# Patient Record
Sex: FEMALE | Race: WHITE | Employment: FULL TIME | URBAN - METROPOLITAN AREA
[De-identification: names, ages, dates, MRNs, and addresses within clinical notes are randomized per-mention and may not be internally consistent; named-entity substitution may affect disease eponyms.]

---

## 2017-01-03 ENCOUNTER — TELEPHONE (OUTPATIENT)
Dept: PEDIATRICS | Facility: CLINIC | Age: 47
End: 2017-01-03

## 2017-01-03 NOTE — TELEPHONE ENCOUNTER
[facility] Call Center    Phone Message    Name of Caller: ANDIE from  [facility]    Phone Number: Other phone number: [phone].  Andie states she sent a message last week and never heard back from Dr Limon.  Needs order for home care.  Please call her back.

## 2017-01-03 NOTE — TELEPHONE ENCOUNTER
Called Andie, Home care RN.  She has orders for wound care, she needs the orders to reflect  RN visits for wound care 1 time per week for 7 weeks with 4 PRN visits.    Routing to Carmen Paez RN.     Olive Lam RN, [facility]

## 2017-01-03 NOTE — TELEPHONE ENCOUNTER
Called Andie, ok'd 1 time per week RN visits to change dressing for 7 weeks with 4 prn visits.  As long as patient is comfortable with doing her wound care daily, homecare can come out 1 time per week.  If patient is not comfortable they will call to get additional orders.  Andie stated patient is comfortable with her dressing changes as of now.    Will fax orders to be signed later.  Carmen Paez RN

## 2017-01-10 ENCOUNTER — OFFICE VISIT (OUTPATIENT)
Dept: PODIATRY | Facility: CLINIC | Age: 47
End: 2017-01-10
Payer: MEDICARE

## 2017-01-10 VITALS — DIASTOLIC BLOOD PRESSURE: 78 MMHG | SYSTOLIC BLOOD PRESSURE: 124 MMHG

## 2017-01-10 DIAGNOSIS — Z53.9 ERRONEOUS ENCOUNTER--DISREGARD: Primary | ICD-10-CM

## 2017-01-10 DIAGNOSIS — L97.422 ULCER OF HEEL AND MIDFOOT, LEFT, WITH FAT LAYER EXPOSED (H): Primary | ICD-10-CM

## 2017-01-10 PROCEDURE — 97597 DBRDMT OPN WND 1ST 20 CM/<: CPT | Performed by: PODIATRIST

## 2017-01-10 NOTE — PATIENT INSTRUCTIONS
Thanks for coming today.  Ortho/Sports Medicine Clinic  [address] [place] [address]    To schedule future appointments in Ortho Clinic, you may call [phone].    To schedule ordered imaging by your provider:   Call [facility] [phone]    To schedule an injection ordered by your provider:  Call [facility] Injection scheduling line: [phone]  [employer] available online at:  [email or web address]    Please call if any further questions or concerns ([phone]).  Clinic hours 8 am to 5 pm.    Return to clinic (call) if symptoms worsen or fail to improve.

## 2017-01-10 NOTE — PROGRESS NOTES
Past Medical History   Diagnosis Date     Tobacco use disorder 1/30/2012     Paraplegic immobility syndrome      Diarrhea      Abdominal pain      Patient Active Problem List   Diagnosis     Paraplegia (H)     Suprapubic catheter (H)     Gastrointestinal problem     CARDIOVASCULAR SCREENING; LDL GOAL LESS THAN 160     Dysmenorrhea     Neuropathic pain of flank     Neurogenic bladder     Endometriosis     IBS (irritable bowel syndrome)     Generalized anxiety disorder     Past Surgical History   Procedure Laterality Date     C spine fusion,poster,7-12 sgmts  2004     From T10 to L5     Cholecystectomy  1990's     Colonoscopy       Colonoscopy Left 11/25/2014     Procedure: COMBINED COLONOSCOPY, SINGLE OR MULTIPLE BIOPSY/POLYPECTOMY BY BIOPSY;  Surgeon: Junior Galeano MD;  Location:  GI     Esophagoscopy, gastroscopy, duodenoscopy (egd), combined N/A 11/25/2014     Procedure: COMBINED ESOPHAGOSCOPY, GASTROSCOPY, DUODENOSCOPY (EGD), BIOPSY SINGLE OR MULTIPLE;  Surgeon: Junior Galeano MD;  Location:  GI     Social History     Social History     Marital Status: Single     Spouse Name: N/A     Number of Children: N/A     Years of Education: N/A     Occupational History     Not on file.     Social History Main Topics     Smoking status: Former Smoker     Quit date: 03/18/2012     Smokeless tobacco: Never Used      Comment: 7 cig a day off and on socially     Alcohol Use: No     Drug Use: No     Sexual Activity: No     Other Topics Concern     Not on file     Social History Narrative     Family History   Problem Relation Age of Onset     Hypertension Father      Heart Failure Maternal Grandmother        SUBJECTIVE:  A 46-year-old female returns to clinic for ulcers, left heel, paronychia, left hallux.  She relates she is doing okay.  She feels it is doing better and she is using the Silvadene cream, cleaning it with MicroKlenz and putting on a dressing.  She got her PRAFO boot and that seems to help.   There is not much drainage.      OBJECTIVE:  DP and PT are 2/4 left.  She has some peripheral edema.  She has a left medial heel ulcer with full-thickness eschar that is about 3.5 x 5 cm with floresita margins.  There is no erythema, minimal drainage, no odor, no calor.  She has a lateral heel ulcer that is about 4.5 x 5.5 cm, full-thickness eschar with loosening, floresita margins.  There is some hyperkeratotic tissue buildup in the margins on both ulcer sites.  The lateral heel ulcer has some serosanguineous drainage.  No erythema, no odor, no calor.  Left hallux nail bed is intact.  No erythema, no drainage, no odor, no calor there.      ASSESSMENT AND PLAN:  Ulcers, left heel.  Diagnosis and treatment options discussed with her.  I am going to continue the Silvadene, clean with MicroKlenz and a dressing.  Continue with PRAFO boot and offloading.  She can d/c the wound care on the paronychia; that appears to be resolved.  She will return to clinic and see me in about 2 weeks.  I sharp debrided the margins of the eschars with a tissue cutter through the dermis.  No anesthesia needed.  Local wound care done upon consent today.

## 2017-01-10 NOTE — NURSING NOTE
"Priyanka Martinez's goals for this visit include: Recheck left foot.  She requests these members of her care team be copied on today's visit information: yes    PCP: Ayala Limon    Referring Provider:  No referring provider defined for this encounter.    Chief Complaint   Patient presents with     RECHECK     Recheck left foot ulcer.        Initial /78 mmHg Estimated body mass index is 22.24 kg/(m^2) as calculated from the following:    Height as of 12/17/16: 1.778 m (5' 10\").    Weight as of 12/17/16: 70.308 kg (155 lb).  BP completed using cuff size: large    "

## 2017-01-25 ENCOUNTER — OFFICE VISIT (OUTPATIENT)
Dept: PODIATRY | Facility: CLINIC | Age: 47
End: 2017-01-25
Payer: MEDICARE

## 2017-01-25 VITALS — SYSTOLIC BLOOD PRESSURE: 127 MMHG | DIASTOLIC BLOOD PRESSURE: 81 MMHG | HEART RATE: 102 BPM | OXYGEN SATURATION: 99 %

## 2017-01-25 DIAGNOSIS — L97.422 ULCER OF HEEL AND MIDFOOT, LEFT, WITH FAT LAYER EXPOSED (H): Primary | ICD-10-CM

## 2017-01-25 DIAGNOSIS — K58.9 IRRITABLE BOWEL SYNDROME: ICD-10-CM

## 2017-01-25 DIAGNOSIS — N94.6 DYSMENORRHEA: Primary | ICD-10-CM

## 2017-01-25 DIAGNOSIS — L60.0 INGROWING NAIL: ICD-10-CM

## 2017-01-25 PROCEDURE — 97597 DBRDMT OPN WND 1ST 20 CM/<: CPT | Performed by: PODIATRIST

## 2017-01-25 RX ORDER — BENZETHONIUM CHLORIDE
CLEANSER (ML) TOPICAL DAILY
Qty: 240 ML | Refills: 5 | Status: SHIPPED | OUTPATIENT
Start: 2017-01-25 | End: 2018-11-09

## 2017-01-25 NOTE — PROGRESS NOTES
Past Medical History   Diagnosis Date     Tobacco use disorder 1/30/2012     Paraplegic immobility syndrome      Diarrhea      Abdominal pain      Patient Active Problem List   Diagnosis     Paraplegia (H)     Suprapubic catheter (H)     Gastrointestinal problem     CARDIOVASCULAR SCREENING; LDL GOAL LESS THAN 160     Dysmenorrhea     Neuropathic pain of flank     Neurogenic bladder     Endometriosis     IBS (irritable bowel syndrome)     Generalized anxiety disorder     Past Surgical History   Procedure Laterality Date     C spine fusion,poster,7-12 sgmts  2004     From T10 to L5     Cholecystectomy  1990's     Colonoscopy       Colonoscopy Left 11/25/2014     Procedure: COMBINED COLONOSCOPY, SINGLE OR MULTIPLE BIOPSY/POLYPECTOMY BY BIOPSY;  Surgeon: Junior Galeano MD;  Location:  GI     Esophagoscopy, gastroscopy, duodenoscopy (egd), combined N/A 11/25/2014     Procedure: COMBINED ESOPHAGOSCOPY, GASTROSCOPY, DUODENOSCOPY (EGD), BIOPSY SINGLE OR MULTIPLE;  Surgeon: Junior Galeano MD;  Location:  GI     Social History     Social History     Marital Status: Single     Spouse Name: N/A     Number of Children: N/A     Years of Education: N/A     Occupational History     Not on file.     Social History Main Topics     Smoking status: Former Smoker     Quit date: 03/18/2012     Smokeless tobacco: Never Used      Comment: 7 cig a day off and on socially     Alcohol Use: No     Drug Use: No     Sexual Activity: No     Other Topics Concern     Not on file     Social History Narrative     Family History   Problem Relation Age of Onset     Hypertension Father      Heart Failure Maternal Grandmother        SUBJECTIVE FINDINGS:  46-year-old female returns to clinic for ulcers left heel and distal hallux.  Relates she is doing well.  It is draining a little bit but not a lot.  They are using the Silvadene and cleaning with MicroKlenz and that seems to be working well.  She is using an off-loading boot and Ace  wraps as well.  Presents in a wheelchair.      OBJECTIVE FINDINGS:  DP and PT 2/4 left.  She has minimal peripheral edema.  She has a left hallux scab present.  No erythema, no drainage, no odor, no calor there.  She has a left heel medial and lateral full-thickness eschar with loosening margins.  On the lateral heel there is a more proximal one with underlying skin intact after that is removed.  The medial heel ulcer is about 3.5 x 5 cm with loosening margins that have new skin margins underlying it when they are removed.  There is minimal drainage.  No erythema, no odor, no calor there.  She has a left lateral heel ulcer eschar that is about 4.5 x 5.5 cm, again with loosening margins with new skin underlying these margins.  It is floresita.  Minimal drainage.  No erythema, no odor, no calor there.      ASSESSMENT/PLAN:  Ulcers left heel.  This is healing well.  Diagnosis and treatment options discussed with patient.  Continue the treatment plan.  Continue cleaning with MicroKlenz, applying Silvadene and offloading.  Sharp ulcer debridement of the eschars and margins with #15 blade and tissue cutter through the dermis done today upon consent.  She will return to clinic and see me in 2 weeks.

## 2017-01-25 NOTE — NURSING NOTE
"Priyanka Martinez's goals for this visit include: Recheck left foot ulcer  She requests these members of her care team be copied on today's visit information: yes    PCP: Ayala Limon    Referring Provider:  No referring provider defined for this encounter.    Chief Complaint   Patient presents with     RECHECK     Recheck left foot ulcers       Initial /81 mmHg  Pulse 102  SpO2 99% Estimated body mass index is 22.24 kg/(m^2) as calculated from the following:    Height as of 12/17/16: 1.778 m (5' 10\").    Weight as of 12/17/16: 70.308 kg (155 lb).  BP completed using cuff size: regular    "

## 2017-01-25 NOTE — PATIENT INSTRUCTIONS
Thanks for coming today.  Ortho/Sports Medicine Clinic  [address] [place] [address]    To schedule future appointments in Ortho Clinic, you may call [phone].    To schedule ordered imaging by your Provider: Call [facility] at [phone]    [employer] available online at:   [email or web address]    Please call if any further questions or concerns [phone] and ask for the Orthopedic Department. Clinic hours 8 am to 5 pm.    Return to clinic if symptoms worsen.

## 2017-01-28 ENCOUNTER — DOCUMENTATION ONLY (OUTPATIENT)
Dept: CARE COORDINATION | Facility: CLINIC | Age: 47
End: 2017-01-28

## 2017-01-28 NOTE — PROGRESS NOTES
[facility] now requests orders and shares plan of care/discharge summaries for some patients through [employer].  Please REPLY TO THIS MESSAGE in order to give authorization for orders when needed.  This is considered a verbal order, you will still receive a faxed copy of orders for signature.  Thank you for your assistance in improving collaboration for our patients.      DISCHARGE SUMMARY  Goals met   Patient/Caregiver will verbalize s/s to report to Home Care clinician or physician.   Patient will demonstrate ability to maintain safety in home environment without injury/falls.   Patient will demonstrate ability to maintain condition in the home without hospitalization, ER visit, or unplanned physicians visit.   Patient will   demonstrate knowledge of disease process, treatment goals and self-care management.   Patient will demonstrate stable physiological status within normal limits for patient.   Patient/caregiver will demonstrate effective disease management practices.   Patient will achieve adequate symptom control through use of medications or other therapies/treatments.   Patient will remain free of S/S of infection.   Patient will demonstrate compliance with treatment plan, diet, meds, exercise, other.   Patient/caregiver will verbalize appropriate measures for managing changes in body image/lifestyle.   Patient/caregiver will verbalize community resources available and how to contact them.    Pt was discharged from [facility] on 1/27/17. Patient status has improved with education from . Discharge instructions given to Patient. Pt is following up with Dr Rojas for wound care every 2 weeks at the [facility]. Pt is performing her own wound care daily. Pt has an unstageable pressure ulcer to her left medial ankle that measures 4.1 cm X 2.9 cm, wound bed is entirely covered with black eschar. Pt has an unstageable pressure ulcer to her left lateral ankle that measures  4.1 cm X 3.4 cm, wound bed is entirely covered with black eschar. Pt has a Stage 3 pressure ulcer to her left great toe that measures 0.9 cm X 0.2 cm, wound bed is covered with pink/red tissue. None of the areas appear infected, no redness, warmth, and no drainage. Surrounding tissue to each wound is pink. Pt is cleansing the wounds daily with ns and covering the eschar areas with Silvedene and gauze and wrapping with Kerlix. The stage 3 ulcer is just covered with gauze and kerlix, no medication to this area. At the [facility], pt also had an unstageable pressure ulcer to her left lateral ankle that was superior to the other ulcer listed that has healed.  Medication instruction\schedule reviewed with pt. Pt is ind with all of her own medications. Pt is knowledgable about her medications and has no questions or concerns about them.  Activity guidelines/restrictions discussed with pt. Pt is a paraplegic and lives alone. Pt has a ramp on the front of her home. Pt is ind with all w/c mobiltiy. Pt is able to transfer herself from w/c to bed or chair. Pt has had no falls or visits to the ER recently. Pt has a suprapubic catheter that she changes independently every month. Urine is clear and yellow. Pt has a bowel protocol that she follows every 3 days to try to be as continent at possible. Pt denies any constipation or diarrhea. Pt cooks all of her own meals. Pt reports that her appetite is good. Pt is ind with dressing/grooming and showering. Pt has a shower bench in her bathroom and a hand held shower sprayer.       Follow up with Dr Rojas in 2 weeks and PCP as needed.  Physician notified of discharge via [email or web address])

## 2017-01-30 RX ORDER — DICYCLOMINE HCL 20 MG
TABLET ORAL
Qty: 360 TABLET | Refills: 0 | Status: SHIPPED | OUTPATIENT
Start: 2017-01-30 | End: 2017-02-13

## 2017-01-30 RX ORDER — MEDROXYPROGESTERONE ACETATE 10 MG
TABLET ORAL
Qty: 135 TABLET | Refills: 0 | Status: SHIPPED | OUTPATIENT
Start: 2017-01-30 | End: 2017-02-13

## 2017-01-30 NOTE — TELEPHONE ENCOUNTER
Prescription approved per [facility] Refill Protocol.  Jaja refill provided to patient for Bentyl and Provera.  30 day supply provided to patient.  Assisted patient with scheduling annual follow up with Dr. Limon.

## 2017-01-31 ENCOUNTER — MYC MEDICAL ADVICE (OUTPATIENT)
Dept: HEALTH INFORMATION MANAGEMENT | Facility: CLINIC | Age: 47
End: 2017-01-31

## 2017-02-07 ENCOUNTER — OFFICE VISIT (OUTPATIENT)
Dept: PODIATRY | Facility: CLINIC | Age: 47
End: 2017-02-07
Payer: MEDICARE

## 2017-02-07 VITALS — OXYGEN SATURATION: 98 % | DIASTOLIC BLOOD PRESSURE: 80 MMHG | HEART RATE: 101 BPM | SYSTOLIC BLOOD PRESSURE: 136 MMHG

## 2017-02-07 DIAGNOSIS — L97.422 ULCER OF HEEL AND MIDFOOT, LEFT, WITH FAT LAYER EXPOSED (H): Primary | ICD-10-CM

## 2017-02-07 PROCEDURE — 97597 DBRDMT OPN WND 1ST 20 CM/<: CPT | Performed by: PODIATRIST

## 2017-02-07 NOTE — PROGRESS NOTES
Past Medical History   Diagnosis Date     Tobacco use disorder 1/30/2012     Paraplegic immobility syndrome      Diarrhea      Abdominal pain      Patient Active Problem List   Diagnosis     Paraplegia (H)     Suprapubic catheter (H)     Gastrointestinal problem     CARDIOVASCULAR SCREENING; LDL GOAL LESS THAN 160     Dysmenorrhea     Neuropathic pain of flank     Neurogenic bladder     Endometriosis     IBS (irritable bowel syndrome)     Generalized anxiety disorder     Past Surgical History   Procedure Laterality Date     C spine fusion,poster,7-12 sgmts  2004     From T10 to L5     Cholecystectomy  1990's     Colonoscopy       Colonoscopy Left 11/25/2014     Procedure: COMBINED COLONOSCOPY, SINGLE OR MULTIPLE BIOPSY/POLYPECTOMY BY BIOPSY;  Surgeon: Junior Galeano MD;  Location:  GI     Esophagoscopy, gastroscopy, duodenoscopy (egd), combined N/A 11/25/2014     Procedure: COMBINED ESOPHAGOSCOPY, GASTROSCOPY, DUODENOSCOPY (EGD), BIOPSY SINGLE OR MULTIPLE;  Surgeon: Junior Galeano MD;  Location:  GI     Social History     Social History     Marital Status: Single     Spouse Name: N/A     Number of Children: N/A     Years of Education: N/A     Occupational History     Not on file.     Social History Main Topics     Smoking status: Former Smoker     Quit date: 03/18/2012     Smokeless tobacco: Never Used      Comment: 7 cig a day off and on socially     Alcohol Use: No     Drug Use: No     Sexual Activity: No     Other Topics Concern     Not on file     Social History Narrative     Family History   Problem Relation Age of Onset     Hypertension Father      Heart Failure Maternal Grandmother        SUBJECTIVE FINDINGS:  46-year-old female returns to clinic for ulcers left heel and left hallux.  Relates it is doing well.  Seems to be floresita.  They are using the Silvadene and cleaning with MicroKlenz.  She is using the off-loading boot.      OBJECTIVE FINDINGS:  DP and PT 2/4 left.  She has some  peripheral edema.  She has a left medial heel ulcer with full-thickness eschar.  It is about 5 x 3 cm at its widest margin.  It is floresita.  There is some loosening of eschar, mostly on the margins.  No erythema.  Minimal drainage.  No odor, no calor.  No local edema.  She has a left lateral heel ulcer that is about 5 x 4.5 cm at its widest margins.  It is full-thickness eschar.  Margins are floresita.  There is some loosening of the eschar, mostly at the margins.  Minimal drainage.  No erythema, no odor, no calor.  No gross edema.       ASSESSMENT/PLAN:  Ulcers left heel.  Diagnosis and treatment options discussed with patient.  This is improving.  Sharp ulcer through the dermis with a tissue cutter and #15 blade.  No anesthesia needed.  Local wound care done upon consent today.  It was clean upon debridement.  Continue the Silvadene cream and dressing changes.  Wound cares.  She will return to clinic and see me in 2 weeks.

## 2017-02-07 NOTE — NURSING NOTE
"Priyanka Martinez's goals for this visit include: Left foot recheck  She requests these members of her care team be copied on today's visit information: yes    PCP: Ayala Limon    Referring Provider:  No referring provider defined for this encounter.    Chief Complaint   Patient presents with     RECHECK     Left foot recheck       Initial /80 mmHg  Pulse 101  SpO2 98% Estimated body mass index is 22.24 kg/(m^2) as calculated from the following:    Height as of 12/17/16: 1.778 m (5' 10\").    Weight as of 12/17/16: 70.308 kg (155 lb).  Medication Reconciliation: complete    "

## 2017-02-07 NOTE — PATIENT INSTRUCTIONS
Thanks for coming today.  Ortho/Sports Medicine Clinic  [address] [place] [address]    To schedule future appointments in Ortho Clinic, you may call [phone].    To schedule ordered imaging by your Provider: Call [facility] at [phone]    [employer] available online at:   [email or web address]    Please call if any further questions or concerns [phone] and ask for the Orthopedic Department. Clinic hours 8 am to 5 pm.    Return to clinic if symptoms worsen.

## 2017-02-13 ENCOUNTER — OFFICE VISIT (OUTPATIENT)
Dept: PEDIATRICS | Facility: CLINIC | Age: 47
End: 2017-02-13
Payer: MEDICARE

## 2017-02-13 VITALS
SYSTOLIC BLOOD PRESSURE: 116 MMHG | OXYGEN SATURATION: 97 % | HEART RATE: 88 BPM | DIASTOLIC BLOOD PRESSURE: 74 MMHG | TEMPERATURE: 97.9 F

## 2017-02-13 DIAGNOSIS — K58.0 IRRITABLE BOWEL SYNDROME WITH DIARRHEA: ICD-10-CM

## 2017-02-13 DIAGNOSIS — Z00.00 MEDICARE ANNUAL WELLNESS VISIT, SUBSEQUENT: Primary | ICD-10-CM

## 2017-02-13 DIAGNOSIS — N94.6 DYSMENORRHEA: ICD-10-CM

## 2017-02-13 DIAGNOSIS — F32.5 MAJOR DEPRESSION IN COMPLETE REMISSION (H): ICD-10-CM

## 2017-02-13 DIAGNOSIS — N31.9 NEUROGENIC BLADDER: ICD-10-CM

## 2017-02-13 DIAGNOSIS — F41.9 ANXIETY: ICD-10-CM

## 2017-02-13 DIAGNOSIS — R82.90 ABNORMAL URINE ODOR: ICD-10-CM

## 2017-02-13 PROCEDURE — G0439 PPPS, SUBSEQ VISIT: HCPCS | Performed by: INTERNAL MEDICINE

## 2017-02-13 RX ORDER — MEDROXYPROGESTERONE ACETATE 10 MG
TABLET ORAL
Qty: 135 TABLET | Refills: 3 | Status: SHIPPED
Start: 2017-02-13 | End: 2018-02-19

## 2017-02-13 RX ORDER — OXYBUTYNIN CHLORIDE 5 MG/1
5 TABLET ORAL 2 TIMES DAILY
Qty: 180 TABLET | Refills: 3 | Status: SHIPPED
Start: 2017-02-13 | End: 2018-02-19

## 2017-02-13 RX ORDER — DICYCLOMINE HCL 20 MG
20 TABLET ORAL 2 TIMES DAILY
Qty: 180 TABLET | Refills: 3 | Status: SHIPPED
Start: 2017-02-13 | End: 2018-02-19

## 2017-02-13 NOTE — NURSING NOTE
"Chief Complaint   Patient presents with     Wellness Visit       Initial /74 (Cuff Size: Adult Regular)  Pulse 88  Temp 97.9  F (36.6  C) (Temporal)  SpO2 97% Estimated body mass index is 22.24 kg/(m^2) as calculated from the following:    Height as of 12/17/16: 5' 10\" (1.778 m).    Weight as of 12/17/16: 155 lb (70.3 kg).  Medication Reconciliation: unable or not appropriate to perform height and weight    "

## 2017-02-13 NOTE — PATIENT INSTRUCTIONS
Drop off urine sample when you get it at home.       Preventive Health Recommendations    Female Ages 65 +    Yearly exam:     See your health care provider every year in order to  o Review health changes.   o Discuss preventive care.    o Review your medicines if your doctor has prescribed any.      You no longer need a yearly Pap test unless you've had an abnormal Pap test in the past 10 years. If you have vaginal symptoms, such as bleeding or discharge, be sure to talk with your provider about a Pap test.      Every 1 to 2 years, have a mammogram.  If you are over 69, talk with your health care provider about whether or not you want to continue having screening mammograms.      Every 10 years, have a colonoscopy. Or, have a yearly FIT test (stool test). These exams will check for colon cancer.       Have a cholesterol test every 5 years, or more often if your doctor advises it.       Have a diabetes test (fasting glucose) every three years. If you are at risk for diabetes, you should have this test more often.       At age 65, have a bone density scan (DEXA) to check for osteoporosis (brittle bone disease).    Shots:    Get a flu shot each year.    Get a tetanus shot every 10 years.    Talk to your doctor about your pneumonia vaccines. There are now two you should receive - Pneumovax (PPSV 23) and Prevnar (PCV 13).    Talk to your doctor about the shingles vaccine.    Talk to your doctor about the hepatitis B vaccine.    Nutrition:     Eat at least 5 servings of fruits and vegetables each day.      Eat whole-grain bread, whole-wheat pasta and brown rice instead of white grains and rice.      Talk to your provider about Calcium and Vitamin D.     Lifestyle    Exercise at least 150 minutes a week (30 minutes a day, 5 days a week). This will help you control your weight and prevent disease.      Limit alcohol to one drink per day.      No smoking.       Wear sunscreen to prevent skin cancer.       See your dentist  twice a year for an exam and cleaning.      See your eye doctor every 1 to 2 years to screen for conditions such as glaucoma, macular degeneration and cataracts.

## 2017-02-13 NOTE — PROGRESS NOTES
SUBJECTIVE:                                                            Priyanka Martinez is a 46 year old female who presents for Preventive Visit.    Has suprapubic catheter. Urine smells funny for a week.  Depression/anxiety well controlled with fluoxetine    Last year had left tibial fx and subsequent ulcer. It is finally healing. Seeing podiatrist every other week for wound care.   Are you in the first 12 months of your Medicare Part B coverage?  No    Healthy Habits:    Do you get at least three servings of calcium containing foods daily (dairy, green leafy vegetables, etc.)? yes    Amount of exercise or daily activities, outside of work: daily day(s) per week    Problems taking medications regularly No    Medication side effects: No    Have you had an eye exam in the past two years? no    Do you see a dentist twice per year? no    Do you have sleep apnea, excessive snoring or daytime drowsiness?no    COGNITIVE SCREEN  1) Repeat 3 items (Banana, Sunrise, Chair)    2) Clock draw: NORMAL  3) 3 item recall: Recalls 1 objects  Results: 1 items recalled: history of TBI. Normal clock draw.     Mini-CogTM Copyright S Oscar. Licensed by the author for use in [facility]; reprinted with permission ([email or web address]@[email or web address]). All rights reserved.          PROBLEMS TO ADD ON...  Urinary concerns, infections    All Histories reviewed and updated in EPIC as appropriate.  Social History   Substance Use Topics     Smoking status: Former Smoker     Quit date: 3/18/2012     Smokeless tobacco: Never Used      Comment: 7 cig a day off and on socially     Alcohol use No       The patient does not drink >3 drinks per day nor >7 drinks per week.    Today's PHQ-2 Score:   PHQ-2 ( 1999 Pf[employer]) 12/7/2015 5/11/2015   Q1: Little interest or pleasure in doing things - 0   Q2: Feeling down, depressed or hopeless - 0   PHQ-2 Score - 0   Little interest or pleasure in doing things Not at all -   Feeling down, depressed or hopeless Not at  "all -   PHQ-2 Score 0 -       Do you feel safe in your environment - Yes    Do you have a Health Care Directive?: Yes: Patient states has Advance Directive and will bring in a copy to clinic.    Current providers sharing in care for this patient include:   Patient Care Team:  Ayala Limon MD as PCP - General (Internal Medicine)      Hearing impairment: No    Ability to successfully perform activities of daily living: Yes, no assistance needed     Fall risk:       Home safety:  none identified      The following health maintenance items are reviewed in Epic and correct as of today:  Health Maintenance   Topic Date Due     DEPRESSION ACTION PLAN Q1 YR (NO INBASKET)  1970     PHQ-9 Q6 MONTHS (NO INBASKET)  08/03/2014     LIPID MONITORING Q5 YEARS (NO INBASKET)  12/08/2020     TETANUS IMMUNIZATION (SYSTEM ASSIGNED)  12/08/2025     INFLUENZA VACCINE (SYSTEM ASSIGNED)  Addressed         Mammogram Screening: Mammo discussed, not appropriate for or declined by this patient.     ROS:  Constitutional, HEENT, cardiovascular, pulmonary, gi and gu systems are negative, except as otherwise noted.    Problem list, Medication list, Allergies, and Medical/Social/Surgical histories reviewed in [facility] and updated as appropriate.  OBJECTIVE:                                                            /74 (Cuff Size: Adult Regular)  Pulse 88  Temp 97.9  F (36.6  C) (Temporal)  SpO2 97% Estimated body mass index is 22.24 kg/(m^2) as calculated from the following:    Height as of 12/17/16: 5' 10\" (1.778 m).    Weight as of 12/17/16: 155 lb (70.3 kg).  EXAM:   GENERAL: healthy, alert and no distress  EYES: Eyes grossly normal to inspection, PERRL and conjunctivae and sclerae normal  HENT: ear canals and TM's normal, nose and mouth without ulcers or lesions  NECK: no adenopathy, no asymmetry, masses, or scars and thyroid normal to palpation  RESP: lungs clear to auscultation - no rales, rhonchi or wheezes  BREAST: normal without " "masses, tenderness or nipple discharge and no palpable axillary masses or adenopathy  CV: regular rate and rhythm, normal S1 S2, no S3 or S4, no murmur, click or rub, no peripheral edema and peripheral pulses strong  ABDOMEN: soft, nontender, no hepatosplenomegaly, no masses and bowel sounds normal  MS: no gross musculoskeletal defects noted, no edema  SKIN: no suspicious lesions or rashes  NEURO: Normal strength and tone, mentation intact and speech normal  PSYCH: mentation appears normal, affect normal/bright    ASSESSMENT / PLAN:                                                                ICD-10-CM    1. Medicare annual wellness visit, subsequent Z00.00    2. Abnormal urine odor R82.90 UA with Microscopic reflex to Culture ([place])   3. Irritable bowel syndrome with diarrhea K58.0 dicyclomine (BENTYL) 20 MG tablet   4. Neurogenic bladder N31.9 oxybutynin (DITROPAN) 5 MG tablet   5. Dysmenorrhea N94.6 medroxyPROGESTERone (PROVERA) 10 MG tablet   6. Anxiety F41.9    7. Major depression in complete remission (H) F32.5      -- stable chronic health issues. Medications refilled.    -- urine odor: pt will get urine sample at home and send it in.     End of Life Planning:  Patient currently has an advanced directive: No.  I have verified the patient's ablity to prepare an advanced directive/make health care decisions.  Literature was provided to assist patient in preparing an advanced directive.    COUNSELING:  Reviewed preventive health counseling, as reflected in patient instructions        Estimated body mass index is 22.24 kg/(m^2) as calculated from the following:    Height as of 12/17/16: 5' 10\" (1.778 m).    Weight as of 12/17/16: 155 lb (70.3 kg).  Weight management plan: not checking weight, wheelchair dependent   reports that she quit smoking about 4 years ago. She has never used smokeless tobacco.      Appropriate preventive services were discussed with this patient, including applicable screening as " appropriate for cardiovascular disease, diabetes, osteopenia/osteoporosis, and glaucoma.  As appropriate for age/gender, discussed screening for colorectal cancer, prostate cancer, breast cancer, and cervical cancer. Checklist reviewing preventive services available has been given to the patient.    Reviewed patients plan of care and provided an AVS. The Basic Care Plan (routine screening as documented in Health Maintenance) for Priyanka meets the Care Plan requirement. This Care Plan has been established and reviewed with the Patient.    Counseling Resources:  ATP IV Guidelines  Pooled Cohorts Equation Calculator  Breast Cancer Risk Calculator  FRAX Risk Assessment  ICSI Preventive Guidelines  Dietary Guidelines for Americans, 2010  USDA's MyPlate  ASA Prophylaxis  Lung CA Screening    Ayala Liomn MD, MD  [facility]

## 2017-02-13 NOTE — LETTER
My Depression Action Plan  Name: Priyanka Martinez   Date of Birth 1970  Date: 2/13/2017    My doctor: Ayala Limon   My clinic: [address] [address] [phone]          GREEN    ZONE   Good Control    What it looks like:     Things are going generally well. You have normal up s and down s. You may even feel depressed from time to time, but bad moods usually last less than a day.   What you need to do:  1. Continue to care for yourself (see self care plan)  2. Check your depression survival kit and update it as needed  3. Follow your physician s recommendations including any medication.  4. Do not stop taking medication unless you consult with your physician first.           YELLOW         ZONE Getting Worse    What it looks like:     Depression is starting to interfere with your life.     It may be hard to get out of bed; you may be starting to isolate yourself from others.    Symptoms of depression are starting to last most all day and this has happened for several days.     You may have suicidal thoughts but they are not constant.   What you need to do:     1. Call your care team, your response to treatment will improve if you keep your care team informed of your progress. Yellow periods are signs an adjustment may need to be made.     2. Continue your self-care, even if you have to fake it!    3. Talk to someone in your support network    4. Open up your depression survival kit           RED    ZONE Medical Alert - Get Help    What it looks like:     Depression is seriously interfering with your life.     You may experience these or other symptoms: You can t get out of bed most days, can t work or engage in other necessary activities, you have trouble taking care of basic hygiene, or basic responsibilities, thoughts of suicide or death that will not go away, self-injurious behavior.     What you need to do:  1. Call your care team and  request a same-day appointment. If they are not available (weekends or after hours) call your local crisis line, emergency room or 911.      Electronically signed by: Ayala Limon MD, February 13, 2017    Depression Self Care Plan / Survival Kit    Self-Care for Depression  Here s the deal. Your body and mind are really not as separate as most people think.  What you do and think affects how you feel and how you feel influences what you do and think. This means if you do things that people who feel good do, it will help you feel better.  Sometimes this is all it takes.  There is also a place for medication and therapy depending on how severe your depression is, so be sure to consult with your medical provider and/ or Behavioral Health Consultant if your symptoms are worsening or not improving.     In order to better manage my stress, I will:    Exercise  Get some form of exercise, every day. This will help reduce pain and release endorphins, the  feel good  chemicals in your brain. This is almost as good as taking antidepressants!  This is not the same as joining a gym and then never going! (they count on that by the way ) It can be as simple as just going for a walk or doing some gardening, anything that will get you moving.      Hygiene   Maintain good hygiene (Get out of bed in the morning, Make your bed, Brush your teeth, Take a shower, and Get dressed like you were going to work, even if you are unemployed).  If your clothes don't fit try to get ones that do.    Diet  I will strive to eat foods that are good for me, drink plenty of water, and avoid excessive sugar, caffeine, alcohol, and other mood-altering substances.  Some foods that are helpful in depression are: complex carbohydrates, B vitamins, flaxseed, fish or fish oil, fresh fruits and vegetables.    Psychotherapy  I agree to participate in Individual Therapy (if recommended).    Medication  If prescribed medications, I agree to take them.  Missing doses  can result in serious side effects.  I understand that drinking alcohol, or other illicit drug use, may cause potential side effects.  I will not stop my medication abruptly without first discussing it with my provider.    Staying Connected With Others  I will stay in touch with my friends, family members, and my primary care provider/team.    Use your imagination  Be creative.  We all have a creative side; it doesn t matter if it s oil painting, sand castles, or mud pies! This will also kick up the endorphins.    Witness Beauty  (AKA stop and smell the roses) Take a look outside, even in mid-winter. Notice colors, textures. Watch the squirrels and birds.     Service to others  Be of service to others.  There is always someone else in need.  By helping others we can  get out of ourselves  and remember the really important things.  This also provides opportunities for practicing all the other parts of the program.    Humor  Laugh and be silly!  Adjust your TV habits for less news and crime-drama and more comedy.    Control your stress  Try breathing deep, massage therapy, biofeedback, and meditation. Find time to relax each day.     My support system    Clinic Contact:  Phone number:    Contact 1:  Phone number:    Contact 2:  Phone number:    [employer]/:  Phone number:    Therapist:  Phone number:    Local crisis center:    Phone number:    Other community support:  Phone number:

## 2017-02-13 NOTE — MR AVS SNAPSHOT
After Visit Summary   2/13/2017    Priyanka Martinez    MRN: [id number]           Patient Information     Date Of Birth          1970        Visit Information        Provider Department      2/13/2017 3:00 PM Ayala Limon MD [facility]        Today's Diagnoses     Medicare annual wellness visit, subsequent    -  1    Abnormal urine odor        Irritable bowel syndrome with diarrhea        Neurogenic bladder        Dysmenorrhea        Anxiety        Major depression in complete remission (H)          Care Instructions    Drop off urine sample when you get it at home.       Preventive Health Recommendations    Female Ages 65 +    Yearly exam:     See your health care provider every year in order to  o Review health changes.   o Discuss preventive care.    o Review your medicines if your doctor has prescribed any.      You no longer need a yearly Pap test unless you've had an abnormal Pap test in the past 10 years. If you have vaginal symptoms, such as bleeding or discharge, be sure to talk with your provider about a Pap test.      Every 1 to 2 years, have a mammogram.  If you are over 69, talk with your health care provider about whether or not you want to continue having screening mammograms.      Every 10 years, have a colonoscopy. Or, have a yearly FIT test (stool test). These exams will check for colon cancer.       Have a cholesterol test every 5 years, or more often if your doctor advises it.       Have a diabetes test (fasting glucose) every three years. If you are at risk for diabetes, you should have this test more often.       At age 65, have a bone density scan (DEXA) to check for osteoporosis (brittle bone disease).    Shots:    Get a flu shot each year.    Get a tetanus shot every 10 years.    Talk to your doctor about your pneumonia vaccines. There are now two you should receive - Pneumovax (PPSV 23) and Prevnar (PCV 13).    Talk to your doctor about the shingles  vaccine.    Talk to your doctor about the hepatitis B vaccine.    Nutrition:     Eat at least 5 servings of fruits and vegetables each day.      Eat whole-grain bread, whole-wheat pasta and brown rice instead of white grains and rice.      Talk to your provider about Calcium and Vitamin D.     Lifestyle    Exercise at least 150 minutes a week (30 minutes a day, 5 days a week). This will help you control your weight and prevent disease.      Limit alcohol to one drink per day.      No smoking.       Wear sunscreen to prevent skin cancer.       See your dentist twice a year for an exam and cleaning.      See your eye doctor every 1 to 2 years to screen for conditions such as glaucoma, macular degeneration and cataracts.        Follow-ups after your visit        Your next 10 appointments already scheduled     Feb 21, 2017  7:00 AM CST   Return Visit [facility] Y[facility] Rojas DPM   [facility]    [address] [place] [id number] [phone]              Future tests that were ordered for you today     Open Future Orders        Priority Expected Expires Ordered    UA with Microscopic reflex to Culture ([place]) Routine  2/13/2018 2/13/2017            Who to contact     If you have questions or need follow up information about today's clinic visit or your schedule please contact [facility] directly at [phone].  Normal or non-critical lab and imaging results will be communicated to you by MyChart, letter or phone within 4 business days after the clinic has received the results. If you do not hear from us within 7 days, please contact the clinic through MyChart or phone. If you have a critical or abnormal lab result, we will notify you by phone as soon as possible.  Submit refill requests through [employer] or call your pharmacy and they will forward the refill request to us. Please allow 3 business days for your refill to be completed.           Additional Information About Your Visit        [facility] Information     [employer] gives you secure access to your electronic health record. If you see a primary care provider, you can also send messages to your care team and make appointments. If you have questions, please call your primary care clinic.  If you do not have a primary care provider, please call [phone] and they will assist you.      [employer] is an electronic gateway that provides easy, online access to your medical records. With [employer], you can request a clinic appointment, read your test results, renew a prescription or communicate with your care team.     To access your existing account, please contact your [facility] or call [phone] for assistance.        Care EveryWhere ID     This is your Care EveryWhere ID. This could be used by other organizations to access your [facility] medical records  [id number]        Your Vitals Were     Pulse Temperature Pulse Oximetry             88 97.9  F (36.6  C) (Temporal) 97%          Blood Pressure from Last 3 Encounters:   02/13/17 116/74   02/07/17 136/80   01/25/17 127/81    Weight from Last 3 Encounters:   12/17/16 155 lb (70.3 kg)   12/15/16 150 lb (68 kg)   11/14/16 150 lb (68 kg)              We Performed the Following     DEPRESSION ACTION PLAN (DAP)          Today's Medication Changes          These changes are accurate as of: 2/13/17  3:30 PM.  If you have any questions, ask your nurse or doctor.               These medicines have changed or have updated prescriptions.        Dose/Directions    dicyclomine 20 MG tablet   Commonly known as:  BENTYL   This may have changed:  See the new instructions.   Used for:  Irritable bowel syndrome with diarrhea   Changed by:  Ayala Limon MD        Dose:  20 mg   Take 1 tablet (20 mg) by mouth 2 times daily   Quantity:  180 tablet   Refills:  3       medroxyPROGESTERone 10 MG tablet   Commonly known as:  PROVERA   This may  have changed:  See the new instructions.   Used for:  Dysmenorrhea   Changed by:  Ayala Limon MD        TAKE 1 AND 1/2 TABLETS EVERY DAY   Quantity:  135 tablet   Refills:  3       oxybutynin 5 MG tablet   Commonly known as:  DITROPAN   This may have changed:  when to take this   Used for:  Neurogenic bladder   Changed by:  Ayala Limon MD        Dose:  5 mg   Take 1 tablet (5 mg) by mouth 2 times daily   Quantity:  180 tablet   Refills:  3            Where to get your medicines      These medications were sent to [facility]  [address] [facility] [address]    Hours:  Mail Order Phone:  [phone]     dicyclomine 20 MG tablet    medroxyPROGESTERone 10 MG tablet    oxybutynin 5 MG tablet                Primary Care Provider Office Phone # Fax #    Ayala Limon [phone]       [place] [address]  [facility] [address]        Thank you!     Thank you for choosing [facility]  for your care. Our goal is always to provide you with excellent care. Hearing back from our patients is one way we can continue to improve our services. Please take a few minutes to complete the written survey that you may receive in the mail after your visit with us. Thank you!             Your Updated Medication List - Protect others around you: Learn how to safely use, store and throw away your medicines at www.disposemymeds.org.          This list is accurate as of: 2/13/17  3:30 PM.  Always use your most recent med list.                   Brand Name Dispense Instructions for use    bisacodyl 10 MG Suppository    DULCOLAX    25 suppository    Place 1 suppository (10 mg) rectally daily as needed for constipation       Catheters Misc     1 each    1 catheter every 28 days.       CRANBERRY CONCENTRATE 500 MG Caps   Generic drug:  Cranberry     90 capsule    Take 8,400 mg by mouth       dicyclomine 20 MG tablet    BENTYL    180 tablet    Take 1 tablet (20 mg) by mouth 2  times daily       FLUoxetine 20 MG capsule    PROzac    90 capsule    TAKE 1 CAPSULE EVERY DAY       gabapentin 400 MG capsule    NEURONTIN    270 capsule    TAKE 1 CAPSULE THREE TIMES DAILY       GLUTATHIONE PO      Take 175 mg by mouth 2 times daily       loperamide 2 MG capsule    IMODIUM     Take 2 mg by mouth 4 times daily as needed for diarrhea       medroxyPROGESTERone 10 MG tablet    PROVERA    135 tablet    TAKE 1 AND 1/2 TABLETS EVERY DAY       MICROKLENZ WOUND CLEANSER Liqd     240 mL    Externally apply topically daily For daily wound cares.       * order for DME     2 Units    Equipment being ordered: Anchoring Device Flexi-Track LG       * order for DME     1 Units    Equipment being ordered: Insertion Tray Wheeler w/BZK Swab Catheter 10CC, Sterile       * order for DME     2 Units    Equipment being ordered: Drain Bag, Kenguard 2000ml. Urinary Bag with Anti-Refulx       * order for DME     1 Units    Equipment being ordered: Catheter Wheeler 2Way 30cc 18FR, Silicone Coated Latex       order for DME     1 Units    Equipment being ordered: walking boot left       oxybutynin 5 MG tablet    DITROPAN    180 tablet    Take 1 tablet (5 mg) by mouth 2 times daily       * Notice:  This list has 4 medication(s) that are the same as other medications prescribed for you. Read the directions carefully, and ask your doctor or other care provider to review them with you.

## 2017-02-17 ASSESSMENT — ANXIETY QUESTIONNAIRES
6. BECOMING EASILY ANNOYED OR IRRITABLE: SEVERAL DAYS
7. FEELING AFRAID AS IF SOMETHING AWFUL MIGHT HAPPEN: NOT AT ALL
GAD7 TOTAL SCORE: 1
5. BEING SO RESTLESS THAT IT IS HARD TO SIT STILL: NOT AT ALL
1. FEELING NERVOUS, ANXIOUS, OR ON EDGE: NOT AT ALL
2. NOT BEING ABLE TO STOP OR CONTROL WORRYING: NOT AT ALL
3. WORRYING TOO MUCH ABOUT DIFFERENT THINGS: NOT AT ALL

## 2017-02-17 ASSESSMENT — PATIENT HEALTH QUESTIONNAIRE - PHQ9: 5. POOR APPETITE OR OVEREATING: NOT AT ALL

## 2017-02-18 ASSESSMENT — ANXIETY QUESTIONNAIRES: GAD7 TOTAL SCORE: 1

## 2017-02-18 ASSESSMENT — PATIENT HEALTH QUESTIONNAIRE - PHQ9: SUM OF ALL RESPONSES TO PHQ QUESTIONS 1-9: 1

## 2017-02-20 PROCEDURE — 81001 URINALYSIS AUTO W/SCOPE: CPT | Performed by: INTERNAL MEDICINE

## 2017-02-20 PROCEDURE — 87086 URINE CULTURE/COLONY COUNT: CPT | Performed by: INTERNAL MEDICINE

## 2017-02-21 ENCOUNTER — OFFICE VISIT (OUTPATIENT)
Dept: PODIATRY | Facility: CLINIC | Age: 47
End: 2017-02-21
Payer: MEDICARE

## 2017-02-21 VITALS — HEART RATE: 89 BPM | DIASTOLIC BLOOD PRESSURE: 81 MMHG | OXYGEN SATURATION: 98 % | SYSTOLIC BLOOD PRESSURE: 129 MMHG

## 2017-02-21 DIAGNOSIS — N39.0 URINARY TRACT INFECTION, SITE UNSPECIFIED: ICD-10-CM

## 2017-02-21 DIAGNOSIS — L97.422 ULCER OF HEEL AND MIDFOOT, LEFT, WITH FAT LAYER EXPOSED (H): Primary | ICD-10-CM

## 2017-02-21 DIAGNOSIS — R82.90 NONSPECIFIC FINDING ON EXAMINATION OF URINE: Primary | ICD-10-CM

## 2017-02-21 DIAGNOSIS — R82.90 ABNORMAL URINE ODOR: ICD-10-CM

## 2017-02-21 LAB
ALBUMIN UR-MCNC: 10 MG/DL
AMORPH CRY #/AREA URNS HPF: ABNORMAL /HPF
APPEARANCE UR: ABNORMAL
BACTERIA #/AREA URNS HPF: ABNORMAL /HPF
BILIRUB UR QL STRIP: NEGATIVE
CAOX CRY #/AREA URNS HPF: ABNORMAL /HPF
COLOR UR AUTO: ABNORMAL
GLUCOSE UR STRIP-MCNC: NEGATIVE MG/DL
HGB UR QL STRIP: NEGATIVE
KETONES UR STRIP-MCNC: NEGATIVE MG/DL
LEUKOCYTE ESTERASE UR QL STRIP: ABNORMAL
NITRATE UR QL: POSITIVE
PH UR STRIP: 6.5 PH (ref 5–7)
RBC #/AREA URNS AUTO: ABNORMAL /HPF (ref 0–2)
SP GR UR STRIP: 1.01 (ref 1–1.03)
URATE CRY #/AREA URNS HPF: ABNORMAL /HPF
URN SPEC COLLECT METH UR: ABNORMAL
UROBILINOGEN UR STRIP-MCNC: NORMAL MG/DL (ref 0–2)
WBC #/AREA URNS AUTO: ABNORMAL /HPF (ref 0–2)

## 2017-02-21 PROCEDURE — 97597 DBRDMT OPN WND 1ST 20 CM/<: CPT | Performed by: PODIATRIST

## 2017-02-21 RX ORDER — SULFAMETHOXAZOLE/TRIMETHOPRIM 800-160 MG
1 TABLET ORAL 2 TIMES DAILY
Qty: 14 TABLET | Refills: 0 | Status: SHIPPED | OUTPATIENT
Start: 2017-02-21 | End: 2017-02-27

## 2017-02-21 NOTE — MR AVS SNAPSHOT
After Visit Summary   2/21/2017    Priyanka Martinez    MRN: [id number]           Patient Information     Date Of Birth          1970        Visit Information        Provider Department      2/21/2017 7:00 AM Yobany Rojas DPM [facility]        Today's Diagnoses     Ulcer of heel and midfoot, left, with fat layer exposed (H)    -  1      Care Instructions    Thanks for coming today.  Ortho/Sports Medicine Clinic  [address] [place] [address]    To schedule future appointments in Ortho Clinic, you may call [phone].    To schedule ordered imaging by your Provider: Call [facility] at [phone]    [email or web address] available online at:   [email or web address]    Please call if any further questions or concerns [phone] and ask for the Orthopedic Department. Clinic hours 8 am to 5 pm.    Return to clinic if symptoms worsen.          Follow-ups after your visit        Your next 10 appointments already scheduled     Mar 07, 2017  7:30 AM CST   Return Visit with Yobany Rojas DPM   [facility] ([facility])    [address] [place] [id number] [phone]              Who to contact     If you have questions or need follow up information about today's clinic visit or your schedule please contact [facility] directly at [phone].  Normal or non-critical lab and imaging results will be communicated to you by MyChart, letter or phone within 4 business days after the clinic has received the results. If you do not hear from us within 7 days, please contact the clinic through MyChart or phone. If you have a critical or abnormal lab result, we will notify you by phone as soon as possible.  Submit refill requests through [email or web address] or call your pharmacy and they will forward the refill request to us. Please allow 3 business days for your refill to be completed.          Additional Information  About Your Visit        [employer] Information     RiverWired gives you secure access to your electronic health record. If you see a primary care provider, you can also send messages to your care team and make appointments. If you have questions, please call your primary care clinic.  If you do not have a primary care provider, please call [phone] and they will assist you.      RiverWired is an electronic gateway that provides easy, online access to your medical records. With River[employer], you can request a clinic appointment, read your test results, renew a prescription or communicate with your care team.     To access your existing account, please contact your [facility] or call [phone] for assistance.        Care EveryWhere ID     This is your Care EveryWhere ID. This could be used by other organizations to access your [facility] medical records  [id number]        Your Vitals Were     Pulse Pulse Oximetry                89 98%           Blood Pressure from Last 3 Encounters:   02/21/17 129/81   02/13/17 116/74   02/07/17 136/80    Weight from Last 3 Encounters:   12/17/16 70.3 kg (155 lb)   12/15/16 68 kg (150 lb)   11/14/16 68 kg (150 lb)              We Performed the Following     DEBRIDEMENT WOUND UP TO 20 SQ CM          Today's Medication Changes          These changes are accurate as of: 2/21/17  9:30 AM.  If you have any questions, ask your nurse or doctor.               Start taking these medicines.        Dose/Directions    sulfamethoxazole-trimethoprim 800-160 MG per tablet   Commonly known as:  BACTRIM DS/SEPTRA DS   Used for:  Urinary tract infection, site unspecified        Dose:  1 tablet   Take 1 tablet by mouth 2 times daily   Quantity:  14 tablet   Refills:  0            Where to get your medicines      These medications were sent to [facility] - [place], [place] - [address], [address]  [address], [address], [facility] [address]      Phone:  [phone]     sulfamethoxazole-trimethoprim 800-160 MG per tablet                Primary Care Provider Office Phone # Fax #    Ayala Limon [phone]       [facility] [address] [facility] [address]        Thank you!     Thank you for choosing [facility]  for your care. Our goal is always to provide you with excellent care. Hearing back from our patients is one way we can continue to improve our services. Please take a few minutes to complete the written survey that you may receive in the mail after your visit with us. Thank you!             Your Updated Medication List - Protect others around you: Learn how to safely use, store and throw away your medicines at www.disposemymeds.org.          This list is accurate as of: 2/21/17  9:30 AM.  Always use your most recent med list.                   Brand Name Dispense Instructions for use    bisacodyl 10 MG Suppository    DULCOLAX    25 suppository    Place 1 suppository (10 mg) rectally daily as needed for constipation       Catheters Misc     1 each    1 catheter every 28 days.       CRANBERRY CONCENTRATE 500 MG Caps   Generic drug:  Cranberry     90 capsule    Take 8,400 mg by mouth       dicyclomine 20 MG tablet    BENTYL    180 tablet    Take 1 tablet (20 mg) by mouth 2 times daily       FLUoxetine 20 MG capsule    PROzac    90 capsule    TAKE 1 CAPSULE EVERY DAY       gabapentin 400 MG capsule    NEURONTIN    270 capsule    TAKE 1 CAPSULE THREE TIMES DAILY       GLUTATHIONE PO      Take 175 mg by mouth 2 times daily       loperamide 2 MG capsule    IMODIUM     Take 2 mg by mouth 4 times daily as needed for diarrhea       medroxyPROGESTERone 10 MG tablet    PROVERA    135 tablet    TAKE 1 AND 1/2 TABLETS EVERY DAY       MICROKLENZ WOUND CLEANSER Liqd     240 mL    Externally apply topically daily For daily wound cares.       * order for DME     2 Units    Equipment being ordered: Anchoring Device  Flexi-Track LG       * order for DME     1 Units    Equipment being ordered: Insertion Tray Wheeler w/BZK Swab Catheter 10CC, Sterile       * order for DME     2 Units    Equipment being ordered: Drain Bag, Kenguard 2000ml. Urinary Bag with Anti-Refulx       * order for DME     1 Units    Equipment being ordered: Catheter Wheeler 2Way 30cc 18FR, Silicone Coated Latex       order for DME     1 Units    Equipment being ordered: walking boot left       oxybutynin 5 MG tablet    DITROPAN    180 tablet    Take 1 tablet (5 mg) by mouth 2 times daily       sulfamethoxazole-trimethoprim 800-160 MG per tablet    BACTRIM DS/SEPTRA DS    14 tablet    Take 1 tablet by mouth 2 times daily       * Notice:  This list has 4 medication(s) that are the same as other medications prescribed for you. Read the directions carefully, and ask your doctor or other care provider to review them with you.

## 2017-02-21 NOTE — NURSING NOTE
"Priyanka Martinez's goals for this visit include: Recheck left heel   She requests these members of her care team be copied on today's visit information: yes    PCP: Ayala Limon    Referring Provider:  No referring provider defined for this encounter.    Chief Complaint   Patient presents with     RECHECK     Left heel recheck       Initial There were no vitals taken for this visit. Estimated body mass index is 22.24 kg/(m^2) as calculated from the following:    Height as of 12/17/16: 1.778 m (5' 10\").    Weight as of 12/17/16: 70.3 kg (155 lb).  Medication Reconciliation: complete    "

## 2017-02-21 NOTE — PROGRESS NOTES
Past Medical History   Diagnosis Date     Abdominal pain      Diarrhea      Paraplegic immobility syndrome      Tobacco use disorder 1/30/2012     Patient Active Problem List   Diagnosis     Paraplegia (H)     Suprapubic catheter (H)     Gastrointestinal problem     CARDIOVASCULAR SCREENING; LDL GOAL LESS THAN 160     Dysmenorrhea     Neuropathic pain of flank     Neurogenic bladder     Endometriosis     IBS (irritable bowel syndrome)     Generalized anxiety disorder     Past Surgical History   Procedure Laterality Date     C spine fusion,poster,7-12 sgmts  2004     From T10 to L5     Cholecystectomy  1990's     Colonoscopy       Colonoscopy Left 11/25/2014     Procedure: COMBINED COLONOSCOPY, SINGLE OR MULTIPLE BIOPSY/POLYPECTOMY BY BIOPSY;  Surgeon: Junior Galeano MD;  Location:  GI     Esophagoscopy, gastroscopy, duodenoscopy (egd), combined N/A 11/25/2014     Procedure: COMBINED ESOPHAGOSCOPY, GASTROSCOPY, DUODENOSCOPY (EGD), BIOPSY SINGLE OR MULTIPLE;  Surgeon: Junior Galeano MD;  Location:  GI     Social History     Social History     Marital status: Single     Spouse name: N/A     Number of children: N/A     Years of education: N/A     Occupational History     Not on file.     Social History Main Topics     Smoking status: Former Smoker     Quit date: 3/18/2012     Smokeless tobacco: Never Used      Comment: 7 cig a day off and on socially     Alcohol use No     Drug use: No     Sexual activity: No     Other Topics Concern     Not on file     Social History Narrative     Family History   Problem Relation Age of Onset     Hypertension Father      Heart Failure Maternal Grandmother      SUBJECTIVE:  A 46-year-old female returns to clinic for ulcer, left medial and lateral heel.  She relates it is doing well.  She is using the Silvadene and cleaning these daily.  No new changes.      OBJECTIVE:  Vascular status is intact, left.  She has some peripheral edema.  She has a left medial heel  ulcer, has full thickness eschar.  Wound margins are loosening up and floresita.  It is about 4.5 x 3.2 cm at its widest margins.  It is floresita.  There is no erythema, minimal drainage, no odor, no calor.  She has a left lateral heel ulcer that is about 4.5 x 4 cm at its widest margin.  The margins are loosening up and floresita.  There is minimal drainage.  No erythema, no odor, no calor.      ASSESSMENT AND PLAN:  Ulcers, left heel.  These are healing.  Diagnosis and treatment options discussed with the patient.  Sharp ulcer debridement through the dermis with a tissue cutter, into the subcutaneous tissues with the tissue cutter.  No anesthesia needed.  Local wound care done upon consent today.  The ulcers bled well upon debridement.  The eschars are loosening.  The wound margins are floresita.  She will continue the Silvadene and the wound cares and the off-loading.  She will return to clinic and see me in 2 weeks.

## 2017-02-21 NOTE — PATIENT INSTRUCTIONS
Thanks for coming today.  Ortho/Sports Medicine Clinic  [address] [place] [address]    To schedule future appointments in Ortho Clinic, you may call [phone].    To schedule ordered imaging by your Provider: Call [facility] at [phone]    World[employer] available online at:   [email or web address]    Please call if any further questions or concerns [phone] and ask for the Orthopedic Department. Clinic hours 8 am to 5 pm.    Return to clinic if symptoms worsen.

## 2017-02-21 NOTE — PROGRESS NOTES
Hal Martinez,    Attached are your test results.  Urine looks like a UTI  Will send in a script for you to pharmacy here  while we are waiting on the culture    Please contact us if you have any questions.    Lourdes Recinos, CNP

## 2017-02-22 LAB
BACTERIA SPEC CULT: NORMAL
MICRO REPORT STATUS: NORMAL
SPECIMEN SOURCE: NORMAL

## 2017-02-27 ENCOUNTER — MYC MEDICAL ADVICE (OUTPATIENT)
Dept: PEDIATRICS | Facility: CLINIC | Age: 47
End: 2017-02-27

## 2017-02-27 DIAGNOSIS — N39.0 URINARY TRACT INFECTION, SITE UNSPECIFIED: ICD-10-CM

## 2017-02-27 RX ORDER — SULFAMETHOXAZOLE/TRIMETHOPRIM 800-160 MG
1 TABLET ORAL 2 TIMES DAILY
Qty: 14 TABLET | Refills: 0 | Status: SHIPPED | OUTPATIENT
Start: 2017-02-27 | End: 2017-05-03

## 2017-02-27 NOTE — TELEPHONE ENCOUNTER
Routed [employer] message to PCP    Rosemary Majano RN,   [facility]

## 2017-02-27 NOTE — TELEPHONE ENCOUNTER
[facility] Call [facility]    Phone Message    Name of Caller: Priyanka    Phone Number: Home number on file [phone] (home)    Best time to return call: any    May a detailed message be left on voicemail: yes    Relation to patient: Self    Reason for Call: Other: See patient my chart note routed to PCP, Dr Limon.  Regarding medication.  Home delivery needs new rx by 1:15pm for patient to be able to get other medication today.      Action Taken: Message routed to:  Primary Care p [phone]

## 2017-02-27 NOTE — TELEPHONE ENCOUNTER
Dr. Limon I spoke with the pharmacist at [facility] he said the patient is having someone come and  her prescription.  Pharmacist is wondering if you meant to send a different prescription other than Bactrim DS/Septra DS since the patient was just on this medication?      Routed to Dr. Limon.    Shawna Nazario CMA

## 2017-02-27 NOTE — TELEPHONE ENCOUNTER
How to get the medication to home delievery? Do we need to fax? Rx has been sent twice to [facility].

## 2017-02-28 NOTE — TELEPHONE ENCOUNTER
"Routing [employer] message to PCP for review and response.  \"Thanks Dr. Limon. My back hurts like a kidney infection. I will make an apt with my urologist.\"  Lona Baker [facility]    "

## 2017-03-02 NOTE — PROGRESS NOTES
Dear Priyanka,   Here are your recent results that we reviewed at your recent clinic visit.     Please call or Mychart if you have further questions.     Ayala Limon MD

## 2017-03-07 ENCOUNTER — OFFICE VISIT (OUTPATIENT)
Dept: PODIATRY | Facility: CLINIC | Age: 47
End: 2017-03-07
Payer: MEDICARE

## 2017-03-07 VITALS — OXYGEN SATURATION: 98 % | HEART RATE: 96 BPM | SYSTOLIC BLOOD PRESSURE: 128 MMHG | DIASTOLIC BLOOD PRESSURE: 86 MMHG

## 2017-03-07 DIAGNOSIS — L97.422 ULCER OF HEEL AND MIDFOOT, LEFT, WITH FAT LAYER EXPOSED (H): Primary | ICD-10-CM

## 2017-03-07 PROCEDURE — 97597 DBRDMT OPN WND 1ST 20 CM/<: CPT | Performed by: PODIATRIST

## 2017-03-07 NOTE — NURSING NOTE
"Priyanka Martinez's goals for this visit include: Left heel recheck   She requests these members of her care team be copied on today's visit information: yes    PCP: Ayala Limon    Referring Provider:  No referring provider defined for this encounter.    Chief Complaint   Patient presents with     RECHECK     left heel recheck       Initial /86  Pulse 96  SpO2 98% Estimated body mass index is 22.24 kg/(m^2) as calculated from the following:    Height as of 12/17/16: 1.778 m (5' 10\").    Weight as of 12/17/16: 70.3 kg (155 lb).  Medication Reconciliation: complete    "

## 2017-03-07 NOTE — PATIENT INSTRUCTIONS
Thanks for coming today.  Ortho/Sports Medicine Clinic  [address] [place] [address]    To schedule future appointments in Ortho Clinic, you may call [phone].    To schedule ordered imaging by your Provider: Call [facility] at [phone]    [employer] available online at:   [email or web address]    Please call if any further questions or concerns [phone] and ask for the Orthopedic Department. Clinic hours 8 am to 5 pm.    Return to clinic if symptoms worsen.

## 2017-03-07 NOTE — PROGRESS NOTES
Past Medical History   Diagnosis Date     Abdominal pain      Diarrhea      Paraplegic immobility syndrome      Tobacco use disorder 1/30/2012     Patient Active Problem List   Diagnosis     Paraplegia (H)     Suprapubic catheter (H)     Gastrointestinal problem     CARDIOVASCULAR SCREENING; LDL GOAL LESS THAN 160     Dysmenorrhea     Neuropathic pain of flank     Neurogenic bladder     Endometriosis     IBS (irritable bowel syndrome)     Generalized anxiety disorder     Past Surgical History   Procedure Laterality Date     C spine fusion,poster,7-12 sgmts  2004     From T10 to L5     Cholecystectomy  1990's     Colonoscopy       Colonoscopy Left 11/25/2014     Procedure: COMBINED COLONOSCOPY, SINGLE OR MULTIPLE BIOPSY/POLYPECTOMY BY BIOPSY;  Surgeon: Junior Galeano MD;  Location:  GI     Esophagoscopy, gastroscopy, duodenoscopy (egd), combined N/A 11/25/2014     Procedure: COMBINED ESOPHAGOSCOPY, GASTROSCOPY, DUODENOSCOPY (EGD), BIOPSY SINGLE OR MULTIPLE;  Surgeon: Junior Galeano MD;  Location:  GI     Social History     Social History     Marital status: Single     Spouse name: N/A     Number of children: N/A     Years of education: N/A     Occupational History     Not on file.     Social History Main Topics     Smoking status: Former Smoker     Quit date: 3/18/2012     Smokeless tobacco: Never Used      Comment: 7 cig a day off and on socially     Alcohol use No     Drug use: No     Sexual activity: No     Other Topics Concern     Not on file     Social History Narrative     Family History   Problem Relation Age of Onset     Hypertension Father      Heart Failure Maternal Grandmother      SUBJECTIVE FINDINGS:  A 46-year-old female returns to clinic for ulcers, left heel.  She relates she is doing well and there are no new problems.  They are draining a little bit.  She is using the Silvadene cream.      OBJECTIVE FINDINGS:  Vascular status intact, left.  She has left medial and lateral heel  ulcers that are eschared with full thickness.  Wound margins are floresita.  They are deep through the subcutaneous tissues.  The medial one is about 4.3 x 3 cm at its widest margins.  The lateral one is about 4.3 x 4 cm at its widest margins.  There is some serosanguineous drainage, no erythema, no odor.      ASSESSMENT AND PLAN:   Ulcers, left heel.  These are healing.  Diagnosis and treatment discussed with her.  Sharp ulcer debridement with a tissue cutter, no anesthesia needed and local care to apply consent today.  The ulcers bled upon debridement.  These are healing.  She will continue the Silvadene and the wound cares.  She will use the MicroKlenz for cleaning.  Return to clinic and see me in about 2 weeks.

## 2017-03-21 ENCOUNTER — OFFICE VISIT (OUTPATIENT)
Dept: PODIATRY | Facility: CLINIC | Age: 47
End: 2017-03-21
Payer: MEDICARE

## 2017-03-21 VITALS — SYSTOLIC BLOOD PRESSURE: 123 MMHG | HEART RATE: 89 BPM | DIASTOLIC BLOOD PRESSURE: 68 MMHG | OXYGEN SATURATION: 97 %

## 2017-03-21 DIAGNOSIS — L97.422 ULCER OF HEEL AND MIDFOOT, LEFT, WITH FAT LAYER EXPOSED (H): Primary | ICD-10-CM

## 2017-03-21 PROCEDURE — 97597 DBRDMT OPN WND 1ST 20 CM/<: CPT | Performed by: PODIATRIST

## 2017-03-21 NOTE — PATIENT INSTRUCTIONS
Thanks for coming today.  Ortho/Sports Medicine Clinic  [address] [place] [address]    To schedule future appointments in Ortho Clinic, you may call [phone].    To schedule ordered imaging by your Provider: Call [facility] at [phone]    Concurrent Thinking available online at:   [email or web address]    Please call if any further questions or concerns [phone] and ask for the Orthopedic Department. Clinic hours 8 am to 5 pm.    Return to clinic if symptoms worsen.

## 2017-03-21 NOTE — PROGRESS NOTES
Past Medical History   Diagnosis Date     Abdominal pain      Diarrhea      Paraplegic immobility syndrome      Tobacco use disorder 1/30/2012     Patient Active Problem List   Diagnosis     Paraplegia (H)     Suprapubic catheter (H)     Gastrointestinal problem     CARDIOVASCULAR SCREENING; LDL GOAL LESS THAN 160     Dysmenorrhea     Neuropathic pain of flank     Neurogenic bladder     Endometriosis     IBS (irritable bowel syndrome)     Generalized anxiety disorder     Past Surgical History   Procedure Laterality Date     C spine fusion,poster,7-12 sgmts  2004     From T10 to L5     Cholecystectomy  1990's     Colonoscopy       Colonoscopy Left 11/25/2014     Procedure: COMBINED COLONOSCOPY, SINGLE OR MULTIPLE BIOPSY/POLYPECTOMY BY BIOPSY;  Surgeon: Junior Galeano MD;  Location:  GI     Esophagoscopy, gastroscopy, duodenoscopy (egd), combined N/A 11/25/2014     Procedure: COMBINED ESOPHAGOSCOPY, GASTROSCOPY, DUODENOSCOPY (EGD), BIOPSY SINGLE OR MULTIPLE;  Surgeon: Junior Galeano MD;  Location:  GI     Social History     Social History     Marital status: Single     Spouse name: N/A     Number of children: N/A     Years of education: N/A     Occupational History     Not on file.     Social History Main Topics     Smoking status: Former Smoker     Quit date: 3/18/2012     Smokeless tobacco: Never Used      Comment: 7 cig a day off and on socially     Alcohol use No     Drug use: No     Sexual activity: No     Other Topics Concern     Not on file     Social History Narrative     Family History   Problem Relation Age of Onset     Hypertension Father      Heart Failure Maternal Grandmother      SUBJECTIVE:  A 46-year-old female who returns to clinic for ulcers, left heel.  She relates she is doing okay.  They are draining a little bit more.  She feels like they are not really healing like they were before.      OBJECTIVE:  She has vascular status intact, left.  She has left medial and lateral heel  ulcers with full thickness eschar.  The eschar is loosening up.  There is some serosanguineous drainage.  There is no gross erythema, no odor, no calor.  The medial ulcer is about 4.2 x 3 cm at its widest margins.  The lateral one is about 4 x 3 cm at its widest margins.  These are floresita.      ASSESSMENT AND PLAN:  Ulcers, left heel.  These are healing.  Diagnosis and treatment discussed with her.  Sharp ulcer debridement with a tissue cutter and a #15 blade through the dermis into the subcutaneous tissues.  No anesthesia needed.  Local wound care done upon consent today.  I removed the entire remaining eschar.  The ulcers were clean upon debridement.  She will continue the MicroKlenz and the Silvadene cream.  Return to clinic and see me in 1 week.

## 2017-03-21 NOTE — MR AVS SNAPSHOT
After Visit Summary   3/21/2017    Priyanka Martinez    MRN: [id number]           Patient Information     Date Of Birth          1970        Visit Information        Provider Department      3/21/2017 3:00 PM Yobany Rojas DPM [facility]        Today's Diagnoses     Ulcer of heel and midfoot, left, with fat layer exposed (H)    -  1      Care Instructions    Thanks for coming today.  Ortho/Sports Medicine Clinic  [address] [place] [address]    To schedule future appointments in Ortho Clinic, you may call [phone].    To schedule ordered imaging by your Provider: Call [facility] at [phone]    Playtabase available online at:   [email or web address]    Please call if any further questions or concerns [phone] and ask for the Orthopedic Department. Clinic hours 8 am to 5 pm.    Return to clinic if symptoms worsen.          Follow-ups after your visit        Your next 10 appointments already scheduled     Mar 28, 2017  2:15 PM CDT   Return Visit with Yobany Rojas DPM   [facility] ([facility]    [address] [place] [id number] [phone]              Who to contact     If you have questions or need follow up information about today's clinic visit or your schedule please contact [facility] directly at [phone].  Normal or non-critical lab and imaging results will be communicated to you by MyChart, letter or phone within 4 business days after the clinic has received the results. If you do not hear from us within 7 days, please contact the clinic through [facility] [facility] [employer] or phone. If you have a critical or abnormal lab result, we will notify you by phone as soon as possible.  Submit refill requests through Playtabase or call your pharmacy and they will forward the refill request to us. Please allow 3 business days for your refill to be completed.          Additional Information  About Your Visit        MDCapsulehart Information     Haitaobei gives you secure access to your electronic health record. If you see a primary care provider, you can also send messages to your care team and make appointments. If you have questions, please call your primary care clinic.  If you do not have a primary care provider, please call [phone] and they will assist you.      Haitaobei is an electronic gateway that provides easy, online access to your medical records. With Haitaobei, you can request a clinic appointment, read your test results, renew a prescription or communicate with your care team.     To access your existing account, please contact your [facility] or call [phone] for assistance.        Care EveryWhere ID     This is your Care EveryWhere ID. This could be used by other organizations to access your [place] medical records  [id number]        Your Vitals Were     Pulse Pulse Oximetry                89 97%           Blood Pressure from Last 3 Encounters:   03/21/17 123/68   03/07/17 128/86   02/21/17 129/81    Weight from Last 3 Encounters:   12/17/16 70.3 kg (155 lb)   12/15/16 68 kg (150 lb)   11/14/16 68 kg (150 lb)              We Performed the Following     DEBRIDEMENT WOUND UP TO 20 SQ CM        Primary Care Provider Office Phone # Fax #    Ayala Limon [phone]       [facility] [address]  [facility] [address]        Thank you!     Thank you for choosing [facility]  for your care. Our goal is always to provide you with excellent care. Hearing back from our patients is one way we can continue to improve our services. Please take a few minutes to complete the written survey that you may receive in the mail after your visit with us. Thank you!             Your Updated Medication List - Protect others around you: Learn how to safely use, store and throw away your medicines at www.disposemymeds.org.          This  list is accurate as of: 3/21/17 11:59 PM.  Always use your most recent med list.                   Brand Name Dispense Instructions for use    bisacodyl 10 MG Suppository    DULCOLAX    25 suppository    Place 1 suppository (10 mg) rectally daily as needed for constipation       Catheters Misc     1 each    1 catheter every 28 days.       CRANBERRY CONCENTRATE 500 MG Caps   Generic drug:  Cranberry     90 capsule    Take 8,400 mg by mouth       dicyclomine 20 MG tablet    BENTYL    180 tablet    Take 1 tablet (20 mg) by mouth 2 times daily       FLUoxetine 20 MG capsule    PROzac    90 capsule    TAKE 1 CAPSULE EVERY DAY       gabapentin 400 MG capsule    NEURONTIN    270 capsule    TAKE 1 CAPSULE THREE TIMES DAILY       GLUTATHIONE PO      Take 175 mg by mouth 2 times daily       loperamide 2 MG capsule    IMODIUM     Take 2 mg by mouth 4 times daily as needed for diarrhea       medroxyPROGESTERone 10 MG tablet    PROVERA    135 tablet    TAKE 1 AND 1/2 TABLETS EVERY DAY       MICROKLENZ WOUND CLEANSER Liqd     240 mL    Externally apply topically daily For daily wound cares.       * order for DME     2 Units    Equipment being ordered: Anchoring Device Flexi-Track LG       * order for DME     1 Units    Equipment being ordered: Insertion Tray Wheeler w/BZK Swab Catheter 10CC, Sterile       * order for DME     2 Units    Equipment being ordered: Drain Bag, Kenguard 2000ml. Urinary Bag with Anti-Refulx       * order for DME     1 Units    Equipment being ordered: Catheter Wheeler 2Way 30cc 18FR, Silicone Coated Latex       order for DME     1 Units    Equipment being ordered: walking boot left       oxybutynin 5 MG tablet    DITROPAN    180 tablet    Take 1 tablet (5 mg) by mouth 2 times daily       sulfamethoxazole-trimethoprim 800-160 MG per tablet    BACTRIM DS/SEPTRA DS    14 tablet    Take 1 tablet by mouth 2 times daily       * Notice:  This list has 4 medication(s) that are the same as other medications  prescribed for you. Read the directions carefully, and ask your doctor or other care provider to review them with you.

## 2017-03-28 ENCOUNTER — OFFICE VISIT (OUTPATIENT)
Dept: PODIATRY | Facility: CLINIC | Age: 47
End: 2017-03-28
Payer: MEDICARE

## 2017-03-28 VITALS — OXYGEN SATURATION: 97 % | HEART RATE: 86 BPM | SYSTOLIC BLOOD PRESSURE: 120 MMHG | DIASTOLIC BLOOD PRESSURE: 75 MMHG

## 2017-03-28 DIAGNOSIS — L97.422 ULCER OF HEEL AND MIDFOOT, LEFT, WITH FAT LAYER EXPOSED (H): Primary | ICD-10-CM

## 2017-03-28 LAB
BASOPHILS # BLD AUTO: 0 10E9/L (ref 0–0.2)
BASOPHILS NFR BLD AUTO: 0.4 %
CRP SERPL-MCNC: <2.9 MG/L (ref 0–8)
DIFFERENTIAL METHOD BLD: NORMAL
EOSINOPHIL # BLD AUTO: 0.2 10E9/L (ref 0–0.7)
EOSINOPHIL NFR BLD AUTO: 2.2 %
ERYTHROCYTE [DISTWIDTH] IN BLOOD BY AUTOMATED COUNT: 13.8 % (ref 10–15)
ERYTHROCYTE [SEDIMENTATION RATE] IN BLOOD BY WESTERGREN METHOD: 7 MM/H (ref 0–20)
HCT VFR BLD AUTO: 40.8 % (ref 35–47)
HGB BLD-MCNC: 13.8 G/DL (ref 11.7–15.7)
LYMPHOCYTES # BLD AUTO: 2.4 10E9/L (ref 0.8–5.3)
LYMPHOCYTES NFR BLD AUTO: 32.8 %
MCH RBC QN AUTO: 29.6 PG (ref 26.5–33)
MCHC RBC AUTO-ENTMCNC: 33.8 G/DL (ref 31.5–36.5)
MCV RBC AUTO: 87 FL (ref 78–100)
MONOCYTES # BLD AUTO: 0.6 10E9/L (ref 0–1.3)
MONOCYTES NFR BLD AUTO: 8.5 %
NEUTROPHILS # BLD AUTO: 4.1 10E9/L (ref 1.6–8.3)
NEUTROPHILS NFR BLD AUTO: 56.1 %
PLATELET # BLD AUTO: 270 10E9/L (ref 150–450)
RBC # BLD AUTO: 4.67 10E12/L (ref 3.8–5.2)
WBC # BLD AUTO: 7.4 10E9/L (ref 4–11)

## 2017-03-28 PROCEDURE — 36415 COLL VENOUS BLD VENIPUNCTURE: CPT | Performed by: PODIATRIST

## 2017-03-28 PROCEDURE — 85652 RBC SED RATE AUTOMATED: CPT | Performed by: PODIATRIST

## 2017-03-28 PROCEDURE — 86140 C-REACTIVE PROTEIN: CPT | Performed by: PODIATRIST

## 2017-03-28 PROCEDURE — 99213 OFFICE O/P EST LOW 20 MIN: CPT | Performed by: PODIATRIST

## 2017-03-28 PROCEDURE — 85025 COMPLETE CBC W/AUTO DIFF WBC: CPT | Performed by: PODIATRIST

## 2017-03-28 RX ORDER — CEPHALEXIN 500 MG/1
500 CAPSULE ORAL 3 TIMES DAILY
Qty: 30 CAPSULE | Refills: 0 | Status: SHIPPED | OUTPATIENT
Start: 2017-03-28 | End: 2017-04-11

## 2017-03-28 NOTE — PROGRESS NOTES
Past Medical History:   Diagnosis Date     Abdominal pain      Diarrhea      Paraplegic immobility syndrome      Tobacco use disorder 1/30/2012     Patient Active Problem List   Diagnosis     Paraplegia (H)     Suprapubic catheter (H)     Gastrointestinal problem     CARDIOVASCULAR SCREENING; LDL GOAL LESS THAN 160     Dysmenorrhea     Neuropathic pain of flank     Neurogenic bladder     Endometriosis     IBS (irritable bowel syndrome)     Generalized anxiety disorder     Past Surgical History:   Procedure Laterality Date     C SPINE FUSION,POSTER,7-12 SGMTS  2004    From T10 to L5     CHOLECYSTECTOMY  1990's     COLONOSCOPY       COLONOSCOPY Left 11/25/2014    Procedure: COMBINED COLONOSCOPY, SINGLE OR MULTIPLE BIOPSY/POLYPECTOMY BY BIOPSY;  Surgeon: Junior Galeano MD;  Location:  GI     ESOPHAGOSCOPY, GASTROSCOPY, DUODENOSCOPY (EGD), COMBINED N/A 11/25/2014    Procedure: COMBINED ESOPHAGOSCOPY, GASTROSCOPY, DUODENOSCOPY (EGD), BIOPSY SINGLE OR MULTIPLE;  Surgeon: Junior Galeano MD;  Location:  GI     Social History     Social History     Marital status: Single     Spouse name: N/A     Number of children: N/A     Years of education: N/A     Occupational History     Not on file.     Social History Main Topics     Smoking status: Former Smoker     Quit date: 3/18/2012     Smokeless tobacco: Never Used      Comment: 7 cig a day off and on socially     Alcohol use No     Drug use: No     Sexual activity: No     Other Topics Concern     Not on file     Social History Narrative     Family History   Problem Relation Age of Onset     Hypertension Father      Heart Failure Maternal Grandmother    SUBJECTIVE:  A 46-year-old female presents for left heel ulcer.  She relates those seem to be doing okay.  She is doing the Silvadene cream with no problems.  She relates the last couple of days, she has felt really lethargic and just tired.  No fever or chills.  She relates she is not on the Bactrim.  She has  not been on that for at least a week and maybe even longer.  She was using that for a UTI.  She feels that she is not getting a UTI, although she has not seen her primary physician yet.      OBJECTIVE:  Vascular status intact, left.  She has left medial and lateral ankle ulcers.  They are deep through the subcutaneous tissues.  There is some reformation and eschar medially.  There is no odor, no calor, some mild serosanguineous drainage.  She does have some erythema on the medial proximal wound margins.  The ulcers measure about 4.0 cm x 3 cm.       ASSESSMENT AND PLAN:  Ulcers, left heel.  Overall, these are improving.  Diagnosis and treatment options discussed with the patient.  Local wound care done upon consent today.  I am going to put her on Keflex.  Prescription given and use discussed with her.  We will order labs to help rule out infection.  Continue the wound cares.  She will return to clinic and see me in 1 week.  If her lethargy continues and she still has this tomorrow, she will follow up with her primary care physician.       Lab Results   Component Value Date    WBC 7.4 03/28/2017     Lab Results   Component Value Date    RBC 4.67 03/28/2017     Lab Results   Component Value Date    HGB 13.8 03/28/2017     Lab Results   Component Value Date    HCT 40.8 03/28/2017     No components found for: MCT  Lab Results   Component Value Date    MCV 87 03/28/2017     Lab Results   Component Value Date    MCH 29.6 03/28/2017     Lab Results   Component Value Date    MCHC 33.8 03/28/2017     Lab Results   Component Value Date    RDW 13.8 03/28/2017     Lab Results   Component Value Date     03/28/2017     Lab Results   Component Value Date    SED 7 03/28/2017     Lab Results   Component Value Date    CRP <2.9 03/28/2017

## 2017-03-28 NOTE — PATIENT INSTRUCTIONS
Thanks for coming today.  Ortho/Sports Medicine Clinic  [address] [place] [address]    To schedule future appointments in Ortho Clinic, you may call [phone].    To schedule ordered imaging by your Provider: Call [facility] at [phone]    [employer] available online at:   [email or web address]    Please call if any further questions or concerns [phone] and ask for the Orthopedic Department. Clinic hours 8 am to 5 pm.    Return to clinic if symptoms worsen.

## 2017-03-28 NOTE — MR AVS SNAPSHOT
After Visit Summary   3/28/2017    Priyanka Martinez    MRN: [id number]           Patient Information     Date Of Birth          1970        Visit Information        Provider Department      3/28/2017 2:15 PM Yobany Rojas DPM [facility]        Today's Diagnoses     Ulcer of heel and midfoot, left, with fat layer exposed (H)    -  1      Care Instructions    Thanks for coming today.  Ortho/Sports Medicine Clinic  [address] [place] [address]    To schedule future appointments in Ortho Clinic, you may call [phone].    To schedule ordered imaging by your Provider: Call [facility] at [phone]    Datorama available online at:   [email or web address]    Please call if any further questions or concerns [phone] and ask for the Orthopedic Department. Clinic hours 8 am to 5 pm.    Return to clinic if symptoms worsen.          Follow-ups after your visit        Your next 10 appointments already scheduled     Apr 04, 2017  7:00 AM CDT   Return Visit with Yobany Rojas DPM   [facility] ([facility])    [address] [place] [address] [phone]              Who to contact     If you have questions or need follow up information about today's clinic visit or your schedule please contact [facility] directly at [phone].  Normal or non-critical lab and imaging results will be communicated to you by MyChart, letter or phone within 4 business days after the clinic has received the results. If you do not hear from us within 7 days, please contact the clinic through Room [employer] Studiohart or phone. If you have a critical or abnormal lab result, we will notify you by phone as soon as possible.  Submit refill requests through Datorama or call your pharmacy and they will forward the refill request to us. Please allow 3 business days for your refill to be completed.          Additional Information  About Your Visit        SocializeharMyVR Information     Diversion gives you secure access to your electronic health record. If you see a primary care provider, you can also send messages to your care team and make appointments. If you have questions, please call your primary care clinic.  If you do not have a primary care provider, please call [phone] and they will assist you.      Diversion is an electronic gateway that provides easy, online access to your medical records. With Diversion, you can request a clinic appointment, read your test results, renew a prescription or communicate with your care team.     To access your existing account, please contact your [facility] or call [phone] for assistance.        Care EveryWhere ID     This is your Care EveryWhere ID. This could be used by other organizations to access your [place] medical records  [id number]        Your Vitals Were     Pulse Pulse Oximetry                86 97%           Blood Pressure from Last 3 Encounters:   03/28/17 120/75   03/21/17 123/68   03/07/17 128/86    Weight from Last 3 Encounters:   12/17/16 70.3 kg (155 lb)   12/15/16 68 kg (150 lb)   11/14/16 68 kg (150 lb)              We Performed the Following     CBC with platelets differential     CRP inflammation     Erythrocyte sedimentation rate auto          Today's Medication Changes          These changes are accurate as of: 3/28/17 11:59 PM.  If you have any questions, ask your nurse or doctor.               Start taking these medicines.        Dose/Directions    cephALEXin 500 MG capsule   Commonly known as:  KEFLEX   Used for:  Ulcer of heel and midfoot, left, with fat layer exposed (H)   Started by:  Yobany Rojas DPM        Dose:  500 mg   Take 1 capsule (500 mg) by mouth 3 times daily   Quantity:  30 capsule   Refills:  0            Where to get your medicines      These medications were sent to [facility]/pharmacy #[id number] [address]  AT [address] [address]     Phone:  [phone]     cephALEXin 500 MG capsule                Primary Care Provider Office Phone # Fax #    Ayala Limon [phone]       [facility] [address]  [facility] [address]        Thank you!     Thank you for choosing [facility]  for your care. Our goal is always to provide you with excellent care. Hearing back from our patients is one way we can continue to improve our services. Please take a few minutes to complete the written survey that you may receive in the mail after your visit with us. Thank you!             Your Updated Medication List - Protect others around you: Learn how to safely use, store and throw away your medicines at www.disposemymeds.org.          This list is accurate as of: 3/28/17 11:59 PM.  Always use your most recent med list.                   Brand Name Dispense Instructions for use    bisacodyl 10 MG Suppository    DULCOLAX    25 suppository    Place 1 suppository (10 mg) rectally daily as needed for constipation       Catheters Misc     1 each    1 catheter every 28 days.       cephALEXin 500 MG capsule    KEFLEX    30 capsule    Take 1 capsule (500 mg) by mouth 3 times daily       CRANBERRY CONCENTRATE 500 MG Caps   Generic drug:  Cranberry     90 capsule    Take 8,400 mg by mouth       dicyclomine 20 MG tablet    BENTYL    180 tablet    Take 1 tablet (20 mg) by mouth 2 times daily       FLUoxetine 20 MG capsule    PROzac    90 capsule    TAKE 1 CAPSULE EVERY DAY       gabapentin 400 MG capsule    NEURONTIN    270 capsule    TAKE 1 CAPSULE THREE TIMES DAILY       GLUTATHIONE PO      Take 175 mg by mouth 2 times daily       loperamide 2 MG capsule    IMODIUM     Take 2 mg by mouth 4 times daily as needed for diarrhea       medroxyPROGESTERone 10 MG tablet    PROVERA    135 tablet    TAKE 1 AND 1/2 TABLETS EVERY DAY       MICROKLENZ WOUND CLEANSER Liqd      240 mL    Externally apply topically daily For daily wound cares.       * order for DME     2 Units    Equipment being ordered: Anchoring Device Flexi-Track LG       * order for DME     1 Units    Equipment being ordered: Insertion Tray Wheeler w/BZK Swab Catheter 10CC, Sterile       * order for DME     2 Units    Equipment being ordered: Drain Bag, Kenguard 2000ml. Urinary Bag with Anti-Refulx       * order for DME     1 Units    Equipment being ordered: Catheter Wheeler 2Way 30cc 18FR, Silicone Coated Latex       order for DME     1 Units    Equipment being ordered: walking boot left       oxybutynin 5 MG tablet    DITROPAN    180 tablet    Take 1 tablet (5 mg) by mouth 2 times daily       sulfamethoxazole-trimethoprim 800-160 MG per tablet    BACTRIM DS/SEPTRA DS    14 tablet    Take 1 tablet by mouth 2 times daily       * Notice:  This list has 4 medication(s) that are the same as other medications prescribed for you. Read the directions carefully, and ask your doctor or other care provider to review them with you.

## 2017-03-28 NOTE — NURSING NOTE
"Priyanka Martinez's goals for this visit include: Recheck left heel.  She requests these members of her care team be copied on today's visit information: yes    PCP: Ayala Limon    Referring Provider:  No referring provider defined for this encounter.    Chief Complaint   Patient presents with     RECHECK     Recheck left heel       Initial /75  Pulse 86  SpO2 97% Estimated body mass index is 22.24 kg/(m^2) as calculated from the following:    Height as of 12/17/16: 1.778 m (5' 10\").    Weight as of 12/17/16: 70.3 kg (155 lb).  Medication Reconciliation: complete  "

## 2017-04-04 ENCOUNTER — OFFICE VISIT (OUTPATIENT)
Dept: PODIATRY | Facility: CLINIC | Age: 47
End: 2017-04-04
Payer: MEDICARE

## 2017-04-04 VITALS — OXYGEN SATURATION: 97 % | SYSTOLIC BLOOD PRESSURE: 140 MMHG | HEART RATE: 97 BPM | DIASTOLIC BLOOD PRESSURE: 84 MMHG

## 2017-04-04 DIAGNOSIS — L97.422 ULCER OF HEEL AND MIDFOOT, LEFT, WITH FAT LAYER EXPOSED (H): Primary | ICD-10-CM

## 2017-04-04 PROCEDURE — 97597 DBRDMT OPN WND 1ST 20 CM/<: CPT | Performed by: PODIATRIST

## 2017-04-04 NOTE — NURSING NOTE
"Priyanka Martinez's goals for this visit include: Left heel recheck  She requests these members of her care team be copied on today's visit information: yes    PCP: Ayala Limon    Referring Provider:  ESTABLISHED PATIENT  No address on file    Chief Complaint   Patient presents with     RECHECK     Left heel recheck       Initial /84  Pulse 97  SpO2 97% Estimated body mass index is 22.24 kg/(m^2) as calculated from the following:    Height as of 12/17/16: 1.778 m (5' 10\").    Weight as of 12/17/16: 70.3 kg (155 lb).  Medication Reconciliation: complete    "

## 2017-04-04 NOTE — PROGRESS NOTES
Past Medical History:   Diagnosis Date     Abdominal pain      Diarrhea      Paraplegic immobility syndrome      Tobacco use disorder 1/30/2012     Patient Active Problem List   Diagnosis     Paraplegia (H)     Suprapubic catheter (H)     Gastrointestinal problem     CARDIOVASCULAR SCREENING; LDL GOAL LESS THAN 160     Dysmenorrhea     Neuropathic pain of flank     Neurogenic bladder     Endometriosis     IBS (irritable bowel syndrome)     Generalized anxiety disorder     Past Surgical History:   Procedure Laterality Date     C SPINE FUSION,POSTER,7-12 SGMTS  2004    From T10 to L5     CHOLECYSTECTOMY  1990's     COLONOSCOPY       COLONOSCOPY Left 11/25/2014    Procedure: COMBINED COLONOSCOPY, SINGLE OR MULTIPLE BIOPSY/POLYPECTOMY BY BIOPSY;  Surgeon: Junior Galeano MD;  Location:  GI     ESOPHAGOSCOPY, GASTROSCOPY, DUODENOSCOPY (EGD), COMBINED N/A 11/25/2014    Procedure: COMBINED ESOPHAGOSCOPY, GASTROSCOPY, DUODENOSCOPY (EGD), BIOPSY SINGLE OR MULTIPLE;  Surgeon: Junior Galeano MD;  Location:  GI     Social History     Social History     Marital status: Single     Spouse name: N/A     Number of children: N/A     Years of education: N/A     Occupational History     Not on file.     Social History Main Topics     Smoking status: Former Smoker     Quit date: 3/18/2012     Smokeless tobacco: Never Used      Comment: 7 cig a day off and on socially     Alcohol use No     Drug use: No     Sexual activity: No     Other Topics Concern     Not on file     Social History Narrative     Family History   Problem Relation Age of Onset     Hypertension Father      Heart Failure Maternal Grandmother      Lab Results   Component Value Date    WBC 7.4 03/28/2017     Lab Results   Component Value Date    RBC 4.67 03/28/2017     Lab Results   Component Value Date    HGB 13.8 03/28/2017     Lab Results   Component Value Date    HCT 40.8 03/28/2017     No components found for: MCT  Lab Results   Component Value Date     MCV 87 03/28/2017     Lab Results   Component Value Date    MCH 29.6 03/28/2017     Lab Results   Component Value Date    MCHC 33.8 03/28/2017     Lab Results   Component Value Date    RDW 13.8 03/28/2017     Lab Results   Component Value Date     03/28/2017     Lab Results   Component Value Date    SED 7 03/28/2017     Lab Results   Component Value Date    CRP <2.9 03/28/2017     SUBJECTIVE:  A 46-year-old female returns to clinic for ulcers, left heel.  She relates she is doing okay.  She relates she feels better.  She has been taking the Keflex with no problems.      OBJECTIVE:  Vascular status intact, left.  She has a left lateral leg ulcer that is about 3.6 x 3 cm.  There is a medial ulcer that is 4 x 3 cm.  They are both deep through the subcutaneous tissue.  There is some fibrous eschar formation, some venous congestion, minimal edema, minimal drainage, minimal surrounding erythema, no odor, no calor.      ASSESSMENT AND PLAN:  Ulcers, left heel.  Overall, these are improving.  Diagnosis and treatment discussed with her.  Sharp ulcer debridement through this dermis into the subcutaneous tissues.  No anesthesia needed.  Local wound care done upon consent today.  The ulcers were clean upon debridement.  Nayeli, Silvadene and a sterile dressing applied.  I am going to continue the Silvadene and will have her clean these with Wound Vashe daily.  She will return to clinic and see me in 1 week.  Continue the Keflex.

## 2017-04-04 NOTE — MR AVS SNAPSHOT
After Visit Summary   4/4/2017    Priyanka Martinez    MRN: [id number]           Patient Information     Date Of Birth          1970        Visit Information        Provider Department      4/4/2017 7:00 AM Yobany Rojas DPM [facility]        Today's Diagnoses     Ulcer of heel and midfoot, left, with fat layer exposed (H)    -  1      Care Instructions    Thanks for coming today.  Ortho/Sports Medicine Clinic  [address] [place] [address]    To schedule future appointments in Ortho Clinic, you may call [phone].    To schedule ordered imaging by your Provider: Call [facility] at [phone]    [email or web address] available online at:   [email or web address]    Please call if any further questions or concerns [phone] and ask for the Orthopedic Department. Clinic hours 8 am to 5 pm.    Return to clinic if symptoms worsen.          Follow-ups after your visit        Your next 10 appointments already scheduled     Apr 11, 2017  7:00 AM CDT   [facility] with Y[facility]any Rojas D[facility]   [facility]    [address] [place] [id number] [phone]            Apr 18, 2017  3:00 PM CDT   Return Visit with Yobany Rojas DPM   [facility])    [address] [place] [address] [phone]              Who to contact     If you have questions or need follow up information about today's clinic visit or your schedule please contact [facility] directly at [phone].  Normal or non-critical lab and imaging results will be communicated to you by MyChart, letter or phone within 4 business days after the clinic has received the results. If you do not hear from us within 7 days, please contact the clinic through MyChart or phone. If you have a critical or abnormal lab result, we will notify you by phone as soon as  possible.  Submit refill requests through Bright[employer] or call your pharmacy and they will forward the refill request to us. Please allow 3 business days for your refill to be completed.          Additional Information About Your Visit        [employer] Information     Brightbox Charge gives you secure access to your electronic health record. If you see a primary care provider, you can also send messages to your care team and make appointments. If you have questions, please call your primary care clinic.  If you do not have a primary care provider, please call [phone] and they will assist you.      Bright[employer] Charge is an electronic gateway that provides easy, online access to your medical records. With [employer], you can request a clinic appointment, read your test results, renew a prescription or communicate with your care team.     To access your existing account, please contact your [facility] or call [phone] for assistance.        Care EveryWhere ID     This is your Care EveryWhere ID. This could be used by other organizations to access your [facility] medical records  [id number]        Your Vitals Were     Pulse Pulse Oximetry                97 97%           Blood Pressure from Last 3 Encounters:   04/04/17 140/84   03/28/17 120/75   03/21/17 123/68    Weight from Last 3 Encounters:   12/17/16 70.3 kg (155 lb)   12/15/16 68 kg (150 lb)   11/14/16 68 kg (150 lb)              We Performed the Following     DEBRIDEMENT WOUND UP TO 20 SQ CM        Primary Care Provider Office Phone # Fax #    Ayala Limon [phone]       [facility] [address]  [facility] [address]        Thank you!     Thank you for choosing [facility]  for your care. Our goal is always to provide you with excellent care. Hearing back from our patients is one way we can continue to improve our services. Please take a few minutes to complete the written survey that you may receive in the  mail after your visit with us. Thank you!             Your Updated Medication List - Protect others around you: Learn how to safely use, store and throw away your medicines at www.disposemymeds.org.          This list is accurate as of: 4/4/17 11:53 AM.  Always use your most recent med list.                   Brand Name Dispense Instructions for use    bisacodyl 10 MG Suppository    DULCOLAX    25 suppository    Place 1 suppository (10 mg) rectally daily as needed for constipation       Catheters Misc     1 each    1 catheter every 28 days.       cephALEXin 500 MG capsule    KEFLEX    30 capsule    Take 1 capsule (500 mg) by mouth 3 times daily       CRANBERRY CONCENTRATE 500 MG Caps   Generic drug:  Cranberry     90 capsule    Take 8,400 mg by mouth       dicyclomine 20 MG tablet    BENTYL    180 tablet    Take 1 tablet (20 mg) by mouth 2 times daily       FLUoxetine 20 MG capsule    PROzac    90 capsule    TAKE 1 CAPSULE EVERY DAY       gabapentin 400 MG capsule    NEURONTIN    270 capsule    TAKE 1 CAPSULE THREE TIMES DAILY       GLUTATHIONE PO      Take 175 mg by mouth 2 times daily       loperamide 2 MG capsule    IMODIUM     Take 2 mg by mouth 4 times daily as needed for diarrhea       medroxyPROGESTERone 10 MG tablet    PROVERA    135 tablet    TAKE 1 AND 1/2 TABLETS EVERY DAY       MICROKLENZ WOUND CLEANSER Liqd     240 mL    Externally apply topically daily For daily wound cares.       * order for DME     2 Units    Equipment being ordered: Anchoring Device Flexi-Track LG       * order for DME     1 Units    Equipment being ordered: Insertion Tray Wheeler w/BZK Swab Catheter 10CC, Sterile       * order for DME     2 Units    Equipment being ordered: Drain Bag, Kenguard 2000ml. Urinary Bag with Anti-Refulx       * order for DME     1 Units    Equipment being ordered: Catheter Wheeler 2Way 30cc 18FR, Silicone Coated Latex       order for DME     1 Units    Equipment being ordered: walking boot left        oxybutynin 5 MG tablet    DITROPAN    180 tablet    Take 1 tablet (5 mg) by mouth 2 times daily       sulfamethoxazole-trimethoprim 800-160 MG per tablet    BACTRIM DS/SEPTRA DS    14 tablet    Take 1 tablet by mouth 2 times daily       * Notice:  This list has 4 medication(s) that are the same as other medications prescribed for you. Read the directions carefully, and ask your doctor or other care provider to review them with you.

## 2017-04-04 NOTE — PATIENT INSTRUCTIONS
Thanks for coming today.  Ortho/Sports Medicine Clinic  [address] [place] [address]    To schedule future appointments in Ortho Clinic, you may call [phone].    To schedule ordered imaging by your Provider: Call [facility] at [phone]    "[employer]" available online at:   [email or web address]    Please call if any further questions or concerns [phone] and ask for the Orthopedic Department. Clinic hours 8 am to 5 pm.    Return to clinic if symptoms worsen.

## 2017-04-11 ENCOUNTER — OFFICE VISIT (OUTPATIENT)
Dept: PODIATRY | Facility: CLINIC | Age: 47
End: 2017-04-11
Payer: MEDICARE

## 2017-04-11 ENCOUNTER — TELEPHONE (OUTPATIENT)
Dept: PODIATRY | Facility: CLINIC | Age: 47
End: 2017-04-11

## 2017-04-11 VITALS — HEART RATE: 96 BPM | OXYGEN SATURATION: 98 % | DIASTOLIC BLOOD PRESSURE: 87 MMHG | SYSTOLIC BLOOD PRESSURE: 132 MMHG

## 2017-04-11 DIAGNOSIS — L97.422 ULCER OF HEEL AND MIDFOOT, LEFT, WITH FAT LAYER EXPOSED (H): Primary | ICD-10-CM

## 2017-04-11 PROCEDURE — 99213 OFFICE O/P EST LOW 20 MIN: CPT | Performed by: PODIATRIST

## 2017-04-11 NOTE — PATIENT INSTRUCTIONS
Thanks for coming today.  Ortho/Sports Medicine Clinic  [address] [place] [address]    To schedule future appointments in Ortho Clinic, you may call [phone].    To schedule ordered imaging by your Provider: Call [facility] at [phone]    TOPSEC available online at:   [email or web address]    Please call if any further questions or concerns [phone] and ask for the Orthopedic Department. Clinic hours 8 am to 5 pm.    Return to clinic if symptoms worsen.

## 2017-04-11 NOTE — MR AVS SNAPSHOT
After Visit Summary   4/11/2017    Priyanka Martinez    MRN: [id number]           Patient Information     Date Of Birth          1970        Visit Information        Provider Department      4/11/2017 7:00 AM Yobany Rojas DPM [facility]        Today's Diagnoses     Ulcer of heel and midfoot, left, with fat layer exposed (H)    -  1      Care Instructions    Thanks for coming today.  Ortho/Sports Medicine Clinic  [address] [place] [address]    To schedule future appointments in Ortho Clinic, you may call [phone].    To schedule ordered imaging by your Provider: Call [facility] at [phone]    [email or web address] available online at:   [email or web address]    Please call if any further questions or concerns [phone] and ask for the Orthopedic Department. Clinic hours 8 am to 5 pm.    Return to clinic if symptoms worsen.          Follow-ups after your visit        Your next 10 appointments already scheduled     Apr 18, 2017  3:00 PM CDT   Return Visit with Yobany Rojas DPM   [facility] ([facility])    [address] [place] [address] [phone]            May 17, 2017 11:30 AM CDT   (Arrive by 11:15 AM)   Return Visit with Dave Bradley MD   [facility] ([facility])    [address] [place] [id number] [phone]              Who to contact     If you have questions or need follow up information about today's clinic visit or your schedule please contact [facility] directly at [phone].  Normal or non-critical lab and imaging results will be communicated to you by MyChart, letter or phone within 4 business days after the clinic has received the results. If you do not hear from us within 7 days, please contact the clinic through MyChart or phone. If you have a critical or  abnormal lab result, we will notify you by phone as soon as possible.  Submit refill requests through RABT or call your pharmacy and they will forward the refill request to us. Please allow 3 business days for your refill to be completed.          Additional Information About Your Visit        [employer] Information     RABT gives you secure access to your electronic health record. If you see a primary care provider, you can also send messages to your care team and make appointments. If you have questions, please call your primary care clinic.  If you do not have a primary care provider, please call [phone] and they will assist you.      RABT is an electronic gateway that provides easy, online access to your medical records. With RABT, you can request a clinic appointment, read your test results, renew a prescription or communicate with your care team.     To access your existing account, please contact your [facility] or call [phone] for assistance.        Care EveryWhere ID     This is your Care EveryWhere ID. This could be used by other organizations to access your [facility] medical records  [id number]        Your Vitals Were     Pulse Pulse Oximetry                96 98%           Blood Pressure from Last 3 Encounters:   04/11/17 132/87   04/04/17 140/84   03/28/17 120/75    Weight from Last 3 Encounters:   12/17/16 70.3 kg (155 lb)   12/15/16 68 kg (150 lb)   11/14/16 68 kg (150 lb)              Today, you had the following     No orders found for display       Primary Care Provider Office Phone # Fax #    Ayala Limon [phone]       [facility] [address] [facility] [address]        Thank you!     Thank you for choosing [facility]  for your care. Our goal is always to provide you with excellent care. Hearing back from our patients is one way we can continue to improve our services. Please take a few minutes to  complete the written survey that you may receive in the mail after your visit with us. Thank you!             Your Updated Medication List - Protect others around you: Learn how to safely use, store and throw away your medicines at www.disposemymeds.org.          This list is accurate as of: 4/11/17  3:46 PM.  Always use your most recent med list.                   Brand Name Dispense Instructions for use    bisacodyl 10 MG Suppository    DULCOLAX    25 suppository    Place 1 suppository (10 mg) rectally daily as needed for constipation       Catheters Misc     1 each    1 catheter every 28 days.       CRANBERRY CONCENTRATE 500 MG Caps   Generic drug:  Cranberry     90 capsule    Take 8,400 mg by mouth       dicyclomine 20 MG tablet    BENTYL    180 tablet    Take 1 tablet (20 mg) by mouth 2 times daily       FLUoxetine 20 MG capsule    PROzac    90 capsule    TAKE 1 CAPSULE EVERY DAY       gabapentin 400 MG capsule    NEURONTIN    270 capsule    TAKE 1 CAPSULE THREE TIMES DAILY       GLUTATHIONE PO      Take 175 mg by mouth 2 times daily       loperamide 2 MG capsule    IMODIUM     Take 2 mg by mouth 4 times daily as needed for diarrhea       medroxyPROGESTERone 10 MG tablet    PROVERA    135 tablet    TAKE 1 AND 1/2 TABLETS EVERY DAY       MICROKLENZ WOUND CLEANSER Liqd     240 mL    Externally apply topically daily For daily wound cares.       * order for DME     2 Units    Equipment being ordered: Anchoring Device Flexi-Track LG       * order for DME     1 Units    Equipment being ordered: Insertion Tray Wheeler w/BZK Swab Catheter 10CC, Sterile       * order for DME     2 Units    Equipment being ordered: Drain Bag, Kenguard 2000ml. Urinary Bag with Anti-Refulx       * order for DME     1 Units    Equipment being ordered: Catheter Wheeler 2Way 30cc 18FR, Silicone Coated Latex       order for DME     1 Units    Equipment being ordered: walking boot left       oxybutynin 5 MG tablet    DITROPAN    180 tablet    Take  1 tablet (5 mg) by mouth 2 times daily       sulfamethoxazole-trimethoprim 800-160 MG per tablet    BACTRIM DS/SEPTRA DS    14 tablet    Take 1 tablet by mouth 2 times daily       * Notice:  This list has 4 medication(s) that are the same as other medications prescribed for you. Read the directions carefully, and ask your doctor or other care provider to review them with you.

## 2017-04-11 NOTE — PROGRESS NOTES
Past Medical History:   Diagnosis Date     Abdominal pain      Diarrhea      Paraplegic immobility syndrome      Tobacco use disorder 1/30/2012     Patient Active Problem List   Diagnosis     Paraplegia (H)     Suprapubic catheter (H)     Gastrointestinal problem     CARDIOVASCULAR SCREENING; LDL GOAL LESS THAN 160     Dysmenorrhea     Neuropathic pain of flank     Neurogenic bladder     Endometriosis     IBS (irritable bowel syndrome)     Generalized anxiety disorder     Past Surgical History:   Procedure Laterality Date     C SPINE FUSION,POSTER,7-12 SGMTS  2004    From T10 to L5     CHOLECYSTECTOMY  1990's     COLONOSCOPY       COLONOSCOPY Left 11/25/2014    Procedure: COMBINED COLONOSCOPY, SINGLE OR MULTIPLE BIOPSY/POLYPECTOMY BY BIOPSY;  Surgeon: Junior Galeano MD;  Location:  GI     ESOPHAGOSCOPY, GASTROSCOPY, DUODENOSCOPY (EGD), COMBINED N/A 11/25/2014    Procedure: COMBINED ESOPHAGOSCOPY, GASTROSCOPY, DUODENOSCOPY (EGD), BIOPSY SINGLE OR MULTIPLE;  Surgeon: Junior Galeano MD;  Location:  GI     Social History     Social History     Marital status: Single     Spouse name: N/A     Number of children: N/A     Years of education: N/A     Occupational History     Not on file.     Social History Main Topics     Smoking status: Former Smoker     Quit date: 3/18/2012     Smokeless tobacco: Never Used      Comment: 7 cig a day off and on socially     Alcohol use No     Drug use: No     Sexual activity: No     Other Topics Concern     Not on file     Social History Narrative     Family History   Problem Relation Age of Onset     Hypertension Father      Heart Failure Maternal Grandmother      SUBJECTIVE FINDINGS:  46-year-old female returns to clinic for ulcers left heel.  She relates it is doing okay.  She is using the Silvadene.  She relates the levi stayed on the wounds for about 3 days.      OBJECTIVE FINDINGS:  Vascular status is intact.  She has a left lateral ulcer that is about 3.5 x 3  cm.  She has a medial ulcer that is about 3.8 x 3 cm.  They are both deep through the subcutaneous tissues.  There is re-formation of fibrous eschar.  Wound margins are floresita.  There is minimal drainage.  No erythema, no odor, no calor.      ASSESSMENT/PLAN:  Ulcers left heel.  These are improving slowly.  Diagnosis and treatment options discussed with patient.  Sharp ulcer debridement through the dermis with #15 blade done upon consent today.  Local wound care done.  I am going to discontinue the Silvadene and start her on Iodosorb daily.  We will check on Apligraf coverage.  She will return to clinic and see me in 1 week.

## 2017-04-11 NOTE — NURSING NOTE
"Priyanka Martinez's goals for this visit include: Left ankle recheck   She requests these members of her care team be copied on today's visit information: yes    PCP: Ayala Limon    Referring Provider:  No referring provider defined for this encounter.    Chief Complaint   Patient presents with     RECHECK     Left ankle recheck        Initial /87  Pulse 96  SpO2 98% Estimated body mass index is 22.24 kg/(m^2) as calculated from the following:    Height as of 12/17/16: 1.778 m (5' 10\").    Weight as of 12/17/16: 70.3 kg (155 lb).  Medication Reconciliation: complete    "

## 2017-04-11 NOTE — TELEPHONE ENCOUNTER
Financial Counselor Review:    Procedure to be performed (include CPT code):Yes Apligraf    Diagnosis code (include ICD-10 code):L97.422    Coverage information only:YES    Coverage and patient financial responsibility information:YES    Does patient need to be contacted by Financial Counselor:YES    Note: Do not use abbreviations and route encounter to  podiatry pool

## 2017-04-18 ENCOUNTER — OFFICE VISIT (OUTPATIENT)
Dept: PODIATRY | Facility: CLINIC | Age: 47
End: 2017-04-18
Payer: MEDICARE

## 2017-04-18 VITALS — OXYGEN SATURATION: 98 % | SYSTOLIC BLOOD PRESSURE: 140 MMHG | HEART RATE: 88 BPM | DIASTOLIC BLOOD PRESSURE: 88 MMHG

## 2017-04-18 DIAGNOSIS — L97.422 ULCER OF HEEL AND MIDFOOT, LEFT, WITH FAT LAYER EXPOSED (H): Primary | ICD-10-CM

## 2017-04-18 PROCEDURE — 99212 OFFICE O/P EST SF 10 MIN: CPT | Performed by: PODIATRIST

## 2017-04-18 NOTE — MR AVS SNAPSHOT
After Visit Summary   4/18/2017    Priyanka Martinez    MRN: [id number]           Patient Information     Date Of Birth          1970        Visit Information        Provider Department      4/18/2017 3:00 PM Yobany Rojas DPM [facility]        Today's Diagnoses     Ulcer of heel and midfoot, left, with fat layer exposed (H)    -  1      Care Instructions    Thanks for coming today.  Ortho/Sports Medicine Clinic  [address] [place] [address]    To schedule future appointments in Ortho Clinic, you may call [phone].    To schedule ordered imaging by your Provider: Call [facility] at [phone]    [employer] available online at:   [email or web address]    Please call if any further questions or concerns [phone] and ask for the Orthopedic Department. Clinic hours 8 am to 5 pm.    Return to clinic if symptoms worsen.          Follow-ups after your visit        Your next 10 appointments already scheduled     Apr 25, 2017  7:15 AM CDT   Return Visit with Yobany Rojas DPM   [facility] ([facility])    [address] [place] [address] [phone]            May 17, 2017 11:30 AM CDT   (Arrive by 11:15 AM)   Return Visit with Dave Bradley MD   [facility] ([facility])    [address] [place] [id number] [phone]              Who to contact     If you have questions or need follow up information about today's clinic visit or your schedule please contact [facility] directly at [phone].  Normal or non-critical lab and imaging results will be communicated to you by MyChart, letter or phone within 4 business days after the clinic has received the results. If you do not hear from us within 7 days, please contact the clinic through MyChart or phone. If you have a critical or  abnormal lab result, we will notify you by phone as soon as possible.  Submit refill requests through [employer] or call your pharmacy and they will forward the refill request to us. Please allow 3 business days for your refill to be completed.          Additional Information About Your Visit        [employer]yl[employer] Information     [employer] gives you secure access to your electronic health record. If you see a primary care provider, you can also send messages to your care team and make appointments. If you have questions, please call your primary care clinic.  If you do not have a primary care provider, please call [phone] and they will assist you.      [employer] is an electronic gateway that provides easy, online access to your medical records. With [employer], you can request a clinic appointment, read your test results, renew a prescription or communicate with your care team.     To access your existing account, please contact your [facility] or call [phone] for assistance.        Care EveryWhere ID     This is your Care EveryWhere ID. This could be used by other organizations to access your [place] medical records  [id number]        Your Vitals Were     Pulse Pulse Oximetry                88 98%           Blood Pressure from Last 3 Encounters:   04/18/17 140/88   04/11/17 132/87   04/04/17 140/84    Weight from Last 3 Encounters:   12/17/16 70.3 kg (155 lb)   12/15/16 68 kg (150 lb)   11/14/16 68 kg (150 lb)              Today, you had the following     No orders found for display       Primary Care Provider Office Phone # Fax #    Ayala Limon [phone]       [facility] [address] [facility] [address]        Thank you!     Thank you for choosing [facility]  for your care. Our goal is always to provide you with excellent care. Hearing back from our patients is one way we can continue to improve our services. Please take a few minutes to  complete the written survey that you may receive in the mail after your visit with us. Thank you!             Your Updated Medication List - Protect others around you: Learn how to safely use, store and throw away your medicines at www.disposemymeds.org.          This list is accurate as of: 4/18/17 11:59 PM.  Always use your most recent med list.                   Brand Name Dispense Instructions for use    bisacodyl 10 MG Suppository    DULCOLAX    25 suppository    Place 1 suppository (10 mg) rectally daily as needed for constipation       Catheters Misc     1 each    1 catheter every 28 days.       CRANBERRY CONCENTRATE 500 MG Caps   Generic drug:  Cranberry     90 capsule    Take 8,400 mg by mouth       dicyclomine 20 MG tablet    BENTYL    180 tablet    Take 1 tablet (20 mg) by mouth 2 times daily       FLUoxetine 20 MG capsule    PROzac    90 capsule    TAKE 1 CAPSULE EVERY DAY       gabapentin 400 MG capsule    NEURONTIN    270 capsule    TAKE 1 CAPSULE THREE TIMES DAILY       GLUTATHIONE PO      Take 175 mg by mouth 2 times daily       loperamide 2 MG capsule    IMODIUM     Take 2 mg by mouth 4 times daily as needed for diarrhea       medroxyPROGESTERone 10 MG tablet    PROVERA    135 tablet    TAKE 1 AND 1/2 TABLETS EVERY DAY       MICROKLENZ WOUND CLEANSER Liqd     240 mL    Externally apply topically daily For daily wound cares.       * order for DME     2 Units    Equipment being ordered: Anchoring Device Flexi-Track LG       * order for DME     1 Units    Equipment being ordered: Insertion Tray Wheeler w/BZK Swab Catheter 10CC, Sterile       * order for DME     2 Units    Equipment being ordered: Drain Bag, Kenguard 2000ml. Urinary Bag with Anti-Refulx       * order for DME     1 Units    Equipment being ordered: Catheter Wheeler 2Way 30cc 18FR, Silicone Coated Latex       order for DME     1 Units    Equipment being ordered: walking boot left       oxybutynin 5 MG tablet    DITROPAN    180 tablet    Take  1 tablet (5 mg) by mouth 2 times daily       sulfamethoxazole-trimethoprim 800-160 MG per tablet    BACTRIM DS/SEPTRA DS    14 tablet    Take 1 tablet by mouth 2 times daily       * Notice:  This list has 4 medication(s) that are the same as other medications prescribed for you. Read the directions carefully, and ask your doctor or other care provider to review them with you.

## 2017-04-18 NOTE — NURSING NOTE
"Priyanka Martinez's goals for this visit include: Left heel check   She requests these members of her care team be copied on today's visit information: yes    PCP: Ayala Limon    Referring Provider:  No referring provider defined for this encounter.    Chief Complaint   Patient presents with     RECHECK     Left heel recheck        Initial /88  Pulse 88  SpO2 98% Estimated body mass index is 22.24 kg/(m^2) as calculated from the following:    Height as of 12/17/16: 1.778 m (5' 10\").    Weight as of 12/17/16: 70.3 kg (155 lb).  Medication Reconciliation: complete    "

## 2017-04-18 NOTE — PATIENT INSTRUCTIONS
Thanks for coming today.  Ortho/[facility] Medicine Clinic  [address] [place] [address]    To schedule future appointments in Ortho Clinic, you may call [phone].    To schedule ordered imaging by your Provider: Call [facility] at [phone]    [employer] available online at:   [email or web address]    Please call if any further questions or concerns [phone] and ask for the Orthopedic Department. Clinic hours 8 am to 5 pm.    Return to clinic if symptoms worsen.

## 2017-04-18 NOTE — PROGRESS NOTES
Past Medical History:   Diagnosis Date     Abdominal pain      Diarrhea      Paraplegic immobility syndrome      Tobacco use disorder 1/30/2012     Patient Active Problem List   Diagnosis     Paraplegia (H)     Suprapubic catheter (H)     Gastrointestinal problem     CARDIOVASCULAR SCREENING; LDL GOAL LESS THAN 160     Dysmenorrhea     Neuropathic pain of flank     Neurogenic bladder     Endometriosis     IBS (irritable bowel syndrome)     Generalized anxiety disorder     Past Surgical History:   Procedure Laterality Date     C SPINE FUSION,POSTER,7-12 SGMTS  2004    From T10 to L5     CHOLECYSTECTOMY  1990's     COLONOSCOPY       COLONOSCOPY Left 11/25/2014    Procedure: COMBINED COLONOSCOPY, SINGLE OR MULTIPLE BIOPSY/POLYPECTOMY BY BIOPSY;  Surgeon: Junior Galeano MD;  Location:  GI     ESOPHAGOSCOPY, GASTROSCOPY, DUODENOSCOPY (EGD), COMBINED N/A 11/25/2014    Procedure: COMBINED ESOPHAGOSCOPY, GASTROSCOPY, DUODENOSCOPY (EGD), BIOPSY SINGLE OR MULTIPLE;  Surgeon: Junior Galeano MD;  Location:  GI     Social History     Social History     Marital status: Single     Spouse name: N/A     Number of children: N/A     Years of education: N/A     Occupational History     Not on file.     Social History Main Topics     Smoking status: Former Smoker     Quit date: 3/18/2012     Smokeless tobacco: Never Used      Comment: 7 cig a day off and on socially     Alcohol use No     Drug use: No     Sexual activity: No     Other Topics Concern     Not on file     Social History Narrative     Family History   Problem Relation Age of Onset     Hypertension Father      Heart Failure Maternal Grandmother      SUBJECTIVE FINDINGS:  A 46-year-old female returns to clinic for ulcers, left heel.  She is the Iodosorb.  She relates it is doing okay.      OBJECTIVE FINDINGS:  She has left heel ulcers.  Wound margins are floresita.  There is some hyperkeratotic fibrous tissue buildup.  There is no erythema, minimal  drainage, no odor, no calor.      ASSESSMENT AND PLAN:  Ulcers, left heel.  These are improving.  Diagnosis and treatment discussed with her.  Apligraf was not covered, so we were going to check on the amniotic product.  We are waiting on that.  She will return to clinic and see me in 1 week.  Local wound care done upon consent today.  Continue the Iodosorb.

## 2017-04-25 ENCOUNTER — OFFICE VISIT (OUTPATIENT)
Dept: PODIATRY | Facility: CLINIC | Age: 47
End: 2017-04-25
Payer: MEDICARE

## 2017-04-25 VITALS — DIASTOLIC BLOOD PRESSURE: 84 MMHG | SYSTOLIC BLOOD PRESSURE: 130 MMHG | OXYGEN SATURATION: 94 % | HEART RATE: 97 BPM

## 2017-04-25 DIAGNOSIS — L97.422 ULCER OF HEEL AND MIDFOOT, LEFT, WITH FAT LAYER EXPOSED (H): Primary | ICD-10-CM

## 2017-04-25 PROCEDURE — 97597 DBRDMT OPN WND 1ST 20 CM/<: CPT | Performed by: PODIATRIST

## 2017-04-25 NOTE — PATIENT INSTRUCTIONS
Thanks for coming today.  Ortho/Sports Medicine Clinic  [address] [place] [address]    To schedule future appointments in Ortho Clinic, you may call [phone].    To schedule ordered imaging by your Provider: Call [facility] at [phone]    [email or web address] available online at:   [email or web address]    Please call if any further questions or concerns [phone] and ask for the Orthopedic Department. Clinic hours 8 am to 5 pm.    Return to clinic if symptoms worsen.

## 2017-04-25 NOTE — PROGRESS NOTES
Past Medical History:   Diagnosis Date     Abdominal pain      Diarrhea      Paraplegic immobility syndrome      Tobacco use disorder 1/30/2012     Patient Active Problem List   Diagnosis     Paraplegia (H)     Suprapubic catheter (H)     Gastrointestinal problem     CARDIOVASCULAR SCREENING; LDL GOAL LESS THAN 160     Dysmenorrhea     Neuropathic pain of flank     Neurogenic bladder     Endometriosis     IBS (irritable bowel syndrome)     Generalized anxiety disorder     Past Surgical History:   Procedure Laterality Date     C SPINE FUSION,POSTER,7-12 SGMTS  2004    From T10 to L5     CHOLECYSTECTOMY  1990's     COLONOSCOPY       COLONOSCOPY Left 11/25/2014    Procedure: COMBINED COLONOSCOPY, SINGLE OR MULTIPLE BIOPSY/POLYPECTOMY BY BIOPSY;  Surgeon: Junior Galeano MD;  Location:  GI     ESOPHAGOSCOPY, GASTROSCOPY, DUODENOSCOPY (EGD), COMBINED N/A 11/25/2014    Procedure: COMBINED ESOPHAGOSCOPY, GASTROSCOPY, DUODENOSCOPY (EGD), BIOPSY SINGLE OR MULTIPLE;  Surgeon: Junior Galeano MD;  Location:  GI     Social History     Social History     Marital status: Single     Spouse name: N/A     Number of children: N/A     Years of education: N/A     Occupational History     Not on file.     Social History Main Topics     Smoking status: Former Smoker     Quit date: 3/18/2012     Smokeless tobacco: Never Used      Comment: 7 cig a day off and on socially     Alcohol use No     Drug use: No     Sexual activity: No     Other Topics Concern     Not on file     Social History Narrative     Family History   Problem Relation Age of Onset     Hypertension Father      Heart Failure Maternal Grandmother      SUBJECTIVE FINDINGS:  A 46-year-old female returns to clinic for ulcers, left heel.  She relates she is using the Iodosorb.  It makes it kind of hard to see the wound, but it seems to be doing okay.      OBJECTIVE FINDINGS:  Vascular status intact.  She has a left lateral heel ulcer.  It is about 3 x 2.5 cm.   She has a medial ulcer.  It is about 3.5 x 3 cm.  They are floresita.  They are deep through the subcutaneous tissues.  There is some re-eschar formation centrally.  No erythema, no odor, no calor, minimal drainage.      ASSESSMENT AND PLAN:  Ulcers, left heel.  These are improving.  Diagnosis and treatment discussed with her.  Sharp ulcer debridement through the dermis with a 15 blade, no anesthesia needed and local wound care done upon consent today.  I am going to continue the Iodosorb.  She will return to clinic and see me in 1 week.

## 2017-04-25 NOTE — NURSING NOTE
"Priyanka Martinez's goals for this visit include: Left heel check   She requests these members of her care team be copied on today's visit information: yes    PCP: Ayala Limon    Referring Provider:  ESTABLISHED PATIENT  No address on file    Chief Complaint   Patient presents with     RECHECK     Recheck left heel       Initial /84  Pulse 97  SpO2 94% Estimated body mass index is 22.24 kg/(m^2) as calculated from the following:    Height as of 12/17/16: 1.778 m (5' 10\").    Weight as of 12/17/16: 70.3 kg (155 lb).  Medication Reconciliation: complete    "

## 2017-04-28 ENCOUNTER — TELEPHONE (OUTPATIENT)
Dept: PEDIATRICS | Facility: CLINIC | Age: 47
End: 2017-04-28

## 2017-04-28 NOTE — TELEPHONE ENCOUNTER
Called [facility]. Patient is requesting all catheter supplies to go to this pharmacy.    Needed:    Demographic information    All orders for catheter supplies    Office visit note pertaining to catheter use for Medicare purposes.    Olive Lam RN, [facility]

## 2017-04-28 NOTE — TELEPHONE ENCOUNTER
[facility] Call Center    Phone Message    Name of Caller: Elizabeth     Phone Number  Phone [phone]  Fax: [phone]    Relation to patient: Other Name: Pharmacist   Relationship:   Is there legal documentation in chart to discuss information with this person: n/a    Reason for Call: Medication Question or concern regarding medication   Prescription Clarification:   Name of Medication: Catheter Supplies   Prescribing Provider: Dr. Limon   Pharmacy: [facility]    What on the order needs clarification?     Is patient symptomatic? No. Describe question or concern: Patient is requesting catheter supplies to be faxed to [facility] and the pharmacy is requesting Elizabeth's demographics, Orders and office notes pertaining to the need for catheter.      Action Taken: Message routed to:  Primary Care p [phone]

## 2017-04-28 NOTE — TELEPHONE ENCOUNTER
Please call patient to schedule an appointment for catheter supplies to the new pharmacy. The new pharmacy needs office visit note pertaining to the catheter use for Medicare Purpose. We didn't discuss the catheter use at her recent wellness visit or the visits in the last year. So we'll need her to have a brief visit so we can have the office visit for this.

## 2017-04-28 NOTE — TELEPHONE ENCOUNTER
Called patient and gave message per Dr. Limon.      Patient verbalized understanding and agreement to plan.   Scheduled appt on 5/3/17.    Olive Lam RN, [facility]

## 2017-05-02 ENCOUNTER — OFFICE VISIT (OUTPATIENT)
Dept: PODIATRY | Facility: CLINIC | Age: 47
End: 2017-05-02
Payer: MEDICARE

## 2017-05-02 VITALS — DIASTOLIC BLOOD PRESSURE: 79 MMHG | SYSTOLIC BLOOD PRESSURE: 132 MMHG | OXYGEN SATURATION: 98 % | HEART RATE: 94 BPM

## 2017-05-02 DIAGNOSIS — L97.422 ULCER OF HEEL AND MIDFOOT, LEFT, WITH FAT LAYER EXPOSED (H): Primary | ICD-10-CM

## 2017-05-02 PROCEDURE — 97597 DBRDMT OPN WND 1ST 20 CM/<: CPT | Performed by: PODIATRIST

## 2017-05-02 NOTE — NURSING NOTE
"Priyanka Martinez's goals for this visit include: Left heel check   She requests these members of her care team be copied on today's visit information: yes    PCP: Ayala Limon    Referring Provider:  No referring provider defined for this encounter.    Chief Complaint   Patient presents with     RECHECK     Left heel check        Initial /79  Pulse 94  SpO2 98% Estimated body mass index is 22.24 kg/(m^2) as calculated from the following:    Height as of 12/17/16: 1.778 m (5' 10\").    Weight as of 12/17/16: 70.3 kg (155 lb).  Medication Reconciliation: complete    "

## 2017-05-02 NOTE — MR AVS SNAPSHOT
After Visit Summary   5/2/2017    Priyanka Martinez    MRN: [id number]           Patient Information     Date Of Birth          1970        Visit Information        Provider Department      5/2/2017 7:00 AM Yobany Rojas DPM [facility]        Today's Diagnoses     Ulcer of heel and midfoot, left, with fat layer exposed (H)    -  1      Care Instructions    Thanks for coming today.  Ortho/Sports Medicine Clinic  [address] [place] [address]    To schedule future appointments in Ortho Clinic, you may call [phone].    To schedule ordered imaging by your Provider: Call [facility] at [phone]    [employer] available online at:   [email or web address]    Please call if any further questions or concerns [phone] and ask for the Orthopedic Department. Clinic hours 8 am to 5 pm.    Return to clinic if symptoms worsen.          Follow-ups after your visit        Your next 10 appointments already scheduled     May 03, 2017  3:00 PM CDT   Return Visit with Ayala Limon MD   [facility])    [address] [place] [id number] [phone]            May 10, 2017  3:00 PM CDT   Return Visit with Yobany Rojas DPM   [facility])    [address] [place] [address] [id number] [phone]            May 16, 2017  7:00 AM CDT   Return Visit with Yobany Rojas DPM   [facility])    [address] [place] [id number] [phone]            May 17, 2017 11:30 AM CDT   (Arrive by 11:15 AM)   Return Visit with Dave Bradley MD   [facility] ([facility])    [address] [place] [id number] [phone]            May 23, 2017  7:00 AM CDT   Return Visit with Yobany Rojas DPM   [facility]  [facility] ([facility])    [address] [place] [id number] [phone]              Who to contact     If you have questions or need follow up information about today's clinic visit or your schedule please contact [facility] directly at [phone].  Normal or non-critical lab and imaging results will be communicated to you by MyChart, letter or phone within 4 business days after the clinic has received the results. If you do not hear from us within 7 days, please contact the clinic through [employer] or phone. If you have a critical or abnormal lab result, we will notify you by phone as soon as possible.  Submit refill requests through [employer] or call your pharmacy and they will forward the refill request to us. Please allow 3 business days for your refill to be completed.          Additional Information About Your Visit        [employer] Information     [employer] gives you secure access to your electronic health record. If you see a primary care provider, you can also send messages to your care team and make appointments. If you have questions, please call your primary care clinic.  If you do not have a primary care provider, please call [phone] and they will assist you.      [employer] is an electronic gateway that provides easy, online access to your medical records. With [employer], you can request a clinic appointment, read your test results, renew a prescription or communicate with your care team.     To access your existing account, please contact your [facility] or call [phone] for assistance.        Care EveryWhere ID     This is your Care EveryWhere ID. This could be used by other organizations to access your [place] medical records  [id number]        Your Vitals Were     Pulse Pulse Oximetry                94 98%           Blood Pressure from Last 3 Encounters:   05/02/17 132/79   04/25/17 130/84   04/18/17  140/88    Weight from Last 3 Encounters:   12/17/16 70.3 kg (155 lb)   12/15/16 68 kg (150 lb)   11/14/16 68 kg (150 lb)              We Performed the Following     DEBRIDEMENT WOUND UP TO 20 SQ CM        Primary Care Provider Office Phone # Fax #    Ayala Limon [phone]       [facility] [address]  [facility] [address]        Thank you!     Thank you for choosing [facility]  for your care. Our goal is always to provide you with excellent care. Hearing back from our patients is one way we can continue to improve our services. Please take a few minutes to complete the written survey that you may receive in the mail after your visit with us. Thank you!             Your Updated Medication List - Protect others around you: Learn how to safely use, store and throw away your medicines at www.disposemymeds.org.          This list is accurate as of: 5/2/17  1:58 PM.  Always use your most recent med list.                   Brand Name Dispense Instructions for use    bisacodyl 10 MG Suppository    DULCOLAX    25 suppository    Place 1 suppository (10 mg) rectally daily as needed for constipation       Catheters Misc     1 each    1 catheter every 28 days.       CRANBERRY CONCENTRATE 500 MG Caps   Generic drug:  Cranberry     90 capsule    Take 8,400 mg by mouth       dicyclomine 20 MG tablet    BENTYL    180 tablet    Take 1 tablet (20 mg) by mouth 2 times daily       FLUoxetine 20 MG capsule    PROzac    90 capsule    TAKE 1 CAPSULE EVERY DAY       gabapentin 400 MG capsule    NEURONTIN    270 capsule    TAKE 1 CAPSULE THREE TIMES DAILY       GLUTATHIONE PO      Take 175 mg by mouth 2 times daily       loperamide 2 MG capsule    IMODIUM     Take 2 mg by mouth 4 times daily as needed for diarrhea       medroxyPROGESTERone 10 MG tablet    PROVERA    135 tablet    TAKE 1 AND 1/2 TABLETS EVERY DAY       MICROKLENZ WOUND CLEANSER Liqd     240 mL    Externally apply topically  daily For daily wound cares.       * order for DME     2 Units    Equipment being ordered: Anchoring Device Flexi-Track LG       * order for DME     1 Units    Equipment being ordered: Insertion Tray Wheeler w/BZK Swab Catheter 10CC, Sterile       * order for DME     2 Units    Equipment being ordered: Drain Bag, Kenguard 2000ml. Urinary Bag with Anti-Refulx       * order for DME     1 Units    Equipment being ordered: Catheter Wheeler 2Way 30cc 18FR, Silicone Coated Latex       order for DME     1 Units    Equipment being ordered: walking boot left       oxybutynin 5 MG tablet    DITROPAN    180 tablet    Take 1 tablet (5 mg) by mouth 2 times daily       sulfamethoxazole-trimethoprim 800-160 MG per tablet    BACTRIM DS/SEPTRA DS    14 tablet    Take 1 tablet by mouth 2 times daily       * Notice:  This list has 4 medication(s) that are the same as other medications prescribed for you. Read the directions carefully, and ask your doctor or other care provider to review them with you.

## 2017-05-02 NOTE — PROGRESS NOTES
Past Medical History:   Diagnosis Date     Abdominal pain      Diarrhea      Paraplegic immobility syndrome      Tobacco use disorder 1/30/2012     Patient Active Problem List   Diagnosis     Paraplegia (H)     Suprapubic catheter (H)     Gastrointestinal problem     CARDIOVASCULAR SCREENING; LDL GOAL LESS THAN 160     Dysmenorrhea     Neuropathic pain of flank     Neurogenic bladder     Endometriosis     IBS (irritable bowel syndrome)     Generalized anxiety disorder     Past Surgical History:   Procedure Laterality Date     C SPINE FUSION,POSTER,7-12 SGMTS  2004    From T10 to L5     CHOLECYSTECTOMY  1990's     COLONOSCOPY       COLONOSCOPY Left 11/25/2014    Procedure: COMBINED COLONOSCOPY, SINGLE OR MULTIPLE BIOPSY/POLYPECTOMY BY BIOPSY;  Surgeon: Junior Galeano MD;  Location:  GI     ESOPHAGOSCOPY, GASTROSCOPY, DUODENOSCOPY (EGD), COMBINED N/A 11/25/2014    Procedure: COMBINED ESOPHAGOSCOPY, GASTROSCOPY, DUODENOSCOPY (EGD), BIOPSY SINGLE OR MULTIPLE;  Surgeon: Junior Galeano MD;  Location:  GI     Social History     Social History     Marital status: Single     Spouse name: N/A     Number of children: N/A     Years of education: N/A     Occupational History     Not on file.     Social History Main Topics     Smoking status: Former Smoker     Quit date: 3/18/2012     Smokeless tobacco: Never Used      Comment: 7 cig a day off and on socially     Alcohol use No     Drug use: No     Sexual activity: No     Other Topics Concern     Not on file     Social History Narrative     Family History   Problem Relation Age of Onset     Hypertension Father      Heart Failure Maternal Grandmother      SUBJECTIVE FINDINGS:  A 46-year-old female returns to clinic for ulcer, left medial and lateral heel.  She relates it is doing okay.  It is draining some.  She is using the Iodosorb with no problems.      OBJECTIVE FINDINGS:  Vascular status is intact, left.  She has a left lateral heel ulcer.  It is about 2.3  x 3.5 cm at its widest margin.  She has a medial ulcer that is about 3.5 x 2.6 cm at its widest margins.  The margins are floresita.  Some edema, minimal drainage, no erythema, no odor, no calor, some eschar formation centrally.      ASSESSMENT AND PLAN:  Ulcers, left heel.  These are improving slowly.  Diagnosis and treatment discussed with her.  Sharp ulcer debridement through the dermis with a 15 blade, no anesthesia needed and local wound care done upon consent today.  I am going to continue the Iodosorb and the wound cares.  Return to clinic and see me in 1 week.

## 2017-05-02 NOTE — PATIENT INSTRUCTIONS
Thanks for coming today.  Ortho/Sports Medicine Clinic  [address] [place] [address]    To schedule future appointments in Ortho Clinic, you may call [phone].    To schedule ordered imaging by your Provider: Call [facility] at [phone]    [employer] available online at:   [email or web address]    Please call if any further questions or concerns [phone] and ask for the Orthopedic Department. Clinic hours 8 am to 5 pm.    Return to clinic if symptoms worsen.

## 2017-05-03 ENCOUNTER — OFFICE VISIT (OUTPATIENT)
Dept: PEDIATRICS | Facility: CLINIC | Age: 47
End: 2017-05-03
Payer: MEDICARE

## 2017-05-03 VITALS
OXYGEN SATURATION: 96 % | DIASTOLIC BLOOD PRESSURE: 80 MMHG | TEMPERATURE: 97.4 F | HEART RATE: 92 BPM | SYSTOLIC BLOOD PRESSURE: 120 MMHG

## 2017-05-03 DIAGNOSIS — N31.9 NEUROGENIC BLADDER: ICD-10-CM

## 2017-05-03 DIAGNOSIS — R10.11 ABDOMINAL PAIN, RIGHT UPPER QUADRANT: Primary | ICD-10-CM

## 2017-05-03 LAB
ALBUMIN SERPL-MCNC: 3.5 G/DL (ref 3.4–5)
ALP SERPL-CCNC: 87 U/L (ref 40–150)
ALT SERPL W P-5'-P-CCNC: 26 U/L (ref 0–50)
AMYLASE SERPL-CCNC: 38 U/L (ref 30–110)
ANION GAP SERPL CALCULATED.3IONS-SCNC: 7 MMOL/L (ref 3–14)
AST SERPL W P-5'-P-CCNC: 16 U/L (ref 0–45)
BASOPHILS # BLD AUTO: 0 10E9/L (ref 0–0.2)
BASOPHILS NFR BLD AUTO: 0.5 %
BILIRUB SERPL-MCNC: 0.2 MG/DL (ref 0.2–1.3)
BUN SERPL-MCNC: 9 MG/DL (ref 7–30)
CALCIUM SERPL-MCNC: 8.7 MG/DL (ref 8.5–10.1)
CHLORIDE SERPL-SCNC: 107 MMOL/L (ref 94–109)
CO2 SERPL-SCNC: 28 MMOL/L (ref 20–32)
CREAT SERPL-MCNC: 0.37 MG/DL (ref 0.52–1.04)
DIFFERENTIAL METHOD BLD: NORMAL
EOSINOPHIL # BLD AUTO: 0.1 10E9/L (ref 0–0.7)
EOSINOPHIL NFR BLD AUTO: 1.5 %
ERYTHROCYTE [DISTWIDTH] IN BLOOD BY AUTOMATED COUNT: 13.5 % (ref 10–15)
GFR SERPL CREATININE-BSD FRML MDRD: ABNORMAL ML/MIN/1.7M2
GLUCOSE SERPL-MCNC: 99 MG/DL (ref 70–99)
HCT VFR BLD AUTO: 42 % (ref 35–47)
HGB BLD-MCNC: 14 G/DL (ref 11.7–15.7)
LIPASE SERPL-CCNC: 246 U/L (ref 73–393)
LYMPHOCYTES # BLD AUTO: 1.9 10E9/L (ref 0.8–5.3)
LYMPHOCYTES NFR BLD AUTO: 28.5 %
MCH RBC QN AUTO: 29.7 PG (ref 26.5–33)
MCHC RBC AUTO-ENTMCNC: 33.3 G/DL (ref 31.5–36.5)
MCV RBC AUTO: 89 FL (ref 78–100)
MONOCYTES # BLD AUTO: 0.6 10E9/L (ref 0–1.3)
MONOCYTES NFR BLD AUTO: 9.7 %
NEUTROPHILS # BLD AUTO: 4 10E9/L (ref 1.6–8.3)
NEUTROPHILS NFR BLD AUTO: 59.8 %
PLATELET # BLD AUTO: 284 10E9/L (ref 150–450)
POTASSIUM SERPL-SCNC: 3.7 MMOL/L (ref 3.4–5.3)
PROT SERPL-MCNC: 7.2 G/DL (ref 6.8–8.8)
RBC # BLD AUTO: 4.71 10E12/L (ref 3.8–5.2)
SODIUM SERPL-SCNC: 142 MMOL/L (ref 133–144)
WBC # BLD AUTO: 6.6 10E9/L (ref 4–11)

## 2017-05-03 PROCEDURE — 99213 OFFICE O/P EST LOW 20 MIN: CPT | Performed by: INTERNAL MEDICINE

## 2017-05-03 PROCEDURE — 36415 COLL VENOUS BLD VENIPUNCTURE: CPT | Performed by: INTERNAL MEDICINE

## 2017-05-03 PROCEDURE — 82150 ASSAY OF AMYLASE: CPT | Performed by: INTERNAL MEDICINE

## 2017-05-03 PROCEDURE — 85025 COMPLETE CBC W/AUTO DIFF WBC: CPT | Performed by: INTERNAL MEDICINE

## 2017-05-03 PROCEDURE — 83690 ASSAY OF LIPASE: CPT | Performed by: INTERNAL MEDICINE

## 2017-05-03 PROCEDURE — 80053 COMPREHEN METABOLIC PANEL: CPT | Performed by: INTERNAL MEDICINE

## 2017-05-03 NOTE — PROGRESS NOTES
Dear Priyanka,   Here are your recent results.   -- labs were all normal. Creatinine of 0.37 is considered normal even though the computer reports this to be outside the normal range. No concern for liver or pancrease problem.   -- If the herbal supplement does not help consistently, you may consider taking higher dose of gabapentin regularly for a week rather than taking it when the pain comes. If may be more effective to be taken around the clock then as needed.     Please call or Mychart to our office if you have further questions.     Ayala Limon MD

## 2017-05-03 NOTE — NURSING NOTE
"Chief Complaint   Patient presents with     Catheter use and supplies     Also stabbing pain in RLQ abdominal pain, states she gets nerve pain and shows up in that area.        Initial /80 (Cuff Size: Adult Regular)  Pulse 92  Temp 97.4  F (36.3  C) (Temporal)  SpO2 96% Estimated body mass index is 22.24 kg/(m^2) as calculated from the following:    Height as of 12/17/16: 5' 10\" (1.778 m).    Weight as of 12/17/16: 155 lb (70.3 kg).  Medication Reconciliation: complete    "

## 2017-05-03 NOTE — MR AVS SNAPSHOT
After Visit Summary   5/3/2017    Priyanka Martinez    MRN: [id number]           Patient Information     Date Of Birth          1970        Visit Information        Provider Department      5/3/2017 3:00 PM Ayala Limon MD [facility]        Today's Diagnoses     Abdominal pain, right upper quadrant    -  1    Neurogenic bladder          Care Instructions    Get labs done today.       Medication(s) prescribed today:    Orders Placed This Encounter   Medications     order for DME     Sig: Equipment being ordered: Anchoring Device Flexi-Track LG     Dispense:  2 Units     Refill:  12     order for DME     Sig: Equipment being ordered: Insertion Tray Wheeler w/BZK Swab Catheter 10CC, Sterile     Dispense:  1 Units     Refill:  12     order for DME     Sig: Equipment being ordered: Drain Bag, Kenguard 2000ml. Urinary Bag with Anti-Refulx     Dispense:  2 Units     Refill:  12     order for DME     Sig: Equipment being ordered: Catheter Wheeler 2Way 30cc 18FR, Silicone Coated Latex     Dispense:  1 Units     Refill:  12               Follow-ups after your visit        Your next 10 appointments already scheduled     May 10, 2017  3:00 PM CDT   [facility] Visit with Yobany Rojas DPM   [facility]    [address] [place] [address]   [phone]            May 16, 2017  7:00 AM CDT   Return Visit with Yobany Rojas DPM   [facility] ([facility]    [address] [place] [address]   [phone]            May 17, 2017 11:30 AM CDT   (Arrive by 11:15 AM)   Return Visit with Dave Bradley MD   [facility] ([facility])    [address] [place] [address]   [phone]            May 23, 2017  7:00 AM CDT   Return Visit with Yobany Rojas DPM   [facility] ([facility]  [facility])    [address] [address] [place] [address] [phone]              Who to contact     If you have questions or need follow up information about today's clinic visit or your schedule please contact [facility] directly at [phone].  Normal or non-critical lab and imaging results will be communicated to you by MyChart, letter or phone within 4 business days after the clinic has received the results. If you do not hear from us within 7 days, please contact the clinic through MyChart or phone. If you have a critical or abnormal lab result, we will notify you by phone as soon as possible.  Submit refill requests through Brandtree or call your pharmacy and they will forward the refill request to us. Please allow 3 business days for your refill to be completed.          Additional Information About Your Visit        MyChart Information     Brandtree gives you secure access to your electronic health record. If you see a primary care provider, you can also send messages to your care team and make appointments. If you have questions, please call your primary care clinic.  If you do not have a primary care provider, please call [phone] and they will assist you.      Brandtree is an electronic gateway that provides easy, online access to your medical records. With Brandtree, you can request a clinic appointment, read your test results, renew a prescription or communicate with your care team.     To access your existing account, please contact your [facility] or call [phone] for assistance.        Care EveryWhere ID     This is your Care EveryWhere ID. This could be used by other organizations to access your [facility] medical records  [id number]        Your Vitals Were     Pulse Temperature Pulse Oximetry             92 97.4  F (36.3  C) (Temporal) 96%          Blood Pressure from Last 3 Encounters:   05/03/17 120/80   05/02/17 132/79    04/25/17 130/84    Weight from Last 3 Encounters:   12/17/16 155 lb (70.3 kg)   12/15/16 150 lb (68 kg)   11/14/16 150 lb (68 kg)              We Performed the Following     Amylase     CBC with platelets differential     Comprehensive metabolic panel (BMP + Alb, Alk Phos, ALT, AST, Total. Bili, TP)     Lipase          Where to get your medicines      Some of these will need a paper prescription and others can be bought over the counter.  Ask your nurse if you have questions.     Bring a paper prescription for each of these medications     order for DME    order for DME    order for DME    order for DME          Primary Care Provider Office Phone # Fax #    Ayala Limon [phone]       [facility] [address] [facility] [address]        Thank you!     Thank you for choosing [facility]  for your care. Our goal is always to provide you with excellent care. Hearing back from our patients is one way we can continue to improve our services. Please take a few minutes to complete the written survey that you may receive in the mail after your visit with us. Thank you!             Your Updated Medication List - Protect others around you: Learn how to safely use, store and throw away your medicines at www.disposemymeds.org.          This list is accurate as of: 5/3/17  3:29 PM.  Always use your most recent med list.                   Brand Name Dispense Instructions for use    bisacodyl 10 MG Suppository    DULCOLAX    25 suppository    Place 1 suppository (10 mg) rectally daily as needed for constipation       Catheters Misc     1 each    1 catheter every 28 days.       CRANBERRY CONCENTRATE 500 MG Caps   Generic drug:  Cranberry     90 capsule    Take 8,400 mg by mouth Reported on 5/3/2017       dicyclomine 20 MG tablet    BENTYL    180 tablet    Take 1 tablet (20 mg) by mouth 2 times daily       FLUoxetine 20 MG capsule    PROzac    90 capsule    TAKE 1 CAPSULE EVERY DAY        gabapentin 400 MG capsule    NEURONTIN    270 capsule    TAKE 1 CAPSULE THREE TIMES DAILY       GLUTATHIONE PO      Take 175 mg by mouth 2 times daily Reported on 5/3/2017       loperamide 2 MG capsule    IMODIUM     Take 2 mg by mouth 4 times daily as needed for diarrhea       medroxyPROGESTERone 10 MG tablet    PROVERA    135 tablet    TAKE 1 AND 1/2 TABLETS EVERY DAY       MICROKLENZ WOUND CLEANSER Liqd     240 mL    Externally apply topically daily For daily wound cares.       * order for DME     1 Units    Equipment being ordered: walking boot left       * order for DME     2 Units    Equipment being ordered: Anchoring Device Flexi-Track LG       * order for DME     1 Units    Equipment being ordered: Insertion Tray Wheeler w/BZK Swab Catheter 10CC, Sterile       * order for DME     2 Units    Equipment being ordered: Drain Bag, Kenguard 2000ml. Urinary Bag with Anti-Refulx       * order for DME     1 Units    Equipment being ordered: Catheter Wheeler 2Way 30cc 18FR, Silicone Coated Latex       oxybutynin 5 MG tablet    DITROPAN    180 tablet    Take 1 tablet (5 mg) by mouth 2 times daily       * Notice:  This list has 5 medication(s) that are the same as other medications prescribed for you. Read the directions carefully, and ask your doctor or other care provider to review them with you.

## 2017-05-03 NOTE — PATIENT INSTRUCTIONS
Get labs done today.       Medication(s) prescribed today:    Orders Placed This Encounter   Medications     order for DME     Sig: Equipment being ordered: Anchoring Device Flexi-Track LG     Dispense:  2 Units     Refill:  12     order for DME     Sig: Equipment being ordered: Insertion Tray Wheeler w/BZK Swab Catheter 10CC, Sterile     Dispense:  1 Units     Refill:  12     order for DME     Sig: Equipment being ordered: Drain Bag, Kenguard 2000ml. Urinary Bag with Anti-Refulx     Dispense:  2 Units     Refill:  12     order for DME     Sig: Equipment being ordered: Catheter Wheeler 2Way 30cc 18FR, Silicone Coated Latex     Dispense:  1 Units     Refill:  12

## 2017-05-03 NOTE — PROGRESS NOTES
SUBJECTIVE:                                                    Priyanka Martinez is a 46 year old female who presents to clinic today for the following health issues:    Catheter use and supplies. History of Neurogenic bladder since paragplegia in 2004. Has suprapubic johnson catheter 18 Albanian, changes every 28 days. Uses 2 urine bag each time. She needs supplies ordered for new pharmacy [facility].       Also stabbing pain in RLQ abdominal pain, states she gets nerve pain and shows up in that area. It is worse when she is not doing anything. Not related to eating. When it comes, it is sharp, hot, tried various medication for stomach ulcer without relief. S/p cholecystectomy. Higher dose gabapentin didn't help. The only thing that took the pain away two nights ago was the herb her chiropractor gave her for pancreatitis. It comes daily, almost constant. Currently has a mild pain.          Problem list, Medication list, Allergies, and Medical/Social/Surgical histories reviewed in EPIC and updated as appropriate.    OBJECTIVE:                                                    /80 (Cuff Size: Adult Regular)  Pulse 92  Temp 97.4  F (36.3  C) (Temporal)  SpO2 96%    GEN: healthy, alert and no distress  HEENT: unremarkable.  Neck:     supple, no thyromegaly, no cervical lymphadenopathy.   MOLLY:  CTA B/L, no wheezing or crackles.  CV:  RRR no murmur.  Intact distal pulses, good cap refill.  Abd: soft, normal active bowel sounds, RUQtender, non distended. No mass or organomegaly. No left quadrant tenderness.   Ext:  no cyanosis, clubbing or edema.         Diagnostic test results:  No results found for this or any previous visit (from the past 24 hour(s)).       ASSESSMENT/PLAN:                                                      46 year old female with the following diagnoses and treatment plan:      ICD-10-CM    1. Abdominal pain, right upper quadrant R10.11 Comprehensive metabolic panel (BMP + Alb, Alk  Phos, ALT, AST, Total. Bili, TP)     CBC with platelets differential     Lipase     Amylase   2. Neurogenic bladder N31.9 order for DME     order for DME     order for DME     order for DME       -- check labs for RUQ pain. She may try the herb to see if it helps the pain again.   -- johnson supplies written.       Ayala Limon MD-PhD  [facility]    (Note: Chart documentation was done in part with Dragon Voice Recognition software. Although reviewed after completion, some word and grammatical errors may remain.)

## 2017-05-04 ENCOUNTER — TELEPHONE (OUTPATIENT)
Dept: PEDIATRICS | Facility: CLINIC | Age: 47
End: 2017-05-04

## 2017-05-04 NOTE — TELEPHONE ENCOUNTER
Call placed to [facility] to confirm the fax of notes and catheter Supplies were sent.    Elizabeth staff from [facility] confirmed.

## 2017-05-04 NOTE — TELEPHONE ENCOUNTER
[facility] Call Center    Phone Message    Name of Caller: Elizabeth    Phone Number: Other phone number:  [phone] Fax: Fax: [phone]    Best time to return call: Any    May a detailed message be left on voicemail: yes    Relation to patient: Other Name: [facility]  Relationship: Pharmacy  Is there legal documentation in chart to discuss information with this person: n/a    Reason for Call: Medication Question or concern regarding medication   Prescription Clarification:   Name of Medication: Catheter Supplies  Prescribing Provider:    Pharmacy: [facility]   What on the order needs clarification?     Is patient symptomatic? No. Describe question or concern: Requesting office visit notes pertaining the need for Catheter supplies. Please advise.        Action Taken: Message routed to:  Primary Care p [id number]

## 2017-05-08 ENCOUNTER — TELEPHONE (OUTPATIENT)
Dept: PEDIATRICS | Facility: CLINIC | Age: 47
End: 2017-05-08

## 2017-05-08 NOTE — TELEPHONE ENCOUNTER
[facility] Call Center    Phone Message    Name of Caller: Elizabeth    Phone Number: Other phone number:  [phone]    Best time to return call: ANy    May a detailed message be left on voicemail: yes    Relation to patient: Other Name: [facility]  Relationship: Pharmacist  Is there legal documentation in chart to discuss information with this person: n/a    Reason for Call: Other: Elizabeth states they never recieved the office notes pertaining to catheter supplies. Requesting to get that faxed to [phone] please advise.    Action Taken: Message routed to:  Primary Care p [phone]

## 2017-05-16 ENCOUNTER — OFFICE VISIT (OUTPATIENT)
Dept: PODIATRY | Facility: CLINIC | Age: 47
End: 2017-05-16
Payer: MEDICARE

## 2017-05-16 VITALS — SYSTOLIC BLOOD PRESSURE: 124 MMHG | DIASTOLIC BLOOD PRESSURE: 78 MMHG | HEART RATE: 90 BPM | OXYGEN SATURATION: 98 %

## 2017-05-16 DIAGNOSIS — L97.422 ULCER OF HEEL AND MIDFOOT, LEFT, WITH FAT LAYER EXPOSED (H): Primary | ICD-10-CM

## 2017-05-16 PROCEDURE — 97597 DBRDMT OPN WND 1ST 20 CM/<: CPT | Performed by: PODIATRIST

## 2017-05-16 NOTE — MR AVS SNAPSHOT
After Visit Summary   5/16/2017    Priyanka Martinez    MRN: [id number]           Patient Information     Date Of Birth          1970        Visit Information        Provider Department      5/16/2017 7:00 AM Yobany Rojas DPM [facility]        Today's Diagnoses     Ulcer of heel and midfoot, left, with fat layer exposed (H)    -  1      Care Instructions    Thanks for coming today.  Ortho/Sports Medicine Clinic  [address] [place] [address]    To schedule future appointments in Ortho Clinic, you may call [phone].    To schedule ordered imaging by your Provider: Call [facility] at [phone]    [email or web address] available online at:   [email or web address]    Please call if any further questions or concerns [phone] and ask for the Orthopedic Department. Clinic hours 8 am to 5 pm.    Return to clinic if symptoms worsen.          Follow-ups after your visit        Your next 10 appointments already scheduled     May 17, 2017 11:30 AM CDT   (Arrive by 11:15 AM)   Return Visit with Dave Bradley MD   [facility] ([facility])    [address] [place] [address] [phone]            May 23, 2017  7:00 AM CDT   Return Visit with Yobany Rojas DPM   [facility] ([facility])    [address] [place] [address] [phone]              Who to contact     If you have questions or need follow up information about today's clinic visit or your schedule please contact [facility] directly at [phone].  Normal or non-critical lab and imaging results will be communicated to you by MyChart, letter or phone within 4 business days after the clinic has received the results. If you do not hear from us within 7 days, please contact the clinic through MyChart or phone. If you have a critical or  abnormal lab result, we will notify you by phone as soon as possible.  Submit refill requests through Tipjoy or call your pharmacy and they will forward the refill request to us. Please allow 3 business days for your refill to be completed.          Additional Information About Your Visit        [employer] Information     Tipjoy gives you secure access to your electronic health record. If you see a primary care provider, you can also send messages to your care team and make appointments. If you have questions, please call your primary care clinic.  If you do not have a primary care provider, please call [phone] and they will assist you.      Tipjoy is an electronic gateway that provides easy, online access to your medical records. With Tipjoy, you can request a clinic appointment, read your test results, renew a prescription or communicate with your care team.     To access your existing account, please contact your [facility] or call [phone] for assistance.        Care EveryWhere ID     This is your Care EveryWhere ID. This could be used by other organizations to access your [facility] medical records  [id number]        Your Vitals Were     Pulse Pulse Oximetry                90 98%           Blood Pressure from Last 3 Encounters:   05/16/17 124/78   05/03/17 120/80   05/02/17 132/79    Weight from Last 3 Encounters:   12/17/16 70.3 kg (155 lb)   12/15/16 68 kg (150 lb)   11/14/16 68 kg (150 lb)              We Performed the Following     DEBRIDEMENT WOUND UP TO 20 SQ CM        Primary Care Provider Office Phone # Fax #    Ayala Limon [phone]       [facility] [address] [facility] [address]        Thank you!     Thank you for choosing [facility]  for your care. Our goal is always to provide you with excellent care. Hearing back from our patients is one way we can continue to improve our services. Please take a few  minutes to complete the written survey that you may receive in the mail after your visit with us. Thank you!             Your Updated Medication List - Protect others around you: Learn how to safely use, store and throw away your medicines at www.disposemymeds.org.          This list is accurate as of: 5/16/17 11:59 PM.  Always use your most recent med list.                   Brand Name Dispense Instructions for use    bisacodyl 10 MG Suppository    DULCOLAX    25 suppository    Place 1 suppository (10 mg) rectally daily as needed for constipation       Catheters Misc     1 each    1 catheter every 28 days.       CRANBERRY CONCENTRATE 500 MG Caps   Generic drug:  Cranberry     90 capsule    Take 8,400 mg by mouth Reported on 5/3/2017       dicyclomine 20 MG tablet    BENTYL    180 tablet    Take 1 tablet (20 mg) by mouth 2 times daily       FLUoxetine 20 MG capsule    PROzac    90 capsule    TAKE 1 CAPSULE EVERY DAY       gabapentin 400 MG capsule    NEURONTIN    270 capsule    TAKE 1 CAPSULE THREE TIMES DAILY       GLUTATHIONE PO      Take 175 mg by mouth 2 times daily Reported on 5/3/2017       loperamide 2 MG capsule    IMODIUM     Take 2 mg by mouth 4 times daily as needed for diarrhea       medroxyPROGESTERone 10 MG tablet    PROVERA    135 tablet    TAKE 1 AND 1/2 TABLETS EVERY DAY       MICROKLENZ WOUND CLEANSER Liqd     240 mL    Externally apply topically daily For daily wound cares.       * order for DME     1 Units    Equipment being ordered: walking boot left       * order for DME     2 Units    Equipment being ordered: Anchoring Device Flexi-Track LG       * order for DME     1 Units    Equipment being ordered: Insertion Tray Wheeler w/BZK Swab Catheter 10CC, Sterile       * order for DME     2 Units    Equipment being ordered: Drain Bag, Kenguard 2000ml. Urinary Bag with Anti-Refulx       * order for DME     1 Units    Equipment being ordered: Catheter Wheeler 2Way 30cc 18FR, Silicone Coated Latex        oxybutynin 5 MG tablet    DITROPAN    180 tablet    Take 1 tablet (5 mg) by mouth 2 times daily       * Notice:  This list has 5 medication(s) that are the same as other medications prescribed for you. Read the directions carefully, and ask your doctor or other care provider to review them with you.

## 2017-05-16 NOTE — PATIENT INSTRUCTIONS
Thanks for coming today.  Ortho/Sports Medicine Clinic  [address] [place] [address]    To schedule future appointments in Ortho Clinic, you may call [phone].    To schedule ordered imaging by your Provider: Call [facility] at [phone]    [employer] available online at:   [email or web address]    Please call if any further questions or concerns [phone] and ask for the Orthopedic Department. Clinic hours 8 am to 5 pm.    Return to clinic if symptoms worsen.

## 2017-05-16 NOTE — PROGRESS NOTES
Past Medical History:   Diagnosis Date     Abdominal pain      Diarrhea      Paraplegic immobility syndrome      Tobacco use disorder 1/30/2012     Patient Active Problem List   Diagnosis     Paraplegia (H)     Suprapubic catheter (H)     Gastrointestinal problem     CARDIOVASCULAR SCREENING; LDL GOAL LESS THAN 160     Dysmenorrhea     Neuropathic pain of flank     Neurogenic bladder     Endometriosis     IBS (irritable bowel syndrome)     Generalized anxiety disorder     Past Surgical History:   Procedure Laterality Date     C SPINE FUSION,POSTER,7-12 SGMTS  2004    From T10 to L5     CHOLECYSTECTOMY  1990's     COLONOSCOPY       COLONOSCOPY Left 11/25/2014    Procedure: COMBINED COLONOSCOPY, SINGLE OR MULTIPLE BIOPSY/POLYPECTOMY BY BIOPSY;  Surgeon: Junior Galeano MD;  Location:  GI     ESOPHAGOSCOPY, GASTROSCOPY, DUODENOSCOPY (EGD), COMBINED N/A 11/25/2014    Procedure: COMBINED ESOPHAGOSCOPY, GASTROSCOPY, DUODENOSCOPY (EGD), BIOPSY SINGLE OR MULTIPLE;  Surgeon: Junior Galeano MD;  Location:  GI     Social History     Social History     Marital status: Single     Spouse name: N/A     Number of children: N/A     Years of education: N/A     Occupational History     Not on file.     Social History Main Topics     Smoking status: Former Smoker     Quit date: 3/18/2012     Smokeless tobacco: Never Used      Comment: 7 cig a day off and on socially     Alcohol use No     Drug use: No     Sexual activity: No     Other Topics Concern     Not on file     Social History Narrative     Family History   Problem Relation Age of Onset     Hypertension Father      Heart Failure Maternal Grandmother    SUBJECTIVE FINDINGS:  A 46-year-old female returns to clinic for left medial and lateral heel ulcers.  She relates it is doing okay.  It is not draining much.  She did wear some regular shoes and it got a little red,  but other than that, she has had no problems.  She is using Iodosorb.      OBJECTIVE FINDINGS:   Vascular status is intact, left.  She has a left lateral heel ulcer that is about 2.5 x 3 at its widest margins.  There is minimal drainage and minimal erythema.  No odor, no calor.  She has a medial ulcer that is about 2.5 x 3 cm at its widest margins.  There is minimal drainage, no erythema, no odor, no calor, minimal edema peripherally.  Wound margins are floresita.  There is eschar formation and hyperkeratotic tissue buildup on the wound margins.      ASSESSMENT AND PLAN:  Ulcers, left heel.  These are healing.  Diagnosis and treatment discussed with her.  Sharp ulcer debridement to the dermis with a tissue cutter.  No anesthesia needed.  Local wound care done upon consent today.  The ulcers bled upon debridement.  Nayeli applied to the wounds today.  I am going to continue the Iodosorb daily.  She will return to clinic in about 1 week.  Thee are still waiting to see if the amnio cell product is covered.

## 2017-05-16 NOTE — NURSING NOTE
"Priyanka Martinez's goals for this visit include: Left heel check  She requests these members of her care team be copied on today's visit information: yes    PCP: Ayala Limon    Referring Provider:  No referring provider defined for this encounter.    Chief Complaint   Patient presents with     RECHECK     Left heel check        Initial /78  Pulse 90  SpO2 98% Estimated body mass index is 22.24 kg/(m^2) as calculated from the following:    Height as of 12/17/16: 1.778 m (5' 10\").    Weight as of 12/17/16: 70.3 kg (155 lb).  Medication Reconciliation: complete    "

## 2017-05-17 ENCOUNTER — OFFICE VISIT (OUTPATIENT)
Dept: PHYSICAL MEDICINE AND REHAB | Facility: CLINIC | Age: 47
End: 2017-05-17

## 2017-05-17 VITALS — DIASTOLIC BLOOD PRESSURE: 82 MMHG | HEART RATE: 79 BPM | SYSTOLIC BLOOD PRESSURE: 119 MMHG | HEIGHT: 70 IN

## 2017-05-17 DIAGNOSIS — Z90.49 S/P CHOLECYSTECTOMY: ICD-10-CM

## 2017-05-17 DIAGNOSIS — R10.11 ABDOMINAL PAIN, RIGHT UPPER QUADRANT: ICD-10-CM

## 2017-05-17 DIAGNOSIS — R10.11 RIGHT UPPER QUADRANT PAIN: Primary | ICD-10-CM

## 2017-05-17 ASSESSMENT — PAIN SCALES - GENERAL: PAINLEVEL: WORST PAIN (10)

## 2017-05-17 NOTE — MR AVS SNAPSHOT
After Visit Summary   5/17/2017    Priyanka Martinez    MRN: [id number]           Patient Information     Date Of Birth          1970        Visit Information        Provider Department      5/17/2017 11:30 AM Dave Bradley MD [facility]        Today's Diagnoses     Right upper quadrant pain    -  1    Abdominal pain, right upper quadrant        S/P cholecystectomy           Follow-ups after your visit        Additional Services     GASTROENTEROLOGY ADULT REF CONSULT ONLY       Preferred Location: [facility], [facility] [phone]      Please be aware that coverage of these services is subject to the terms and limitations of your health insurance plan.  Call member services at your health plan with any benefit or coverage questions.  Any procedures must be performed at a [place] facility OR coordinated by your clinic's referral office.    Please bring the following with you to your appointment:    (1) Any X-Rays, CTs or MRIs which have been performed.  Contact the facility where they were done to arrange for  prior to your scheduled appointment.    (2) List of current medications   (3) This referral request   (4) Any documents/labs given to you for this referral                  Your next 10 appointments already scheduled     May 23, 2017  7:00 AM CDT   Return Visit with Yobany Rojas DPM   [facility] ([facility])    [address] [place] [address] [phone]            Sep 21, 2017  8:00 AM CDT   (Arrive by 7:45 AM)   New Patient Visit with AMY Leos CNP   [facility] ([facility])    [address] [place] [address] [phone]              Who to contact     Please call your clinic at [phone] to:    Ask questions about your health    Make or cancel appointments    Discuss your medicines    Learn  "about your test results    Speak to your doctor   If you have compliments or concerns about an experience at your clinic, or if you wish to file a complaint, please contact [facility] Patient Relations at [phone] or email us at [email or web address]         Additional Information About Your Visit        SHERPANDIPITYhart Information     ID Watchdogt gives you secure access to your electronic health record. If you see a primary care provider, you can also send messages to your care team and make appointments. If you have questions, please call your primary care clinic.  If you do not have a primary care provider, please call [phone] and they will assist you.      CTI Towers is an electronic gateway that provides easy, online access to your medical records. With CTI Towers, you can request a clinic appointment, read your test results, renew a prescription or communicate with your care team.     To access your existing account, please contact your [facility] or call [phone] for assistance.        Care EveryWhere ID     This is your Care EveryWhere ID. This could be used by other organizations to access your [facility] medical records  [id number]        Your Vitals Were     Pulse Height                79 1.778 m (5' 10\")           Blood Pressure from Last 3 Encounters:   05/17/17 119/82   05/16/17 124/78   05/03/17 120/80    Weight from Last 3 Encounters:   12/17/16 70.3 kg (155 lb)   12/15/16 68 kg (150 lb)   11/14/16 68 kg (150 lb)              We Performed the Following     GASTROENTEROLOGY ADULT REF CONSULT ONLY        Primary Care Provider Office Phone # Fax #    Ayala Limon [phone]       [facility] [address]  [facility] [address]        Thank you!     Thank you for choosing [facility] PHYSICAL MEDICINE AND REHABILITATION  for your care. Our goal is always to provide you with excellent care. Hearing back from our " patients is one way we can continue to improve our services. Please take a few minutes to complete the written survey that you may receive in the mail after your visit with us. Thank you!             Your Updated Medication List - Protect others around you: Learn how to safely use, store and throw away your medicines at www.disposemymeds.org.          This list is accurate as of: 5/17/17 11:55 AM.  Always use your most recent med list.                   Brand Name Dispense Instructions for use    bisacodyl 10 MG Suppository    DULCOLAX    25 suppository    Place 1 suppository (10 mg) rectally daily as needed for constipation       Catheters Misc     1 each    1 catheter every 28 days.       CRANBERRY CONCENTRATE 500 MG Caps   Generic drug:  Cranberry     90 capsule    Take 8,400 mg by mouth Reported on 5/3/2017       dicyclomine 20 MG tablet    BENTYL    180 tablet    Take 1 tablet (20 mg) by mouth 2 times daily       FLUoxetine 20 MG capsule    PROzac    90 capsule    TAKE 1 CAPSULE EVERY DAY       gabapentin 400 MG capsule    NEURONTIN    270 capsule    TAKE 1 CAPSULE THREE TIMES DAILY       GLUTATHIONE PO      Take 175 mg by mouth 2 times daily Reported on 5/3/2017       loperamide 2 MG capsule    IMODIUM     Take 2 mg by mouth 4 times daily as needed for diarrhea       medroxyPROGESTERone 10 MG tablet    PROVERA    135 tablet    TAKE 1 AND 1/2 TABLETS EVERY DAY       MICROKLENZ WOUND CLEANSER Liqd     240 mL    Externally apply topically daily For daily wound cares.       * order for DME     1 Units    Equipment being ordered: walking boot left       * order for DME     2 Units    Equipment being ordered: Anchoring Device Flexi-Track LG       * order for DME     1 Units    Equipment being ordered: Insertion Tray Wheeler w/BZK Swab Catheter 10CC, Sterile       * order for DME     2 Units    Equipment being ordered: Drain Bag, Kenguard 2000ml. Urinary Bag with Anti-Refulx       * order for DME     1 Units     Equipment being ordered: Catheter Wheeler 2Way 30cc 18FR, Silicone Coated Latex       oxybutynin 5 MG tablet    DITROPAN    180 tablet    Take 1 tablet (5 mg) by mouth 2 times daily       * Notice:  This list has 5 medication(s) that are the same as other medications prescribed for you. Read the directions carefully, and ask your doctor or other care provider to review them with you.
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
## 2017-05-23 ENCOUNTER — OFFICE VISIT (OUTPATIENT)
Dept: PODIATRY | Facility: CLINIC | Age: 47
End: 2017-05-23
Payer: MEDICARE

## 2017-05-23 VITALS — DIASTOLIC BLOOD PRESSURE: 82 MMHG | HEART RATE: 94 BPM | SYSTOLIC BLOOD PRESSURE: 131 MMHG | OXYGEN SATURATION: 99 %

## 2017-05-23 DIAGNOSIS — L97.422 ULCER OF HEEL AND MIDFOOT, LEFT, WITH FAT LAYER EXPOSED (H): Primary | ICD-10-CM

## 2017-05-23 PROCEDURE — 97597 DBRDMT OPN WND 1ST 20 CM/<: CPT | Performed by: PODIATRIST

## 2017-05-23 NOTE — NURSING NOTE
"Priyanka Martinez's goals for this visit include: Left heel recheck   She requests these members of her care team be copied on today's visit information: yes    PCP: Ayala Limon    Referring Provider:  No referring provider defined for this encounter.    Chief Complaint   Patient presents with     RECHECK     Left heel recheck        Initial /82  Pulse 94  SpO2 99% Estimated body mass index is 22.24 kg/(m^2) as calculated from the following:    Height as of 12/17/16: 1.778 m (5' 10\").    Weight as of 12/17/16: 70.3 kg (155 lb).  Medication Reconciliation: complete    "

## 2017-05-23 NOTE — PROGRESS NOTES
Past Medical History:   Diagnosis Date     Abdominal pain      Diarrhea      Paraplegic immobility syndrome      Tobacco use disorder 1/30/2012     Patient Active Problem List   Diagnosis     Paraplegia (H)     Suprapubic catheter (H)     Gastrointestinal problem     CARDIOVASCULAR SCREENING; LDL GOAL LESS THAN 160     Dysmenorrhea     Neuropathic pain of flank     Neurogenic bladder     Endometriosis     IBS (irritable bowel syndrome)     Generalized anxiety disorder     Past Surgical History:   Procedure Laterality Date     C SPINE FUSION,POSTER,7-12 SGMTS  2004    From T10 to L5     CHOLECYSTECTOMY  1990's     COLONOSCOPY       COLONOSCOPY Left 11/25/2014    Procedure: COMBINED COLONOSCOPY, SINGLE OR MULTIPLE BIOPSY/POLYPECTOMY BY BIOPSY;  Surgeon: Junior Galeano MD;  Location:  GI     ESOPHAGOSCOPY, GASTROSCOPY, DUODENOSCOPY (EGD), COMBINED N/A 11/25/2014    Procedure: COMBINED ESOPHAGOSCOPY, GASTROSCOPY, DUODENOSCOPY (EGD), BIOPSY SINGLE OR MULTIPLE;  Surgeon: Junior Galeano MD;  Location:  GI     Social History     Social History     Marital status: Single     Spouse name: N/A     Number of children: N/A     Years of education: N/A     Occupational History     Not on file.     Social History Main Topics     Smoking status: Former Smoker     Quit date: 3/18/2012     Smokeless tobacco: Never Used      Comment: 7 cig a day off and on socially     Alcohol use No     Drug use: No     Sexual activity: No     Other Topics Concern     Not on file     Social History Narrative     Family History   Problem Relation Age of Onset     Hypertension Father      Heart Failure Maternal Grandmother      SUBJECTIVE FINDINGS:  A 46-year-old female returns to clinic for ulcers, left heel.  She relates they are doing okay.  They are a little more wet and goopy than they usually are.  She is using the Iodosorb.  No new problems with the heels.        OBJECTIVE FINDINGS:  Vascular status intact, left.  She has a  left lateral heel ulcer that is about 3 x 2 cm at its widest margins.  The medial ulcer is about 3.5 x 2.5 cm.  Both are deep into subcutaneous tissue.  There is some eschar that is loosening.  There is some serosanguineous drainage and some contracture on the margins.  Minimal edema.  No gross erythema, no odor, no calor.      ASSESSMENT AND PLAN:  Ulcers, left heel.  These are healing.  Diagnosis and treatment discussed with her.  Sharp ulcer debridement through the dermis into the subcutaneous tissues, no anesthesia needed and local wound care done upon consent today.  Applied Nayeli to the wounds today.  She will continue the Iodosorb daily.  These do not appear grossly infected.  She will return to clinic to see me in 1 week.

## 2017-05-23 NOTE — PATIENT INSTRUCTIONS
Thanks for coming today.  Ortho/Sports Medicine Clinic  [address] [place] [address]    To schedule future appointments in Ortho Clinic, you may call [phone].    To schedule ordered imaging by your Provider: Call [facility] at [phone]    [employer] available online at:   [email or web address]    Please call if any further questions or concerns [phone] and ask for the Orthopedic Department. Clinic hours 8 am to 5 pm.    Return to clinic if symptoms worsen.

## 2017-05-23 NOTE — MR AVS SNAPSHOT
After Visit Summary   5/23/2017    Priyanka Martinez    MRN: [id number]           Patient Information     Date Of Birth          1970        Visit Information        Provider Department      5/23/2017 7:00 AM Yobany Rojas DPM [facility]        Today's Diagnoses     Ulcer of heel and midfoot, left, with fat layer exposed (H)    -  1      Care Instructions    Thanks for coming today.  Ortho/Sports Medicine Clinic  [address] [place] [address]    To schedule future appointments in Ortho Clinic, you may call [phone].    To schedule ordered imaging by your Provider: Call [facility] at [phone]    [employer] available online at:   [email or web address]    Please call if any further questions or concerns [phone] and ask for the Orthopedic Department. Clinic hours 8 am to 5 pm.    Return to clinic if symptoms worsen.          Follow-ups after your visit        Your next 10 appointments already scheduled     May 30, 2017  2:30 PM CDT   Return Visit with Yobany Rojas DPM   [facility] [place] [facility])    [address] [place] [id number] [phone]            Jun 06, 2017  7:00 AM CDT   Return Visit with Yobany Rojas DPM   [facility] [place] [facility])    [address] [place] [address]   [phone]            Jun 13, 2017  7:30 AM CDT   Return Visit with Yobany Rojas DPM   [facility] [place] [facility])    [address] [place] [address] [phone]            Jun 20, 2017  3:00 PM CDT   Return Visit with Yobany Rojas DPM   [facility] [place] [facility])    [address] [facility] [address] [phone]            Jun 23, 2017  3:00 PM CDT   Return Visit with Yobany Rojas DPM   [facility] [place] [facility])    [address]  [address] [address] [phone]            Jul 11, 2017  8:00 AM CDT   Return Visit with Yobany Rojas DPM   [facility] ([facility])    [address] [place] [address] [phone]            Sep 21, 2017  8:00 AM CDT   (Arrive by 7:45 AM)   New Patient Visit with AMY Leos CNP   [facility] ([facility])    [address] [facility]  4th Floor  [facility] [address] [phone]              Who to contact     If you have questions or need follow up information about today's clinic visit or your schedule please contact [facility] directly at [phone].  Normal or non-critical lab and imaging results will be communicated to you by [email or web address], letter or phone within 4 business days after the clinic has received the results. If you do not hear from us within 7 days, please contact the clinic through [email or web address] or phone. If you have a critical or abnormal lab result, we will notify you by phone as soon as possible.  Submit refill requests through Oversee or call your pharmacy and they will forward the refill request to us. Please allow 3 business days for your refill to be completed.          Additional Information About Your Visit        MyChart Information     Oversee gives you secure access to your electronic health record. If you see a primary care provider, you can also send messages to your care team and make appointments. If you have questions, please call your primary care clinic.  If you do not have a primary care provider, please call [phone] and they will assist you.      Oversee is an electronic gateway that provides easy, online access to your medical records. With Oversee, you can request a clinic appointment, read your test results, renew a prescription or communicate with your care team.     To access your existing account, please contact your  [facility] or call [phone] for assistance.        Care EveryWhere ID     This is your Care EveryWhere ID. This could be used by other organizations to access your [facility] medical records  [id number]        Your Vitals Were     Pulse Pulse Oximetry                94 99%           Blood Pressure from Last 3 Encounters:   05/23/17 131/82   05/17/17 119/82   05/16/17 124/78    Weight from Last 3 Encounters:   12/17/16 70.3 kg (155 lb)   12/15/16 68 kg (150 lb)   11/14/16 68 kg (150 lb)              We Performed the Following     DEBRIDEMENT WOUND UP TO 20 SQ CM        Primary Care Provider Office Phone # Fax #    Ayala Limon [phone]       [facility] [address]  [facility] [address]        Thank you!     Thank you for choosing [facility]  for your care. Our goal is always to provide you with excellent care. Hearing back from our patients is one way we can continue to improve our services. Please take a few minutes to complete the written survey that you may receive in the mail after your visit with us. Thank you!             Your Updated Medication List - Protect others around you: Learn how to safely use, store and throw away your medicines at www.disposemymeds.org.          This list is accurate as of: 5/23/17  3:05 PM.  Always use your most recent med list.                   Brand Name Dispense Instructions for use    bisacodyl 10 MG Suppository    DULCOLAX    25 suppository    Place 1 suppository (10 mg) rectally daily as needed for constipation       Catheters Misc     1 each    1 catheter every 28 days.       CRANBERRY CONCENTRATE 500 MG Caps   Generic drug:  Cranberry     90 capsule    Take 8,400 mg by mouth Reported on 5/3/2017       dicyclomine 20 MG tablet    BENTYL    180 tablet    Take 1 tablet (20 mg) by mouth 2 times daily       FLUoxetine 20 MG capsule    PROzac    90 capsule    TAKE 1 CAPSULE EVERY DAY        gabapentin 400 MG capsule    NEURONTIN    270 capsule    TAKE 1 CAPSULE THREE TIMES DAILY       GLUTATHIONE PO      Take 175 mg by mouth 2 times daily Reported on 5/3/2017       loperamide 2 MG capsule    IMODIUM     Take 2 mg by mouth 4 times daily as needed for diarrhea       medroxyPROGESTERone 10 MG tablet    PROVERA    135 tablet    TAKE 1 AND 1/2 TABLETS EVERY DAY       MICROKLENZ WOUND CLEANSER Liqd     240 mL    Externally apply topically daily For daily wound cares.       * order for DME     1 Units    Equipment being ordered: walking boot left       * order for DME     2 Units    Equipment being ordered: Anchoring Device Flexi-Track LG       * order for DME     1 Units    Equipment being ordered: Insertion Tray Wheeler w/BZK Swab Catheter 10CC, Sterile       * order for DME     2 Units    Equipment being ordered: Drain Bag, Kenguard 2000ml. Urinary Bag with Anti-Refulx       * order for DME     1 Units    Equipment being ordered: Catheter Wheeler 2Way 30cc 18FR, Silicone Coated Latex       oxybutynin 5 MG tablet    DITROPAN    180 tablet    Take 1 tablet (5 mg) by mouth 2 times daily       * Notice:  This list has 5 medication(s) that are the same as other medications prescribed for you. Read the directions carefully, and ask your doctor or other care provider to review them with you.

## 2017-05-25 ENCOUNTER — OFFICE VISIT (OUTPATIENT)
Dept: FAMILY MEDICINE | Facility: CLINIC | Age: 47
End: 2017-05-25
Payer: MEDICARE

## 2017-05-25 VITALS
TEMPERATURE: 97.7 F | OXYGEN SATURATION: 99 % | DIASTOLIC BLOOD PRESSURE: 83 MMHG | HEART RATE: 118 BPM | SYSTOLIC BLOOD PRESSURE: 111 MMHG

## 2017-05-25 DIAGNOSIS — G82.20 PARAPLEGIA (H): ICD-10-CM

## 2017-05-25 DIAGNOSIS — R10.9 FLANK PAIN: Primary | ICD-10-CM

## 2017-05-25 DIAGNOSIS — Z93.59 SUPRAPUBIC CATHETER (H): ICD-10-CM

## 2017-05-25 DIAGNOSIS — M79.2 NEUROPATHIC PAIN OF FLANK, RIGHT: ICD-10-CM

## 2017-05-25 LAB
ALBUMIN UR-MCNC: NEGATIVE MG/DL
AMORPH CRY #/AREA URNS HPF: ABNORMAL /HPF
APPEARANCE UR: CLEAR
BACTERIA #/AREA URNS HPF: ABNORMAL /HPF
BILIRUB UR QL STRIP: NEGATIVE
CAOX CRY #/AREA URNS HPF: ABNORMAL /HPF
COLOR UR AUTO: YELLOW
GLUCOSE UR STRIP-MCNC: NEGATIVE MG/DL
HGB UR QL STRIP: NEGATIVE
KETONES UR STRIP-MCNC: NEGATIVE MG/DL
LEUKOCYTE ESTERASE UR QL STRIP: ABNORMAL
NITRATE UR QL: NEGATIVE
NON-SQ EPI CELLS #/AREA URNS LPF: ABNORMAL /LPF
PH UR STRIP: 6 PH (ref 5–7)
RBC #/AREA URNS AUTO: ABNORMAL /HPF (ref 0–2)
SP GR UR STRIP: <=1.005 (ref 1–1.03)
URN SPEC COLLECT METH UR: ABNORMAL
UROBILINOGEN UR STRIP-ACNC: 0.2 EU/DL (ref 0.2–1)
WBC #/AREA URNS AUTO: ABNORMAL /HPF (ref 0–2)

## 2017-05-25 PROCEDURE — 81001 URINALYSIS AUTO W/SCOPE: CPT | Performed by: PREVENTIVE MEDICINE

## 2017-05-25 PROCEDURE — 99213 OFFICE O/P EST LOW 20 MIN: CPT | Performed by: PREVENTIVE MEDICINE

## 2017-05-25 RX ORDER — SULFAMETHOXAZOLE/TRIMETHOPRIM 800-160 MG
1 TABLET ORAL 2 TIMES DAILY
Qty: 14 TABLET | Refills: 0 | Status: SHIPPED | OUTPATIENT
Start: 2017-05-25 | End: 2017-06-06

## 2017-05-25 ASSESSMENT — PAIN SCALES - GENERAL: PAINLEVEL: EXTREME PAIN (9)

## 2017-05-25 NOTE — MR AVS SNAPSHOT
After Visit Summary   5/25/2017    Priyanka Martinez    MRN: [id number]           Patient Information     Date Of Birth          1970        Visit Information        Provider Department      5/25/2017 7:00 AM Leti Wagner MD [facility]        Today's Diagnoses     Flank pain    -  1    Paraplegia (H)        Suprapubic catheter (H)        Neuropathic pain of flank, right          Care Instructions    At [facility], we strive to deliver an exceptional experience to you, every time we see you.    If you receive a survey in the mail, please send us back your thoughts. We really do value your feedback.    Thank you for visiting [facility]    Normal or non-critical lab and imaging results will be communicated to you by MyChart, letter or phone within 7 days.  If you do not hear from us within 10 days, please call the clinic. If you have a critical or abnormal lab result, we will notify you by phone as soon as possible.     If you have any questions regarding your visit please contact:     Team Jaja/Spirit  Clinic Hours Telephone Number   Dr. Puja Vermamateusz   7am-7pm  Monday through Thursday  7am-5pm Friday [phone]  Rani GARSIA RN   Pharmacy 8:30am-9pm Monday-Friday    9am-5pm Saturday-Sunday [phone]   Urgent Care 11am-9pm Monday-Friday        9am-5pm Saturday-Sunday [phone]     After hours, weekend or if you need to make an appointment with your primary provider please call [phone].   After Hours nurse advise: call [facility]: [phone]    Use KeepRecipest (secure email communication and access to your chart) to send your primary care provider a message or make an appointment. Ask someone on your Team how to sign up for Funium. To log on to Health2Sync or for more information in Phorm please visit the  website at [email or web address].   As of October 8, 2013, all password changes, disabled accounts, or ID changes in Agennix/MyHealth will be done by our Access Services Department.   If you need help with your account or password, call: [phone]. Clinic staff no longer has the ability to change passwords.             Follow-ups after your visit        Follow-up notes from your care team     Return if symptoms worsen or fail to improve.      Your next 10 appointments already scheduled     May 30, 2017  2:30 PM CDT   [facility]   [facility]    [address] [place] [address]   [phone]            Jun 06, 2017  7:00 AM CDT   Return Visit with Yobany Rojas DPM   [facility])    [address] [facility] [address]   [phone]            Jun 13, 2017  7:30 AM CDT   Return Visit with Yobany Rojas DPM   [facility]    [address] [facility] [address]   [phone]            Jun 20, 2017  3:00 PM CDT   Return Visit with Yobany Rojas DPM   [facility]    [address] [facility] [address]   [phone]            Jun 23, 2017  3:00 PM CDT   Return Visit with Yobany Rojas DPM   [facility]    [address] [facility] [address]   [phone]            Jul 11, 2017  8:00 AM CDT   Return Visit with Yobany Rojas DPM   [facility])    [address] [facility] [address]   [phone]            Sep 21, 2017  8:00 AM CDT   (Arrive by 7:45 AM)   New Patient Visit with AMY Leos [facility] ([facility])    [address] [place] [address]  [facility] [address] [phone]              Future tests that were ordered for you today     Open Future Orders        Priority Expected Expires Ordered    US Renal Limited Routine  5/25/2018 5/25/2017            Who to contact     If you have questions or need follow up information about today's clinic visit or your schedule please contact [facility] directly at [phone].  Normal or non-critical lab and imaging results will be communicated to you by i-Human Patientshart, letter or phone within 4 business days after the clinic has received the results. If you do not hear from us within 7 days, please contact the clinic through NextIOt or phone. If you have a critical or abnormal lab result, we will notify you by phone as soon as possible.  Submit refill requests through Vator or call your pharmacy and they will forward the refill request to us. Please allow 3 business days for your refill to be completed.          Additional Information About Your Visit        i-Human PatientsharShaker Information     Vator gives you secure access to your electronic health record. If you see a primary care provider, you can also send messages to your care team and make appointments. If you have questions, please call your primary care clinic.  If you do not have a primary care provider, please call [phone] and they will assist you.        Care EveryWhere ID     This is your Care EveryWhere ID. This could be used by other organizations to access your [facility] medical records  [id number]        Your Vitals Were     Pulse Temperature Pulse Oximetry Breastfeeding?          118 97.7  F (36.5  C) (Oral) 99% No         Blood Pressure from Last 3 Encounters:   05/25/17 111/83   05/23/17 131/82   05/17/17 119/82    Weight from Last 3 Encounters:   12/17/16 155 lb (70.3 kg)   12/15/16 150 lb (68 kg)   11/14/16 150 lb (68 kg)              We Performed the Following     *UA reflex to Microscopic and Culture ([place] and  [facility] (except [place] and [place])     Urine Microscopic          Today's Medication Changes          These changes are accurate as of: 5/25/17  8:48 AM.  If you have any questions, ask your nurse or doctor.               Start taking these medicines.        Dose/Directions    sulfamethoxazole-trimethoprim 800-160 MG per tablet   Commonly known as:  BACTRIM DS/SEPTRA DS   Used for:  Flank pain   Started by:  Leti Wagner MD        Dose:  1 tablet   Take 1 tablet by mouth 2 times daily   Quantity:  14 tablet   Refills:  0         Stop taking these medicines if you haven't already. Please contact your care team if you have questions.     bisacodyl 10 MG Suppository   Commonly known as:  DULCOLAX   Stopped by:  Leti Wagner MD           CRANBERRY CONCENTRATE 500 MG Caps   Generic drug:  Cranberry   Stopped by:  Leti Wagner MD           GLUTATHIONE PO   Stopped by:  Leti Wagner MD           loperamide 2 MG capsule   Commonly known as:  IMODIUM   Stopped by:  Leti Wagner MD                Where to get your medicines      These medications were sent to [facility]/pharmacy #[id number] [address] [address]     Phone:  [phone]     sulfamethoxazole-trimethoprim 800-160 MG per tablet                Primary Care Provider Office Phone # Fax #    Aylaa Limon [phone]       [facility] [address]  [facility] [address]        Thank you!     Thank you for choosing [facility]  for your care. Our goal is always to provide you with excellent care. Hearing back from our patients is one way we can continue to improve our services. Please take a few minutes to complete the written survey that you may receive in the mail after your visit with us. Thank you!             Your Updated Medication List - Protect others around you: Learn how to safely use, store and throw away your  medicines at www.disposemymeds.org.          This list is accurate as of: 5/25/17  8:48 AM.  Always use your most recent med list.                   Brand Name Dispense Instructions for use    Catheters Misc     1 each    1 catheter every 28 days.       dicyclomine 20 MG tablet    BENTYL    180 tablet    Take 1 tablet (20 mg) by mouth 2 times daily       FLUoxetine 20 MG capsule    PROzac    90 capsule    TAKE 1 CAPSULE EVERY DAY       gabapentin 400 MG capsule    NEURONTIN    270 capsule    TAKE 1 CAPSULE THREE TIMES DAILY       medroxyPROGESTERone 10 MG tablet    PROVERA    135 tablet    TAKE 1 AND 1/2 TABLETS EVERY DAY       MICROKLENZ WOUND CLEANSER Liqd     240 mL    Externally apply topically daily For daily wound cares.       * order for DME     1 Units    Equipment being ordered: walking boot left       * order for DME     2 Units    Equipment being ordered: Anchoring Device Flexi-Track LG       * order for DME     1 Units    Equipment being ordered: Insertion Tray Wheeler w/BZK Swab Catheter 10CC, Sterile       * order for DME     2 Units    Equipment being ordered: Drain Bag, Kenguard 2000ml. Urinary Bag with Anti-Refulx       * order for DME     1 Units    Equipment being ordered: Catheter Wheeler 2Way 30cc 18FR, Silicone Coated Latex       oxybutynin 5 MG tablet    DITROPAN    180 tablet    Take 1 tablet (5 mg) by mouth 2 times daily       sulfamethoxazole-trimethoprim 800-160 MG per tablet    BACTRIM DS/SEPTRA DS    14 tablet    Take 1 tablet by mouth 2 times daily       * Notice:  This list has 5 medication(s) that are the same as other medications prescribed for you. Read the directions carefully, and ask your doctor or other care provider to review them with you.

## 2017-05-25 NOTE — PROGRESS NOTES
SUBJECTIVE:                                                    Priyanka Martinez is a 46 year old female who presents to clinic today for the following health issues:    I have reviewed and agree with the documentation by the MA. I updated the history as indicated.  Leti Wagner MD MPH     Kidney pain      Duration: x 1.5 weeks    Description (location/character/radiation): right kidney    Intensity:  9/10    Accompanying signs and symptoms: none    History (similar episodes/previous evaluation): None    Precipitating or alleviating factors: None    Therapies tried and outcome: None     Patient points to right upper quadrant as the site of pain. No changes in bowel movements. No melena or rectal bleeding. No rash, no fever or chills. Pain does not radiate. No changes in urine, has a supra pubic catheter. The pain she is feeling now is different from the chronic neuropathic flank pain that she has had for many years. Has increased Neurontin to 400 mg QID without improvement. Is concerned about a renal infection. Past history of cholecystectomy.     Problem list and histories reviewed & adjusted, as indicated.  Additional history: as documented    Patient Active Problem List   Diagnosis     Paraplegia (H)     Suprapubic catheter (H)     Gastrointestinal problem     CARDIOVASCULAR SCREENING; LDL GOAL LESS THAN 160     Dysmenorrhea     Neuropathic pain of flank     Neurogenic bladder     Endometriosis     IBS (irritable bowel syndrome)     Generalized anxiety disorder     Past Surgical History:   Procedure Laterality Date     C SPINE FUSION,POSTER,7-12 SGMTS  2004    From T10 to L5     CHOLECYSTECTOMY  1990's     COLONOSCOPY       COLONOSCOPY Left 11/25/2014    Procedure: COMBINED COLONOSCOPY, SINGLE OR MULTIPLE BIOPSY/POLYPECTOMY BY BIOPSY;  Surgeon: Junior Galeano MD;  Location:  GI     ESOPHAGOSCOPY, GASTROSCOPY, DUODENOSCOPY (EGD), COMBINED N/A 11/25/2014    Procedure: COMBINED ESOPHAGOSCOPY,  GASTROSCOPY, DUODENOSCOPY (EGD), BIOPSY SINGLE OR MULTIPLE;  Surgeon: Junior Galeano MD;  Location:  GI       Social History   Substance Use Topics     Smoking status: Former Smoker     Quit date: 3/18/2012     Smokeless tobacco: Never Used      Comment: 7 cig a day off and on socially     Alcohol use No     Family History   Problem Relation Age of Onset     Hypertension Father      Heart Failure Maternal Grandmother          Current Outpatient Prescriptions   Medication Sig Dispense Refill     order for DME Equipment being ordered: Anchoring Device Flexi-Track LG 2 Units 12     order for DME Equipment being ordered: Insertion Tray Wheeler w/BZK Swab Catheter 10CC, Sterile 1 Units 12     order for DME Equipment being ordered: Drain Bag, Kenguard 2000ml. Urinary Bag with Anti-Refulx 2 Units 12     order for DME Equipment being ordered: Catheter Wheeler 2Way 30cc 18FR, Silicone Coated Latex 1 Units 12     dicyclomine (BENTYL) 20 MG tablet Take 1 tablet (20 mg) by mouth 2 times daily 180 tablet 3     oxybutynin (DITROPAN) 5 MG tablet Take 1 tablet (5 mg) by mouth 2 times daily 180 tablet 3     medroxyPROGESTERone (PROVERA) 10 MG tablet TAKE 1 AND 1/2 TABLETS EVERY  tablet 3     Wound Cleansers (MICROKLENZ WOUND CLEANSER) LIQD Externally apply topically daily For daily wound cares. 240 mL 5     FLUoxetine (PROZAC) 20 MG capsule TAKE 1 CAPSULE EVERY DAY 90 capsule 1     gabapentin (NEURONTIN) 400 MG capsule TAKE 1 CAPSULE THREE TIMES DAILY 270 capsule 3     order for DME Equipment being ordered: walking boot left 1 Units 0     Catheters MISC 1 catheter every 28 days. 1 each 12     [DISCONTINUED] oxybutynin (DITROPAN) 5 MG tablet Take 1 tablet by mouth 3 times daily. 270 tablet 3     Allergies   Allergen Reactions     Hydrocodone Itching     BP Readings from Last 3 Encounters:   05/25/17 111/83   05/23/17 131/82   05/17/17 119/82    Wt Readings from Last 3 Encounters:   12/17/16 155 lb (70.3 kg)   12/15/16  150 lb (68 kg)   11/14/16 150 lb (68 kg)                    Reviewed and updated as needed this visit by clinical staff  Tobacco  Allergies  Meds       Reviewed and updated as needed this visit by Provider         ROS:  Constitutional, HEENT, cardiovascular, pulmonary, gi and gu systems are negative, except as otherwise noted.    OBJECTIVE:                                                    /83  Pulse 118  Temp 97.7  F (36.5  C) (Oral)  SpO2 99%  Breastfeeding? No  There is no height or weight on file to calculate BMI.  GENERAL APPEARANCE: alert and seated om wheelchair  EYES: Eyes grossly normal to inspection and conjunctivae and sclerae normal  RESP: lungs clear to auscultation - no rales, rhonchi or wheezes  CV: regular rates and rhythm and normal S1 S2, no S3 or S4  ABDOMEN: non distended, no rebound or guarding, no tenderness in RUQ, Marked point tenderness over right flank, no edema or tenderness around supra pubic catheter, no purulent drainage   SKIN: no suspicious lesions or rashes  NEURO: mentation intact and speech normal  PSYCH: mentation appears normal    Diagnostic test results:  Diagnostic Test Results:  Results for orders placed or performed in visit on 05/25/17 (from the past 24 hour(s))   *UA reflex to Microscopic and Culture ([place] and [facility] (except [place] and [place])   Result Value Ref [facility]    Color Urine Yellow     Appearance Urine Clear     Glucose Urine Negative NEG mg/dL    Bilirubin Urine Negative NEG    Ketones Urine Negative NEG mg/dL    Specific Gravity Urine <=1.005 1.003 - 1.035    Blood Urine Negative NEG    pH Urine 6.0 5.0 - 7.0 pH    Protein Albumin Urine Negative NEG mg/dL    Urobilinogen Urine 0.2 0.2 - 1.0 EU/dL    Nitrite Urine Negative NEG    Leukocyte Esterase Urine Small (A) NEG    Source Midstream Urine    Urine Microscopic   Result Value Ref [facility]    WBC Urine 2-5 (A) 0 - 2 /HPF    RBC Urine O - 2 0 - 2 /HPF    Squamous Epithelial /LPF Urine  Few FEW /LPF    Bacteria Urine Moderate (A) NEG /HPF    Amorphous Crystals Few (A) NEG /HPF    Calcium Oxalate Few (A) NEG /HPF        ASSESSMENT/PLAN:                                                      (R10.9) Flank pain  (primary encounter diagnosis)  Comment: Feels different from usual neuropathic pain   Plan: *UA reflex to Microscopic and Culture ([place]         and [facility] (except [place] and         [place]), Urine Microscopic, US Renal Limited,         sulfamethoxazole-trimethoprim (BACTRIM         DS/SEPTRA DS) 800-160 MG per tablet        ER precautions   Is afebrile, no nausea or emesis, no hematuria hence no risk of pyelonephritis at this time     (G82.20) Paraplegia (H)  Comment: Stable  Plan: Follow up with Dr. Bradley as scheduled     (Z93.59) Suprapubic catheter (H)  Comment: Stable  Plan: No signs of infection     (G57.91) Neuropathic pain of flank, right  Comment: Chronic  Plan: On gabapentin 400 mg 4 times a day, recent increase in dose per Neurology       Follow up with Provider - As needed otherwise as scheduled with PCP      Leti Wagner MD MPH    [facility] [place]

## 2017-05-25 NOTE — PATIENT INSTRUCTIONS
At [facility], we strive to deliver an exceptional experience to you, every time we see you.    If you receive a survey in the mail, please send us back your thoughts. We really do value your feedback.    Thank you for visiting [facility]    Normal or non-critical lab and imaging results will be communicated to you by MyChart, letter or phone within 7 days.  If you do not hear from us within 10 days, please call the clinic. If you have a critical or abnormal lab result, we will notify you by phone as soon as possible.     If you have any questions regarding your visit please contact:     Team Jaja/Spirit  Clinic Hours Telephone Number   Dr. Puja Chau   7am-7pm  Monday through Thursday  7am-5pm Friday [phone]  Rani GARSIA RN   Pharmacy 8:30am-9pm Monday-Friday    9am-5pm Saturday-Sunday [phone]   Urgent Care 11am-9pm Monday-Friday        9am-5pm Saturday-Sunday [phone]     After hours, weekend or if you need to make an appointment with your primary provider please call [phone].   After Hours nurse advise: call [facility] [facility]: [phone]    Use Nangatehart (secure email communication and access to your chart) to send your primary care provider a message or make an appointment. Ask someone on your Team how to sign up for MirageWorks. To log on to ImmuneWorks or for more information in ReaMetrix please visit the website at [email or web address].   As of October 8, 2013, all password changes, disabled accounts, or ID changes in MirageWorks/MyHealth will be done by our Access Services Department.   If you need help with your account or password, call: [phone]. Clinic staff no longer has the ability to change passwords.

## 2017-05-25 NOTE — NURSING NOTE
"Chief Complaint   Patient presents with     Kidney Problem       Initial /83  Pulse 118  Temp 97.7  F (36.5  C) (Oral)  SpO2 99%  Breastfeeding? No Estimated body mass index is 22.24 kg/(m^2) as calculated from the following:    Height as of 12/17/16: 5' 10\" (1.778 m).    Weight as of 12/17/16: 155 lb (70.3 kg).  Medication Reconciliation: complete   Ruth KIMBROUGH        "

## 2017-05-30 ENCOUNTER — RADIANT APPOINTMENT (OUTPATIENT)
Dept: ULTRASOUND IMAGING | Facility: CLINIC | Age: 47
End: 2017-05-30
Payer: MEDICARE

## 2017-05-30 ENCOUNTER — OFFICE VISIT (OUTPATIENT)
Dept: PODIATRY | Facility: CLINIC | Age: 47
End: 2017-05-30
Payer: MEDICARE

## 2017-05-30 VITALS — SYSTOLIC BLOOD PRESSURE: 112 MMHG | HEART RATE: 81 BPM | DIASTOLIC BLOOD PRESSURE: 76 MMHG

## 2017-05-30 DIAGNOSIS — L97.422 ULCER OF HEEL AND MIDFOOT, LEFT, WITH FAT LAYER EXPOSED (H): Primary | ICD-10-CM

## 2017-05-30 DIAGNOSIS — R10.9 FLANK PAIN: ICD-10-CM

## 2017-05-30 PROCEDURE — 76770 US EXAM ABDO BACK WALL COMP: CPT

## 2017-05-30 PROCEDURE — 97597 DBRDMT OPN WND 1ST 20 CM/<: CPT | Performed by: PODIATRIST

## 2017-05-30 ASSESSMENT — PAIN SCALES - GENERAL: PAINLEVEL: NO PAIN (0)

## 2017-05-30 NOTE — PROGRESS NOTES
Priyanka, your test results were within normal limits. Ultrasound of the kidneys did not show any acute abnormalities. No mass, cyst or stones were seen.     Please do not hesitate to call us at [phone] if you have any questions or concerns.    Thank you,    Leti Wagner MD MPH

## 2017-05-30 NOTE — MR AVS SNAPSHOT
After Visit Summary   2017    Priyanka Martinez    MRN: [id number]           Patient Information     Date Of Birth          1970        Visit Information        Provider Department      2017 2:30 PM Yobany Rojas DPM [facility]        Today's Diagnoses     Ulcer of heel and midfoot, left, with fat layer exposed (H)    -  1      Care Instructions    Thanks for coming today.  Ortho/Sports Medicine Clinic  [address] [place] [address]    To schedule future appointments in Ortho Clinic, you may call [phone].    To schedule ordered imaging by your provider:   Call Central Imaging [phone]    To schedule an injection ordered by your provider:  Call Central Imaging Injection scheduling line: [phone]  Physician Software Systemshart available online at:  [email or web address]    Please call if any further questions or concerns ([phone]).  Clinic hours 8 am to 5 pm.    Return to clinic (call) if symptoms worsen or fail to improve.            Follow-ups after your visit        Your next 10 appointments already scheduled     2017  7:00 AM CDT   Return Visit with Yobany Rojas DPM   [facility])    [address] [place] [address]   [phone]            2017  7:30 AM CDT   Return Visit with Yobany Rojas DPM   [facility])    [address] [facility] [address]   [phone]            2017  3:00 PM CDT   Return Visit with Yobany Rojas DPM   [facility] ([facility])    [address] [facility] [address]   [phone]            2017  3:00 PM CDT   Return Visit with Yobany Rojas DPM   [facility] ([facility])    [address] [facility] [address]   [phone]            2017  8:00 AM  CDT   Return Visit with Yobany Rojas DPM   [facility] ([facility])    [address] [place] [address] [phone]            Sep 21, 2017  8:00 AM CDT   (Arrive by 7:45 AM)   New Patient Visit with AMY Leos CNP   [facility]enterology [facility] IBD ([facility])    [address] [facility]  4th Floor  [facility] [address] [phone]              Who to contact     If you have questions or need follow up information about today's clinic visit or your schedule please contact [facility] directly at [phone].  Normal or non-critical lab and imaging results will be communicated to you by Waldo Networkshart, letter or phone within 4 business days after the clinic has received the results. If you do not hear from us within 7 days, please contact the clinic through Waldo Networkshart or phone. If you have a critical or abnormal lab result, we will notify you by phone as soon as possible.  Submit refill requests through [employer] or call your pharmacy and they will forward the refill request to us. Please allow 3 business days for your refill to be completed.          Additional Information About Your Visit        [employer] Information     [employer] gives you secure access to your electronic health record. If you see a primary care provider, you can also send messages to your care team and make appointments. If you have questions, please call your primary care clinic.  If you do not have a primary care provider, please call [phone] and they will assist you.      [employer] is an electronic gateway that provides easy, online access to your medical records. With [employer], you can request a clinic appointment, read your test results, renew a prescription or communicate with your care team.     To access your existing account, please contact your [facility] or call [phone] for assistance.        Care  EveryWhere ID     This is your Care EveryWhere ID. This could be used by other organizations to access your [place] medical records  [id number]        Your Vitals Were     Pulse                   81            Blood Pressure from Last 3 Encounters:   05/30/17 112/76   05/25/17 111/83   05/23/17 131/82    Weight from Last 3 Encounters:   12/17/16 70.3 kg (155 lb)   12/15/16 68 kg (150 lb)   11/14/16 68 kg (150 lb)              We Performed the Following     DEBRIDEMENT WOUND UP TO 20 SQ CM        Primary Care Provider Office Phone # Fax #    Ayala Limon [phone]       [facility] [address]  [facility] [address]        Thank you!     Thank you for choosing [facility]  for your care. Our goal is always to provide you with excellent care. Hearing back from our patients is one way we can continue to improve our services. Please take a few minutes to complete the written survey that you may receive in the mail after your visit with us. Thank you!             Your Updated Medication List - Protect others around you: Learn how to safely use, store and throw away your medicines at www.disposemymeds.org.          This list is accurate as of: 5/30/17 11:59 PM.  Always use your most recent med list.                   Brand Name Dispense Instructions for use    Catheters Misc     1 each    1 catheter every 28 days.       dicyclomine 20 MG tablet    BENTYL    180 tablet    Take 1 tablet (20 mg) by mouth 2 times daily       FLUoxetine 20 MG capsule    PROzac    90 capsule    TAKE 1 CAPSULE EVERY DAY       gabapentin 400 MG capsule    NEURONTIN    270 capsule    TAKE 1 CAPSULE THREE TIMES DAILY       medroxyPROGESTERone 10 MG tablet    PROVERA    135 tablet    TAKE 1 AND 1/2 TABLETS EVERY DAY       MICROKLENZ WOUND CLEANSER Liqd     240 mL    Externally apply topically daily For daily wound cares.       * order for DME     1 Units    Equipment being ordered: walking boot left        * order for DME     2 Units    Equipment being ordered: Anchoring Device Flexi-Track LG       * order for DME     1 Units    Equipment being ordered: Insertion Tray Wheeler w/BZK Swab Catheter 10CC, Sterile       * order for DME     2 Units    Equipment being ordered: Drain Bag, Kenguard 2000ml. Urinary Bag with Anti-Refulx       * order for DME     1 Units    Equipment being ordered: Catheter Wheeler 2Way 30cc 18FR, Silicone Coated Latex       oxybutynin 5 MG tablet    DITROPAN    180 tablet    Take 1 tablet (5 mg) by mouth 2 times daily       sulfamethoxazole-trimethoprim 800-160 MG per tablet    BACTRIM DS/SEPTRA DS    14 tablet    Take 1 tablet by mouth 2 times daily       * Notice:  This list has 5 medication(s) that are the same as other medications prescribed for you. Read the directions carefully, and ask your doctor or other care provider to review them with you.

## 2017-05-30 NOTE — NURSING NOTE
"Priyanka Martinez's goals for this visit include: Recheck left foot ulcer.  She requests these members of her care team be copied on today's visit information: yes    PCP: Ayala Limon    Referring Provider:  No referring provider defined for this encounter.    Chief Complaint   Patient presents with     RECHECK     left foot ulcer       Initial /76  Pulse 81 Estimated body mass index is 22.24 kg/(m^2) as calculated from the following:    Height as of 12/17/16: 1.778 m (5' 10\").    Weight as of 12/17/16: 70.3 kg (155 lb).  Medication Reconciliation: complete    "

## 2017-05-30 NOTE — PATIENT INSTRUCTIONS
Thanks for coming today.  Ortho/Sports Medicine Clinic  [address] [place] [address]    To schedule future appointments in Ortho Clinic, you may call [phone].    To schedule ordered imaging by your provider:   Call Central Imaging [phone]    To schedule an injection ordered by your provider:  Call Central Imaging Injection scheduling line: [phone]  Buildingeyehart available online at:  [email or web address]    Please call if any further questions or concerns ([phone]).  Clinic hours 8 am to 5 pm.    Return to clinic (call) if symptoms worsen or fail to improve.

## 2017-05-30 NOTE — PROGRESS NOTES
Past Medical History:   Diagnosis Date     Abdominal pain      Diarrhea      Paraplegic immobility syndrome      Tobacco use disorder 1/30/2012     Patient Active Problem List   Diagnosis     Paraplegia (H)     Suprapubic catheter (H)     Gastrointestinal problem     CARDIOVASCULAR SCREENING; LDL GOAL LESS THAN 160     Dysmenorrhea     Neuropathic pain of flank     Neurogenic bladder     Endometriosis     IBS (irritable bowel syndrome)     Generalized anxiety disorder     Past Surgical History:   Procedure Laterality Date     C SPINE FUSION,POSTER,7-12 SGMTS  2004    From T10 to L5     CHOLECYSTECTOMY  1990's     COLONOSCOPY       COLONOSCOPY Left 11/25/2014    Procedure: COMBINED COLONOSCOPY, SINGLE OR MULTIPLE BIOPSY/POLYPECTOMY BY BIOPSY;  Surgeon: Junior Galeano MD;  Location:  GI     ESOPHAGOSCOPY, GASTROSCOPY, DUODENOSCOPY (EGD), COMBINED N/A 11/25/2014    Procedure: COMBINED ESOPHAGOSCOPY, GASTROSCOPY, DUODENOSCOPY (EGD), BIOPSY SINGLE OR MULTIPLE;  Surgeon: Junior Galeano MD;  Location:  GI     Social History     Social History     Marital status: Single     Spouse name: N/A     Number of children: N/A     Years of education: N/A     Occupational History     Not on file.     Social History Main Topics     Smoking status: Former Smoker     Quit date: 3/18/2012     Smokeless tobacco: Never Used      Comment: 7 cig a day off and on socially     Alcohol use No     Drug use: No     Sexual activity: No     Other Topics Concern     Not on file     Social History Narrative     Family History   Problem Relation Age of Onset     Hypertension Father      Heart Failure Maternal Grandmother      SUBJECTIVE:  A 46-year-old female returns to clinic for ulcer on the left heel.  She relates she is doing okay.  Some loose skin fell off.  She has been using the Iodosorb.        OBJECTIVE:  Vascular status intact on the left.  She has a left lateral heel ulcer that is about 3 x 2 cm at its widest margins.   She has a medial ulcer that is about 3.5 x 2.5 cm.  They are both deep into the subcutaneous tissue.  There is hyperkeratotic tissue buildup and eschar formation.  There is minimal drainage.  No erythema, no odor, no calor.  The wound margins are flattening.        ASSESSMENT/PLAN:  Ulcers, left heel.  These are improving.  Diagnosis and treatment options discussed with her.  Sharp ulcer debridement through the dermis into the subcutaneous tissues, no anesthesia needed and local wound care done upon consent today.  The ulcers bled well upon debridement.  Applied Nayeli to the wounds today.  She will continue the Iodosorb daily.  She will return to clinic and see me in about 1 week.

## 2017-06-06 ENCOUNTER — PRE VISIT (OUTPATIENT)
Dept: GASTROENTEROLOGY | Facility: CLINIC | Age: 47
End: 2017-06-06

## 2017-06-06 ENCOUNTER — OFFICE VISIT (OUTPATIENT)
Dept: PODIATRY | Facility: CLINIC | Age: 47
End: 2017-06-06
Payer: MEDICARE

## 2017-06-06 VITALS — HEART RATE: 85 BPM | DIASTOLIC BLOOD PRESSURE: 76 MMHG | SYSTOLIC BLOOD PRESSURE: 118 MMHG

## 2017-06-06 DIAGNOSIS — L97.422 ULCER OF HEEL AND MIDFOOT, LEFT, WITH FAT LAYER EXPOSED (H): Primary | ICD-10-CM

## 2017-06-06 PROCEDURE — 97597 DBRDMT OPN WND 1ST 20 CM/<: CPT | Performed by: PODIATRIST

## 2017-06-06 ASSESSMENT — PAIN SCALES - GENERAL: PAINLEVEL: NO PAIN (0)

## 2017-06-06 NOTE — MR AVS SNAPSHOT
After Visit Summary   2017    Priyanka Martinez    MRN: [id number]           Patient Information     Date Of Birth          1970        Visit Information        Provider Department      2017 7:00 AM Yobany Rojas DPM [facility]        Today's Diagnoses     Ulcer of heel and midfoot, left, with fat layer exposed (H)    -  1      Care Instructions    Thanks for coming today.  Ortho/Sports Medicine Clinic  [address] [place] [address]    To schedule future appointments in Ortho Clinic, you may call [phone].    To schedule ordered imaging by your provider:   Call Central Imaging [phone]    To schedule an injection ordered by your provider:  Call Central Imaging Injection scheduling line: [phone]  Hactushart available online at:  [email or web address]    Please call if any further questions or concerns ([phone]).  Clinic hours 8 am to 5 pm.    Return to clinic (call) if symptoms worsen or fail to improve.            Follow-ups after your visit        Your next 10 appointments already scheduled     2017 12:30 PM CDT   (Arrive by 12:15 PM)   [facility])    [address] [place] [address]   [phone]            2017  7:30 AM CDT   Return Visit with Yobany Rojas DPM   [facility] ([facility])    [address] [place] [id number] [phone]            2017  3:00 PM CDT   Return Visit with Yobany Rojas DPM   [facility] ([facility])    [address] [facility] [address] [phone]            2017  3:00 PM CDT   Return Visit with Yobany Rojas DPM   [facility] ([facility])    [address]  [address] [phone]            Jul 11, 2017  8:00 AM CDT   Return Visit with Yobany Rojas DPM   [facility])    [address] [place] [id number] [phone]            Sep 21, 2017  8:00 AM CDT   (Arrive by 7:45 AM)   New Patient Visit with AMY Leos CNP   [facility] Gastroenterology and IBD ([facility])    [address] [facility]  [address]th [facility] [address] [phone]              Who to contact     If you have questions or need follow up information about today's clinic visit or your schedule please contact [facility] directly at [phone].  Normal or non-critical lab and imaging results will be communicated to you by Act[employer] Interactive Softwarehart, letter or phone within 4 business days after the clinic has received the results. If you do not hear from us within 7 days, please contact the clinic through [employer] Softwarehart or phone. If you have a critical or abnormal lab result, we will notify you by phone as soon as possible.  Submit refill requests through [employer] or call your pharmacy and they will forward the refill request to us. Please allow 3 business days for your refill to be completed.          Additional Information About Your Visit        One[employer] Information     One[employer] gives you secure access to your electronic health record. If you see a primary care provider, you can also send messages to your care team and make appointments. If you have questions, please call your primary care clinic.  If you do not have a primary care provider, please call [phone] and they will assist you.      One[employer] is an electronic gateway that provides easy, online access to your medical records. With One[employer], you can request a clinic appointment, read your test results, renew a prescription or communicate with your care team.     To access your existing account, please contact your [facility] or call [phone] for assistance.        Care EveryWhere ID     This is your Care EveryWhere ID. This could be used by other organizations to access your [place] medical records  [id number]        Your Vitals Were     Pulse                   85            Blood Pressure from Last 3 Encounters:   06/06/17 118/76   05/30/17 112/76   05/25/17 111/83    Weight from Last 3 Encounters:   12/17/16 70.3 kg (155 lb)   12/15/16 68 kg (150 lb)   11/14/16 68 kg (150 lb)              We Performed the Following     DEBRIDEMENT WOUND UP TO 20 SQ CM        Primary Care Provider Office Phone # Fax #    Ayala Limon [phone]       [facility] [address]  [facility] [address]        Thank you!     Thank you for choosing [facility]  for your care. Our goal is always to provide you with excellent care. Hearing back from our patients is one way we can continue to improve our services. Please take a few minutes to complete the written survey that you may receive in the mail after your visit with us. Thank you!             Your Updated Medication List - Protect others around you: Learn how to safely use, store and throw away your medicines at www.disposemymeds.org.          This list is accurate as of: 6/6/17 11:59 PM.  Always use your most recent med list.                   Brand Name Dispense Instructions for use    Catheters Misc     1 each    1 catheter every 28 days.       dicyclomine 20 MG tablet    BENTYL    180 tablet    Take 1 tablet (20 mg) by mouth 2 times daily       FLUoxetine 20 MG capsule    PROzac    90 capsule    TAKE 1 CAPSULE EVERY DAY       gabapentin 400 MG capsule    NEURONTIN    270 capsule    TAKE 1 CAPSULE THREE TIMES DAILY       medroxyPROGESTERone 10 MG tablet    PROVERA    135 tablet    TAKE 1 AND 1/2 TABLETS EVERY DAY       MICROKLENZ WOUND CLEANSER Liqd     240 mL    Externally apply topically daily For daily wound cares.       * order  for DME     1 Units    Equipment being ordered: walking boot left       * order for DME     2 Units    Equipment being ordered: Anchoring Device Flexi-Track LG       * order for DME     1 Units    Equipment being ordered: Insertion Tray Wheeler w/BZK Swab Catheter 10CC, Sterile       * order for DME     2 Units    Equipment being ordered: Drain Bag, Kenguard 2000ml. Urinary Bag with Anti-Refulx       * order for DME     1 Units    Equipment being ordered: Catheter Wheeler 2Way 30cc 18FR, Silicone Coated Latex       oxybutynin 5 MG tablet    DITROPAN    180 tablet    Take 1 tablet (5 mg) by mouth 2 times daily       * Notice:  This list has 5 medication(s) that are the same as other medications prescribed for you. Read the directions carefully, and ask your doctor or other care provider to review them with you.

## 2017-06-06 NOTE — TELEPHONE ENCOUNTER
1.  Date/reason for appt: 6/12/17 - Abd Pain  2.  Referring provider: Dr. Dave Bradley  3.  Call to patient (Yes / No - short description): no, pt is referred  4.  Previous care at / records requested from:    - [facility] -- Records are in [facility] with Dr. Ayala Limon   - [facility] -- Records and referral in [facility] -- Records with Aleida Costello NP from 2014 are in Epic   - Colonoscopy and Upper GI Endoscopy 11/25/14 are in [facility]

## 2017-06-06 NOTE — PATIENT INSTRUCTIONS
Thanks for coming today.  Ortho/Sports Medicine Clinic  [address] [place] [address]    To schedule future appointments in Ortho Clinic, you may call [phone].    To schedule ordered imaging by your provider:   Call Central Imaging [phone]    To schedule an injection ordered by your provider:  Call Central Imaging Injection scheduling line: [phone]  Well[employer]hart available online at:  [email or web address]    Please call if any further questions or concerns ([phone]).  Clinic hours 8 am to 5 pm.    Return to clinic (call) if symptoms worsen or fail to improve.

## 2017-06-06 NOTE — NURSING NOTE
"Priyanka Martinez's goals for this visit include: Recheck left foot ulcer.   She requests these members of her care team be copied on today's visit information: yes    PCP: Ayala Limon    Referring Provider:  No referring provider defined for this encounter.    Chief Complaint   Patient presents with     RECHECK     Left foot ulcer, recheck       Initial /76  Pulse 85 Estimated body mass index is 22.24 kg/(m^2) as calculated from the following:    Height as of 12/17/16: 1.778 m (5' 10\").    Weight as of 12/17/16: 70.3 kg (155 lb).  Medication Reconciliation: complete    "

## 2017-06-06 NOTE — PROGRESS NOTES
Past Medical History:   Diagnosis Date     Abdominal pain      Diarrhea      Paraplegic immobility syndrome      Tobacco use disorder 1/30/2012     Patient Active Problem List   Diagnosis     Paraplegia (H)     Suprapubic catheter (H)     Gastrointestinal problem     CARDIOVASCULAR SCREENING; LDL GOAL LESS THAN 160     Dysmenorrhea     Neuropathic pain of flank     Neurogenic bladder     Endometriosis     IBS (irritable bowel syndrome)     Generalized anxiety disorder     Past Surgical History:   Procedure Laterality Date     C SPINE FUSION,POSTER,7-12 SGMTS  2004    From T10 to L5     CHOLECYSTECTOMY  1990's     COLONOSCOPY       COLONOSCOPY Left 11/25/2014    Procedure: COMBINED COLONOSCOPY, SINGLE OR MULTIPLE BIOPSY/POLYPECTOMY BY BIOPSY;  Surgeon: Junior Galeano MD;  Location:  GI     ESOPHAGOSCOPY, GASTROSCOPY, DUODENOSCOPY (EGD), COMBINED N/A 11/25/2014    Procedure: COMBINED ESOPHAGOSCOPY, GASTROSCOPY, DUODENOSCOPY (EGD), BIOPSY SINGLE OR MULTIPLE;  Surgeon: Junior Galeano MD;  Location:  GI     Social History     Social History     Marital status: Single     Spouse name: N/A     Number of children: N/A     Years of education: N/A     Occupational History     Not on file.     Social History Main Topics     Smoking status: Former Smoker     Quit date: 3/18/2012     Smokeless tobacco: Never Used      Comment: 7 cig a day off and on socially     Alcohol use No     Drug use: No     Sexual activity: No     Other Topics Concern     Not on file     Social History Narrative     Family History   Problem Relation Age of Onset     Hypertension Father      Heart Failure Maternal Grandmother      SUBJECTIVE FINDINGS: A 46-year-old female who returns to clinic for ulcers, left heel.  She relates is doing all right.  No new problems.      OBJECTIVE FINDINGS: Vascular status intact, left.  She has left lateral heel ulcer that is about 2 x 2.8 cm.  Medial ulcer is about 2.5 x 3 cm.  They are floresita.  They are deep through the subcutaneous tissue with partial eschar formation and some serosanguineous drainage.  No odor, no calor.  Minimal surrounding erythema.      ASSESSMENT AND PLAN: Ulcers, left heel. These are improving.  Diagnosis and treatment discussed with the patient.  Sharp ulcer debridement through the dermis and the subcutaneous tissues.  No anesthesia needed and local wound care done upon consent today.  Applied Nayeli to the wound today.  She will continue the Iodosorb and the dressing changes, cleaning with wound Vashe. Return to clinic and see me in 1 week.

## 2017-06-13 ENCOUNTER — OFFICE VISIT (OUTPATIENT)
Dept: PODIATRY | Facility: CLINIC | Age: 47
End: 2017-06-13
Payer: MEDICARE

## 2017-06-13 VITALS — SYSTOLIC BLOOD PRESSURE: 121 MMHG | OXYGEN SATURATION: 98 % | HEART RATE: 96 BPM | DIASTOLIC BLOOD PRESSURE: 81 MMHG

## 2017-06-13 DIAGNOSIS — L97.422 ULCER OF HEEL AND MIDFOOT, LEFT, WITH FAT LAYER EXPOSED (H): Primary | ICD-10-CM

## 2017-06-13 PROCEDURE — 99212 OFFICE O/P EST SF 10 MIN: CPT | Performed by: PODIATRIST
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
## 2017-06-13 NOTE — NURSING NOTE
"Priyanka Martinez's goals for this visit include: Left heel recheck   She requests these members of her care team be copied on today's visit information: yes    PCP: Ayala Limon    Referring Provider:  No referring provider defined for this encounter.    Chief Complaint   Patient presents with     RECHECK     Left heel recheck        Initial /81  Pulse 96  SpO2 98% Estimated body mass index is 22.24 kg/(m^2) as calculated from the following:    Height as of 12/17/16: 1.778 m (5' 10\").    Weight as of 12/17/16: 70.3 kg (155 lb).  Medication Reconciliation: complete    "

## 2017-06-20 ENCOUNTER — OFFICE VISIT (OUTPATIENT)
Dept: PODIATRY | Facility: CLINIC | Age: 47
End: 2017-06-20
Payer: MEDICARE

## 2017-06-20 VITALS — DIASTOLIC BLOOD PRESSURE: 88 MMHG | SYSTOLIC BLOOD PRESSURE: 130 MMHG

## 2017-06-20 DIAGNOSIS — L97.422 ULCER OF HEEL AND MIDFOOT, LEFT, WITH FAT LAYER EXPOSED (H): Primary | ICD-10-CM

## 2017-06-20 PROCEDURE — 97597 DBRDMT OPN WND 1ST 20 CM/<: CPT | Performed by: PODIATRIST

## 2017-06-20 ASSESSMENT — PAIN SCALES - GENERAL: PAINLEVEL: NO PAIN (0)

## 2017-06-20 NOTE — NURSING NOTE
"Priyanka Martinez's goals for this visit include: Recheck right heel ulcer with increased nerve pain.  She requests these members of her care team be copied on today's visit information: yes    PCP: Ayala Limon    Referring Provider:  No referring provider defined for this encounter.    Chief Complaint   Patient presents with     RECHECK     Left foot ulcer, increased nerve pain.       Initial /88 Estimated body mass index is 22.24 kg/(m^2) as calculated from the following:    Height as of 12/17/16: 1.778 m (5' 10\").    Weight as of 12/17/16: 70.3 kg (155 lb).  Medication Reconciliation: complete    "
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
## 2017-06-20 NOTE — PATIENT INSTRUCTIONS
Thanks for coming today.  Ortho/Sports Medicine Clinic  [address] [place] [address]    To schedule future appointments in Ortho Clinic, you may call [phone].    To schedule ordered imaging by your provider:   Call [facility] [phone]    To schedule an injection ordered by your provider:  Call [facility] Injection scheduling line: [phone]  [employer] available online at:  [email or web address]    Please call if any further questions or concerns ([phone]).  Clinic hours 8 am to 5 pm.    Return to clinic (call) if symptoms worsen or fail to improve.

## 2017-06-20 NOTE — MR AVS SNAPSHOT
After Visit Summary   2017    Priyanka Martinez    MRN: [id number]           Patient Information     Date Of Birth          1970        Visit Information        Provider Department      2017 3:00 PM Yobany Rojas DPM [facility]        Today's Diagnoses     Ulcer of heel and midfoot, left, with fat layer exposed (H)    -  1      Care Instructions    Thanks for coming today.  Ortho/Sports Medicine Clinic  [address] [place] [address]    To schedule future appointments in Ortho Clinic, you may call [phone].    To schedule ordered imaging by your provider:   Call [facility] [phone]    To schedule an injection ordered by your provider:  Call [facility] Injection scheduling line: [phone]  [employer] available online at:  [email or web address]    Please call if any further questions or concerns ([phone]).  Clinic hours 8 am to 5 pm.    Return to clinic (call) if symptoms worsen or fail to improve.            Follow-ups after your visit        Your next 10 appointments already scheduled     2017  3:00 PM CDT   Return Visit with Yobany Rojas DPM   [facility]    [address] [place] [address] [phone]            2017  7:00 AM CDT   Return Visit with Yobany Rojas DPM   [facility]    [address] [place] [address] [phone]            Sep 21, 2017  8:00 AM CDT   (Arrive by 7:45 AM)   New Patient Visit with AMY Leos [facility] ([facility])    [address] [place] [address]  4th [facility] [address] [phone]              Who to contact     If you have questions or need follow up information about today's clinic visit or your schedule please contact [facility] directly at  [phone].  Normal or non-critical lab and imaging results will be communicated to you by Insmedhart, letter or phone within 4 business days after the clinic has received the results. If you do not hear from us within 7 days, please contact the clinic through Insmedhart or phone. If you have a critical or abnormal lab result, we will notify you by phone as soon as possible.  Submit refill requests through Jumptap or call your pharmacy and they will forward the refill request to us. Please allow 3 business days for your refill to be completed.          Additional Information About Your Visit        InsmedharSr.Pago Information     Jumptap gives you secure access to your electronic health record. If you see a primary care provider, you can also send messages to your care team and make appointments. If you have questions, please call your primary care clinic.  If you do not have a primary care provider, please call [phone] and they will assist you.      Jumptap is an electronic gateway that provides easy, online access to your medical records. With Jumptap, you can request a clinic appointment, read your test results, renew a prescription or communicate with your care team.     To access your existing account, please contact your [facility] or call [phone] for assistance.        Care EveryWhere ID     This is your Care EveryWhere ID. This could be used by other organizations to access your [facility] medical records  [id number]         Blood Pressure from Last 3 Encounters:   06/20/17 130/88   06/13/17 121/81   06/06/17 118/76    Weight from Last 3 Encounters:   12/17/16 70.3 kg (155 lb)   12/15/16 68 kg (150 lb)   11/14/16 68 kg (150 lb)              We Performed the Following     DEBRIDEMENT WOUND UP TO 20 SQ CM        Primary Care Provider Office Phone # Fax #    Ayala Limon [phone]       [facility] [address]  [facility] [address]        Equal Access  to Services     MELY GAMBLE : Hadii aad ku hadmelvayifan Cason, waaxda luqadaha, qaybta kaalmacristal espinoza, ronnie mendoza. So [facility] [phone].    ATENCIÓN: Si habla esme, tiene a luu disposición servicios gratuitos de asistencia lingüística. Llame al [phone].    We comply with applicable federal civil rights laws and [place] laws. We do not discriminate on the basis of race, color, national origin, age, disability sex, sexual orientation or gender identity.            Thank you!     Thank you for choosing [facility]  for your care. Our goal is always to provide you with excellent care. Hearing back from our patients is one way we can continue to improve our services. Please take a few minutes to complete the written survey that you may receive in the mail after your visit with us. Thank you!             Your Updated Medication List - Protect others around you: Learn how to safely use, store and throw away your medicines at www.disposemymeds.org.          This list is accurate as of: 6/20/17 11:59 PM.  Always use your most recent med list.                   Brand Name Dispense Instructions for use Diagnosis    Catheters Misc     1 each    1 catheter every 28 days.    Atonic neurogenic bladder       dicyclomine 20 MG tablet    BENTYL    180 tablet    Take 1 tablet (20 mg) by mouth 2 times daily    Irritable bowel syndrome with diarrhea       FLUoxetine 20 MG capsule    PROzac    90 capsule    TAKE 1 CAPSULE EVERY DAY    Anxiety       gabapentin 400 MG capsule    NEURONTIN    270 capsule    TAKE 1 CAPSULE THREE TIMES DAILY    Neuropathic pain of flank, right, Neuropathic pain of flank, left       medroxyPROGESTERone 10 MG tablet    PROVERA    135 tablet    TAKE 1 AND 1/2 TABLETS EVERY DAY    Dysmenorrhea       MICROKLENZ WOUND CLEANSER Liqd     240 mL    Externally apply topically daily For daily wound cares.    Ulcer of heel and midfoot, left, with fat layer  exposed (H), Ingrowing nail       * order for DME     1 Units    Equipment being ordered: walking boot left    Tibia/fibula fracture, left, closed, initial encounter       * order for DME     2 Units    Equipment being ordered: Anchoring Device Flexi-Track LG    Neurogenic bladder       * order for DME     1 Units    Equipment being ordered: Insertion Tray Wheeler w/BZK Swab Catheter 10CC, Sterile    Neurogenic bladder       * order for DME     2 Units    Equipment being ordered: Drain Bag, Kenguard 2000ml. Urinary Bag with Anti-Refulx    Neurogenic bladder       * order for DME     1 Units    Equipment being ordered: Catheter Wheeler 2Way 30cc 18FR, Silicone Coated Latex    Neurogenic bladder       oxybutynin 5 MG tablet    DITROPAN    180 tablet    Take 1 tablet (5 mg) by mouth 2 times daily    Neurogenic bladder       * Notice:  This list has 5 medication(s) that are the same as other medications prescribed for you. Read the directions carefully, and ask your doctor or other care provider to review them with you.

## 2017-06-23 DIAGNOSIS — F41.9 ANXIETY: ICD-10-CM

## 2017-06-23 NOTE — TELEPHONE ENCOUNTER
prozac     Last Written Prescription Date: 12/29/16  Last Fill Quantity: 90, # refills: 1  Last Office Visit with [facility] primary care provider:  5/3/17   Next 5 appointments (look out 90 days)     Jul 25, 2017  7:00 AM CDT   Return Visit with Yobany Rojas DPM   [facility]    [address] [place] [address] [phone]                   Last PHQ-9 score on record=   PHQ-9 SCORE 2/17/2017   Total Score -   Total Score 1         Medication filled for 3 months per protocol.      Rosemary Majano RN, . [facility]

## 2017-08-01 ENCOUNTER — OFFICE VISIT (OUTPATIENT)
Dept: PODIATRY | Facility: CLINIC | Age: 47
End: 2017-08-01
Payer: MEDICARE

## 2017-08-01 VITALS — DIASTOLIC BLOOD PRESSURE: 79 MMHG | SYSTOLIC BLOOD PRESSURE: 120 MMHG | HEART RATE: 93 BPM | OXYGEN SATURATION: 96 %

## 2017-08-01 DIAGNOSIS — L97.422 ULCER OF HEEL AND MIDFOOT, LEFT, WITH FAT LAYER EXPOSED (H): Primary | ICD-10-CM

## 2017-08-01 PROCEDURE — 97597 DBRDMT OPN WND 1ST 20 CM/<: CPT | Performed by: PODIATRIST

## 2017-08-01 NOTE — NURSING NOTE
"Priyanka Martinez's goals for this visit include: Recheck left heel  She requests these members of her care team be copied on today's visit information: yes    PCP: Ayala Limon    Referring Provider:  No referring provider defined for this encounter.    Chief Complaint   Patient presents with     RECHECK     Left heel recheck        Initial /79  Pulse 93  SpO2 96% Estimated body mass index is 22.24 kg/(m^2) as calculated from the following:    Height as of 12/17/16: 1.778 m (5' 10\").    Weight as of 12/17/16: 70.3 kg (155 lb).  Medication Reconciliation: complete    "

## 2017-08-01 NOTE — MR AVS SNAPSHOT
After Visit Summary   8/1/2017    Priyanka Martinez    MRN: [id number]           Patient Information     Date Of Birth          1970        Visit Information        Provider Department      8/1/2017 7:00 AM Yobany Rojas DPM [facility]        Today's Diagnoses     Ulcer of heel and midfoot, left, with fat layer exposed (H)    -  1      Care Instructions    Thanks for coming today.  Ortho/Sports Medicine Clinic  [address] [place] [address]    To schedule future appointments in Ortho Clinic, you may call [phone].    To schedule ordered imaging by your Provider: Call [facility] at [phone]    Yobble available online at:   [email or web address]    Please call if any further questions or concerns [phone] and ask for the Orthopedic Department. Clinic hours 8 am to 5 pm.    Return to clinic if symptoms worsen.            Follow-ups after your visit        Your next 10 appointments already scheduled     Aug 15, 2017  7:15 AM CDT   Return Visit with Yobany Rojas DPM   [facility] ([facility])    [address] [place] [address] [phone]              Who to contact     If you have questions or need follow up information about today's clinic visit or your schedule please contact [facility] directly at [phone].  Normal or non-critical lab and imaging results will be communicated to you by MyChart, letter or phone within 4 business days after the clinic has received the results. If you do not hear from us within 7 days, please contact the clinic through MyChart or phone. If you have a critical or abnormal lab result, we will notify you by phone as soon as possible.  Submit refill requests through Yobble or call your pharmacy and they will forward the refill request to us. Please allow 3 business days for your refill to be completed.          Additional Information  About Your Visit        FOODithart Information     IID gives you secure access to your electronic health record. If you see a primary care provider, you can also send messages to your care team and make appointments. If you have questions, please call your primary care clinic.  If you do not have a primary care provider, please call [phone] and they will assist you.      IID is an electronic gateway that provides easy, online access to your medical records. With IID, you can request a clinic appointment, read your test results, renew a prescription or communicate with your care team.     To access your existing account, please contact your [facility] or call [phone] for assistance.        Care EveryWhere ID     This is your Care EveryWhere ID. This could be used by other organizations to access your [place] medical records  [id number]        Your Vitals Were     Pulse Pulse Oximetry                93 96%           Blood Pressure from Last 3 Encounters:   08/01/17 120/79   06/20/17 130/88   06/13/17 121/81    Weight from Last 3 Encounters:   12/17/16 70.3 kg (155 lb)   12/15/16 68 kg (150 lb)   11/14/16 68 kg (150 lb)              We Performed the Following     DEBRIDEMENT WOUND UP TO 20 SQ CM        Primary Care Provider Office Phone # Fax #    Ayala Limon [phone]       [facility] [address]  [facility] [address]        Equal Access to Services     [facility]: Hadii aad ku hadasho Soomaali, waaxda luqadaha, qaybta kaalmada adeegyada, ronnie mccoy . So [facility] [phone].    ATENCIÓN: Si habla español, tiene a luu disposición servicios gratuitos de asistencia lingüística. Llame al [phone].    We comply with applicable federal civil rights laws and [place] laws. We do not discriminate on the basis of race, color, national origin, age, disability sex, sexual orientation or gender identity.             Thank you!     Thank you for choosing [facility]  for your care. Our goal is always to provide you with excellent care. Hearing back from our patients is one way we can continue to improve our services. Please take a few minutes to complete the written survey that you may receive in the mail after your visit with us. Thank you!             Your Updated Medication List - Protect others around you: Learn how to safely use, store and throw away your medicines at www.disposemymeds.org.          This list is accurate as of: 8/1/17  7:19 AM.  Always use your most recent med list.                   Brand Name Dispense Instructions for use Diagnosis    Catheters Misc     1 each    1 catheter every 28 days.    Atonic neurogenic bladder       dicyclomine 20 MG tablet    BENTYL    180 tablet    Take 1 tablet (20 mg) by mouth 2 times daily    Irritable bowel syndrome with diarrhea       FLUoxetine 20 MG capsule    PROzac    90 capsule    TAKE 1 CAPSULE EVERY DAY    Anxiety       gabapentin 400 MG capsule    NEURONTIN    270 capsule    TAKE 1 CAPSULE THREE TIMES DAILY    Neuropathic pain of flank, right, Neuropathic pain of flank, left       medroxyPROGESTERone 10 MG tablet    PROVERA    135 tablet    TAKE 1 AND 1/2 TABLETS EVERY DAY    Dysmenorrhea       MICROKLENZ WOUND CLEANSER Liqd     240 mL    Externally apply topically daily For daily wound cares.    Ulcer of heel and midfoot, left, with fat layer exposed (H), Ingrowing nail       * order for DME     1 Units    Equipment being ordered: walking boot left    Tibia/fibula fracture, left, closed, initial encounter       * order for DME     2 Units    Equipment being ordered: Anchoring Device Flexi-Track LG    Neurogenic bladder       * order for DME     1 Units    Equipment being ordered: Insertion Tray Wheeler w/BZK Swab Catheter 10CC, Sterile    Neurogenic bladder       * order for DME     2 Units    Equipment being ordered: Drain Bag, Kenguard 2000ml.  Urinary Bag with Anti-Refulx    Neurogenic bladder       * order for DME     1 Units    Equipment being ordered: Catheter Wheeler 2Way 30cc 18FR, Silicone Coated Latex    Neurogenic bladder       oxybutynin 5 MG tablet    DITROPAN    180 tablet    Take 1 tablet (5 mg) by mouth 2 times daily    Neurogenic bladder       * Notice:  This list has 5 medication(s) that are the same as other medications prescribed for you. Read the directions carefully, and ask your doctor or other care provider to review them with you.
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
## 2017-08-01 NOTE — PATIENT INSTRUCTIONS
Thanks for coming today.  Ortho/Sports Medicine Clinic  [address] [place] [address]    To schedule future appointments in Ortho Clinic, you may call [phone].    To schedule ordered imaging by your Provider: Call [facility] at [phone]    [employer] available online at:   [email or web address]    Please call if any further questions or concerns [phone] and ask for the Orthopedic Department. Clinic hours 8 am to 5 pm.    Return to clinic if symptoms worsen.

## 2017-08-02 ENCOUNTER — TRANSFERRED RECORDS (OUTPATIENT)
Dept: HEALTH INFORMATION MANAGEMENT | Facility: CLINIC | Age: 47
End: 2017-08-02

## 2017-08-15 ENCOUNTER — OFFICE VISIT (OUTPATIENT)
Dept: PODIATRY | Facility: CLINIC | Age: 47
End: 2017-08-15
Payer: MEDICARE

## 2017-08-15 VITALS — OXYGEN SATURATION: 97 % | HEART RATE: 84 BPM | SYSTOLIC BLOOD PRESSURE: 131 MMHG | DIASTOLIC BLOOD PRESSURE: 91 MMHG

## 2017-08-15 DIAGNOSIS — L97.422 ULCER OF HEEL AND MIDFOOT, LEFT, WITH FAT LAYER EXPOSED (H): Primary | ICD-10-CM

## 2017-08-15 PROCEDURE — 97597 DBRDMT OPN WND 1ST 20 CM/<: CPT | Performed by: PODIATRIST

## 2017-08-15 NOTE — NURSING NOTE
"Priyanka Martinez's goals for this visit include: Left heel check   She requests these members of her care team be copied on today's visit information: yes    PCP: Ayala Limon    Referring Provider:  No referring provider defined for this encounter.    Chief Complaint   Patient presents with     RECHECK     Left heel recheck        Initial BP (!) 131/91  Pulse 84  SpO2 97% Estimated body mass index is 22.24 kg/(m^2) as calculated from the following:    Height as of 12/17/16: 1.778 m (5' 10\").    Weight as of 12/17/16: 70.3 kg (155 lb).  Medication Reconciliation: complete    "

## 2017-08-15 NOTE — MR AVS SNAPSHOT
After Visit Summary   8/15/2017    Priyanka Martinez    MRN: [id number]           Patient Information     Date Of Birth          1970        Visit Information        Provider Department      8/15/2017 7:15 AM Yobany Rojas DPM [facility]        Today's Diagnoses     Ulcer of heel and midfoot, left, with fat layer exposed (H)    -  1      Care Instructions    Thanks for coming today.  Ortho/Sports Medicine Clinic  [address] [place] [id number]    To schedule future appointments in Ortho Clinic, you may call [phone].    To schedule ordered imaging by your Provider: Call [facility] at [phone]    [employer] computer available online at:   [email or web address]    Please call if any further questions or concerns [phone] and ask for the Orthopedic Department. Clinic hours 8 am to 5 pm.    Return to clinic if symptoms worsen.            Follow-ups after your visit        Your next 10 appointments already scheduled     Aug 15, 2017  7:15 AM CDT   Return Visit with Yobany Rojas DPM   [facility] ([facility]    [address] [place] [address] [phone]              Who to contact     If you have questions or need follow up information about today's clinic visit or your schedule please contact [facility] directly at [phone].  Normal or non-critical lab and imaging results will be communicated to you by MyChart, letter or phone within 4 business days after the clinic has received the results. If you do not hear from us within 7 days, please contact the clinic through Frontohart or phone. If you have a critical or abnormal lab result, we will notify you by phone as soon as possible.  Submit refill requests through [employer] computer or call your pharmacy and they will forward the refill request to us. Please allow 3 business days for your refill to be completed.          Additional Information  About Your Visit        [employer] Information     [employer] crowdmarketing gives you secure access to your electronic health record. If you see a primary care provider, you can also send messages to your care team and make appointments. If you have questions, please call your primary care clinic.  If you do not have a primary care provider, please call [phone] and they will assist you.      [employer] crowdmarketing is an electronic gateway that provides easy, online access to your medical records. With [employer] crowd[employer]eting, you can request a clinic appointment, read your test results, renew a prescription or communicate with your care team.     To access your existing account, please contact your [facility] or call [phone] for assistance.        Care EveryWhere ID     This is your Care EveryWhere ID. This could be used by other organizations to access your [facility] medical records  [id number]        Your Vitals Were     Pulse Pulse Oximetry                84 97%           Blood Pressure from Last 3 Encounters:   08/15/17 (!) 131/91   08/01/17 120/79   06/20/17 130/88    Weight from Last 3 Encounters:   12/17/16 70.3 kg (155 lb)   12/15/16 68 kg (150 lb)   11/14/16 68 kg (150 lb)              We Performed the Following     DEBRIDEMENT WOUND UP TO 20 SQ CM        Primary Care Provider Office Phone # Fax #    Ayala Limon [phone]       [address]  [facility] [address]        Equal Access to Services     [facility]: Hadii aad ku hadasho Soomaali, waaxda luqadaha, qaybta kaalmada adeegyada, ronnie mccoy . So [facility] [place] [facility] [phone].    ATENCIÓN: Si habla español, tiene a luu disposición servicios gratuitos de asistencia lingüística. Llame al [phone].    We comply with applicable federal civil rights laws and [place] laws. We do not discriminate on the basis of race, color, national origin, age, disability sex, sexual orientation or gender identity.            Thank you!      Thank you for choosing [facility]  for your care. Our goal is always to provide you with excellent care. Hearing back from our patients is one way we can continue to improve our services. Please take a few minutes to complete the written survey that you may receive in the mail after your visit with us. Thank you!             Your Updated Medication List - Protect others around you: Learn how to safely use, store and throw away your medicines at www.disposemymeds.org.          This list is accurate as of: 8/15/17  7:11 AM.  Always use your most recent med list.                   Brand Name Dispense Instructions for use Diagnosis    Catheters Misc     1 each    1 catheter every 28 days.    Atonic neurogenic bladder       dicyclomine 20 MG tablet    BENTYL    180 tablet    Take 1 tablet (20 mg) by mouth 2 times daily    Irritable bowel syndrome with diarrhea       FLUoxetine 20 MG capsule    PROzac    90 capsule    TAKE 1 CAPSULE EVERY DAY    Anxiety       gabapentin 400 MG capsule    NEURONTIN    270 capsule    TAKE 1 CAPSULE THREE TIMES DAILY    Neuropathic pain of flank, right, Neuropathic pain of flank, left       medroxyPROGESTERone 10 MG tablet    PROVERA    135 tablet    TAKE 1 AND 1/2 TABLETS EVERY DAY    Dysmenorrhea       MICROKLENZ WOUND CLEANSER Liqd     240 mL    Externally apply topically daily For daily wound cares.    Ulcer of heel and midfoot, left, with fat layer exposed (H), Ingrowing nail       * order for DME     1 Units    Equipment being ordered: walking boot left    Tibia/fibula fracture, left, closed, initial encounter       * order for DME     2 Units    Equipment being ordered: Anchoring Device Flexi-Track LG    Neurogenic bladder       * order for DME     1 Units    Equipment being ordered: Insertion Tray Wheeler w/BZK Swab Catheter 10CC, Sterile    Neurogenic bladder       * order for DME     2 Units    Equipment being ordered: Drain Bag, Kenguard 2000ml. Urinary Bag with  Anti-Refulx    Neurogenic bladder       * order for DME     1 Units    Equipment being ordered: Catheter Wheeler 2Way 30cc 18FR, Silicone Coated Latex    Neurogenic bladder       oxybutynin 5 MG tablet    DITROPAN    180 tablet    Take 1 tablet (5 mg) by mouth 2 times daily    Neurogenic bladder       * Notice:  This list has 5 medication(s) that are the same as other medications prescribed for you. Read the directions carefully, and ask your doctor or other care provider to review them with you.
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
## 2017-08-15 NOTE — PATIENT INSTRUCTIONS
Thanks for coming today.  Ortho/Sports Medicine Clinic  [address] [place] [address]    To schedule future appointments in Ortho Clinic, you may call [phone].    To schedule ordered imaging by your Provider: Call [facility] at [phone]    [employer] available online at:   [email or web address]    Please call if any further questions or concerns [phone] and ask for the Orthopedic Department. Clinic hours 8 am to 5 pm.    Return to clinic if symptoms worsen.

## 2017-08-29 ENCOUNTER — OFFICE VISIT (OUTPATIENT)
Dept: PODIATRY | Facility: CLINIC | Age: 47
End: 2017-08-29
Payer: MEDICARE

## 2017-08-29 VITALS — DIASTOLIC BLOOD PRESSURE: 83 MMHG | OXYGEN SATURATION: 98 % | HEART RATE: 91 BPM | SYSTOLIC BLOOD PRESSURE: 129 MMHG

## 2017-08-29 DIAGNOSIS — L97.422 ULCER OF HEEL AND MIDFOOT, LEFT, WITH FAT LAYER EXPOSED (H): Primary | ICD-10-CM

## 2017-08-29 PROCEDURE — 99212 OFFICE O/P EST SF 10 MIN: CPT | Performed by: PODIATRIST

## 2017-08-29 NOTE — NURSING NOTE
"Priyanka Martinez's goals for this visit include: recheck left heel   She requests these members of her care team be copied on today's visit information: yes    PCP: Ayala Limon    Referring Provider:  No referring provider defined for this encounter.    Chief Complaint   Patient presents with     RECHECK     Left heel recheck       Initial /83  Pulse 91  SpO2 98% Estimated body mass index is 22.24 kg/(m^2) as calculated from the following:    Height as of 12/17/16: 1.778 m (5' 10\").    Weight as of 12/17/16: 70.3 kg (155 lb).  Medication Reconciliation: complete    "
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
## 2017-08-29 NOTE — PATIENT INSTRUCTIONS
Thanks for coming today.  Ortho/Sports Medicine Clinic  [address] [place] [address]    To schedule future appointments in Ortho Clinic, you may call [phone].    To schedule ordered imaging by your Provider: Call [facility] at [phone]    [employer] available online at:   [email or web address]    Please call if any further questions or concerns [phone] and ask for the Orthopedic Department. Clinic hours 8 am to 5 pm.    Return to clinic if symptoms worsen.

## 2017-08-31 DIAGNOSIS — F41.9 ANXIETY: ICD-10-CM

## 2017-09-05 NOTE — TELEPHONE ENCOUNTER
FLUoxetine (PROZAC) 20 MG capsule  Last Written Prescription Date: 6/23/2017  Last Fill Quantity: 90, # refills: 0  Last Office Visit with [facility] primary care provider:  5/3/2017   Next 5 appointments (look out 90 days)     Sep 12, 2017  7:30 AM CDT   Return Visit with Yobany Rojas DPM   [facility]    [address] [place] [id number] [phone]                 Last PHQ-9 score on record= Anxiety [F41.9]  PHQ-9 SCORE 2/17/2017   Total Score -   Total Score 1     Refilled per [facility] protocol.    Mitzy Montiel RN

## 2017-09-06 ENCOUNTER — TRANSFERRED RECORDS (OUTPATIENT)
Dept: HEALTH INFORMATION MANAGEMENT | Facility: CLINIC | Age: 47
End: 2017-09-06

## 2017-09-18 ENCOUNTER — OFFICE VISIT (OUTPATIENT)
Dept: PODIATRY | Facility: CLINIC | Age: 47
End: 2017-09-18
Payer: MEDICARE

## 2017-09-18 VITALS — SYSTOLIC BLOOD PRESSURE: 121 MMHG | HEART RATE: 98 BPM | DIASTOLIC BLOOD PRESSURE: 73 MMHG | OXYGEN SATURATION: 96 %

## 2017-09-18 DIAGNOSIS — L97.422 ULCER OF HEEL AND MIDFOOT, LEFT, WITH FAT LAYER EXPOSED (H): Primary | ICD-10-CM

## 2017-09-18 PROCEDURE — 97597 DBRDMT OPN WND 1ST 20 CM/<: CPT | Performed by: PODIATRIST

## 2017-09-18 NOTE — PATIENT INSTRUCTIONS
Thanks for coming today.  Ortho/Sports Medicine Clinic  [address] [place] [address]    To schedule future appointments in Ortho Clinic, you may call [phone].    To schedule ordered imaging by your Provider: Call [facility] at [phone]    [employer] available online at:   [email or web address]    Please call if any further questions or concerns [phone] and ask for the Orthopedic Department. Clinic hours 8 am to 5 pm.    Return to clinic if symptoms worsen.
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
## 2017-09-18 NOTE — NURSING NOTE
"Priyanka Martinez's goals for this visit include: Recheck left heel   She requests these members of her care team be copied on today's visit information: yes    PCP: Ayala Limon    Referring Provider:  No referring provider defined for this encounter.    Chief Complaint   Patient presents with     RECHECK     Left heel recheck        Initial /73  Pulse 98  SpO2 96% Estimated body mass index is 22.24 kg/(m^2) as calculated from the following:    Height as of 12/17/16: 1.778 m (5' 10\").    Weight as of 12/17/16: 70.3 kg (155 lb).  Medication Reconciliation: complete    "

## 2017-09-18 NOTE — MR AVS SNAPSHOT
After Visit Summary   9/18/2017    Priyanka Martinez    MRN: [id number]           Patient Information     Date Of Birth          1970        Visit Information        Provider Department      9/18/2017 2:30 PM Yobany Rojas DPM [facility]        Today's Diagnoses     Ulcer of heel and midfoot, left, with fat layer exposed (H)    -  1      Care Instructions    Thanks for coming today.  Ortho/Sports Medicine Clinic  [address] [place] [address]    To schedule future appointments in Ortho Clinic, you may call [phone].    To schedule ordered imaging by your Provider: Call [facility] at [phone]    [employer] available online at:   [email or web address]    Please call if any further questions or concerns [phone] and ask for the Orthopedic Department. Clinic hours 8 am to 5 pm.    Return to clinic if symptoms worsen.            Follow-ups after your visit        Your next 10 appointments already scheduled     Oct 02, 2017  2:30 PM CDT   Return Visit with Yobany Rojas DPM   [facility]    [address] [place] [id number] [phone]              Who to contact     If you have questions or need follow up information about today's clinic visit or your schedule please contact [facility] directly at [phone].  Normal or non-critical lab and imaging results will be communicated to you by MyChart, letter or phone within 4 business days after the clinic has received the results. If you do not hear from us within 7 days, please contact the clinic through [employer] or phone. If you have a critical or abnormal lab result, we will notify you by phone as soon as possible.  Submit refill requests through [employer] or call your pharmacy and they will forward the refill request to us. Please allow 3 business days for your refill to be completed.          Additional Information  About Your Visit        [email or web address] Information     [employer] gives you secure access to your electronic health record. If you see a primary care provider, you can also send messages to your care team and make appointments. If you have questions, please call your primary care clinic.  If you do not have a primary care provider, please call [phone] and they will assist you.      [employer] is an electronic gateway that provides easy, online access to your medical records. With [employer], you can request a clinic appointment, read your test results, renew a prescription or communicate with your care team.     To access your existing account, please contact your [facility] or call [phone] for assistance.        Care EveryWhere ID     This is your Care EveryWhere ID. This could be used by other organizations to access your [place] medical records  [id number]        Your Vitals Were     Pulse Pulse Oximetry                98 96%           Blood Pressure from Last 3 Encounters:   09/18/17 121/73   08/29/17 129/83   08/15/17 (!) 131/91    Weight from Last 3 Encounters:   12/17/16 70.3 kg (155 lb)   12/15/16 68 kg (150 lb)   11/14/16 68 kg (150 lb)              We Performed the Following     DEBRIDEMENT WOUND UP TO 20 SQ CM        Primary Care Provider Office Phone # Fax #    Ayala Braxton, PhD [phone]       [address]  [facility] [address]        Equal Access to Services     [facility] : Hadii aad ku hadasho Soomaali, waaxda luqadaha, qaybta kaalmada adeegyada, ronnie mccoy . So [facility] [phone].    ATENCIÓN: Si habla español, tiene a luu disposición servicios gratuitos de asistencia lingüística. Llame al [phone].    We comply with applicable federal civil rights laws and [place] laws. We do not discriminate on the basis of race, color, national origin, age, disability sex, sexual orientation or gender identity.            Thank you!      Thank you for choosing [facility]  for your care. Our goal is always to provide you with excellent care. Hearing back from our patients is one way we can continue to improve our services. Please take a few minutes to complete the written survey that you may receive in the mail after your visit with us. Thank you!             Your Updated Medication List - Protect others around you: Learn how to safely use, store and throw away your medicines at www.disposemymeds.org.          This list is accurate as of: 9/18/17 11:59 PM.  Always use your most recent med list.                   Brand Name Dispense Instructions for use Diagnosis    Catheters Misc     1 each    1 catheter every 28 days.    Atonic neurogenic bladder       dicyclomine 20 MG tablet    BENTYL    180 tablet    Take 1 tablet (20 mg) by mouth 2 times daily    Irritable bowel syndrome with diarrhea       FLUoxetine 20 MG capsule    PROzac    90 capsule    TAKE 1 CAPSULE EVERY DAY    Anxiety       * HOMEOPATHIC PRODUCTS EX      Drainage tone for swollen glands, sinus congestion and skin eruptions.        * HOMEOPATHIC PRODUCTS EX      Neuro-chord for numbness, irritability, and tingling.        LYRICA PO      Take 50 mg by mouth 3 times daily        medroxyPROGESTERone 10 MG tablet    PROVERA    135 tablet    TAKE 1 AND 1/2 TABLETS EVERY DAY    Dysmenorrhea       MICROKLENZ WOUND CLEANSER Liqd     240 mL    Externally apply topically daily For daily wound cares.    Ulcer of heel and midfoot, left, with fat layer exposed (H), Ingrowing nail       * order for DME     1 Units    Equipment being ordered: walking boot left    Tibia/fibula fracture, left, closed, initial encounter       * order for DME     2 Units    Equipment being ordered: Anchoring Device Flexi-Track LG    Neurogenic bladder       * order for DME     1 Units    Equipment being ordered: Insertion Tray Wheeler w/BZK Swab Catheter 10CC, Sterile    Neurogenic bladder       * order  for DME     2 Units    Equipment being ordered: Drain Bag, Kenguard 2000ml. Urinary Bag with Anti-Refulx    Neurogenic bladder       * order for DME     1 Units    Equipment being ordered: Catheter Wheeler 2Way 30cc 18FR, Silicone Coated Latex    Neurogenic bladder       oxybutynin 5 MG tablet    DITROPAN    180 tablet    Take 1 tablet (5 mg) by mouth 2 times daily    Neurogenic bladder       * Notice:  This list has 7 medication(s) that are the same as other medications prescribed for you. Read the directions carefully, and ask your doctor or other care provider to review them with you.

## 2017-10-02 ENCOUNTER — TELEPHONE (OUTPATIENT)
Dept: PEDIATRICS | Facility: CLINIC | Age: 47
End: 2017-10-02
Payer: MEDICARE

## 2017-10-02 ENCOUNTER — OFFICE VISIT (OUTPATIENT)
Dept: PEDIATRICS | Facility: CLINIC | Age: 47
End: 2017-10-02
Payer: MEDICARE

## 2017-10-02 ENCOUNTER — OFFICE VISIT (OUTPATIENT)
Dept: PODIATRY | Facility: CLINIC | Age: 47
End: 2017-10-02
Payer: MEDICARE

## 2017-10-02 VITALS
OXYGEN SATURATION: 95 % | HEART RATE: 91 BPM | DIASTOLIC BLOOD PRESSURE: 72 MMHG | SYSTOLIC BLOOD PRESSURE: 110 MMHG | TEMPERATURE: 97.6 F

## 2017-10-02 VITALS — OXYGEN SATURATION: 94 % | DIASTOLIC BLOOD PRESSURE: 74 MMHG | HEART RATE: 89 BPM | SYSTOLIC BLOOD PRESSURE: 125 MMHG

## 2017-10-02 DIAGNOSIS — R39.9 UTI SYMPTOMS: Primary | ICD-10-CM

## 2017-10-02 DIAGNOSIS — N30.00 ACUTE CYSTITIS WITHOUT HEMATURIA: Primary | ICD-10-CM

## 2017-10-02 DIAGNOSIS — R82.90 NONSPECIFIC FINDING ON EXAMINATION OF URINE: Primary | ICD-10-CM

## 2017-10-02 DIAGNOSIS — L97.421 ULCER OF LEFT HEEL AND MIDFOOT, LIMITED TO BREAKDOWN OF SKIN (H): Primary | ICD-10-CM

## 2017-10-02 LAB
ALBUMIN UR-MCNC: 10 MG/DL
APPEARANCE UR: ABNORMAL
BACTERIA #/AREA URNS HPF: ABNORMAL /HPF
BILIRUB UR QL STRIP: NEGATIVE
CAOX CRY #/AREA URNS HPF: ABNORMAL /HPF
COLOR UR AUTO: ABNORMAL
GLUCOSE UR STRIP-MCNC: NEGATIVE MG/DL
HGB UR QL STRIP: NEGATIVE
KETONES UR STRIP-MCNC: NEGATIVE MG/DL
LEUKOCYTE ESTERASE UR QL STRIP: ABNORMAL
NITRATE UR QL: POSITIVE
NON-SQ EPI CELLS #/AREA URNS LPF: ABNORMAL /LPF
PH UR STRIP: 6.5 PH (ref 5–7)
RBC #/AREA URNS AUTO: ABNORMAL /HPF
SOURCE: ABNORMAL
SP GR UR STRIP: 1.01 (ref 1–1.03)
UROBILINOGEN UR STRIP-MCNC: NORMAL MG/DL (ref 0–2)
WBC #/AREA URNS AUTO: ABNORMAL /HPF

## 2017-10-02 PROCEDURE — 87186 SC STD MICRODIL/AGAR DIL: CPT | Performed by: INTERNAL MEDICINE

## 2017-10-02 PROCEDURE — 99213 OFFICE O/P EST LOW 20 MIN: CPT | Performed by: INTERNAL MEDICINE

## 2017-10-02 PROCEDURE — 81001 URINALYSIS AUTO W/SCOPE: CPT | Performed by: INTERNAL MEDICINE

## 2017-10-02 PROCEDURE — 87088 URINE BACTERIA CULTURE: CPT | Performed by: INTERNAL MEDICINE

## 2017-10-02 PROCEDURE — 99212 OFFICE O/P EST SF 10 MIN: CPT | Performed by: PODIATRIST

## 2017-10-02 PROCEDURE — 87086 URINE CULTURE/COLONY COUNT: CPT | Performed by: INTERNAL MEDICINE

## 2017-10-02 RX ORDER — CIPROFLOXACIN 500 MG/1
500 TABLET, FILM COATED ORAL 2 TIMES DAILY
Qty: 20 TABLET | Refills: 0 | Status: SHIPPED | OUTPATIENT
Start: 2017-10-02 | End: 2020-05-04

## 2017-10-02 NOTE — TELEPHONE ENCOUNTER
Patient returned call.  Scheduled her at 2:10pm with Dr. Limon prior to her 2:30pm podiatry appt.    Rosemary Majano RN,   [facility]

## 2017-10-02 NOTE — PROGRESS NOTES
Dear Priyanka,   Here are your recent results that we reviewed at your recent clinic visit.     Please call or Mychart if you have further questions.     Ayala Limon MD-PhD

## 2017-10-02 NOTE — MR AVS SNAPSHOT
After Visit Summary   10/2/2017    Priyanka Martinez    MRN: [id number]           Patient Information     Date Of Birth          1970        Visit Information        Provider Department      10/2/2017 2:10 PM Ayala Limon MD PhD [facility]        Today's Diagnoses     Acute cystitis without hematuria    -  1      Care Instructions    Medication(s) prescribed today:    Orders Placed This Encounter   Medications     ciprofloxacin (CIPRO) 500 MG tablet     Sig: Take 1 tablet (500 mg) by mouth 2 times daily for 10 days     Dispense:  20 tablet     Refill:  0               Follow-ups after your visit        Your next 10 appointments already scheduled     Oct 02, 2017  2:30 PM CDT   Return Visit with Yobany Rojas DPM   [facility] ([facility]    [id number] [address] [place] [address] [phone]              Who to contact     If you have questions or need follow up information about today's clinic visit or your schedule please contact [facility] directly at [phone].  Normal or non-critical lab and imaging results will be communicated to you by Sp[employer], letter or phone within 4 business days after the clinic has received the results. If you do not hear from us within 7 days, please contact the clinic through [employer] or phone. If you have a critical or abnormal lab result, we will notify you by phone as soon as possible.  Submit refill requests through [employer] or call your pharmacy and they will forward the refill request to us. Please allow 3 business days for your refill to be completed.          Additional Information About Your Visit        [employer] Information     [employer] gives you secure access to your electronic health record. If you see a primary care provider, you can also send messages to your care team and make appointments. If you have questions, please call your primary care clinic.  If you do not have a  primary care provider, please call [phone] and they will assist you.      [employer] is an electronic gateway that provides easy, online access to your medical records. With [employer], you can request a clinic appointment, read your test results, renew a prescription or communicate with your care team.     To access your existing account, please contact your [facility] or call [phone] for assistance.        Care EveryWhere ID     This is your Care EveryWhere ID. This could be used by other organizations to access your [facility] medical records  [id number]        Your Vitals Were     Pulse Temperature Pulse Oximetry             91 97.6  F (36.4  C) (Temporal) 95%          Blood Pressure from Last 3 Encounters:   10/02/17 110/72   09/18/17 121/73   08/29/17 129/83    Weight from Last 3 Encounters:   12/17/16 155 lb (70.3 kg)   12/15/16 150 lb (68 kg)   11/14/16 150 lb (68 kg)              Today, you had the following     No orders found for display         Today's Medication Changes          These changes are accurate as of: 10/2/17  2:21 PM.  If you have any questions, ask your nurse or doctor.               Start taking these medicines.        Dose/Directions    ciprofloxacin 500 MG tablet   Commonly known as:  CIPRO   Used for:  Acute cystitis without hematuria   Started by:  Ayala Limon MD PhD        Dose:  500 mg   Take 1 tablet (500 mg) by mouth 2 times daily for 10 days   Quantity:  20 tablet   Refills:  0            Where to get your medicines      These medications were sent to [facility]/pharmacy #[id number] [address] AT [address] [address]     Phone:  [phone]     ciprofloxacin 500 MG tablet                Primary Care Provider Office Phone # Fax #    Ayala Limon MD PhD [phone] [address] [facility] [address]        Equal Access to Services     MELY GAMBLE AH: Boni fitzpatrick  Sonoreenali, waaxda luqadaha, qaybta kaalmada olga, ronnie dickensjamila reji. So [facility] [phone].    ATENCIÓN: Si lila victoria, tiene a luu disposición servicios gratuitos de asistencia lingüística. Rowan al [phone].    We comply with applicable federal civil rights laws and [place] laws. We do not discriminate on the basis of race, color, national origin, age, disability, sex, sexual orientation, or gender identity.            Thank you!     Thank you for choosing [facility]  for your care. Our goal is always to provide you with excellent care. Hearing back from our patients is one way we can continue to improve our services. Please take a few minutes to complete the written survey that you may receive in the mail after your visit with us. Thank you!             Your Updated Medication List - Protect others around you: Learn how to safely use, store and throw away your medicines at www.disposemymeds.org.          This list is accurate as of: 10/2/17  2:21 PM.  Always use your most recent med list.                   Brand Name Dispense Instructions for use Diagnosis    Catheters Misc     1 each    1 catheter every 28 days.    Atonic neurogenic bladder       ciprofloxacin 500 MG tablet    CIPRO    20 tablet    Take 1 tablet (500 mg) by mouth 2 times daily for 10 days    Acute cystitis without hematuria       dicyclomine 20 MG tablet    BENTYL    180 tablet    Take 1 tablet (20 mg) by mouth 2 times daily    Irritable bowel syndrome with diarrhea       FLUoxetine 20 MG capsule    PROzac    90 capsule    TAKE 1 CAPSULE EVERY DAY    Anxiety       * HOMEOPATHIC PRODUCTS EX      Drainage tone for swollen glands, sinus congestion and skin eruptions.        * HOMEOPATHIC PRODUCTS EX      Neuro-chord for numbness, irritability, and tingling.        LYRICA PO      Take 50 mg by mouth 3 times daily        medroxyPROGESTERone 10 MG tablet    PROVERA    135 tablet    TAKE 1 AND 1/2  TABLETS EVERY DAY    Dysmenorrhea       MICROKLENZ WOUND CLEANSER Liqd     240 mL    Externally apply topically daily For daily wound cares.    Ulcer of heel and midfoot, left, with fat layer exposed (H), Ingrowing nail       * order for DME     1 Units    Equipment being ordered: walking boot left    Tibia/fibula fracture, left, closed, initial encounter       * order for DME     2 Units    Equipment being ordered: Anchoring Device Flexi-Track LG    Neurogenic bladder       * order for DME     1 Units    Equipment being ordered: Insertion Tray Wheeler w/BZK Swab Catheter 10CC, Sterile    Neurogenic bladder       * order for DME     2 Units    Equipment being ordered: Drain Bag, Kenguard 2000ml. Urinary Bag with Anti-Refulx    Neurogenic bladder       * order for DME     1 Units    Equipment being ordered: Catheter Wheeler 2Way 30cc 18FR, Silicone Coated Latex    Neurogenic bladder       oxybutynin 5 MG tablet    DITROPAN    180 tablet    Take 1 tablet (5 mg) by mouth 2 times daily    Neurogenic bladder       * Notice:  This list has 7 medication(s) that are the same as other medications prescribed for you. Read the directions carefully, and ask your doctor or other care provider to review them with you.

## 2017-10-02 NOTE — PROGRESS NOTES
SUBJECTIVE:   Priyanka Martinez is a 46 year old female who presents to clinic today for the following health issues:      URINARY TRACT SYMPTOMS      Duration: 1 week    Description  Feeling warm and drained. Having other pain in right abdomen but feels it may be from UTI    Intensity:  moderate    Accompanying signs and symptoms:  Fever/chills: YES feels feverish  Flank pain no   Nausea and vomiting: no   Vaginal symptoms: none  Abdominal/Pelvic Pain: YES    History  History of frequent UTI's: YES  History of kidney stones: no   Sexually Active: no   Possibility of pregnancy: No    Precipitating or alleviating factors: None    Therapies tried and outcome: Takusumi   Outcome: No change    She is scheduled for epidural injection tomorrow.       Current Outpatient Prescriptions on File Prior to Visit:  HOMEOPATHIC PRODUCTS EX Drainage tone for swollen glands, sinus congestion and skin eruptions.   HOMEOPATHIC PRODUCTS EX Neuro-chord for numbness, irritability, and tingling.   Pregabalin (LYRICA PO) Take 50 mg by mouth 3 times daily   FLUoxetine (PROZAC) 20 MG capsule TAKE 1 CAPSULE EVERY DAY   dicyclomine (BENTYL) 20 MG tablet Take 1 tablet (20 mg) by mouth 2 times daily   oxybutynin (DITROPAN) 5 MG tablet Take 1 tablet (5 mg) by mouth 2 times daily   medroxyPROGESTERone (PROVERA) 10 MG tablet TAKE 1 AND 1/2 TABLETS EVERY DAY   order for DME Equipment being ordered: Anchoring Device Flexi-Track LG   order for DME Equipment being ordered: Insertion Tray Wheeler w/BZK Swab Catheter 10CC, Sterile   order for DME Equipment being ordered: Drain Bag, Kenguard 2000ml. Urinary Bag with Anti-Refulx   order for DME Equipment being ordered: Catheter Wheeler 2Way 30cc 18FR, Silicone Coated Latex   Wound Cleansers (MICROKLENZ WOUND CLEANSER) LIQD Externally apply topically daily For daily wound cares.   order for DME Equipment being ordered: walking boot left   [DISCONTINUED] oxybutynin (DITROPAN) 5 MG tablet Take 1 tablet by mouth  3 times daily.   Catheters MISC 1 catheter every 28 days.     No current facility-administered medications on file prior to visit.       Problem list, Medication list, Allergies, and Medical/Social/Surgical histories reviewed in [facility] and updated as appropriate.    OBJECTIVE:                                                    /72  Pulse 91  Temp 97.6  F (36.4  C) (Temporal)  SpO2 95%    GEN: healthy, alert and no distress  HEENT: unremarkable.  Neck:     supple, no thyromegaly, no cervical lymphadenopathy.   MOLLY:  CTA B/L, no wheezing or crackles.  CV:  RRR no murmur.  Intact distal pulses, good cap refill.  Abd: soft, normal active bowel sounds, non tender, non distended. No mass or organomegaly.   Ext:  no cyanosis, clubbing or edema.         Diagnostic test results:  Results for orders placed or performed in visit on 10/02/17 (from the past 24 hour(s))   UA with Microscopic reflex to Culture (Courtney Cordova; [facility])   Result Value Ref Range    Color Urine Light Yellow     Appearance Urine Slightly Cloudy     Glucose Urine Negative NEG^Negative mg/dL    Bilirubin Urine Negative NEG^Negative    Ketones Urine Negative NEG^Negative mg/dL    Specific Gravity Urine 1.009 1.003 - 1.035    Blood Urine Negative NEG^Negative    pH Urine 6.5 5.0 - 7.0 pH    Protein Albumin Urine 10 (A) NEG^Negative mg/dL    Urobilinogen mg/dL Normal 0.0 - 2.0 mg/dL    Nitrite Urine Positive (A) NEG^Negative    Leukocyte Esterase Urine Large (A) NEG^Negative    Source Midstream Urine     WBC Urine 10-25 (A) OTO2^O - 2 /HPF    RBC Urine O - 2 OTO2^O - 2 /HPF    Bacteria Urine Many (A) NEG^Negative /HPF    Squamous Epithelial /LPF Urine Few FEW^Few /LPF    Calcium Oxalate Few (A) NEG^Negative /HPF          ASSESSMENT/PLAN:                                                      46 year old female with the following diagnoses and treatment plan:      ICD-10-CM    1. Acute cystitis without hematuria N30.00 ciprofloxacin (CIPRO) 500 MG  tablet       Cipro for 10 days. She will need to re-schedule for the epidural injection.     Will call or return to clinic if worsening or symptoms not improving as discussed.  See Patient Instructions.        Ayala Limon MD-PhD  [facility]    (Note: Chart documentation was done in part with Dragon Voice Recognition software. Although reviewed after completion, some word and grammatical errors may remain.)

## 2017-10-02 NOTE — TELEPHONE ENCOUNTER
Patient states she thinks she may have a UTI.  She has an epidural procedure tomorrow and they want to make sure she doesn't have a UTI before they proceed.  She states since Thursday or Friday she has been having a different kind of pain.  She doesn't usually feel warm due to her spinal cord injury but since Thursday she has been feeling warm.  She is requesting UA order.    Routing to Dr. Limon.    Rosemary Majano RN,   [facility]

## 2017-10-02 NOTE — PATIENT INSTRUCTIONS
Medication(s) prescribed today:    Orders Placed This Encounter   Medications     ciprofloxacin (CIPRO) 500 MG tablet     Sig: Take 1 tablet (500 mg) by mouth 2 times daily for 10 days     Dispense:  20 tablet     Refill:  0
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
## 2017-10-02 NOTE — TELEPHONE ENCOUNTER
Please put patient on my schedule with lab ahead of time given the procedure coming up tomorrow.

## 2017-10-02 NOTE — MR AVS SNAPSHOT
After Visit Summary   10/2/2017    Priyanka Martinez    MRN: [id number]           Patient Information     Date Of Birth          1970        Visit Information        Provider Department      10/2/2017 2:30 PM Yobany Rojas DPM [facility]        Today's Diagnoses     Ulcer of left heel and midfoot, limited to breakdown of skin (H)    -  1       Follow-ups after your visit        Your next 10 appointments already scheduled     Oct 16, 2017  2:30 PM CDT   Return Visit with Yobany Rojas DPM   [facility] ([facility])    [address] [place] [address] [phone]              Who to contact     If you have questions or need follow up information about today's clinic visit or your schedule please contact [facility] directly at [phone].  Normal or non-critical lab and imaging results will be communicated to you by ClaimIthart, letter or phone within 4 business days after the clinic has received the results. If you do not hear from us within 7 days, please contact the clinic through [employer]th[employer] or phone. If you have a critical or abnormal lab result, we will notify you by phone as soon as possible.  Submit refill requests through [employer] or call your pharmacy and they will forward the refill request to us. Please allow 3 business days for your refill to be completed.          Additional Information About Your Visit        MyChart Information     [employer] gives you secure access to your electronic health record. If you see a primary care provider, you can also send messages to your care team and make appointments. If you have questions, please call your primary care clinic.  If you do not have a primary care provider, please call [phone] and they will assist you.      [employer] is an electronic gateway that provides easy, online access to your medical records. With [employer], you can request a clinic appointment,  read your test results, renew a prescription or communicate with your care team.     To access your existing account, please contact your [facility] or call [phone] for assistance.        Care EveryWhere ID     This is your Care EveryWhere ID. This could be used by other organizations to access your [place] medical records  [id number]        Your Vitals Were     Pulse Pulse Oximetry                89 94%           Blood Pressure from Last 3 Encounters:   10/02/17 125/74   10/02/17 110/72   09/18/17 121/73    Weight from Last 3 Encounters:   12/17/16 70.3 kg (155 lb)   12/15/16 68 kg (150 lb)   11/14/16 68 kg (150 lb)              Today, you had the following     No orders found for display         Today's Medication Changes          These changes are accurate as of: 10/2/17 11:59 PM.  If you have any questions, ask your nurse or doctor.               Start taking these medicines.        Dose/Directions    ciprofloxacin 500 MG tablet   Commonly known as:  CIPRO   Used for:  Acute cystitis without hematuria   Started by:  Ayala Limon MD PhD        Dose:  500 mg   Take 1 tablet (500 mg) by mouth 2 times daily for 10 days   Quantity:  20 tablet   Refills:  0            Where to get your medicines      These medications were sent to [facility]/pharmacy [address] AT [address] [address]     Phone:  [phone]     ciprofloxacin 500 MG tablet                Primary Care Provider Office Phone # Fax #    Ayala Limon MD PhD [phone]       [address]  [place] [id number]        Equal Access to Services     [facility] AH: Hadii nikunj kee hadasho Soomaali, waaxda luqadaha, qaybta kaalmada ronnie espinoza. So [facility] [phone].    ATENCIÓN: Si habla español, tiene a luu disposición servicios gratuitos de asistencia lingüística. Llame al [phone].    We comply with  applicable federal civil rights laws and [place] laws. We do not discriminate on the basis of race, color, national origin, age, disability, sex, sexual orientation, or gender identity.            Thank you!     Thank you for choosing [facility]  for your care. Our goal is always to provide you with excellent care. Hearing back from our patients is one way we can continue to improve our services. Please take a few minutes to complete the written survey that you may receive in the mail after your visit with us. Thank you!             Your Updated Medication List - Protect others around you: Learn how to safely use, store and throw away your medicines at www.disposemymeds.org.          This list is accurate as of: 10/2/17 11:59 PM.  Always use your most recent med list.                   Brand Name Dispense Instructions for use Diagnosis    Catheters Misc     1 each    1 catheter every 28 days.    Atonic neurogenic bladder       ciprofloxacin 500 MG tablet    CIPRO    20 tablet    Take 1 tablet (500 mg) by mouth 2 times daily for 10 days    Acute cystitis without hematuria       dicyclomine 20 MG tablet    BENTYL    180 tablet    Take 1 tablet (20 mg) by mouth 2 times daily    Irritable bowel syndrome with diarrhea       FLUoxetine 20 MG capsule    PROzac    90 capsule    TAKE 1 CAPSULE EVERY DAY    Anxiety       * HOMEOPATHIC PRODUCTS EX      Drainage tone for swollen glands, sinus congestion and skin eruptions.        * HOMEOPATHIC PRODUCTS EX      Neuro-chord for numbness, irritability, and tingling.        LYRICA PO      Take 50 mg by mouth 3 times daily        medroxyPROGESTERone 10 MG tablet    PROVERA    135 tablet    TAKE 1 AND 1/2 TABLETS EVERY DAY    Dysmenorrhea       MICROKLENZ WOUND CLEANSER Liqd     240 mL    Externally apply topically daily For daily wound cares.    Ulcer of heel and midfoot, left, with fat layer exposed (H), Ingrowing nail       * order for DME     1 Units     Equipment being ordered: walking boot left    Tibia/fibula fracture, left, closed, initial encounter       * order for DME     2 Units    Equipment being ordered: Anchoring Device Flexi-Track LG    Neurogenic bladder       * order for DME     1 Units    Equipment being ordered: Insertion Tray Wheeler w/BZK Swab Catheter 10CC, Sterile    Neurogenic bladder       * order for DME     2 Units    Equipment being ordered: Drain Bag, Kenguard 2000ml. Urinary Bag with Anti-Refulx    Neurogenic bladder       * order for DME     1 Units    Equipment being ordered: Catheter Wheeler 2Way 30cc 18FR, Silicone Coated Latex    Neurogenic bladder       oxybutynin 5 MG tablet    DITROPAN    180 tablet    Take 1 tablet (5 mg) by mouth 2 times daily    Neurogenic bladder       * Notice:  This list has 7 medication(s) that are the same as other medications prescribed for you. Read the directions carefully, and ask your doctor or other care provider to review them with you.

## 2017-10-02 NOTE — TELEPHONE ENCOUNTER
[facility] Call Center    Phone Message    Name of Caller: Priyanka    Phone Number: Home number on file [phone] (home)    Best time to return call: Any    May a detailed message be left on voicemail: yes    Relation to patient: Self    Reason for Call: Priyanka is requesting to drop off a urine sample today.  She is requesting an order be placed.  Priyanka is wondering how long it will take to get UA results.  She has an Epidural scheduled for tomorrow and needs results prior to the Epidural.   Please advise.  Thank you.    Action Taken: Message routed to:  Primary Care p [phone]

## 2017-10-02 NOTE — NURSING NOTE
"Chief Complaint   Patient presents with     Urinary Problem       Initial /72  Pulse 91  Temp 97.6  F (36.4  C) (Temporal)  SpO2 95% Estimated body mass index is 22.24 kg/(m^2) as calculated from the following:    Height as of 12/17/16: 5' 10\" (1.778 m).    Weight as of 12/17/16: 155 lb (70.3 kg).  Medication Reconciliation: complete    "

## 2017-10-02 NOTE — TELEPHONE ENCOUNTER
Left message on patients voicemail Dr. Limon would like to see her in clinic.    Mother dropped of urine specimen at 0920.      Patient does have another appt today at 2:30pm with podiatry.    Rosemary Majano RN,   [facility]

## 2017-10-04 NOTE — TELEPHONE ENCOUNTER
Dear Priyanka,   Here are your recent results.   -- ciprofloxacin should get rid of this infection for you.     Please call or Mychart to our office if you have further questions.     Ayala Limon MD-PhD

## 2017-10-05 LAB
BACTERIA SPEC CULT: ABNORMAL
BACTERIA SPEC CULT: ABNORMAL
SPECIMEN SOURCE: ABNORMAL

## 2017-10-05 NOTE — TELEPHONE ENCOUNTER
Dear Priyanka,   Here are your recent results.   -- Ciprofloxacin should get rid of this infection.     Please call or Mychart to our office if you have further questions.     Ayala Limon MD-PhD

## 2017-10-16 ENCOUNTER — OFFICE VISIT (OUTPATIENT)
Dept: PODIATRY | Facility: CLINIC | Age: 47
End: 2017-10-16
Payer: MEDICARE

## 2017-10-16 VITALS — SYSTOLIC BLOOD PRESSURE: 114 MMHG | DIASTOLIC BLOOD PRESSURE: 76 MMHG | HEART RATE: 74 BPM | OXYGEN SATURATION: 97 %

## 2017-10-16 DIAGNOSIS — L97.421 ULCER OF LEFT HEEL AND MIDFOOT, LIMITED TO BREAKDOWN OF SKIN (H): Primary | ICD-10-CM

## 2017-10-16 PROCEDURE — 99212 OFFICE O/P EST SF 10 MIN: CPT | Performed by: PODIATRIST
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
## 2017-10-16 NOTE — NURSING NOTE
"Priyanka Martinez's goals for this visit include: Recheck left heel  She requests these members of her care team be copied on today's visit information: yes    PCP: Ayala Limon    Referring Provider:  Referred Self, MD  No address on file    Chief Complaint   Patient presents with     RECHECK     Recheck left heel       Initial /76  Pulse 74  SpO2 97% Estimated body mass index is 22.24 kg/(m^2) as calculated from the following:    Height as of 12/17/16: 1.778 m (5' 10\").    Weight as of 12/17/16: 70.3 kg (155 lb).  Medication Reconciliation: complete    "

## 2017-10-16 NOTE — PATIENT INSTRUCTIONS
Thanks for coming today.  Ortho/Sports Medicine Clinic  [address] [place] [address]    To schedule future appointments in Ortho Clinic, you may call [phone].    To schedule ordered imaging by your Provider: Call [facility] at [phone]    Splashup available online at:   [email or web address]    Please call if any further questions or concerns [phone] and ask for the Orthopedic Department. Clinic hours 8 am to 5 pm.    Return to clinic if symptoms worsen.

## 2017-10-16 NOTE — MR AVS SNAPSHOT
After Visit Summary   10/16/2017    Priyanka Martinez    MRN: [id number]           Patient Information     Date Of Birth          1970        Visit Information        Provider Department      10/16/2017 2:30 PM Yobany Rojas DPM [facility]        Today's Diagnoses     Ulcer of left heel and midfoot, limited to breakdown of skin (H)    -  1      Care Instructions    Thanks for coming today.  Ortho/Sports Medicine Clinic  [address] [place] [address]    To schedule future appointments in Ortho Clinic, you may call [phone].    To schedule ordered imaging by your Provider: Call [facility] at [phone]    [employer] available online at:   [email or web address]    Please call if any further questions or concerns [phone] and ask for the Orthopedic Department. Clinic hours 8 am to 5 pm.    Return to clinic if symptoms worsen.            Follow-ups after your visit        Your next 10 appointments already scheduled     Nov 16, 2017  2:30 PM CST   Return Visit with Yobany Rojas DPM   [facility] ([facility])    [address] [place] [address] [phone]              Who to contact     If you have questions or need follow up information about today's clinic visit or your schedule please contact [facility] directly at [phone].  Normal or non-critical lab and imaging results will be communicated to you by MyChart, letter or phone within 4 business days after the clinic has received the results. If you do not hear from us within 7 days, please contact the clinic through MyChart or phone. If you have a critical or abnormal lab result, we will notify you by phone as soon as possible.  Submit refill requests through [employer] or call your pharmacy and they will forward the refill request to us. Please allow 3 business days for your refill to be completed.          Additional  Information About Your Visit        [employer] Information     [employer] gives you secure access to your electronic health record. If you see a primary care provider, you can also send messages to your care team and make appointments. If you have questions, please call your primary care clinic.  If you do not have a primary care provider, please call [phone] and they will assist you.      [employer] is an electronic gateway that provides easy, online access to your medical records. With [employer] you can request a clinic appointment, read your test results, renew a prescription or communicate with your care team.     To access your existing account, please contact your [facility] or call [phone] for assistance.        Care EveryWhere ID     This is your Care EveryWhere ID. This could be used by other organizations to access your [place] medical records  [id number]        Your Vitals Were     Pulse Pulse Oximetry                74 97%           Blood Pressure from Last 3 Encounters:   10/16/17 114/76   10/02/17 125/74   10/02/17 110/72    Weight from Last 3 Encounters:   12/17/16 70.3 kg (155 lb)   12/15/16 68 kg (150 lb)   11/14/16 68 kg (150 lb)              Today, you had the following     No orders found for display       Primary Care Provider Office Phone # Fax #    Ayala Limon MD PhD [phone]       [address]  [facility] [address]        Equal Access to Services     [facility] : Hadii nikunj kee hadasho Solucia, waaxda luqadaha, qaybta kaalmada adeegyada, ronnie mccoy . So [facility] [phone].    ATENCIÓN: Si habla español, tiene a luu disposición servicios gratuitos de asistencia lingüística. Llame al [phone].    We comply with applicable federal civil rights laws and [place] laws. We do not discriminate on the basis of race, color, national origin, age, disability, sex, sexual orientation, or gender identity.             Thank you!     Thank you for choosing [facility]  for your care. Our goal is always to provide you with excellent care. Hearing back from our patients is one way we can continue to improve our services. Please take a few minutes to complete the written survey that you may receive in the mail after your visit with us. Thank you!             Your Updated Medication List - Protect others around you: Learn how to safely use, store and throw away your medicines at www.disposemymeds.org.          This list is accurate as of: 10/16/17 11:59 PM.  Always use your most recent med list.                   Brand Name Dispense Instructions for use Diagnosis    Catheters Misc     1 each    1 catheter every 28 days.    Atonic neurogenic bladder       dicyclomine 20 MG tablet    BENTYL    180 tablet    Take 1 tablet (20 mg) by mouth 2 times daily    Irritable bowel syndrome with diarrhea       FLUoxetine 20 MG capsule    PROzac    90 capsule    TAKE 1 CAPSULE EVERY DAY    Anxiety       * HOMEOPATHIC PRODUCTS EX      Drainage tone for swollen glands, sinus congestion and skin eruptions.        * HOMEOPATHIC PRODUCTS EX      Neuro-chord for numbness, irritability, and tingling.        LYRICA PO      Take 50 mg by mouth 3 times daily        medroxyPROGESTERone 10 MG tablet    PROVERA    135 tablet    TAKE 1 AND 1/2 TABLETS EVERY DAY    Dysmenorrhea       MICROKLENZ WOUND CLEANSER Liqd     240 mL    Externally apply topically daily For daily wound cares.    Ulcer of heel and midfoot, left, with fat layer exposed (H), Ingrowing nail       * order for DME     1 Units    Equipment being ordered: walking boot left    Tibia/fibula fracture, left, closed, initial encounter       * order for DME     2 Units    Equipment being ordered: Anchoring Device Flexi-Track LG    Neurogenic bladder       * order for DME     1 Units    Equipment being ordered: Insertion Tray Wheeler w/BZK Swab Catheter 10CC, Sterile    Neurogenic bladder        * order for DME     2 Units    Equipment being ordered: Drain Bag, Kenguard 2000ml. Urinary Bag with Anti-Refulx    Neurogenic bladder       * order for DME     1 Units    Equipment being ordered: Catheter Wheeler 2Way 30cc 18FR, Silicone Coated Latex    Neurogenic bladder       oxybutynin 5 MG tablet    DITROPAN    180 tablet    Take 1 tablet (5 mg) by mouth 2 times daily    Neurogenic bladder       * Notice:  This list has 7 medication(s) that are the same as other medications prescribed for you. Read the directions carefully, and ask your doctor or other care provider to review them with you.

## 2017-11-16 ENCOUNTER — OFFICE VISIT (OUTPATIENT)
Dept: PODIATRY | Facility: CLINIC | Age: 47
End: 2017-11-16
Payer: MEDICARE

## 2017-11-16 VITALS — HEART RATE: 89 BPM | OXYGEN SATURATION: 98 % | DIASTOLIC BLOOD PRESSURE: 72 MMHG | SYSTOLIC BLOOD PRESSURE: 108 MMHG

## 2017-11-16 DIAGNOSIS — L97.421 ULCER OF LEFT HEEL AND MIDFOOT, LIMITED TO BREAKDOWN OF SKIN (H): Primary | ICD-10-CM

## 2017-11-16 PROCEDURE — 99212 OFFICE O/P EST SF 10 MIN: CPT | Performed by: PODIATRIST

## 2017-11-16 NOTE — NURSING NOTE
"Priyanka Martinez's goals for this visit include: Recheck left foot ulcer  She requests these members of her care team be copied on today's visit information: yes    PCP: Ayala Limon    Referring Provider:  No referring provider defined for this encounter.    Chief Complaint   Patient presents with     RECHECK     Recheck left foot ulcer       Initial /72  Pulse 89  SpO2 98% Estimated body mass index is 22.24 kg/(m^2) as calculated from the following:    Height as of 12/17/16: 1.778 m (5' 10\").    Weight as of 12/17/16: 70.3 kg (155 lb).  Medication Reconciliation: complete    "

## 2017-11-16 NOTE — PATIENT INSTRUCTIONS
Thanks for coming today.  Ortho/Sports Medicine Clinic  [address] [place] [address]    To schedule future appointments in Ortho Clinic, you may call [phone].    To schedule ordered imaging by your provider:   Call [phone]    To schedule an injection ordered by your provider:  Call [facility] Injection scheduling line: [phone]  [employer] available online at:  [email or web address]    Please call if any further questions or concerns ([phone]).  Clinic hours 8 am to 5 pm.    Return to clinic (call) if symptoms worsen or fail to improve.

## 2017-11-16 NOTE — MR AVS SNAPSHOT
After Visit Summary   2017    Priyanka Martinez    MRN: [id number]           Patient Information     Date Of Birth          1970        Visit Information        Provider Department      2017 2:30 PM Yobany Rojas DPM [facility]        Today's Diagnoses     Ulcer of left heel and midfoot, limited to breakdown of skin (H)    -  1      Care Instructions    Thanks for coming today.  Ortho/Sports Medicine Clinic  [address] [place], [place] [id number]    To schedule future appointments in Ortho Clinic, you may call [phone].    To schedule ordered imaging by your provider:   Call Central Imaging [phone]    To schedule an injection ordered by your provider:  Call Central Imaging Injection scheduling line: [phone]  AtBizzhart available online at:  [email or web address]    Please call if any further questions or concerns ([phone]).  Clinic hours 8 am to 5 pm.    Return to clinic (call) if symptoms worsen or fail to improve.            Follow-ups after your visit        Your next 10 appointments already scheduled     Dec 15, 2017  3:00 PM CST   Return Visit with Yobany Rojas DPM   [facility] ([facility])    [address] [place] [id number] [phone]              Who to contact     If you have questions or need follow up information about today's clinic visit or your schedule please contact [facility] directly at [phone].  Normal or non-critical lab and imaging results will be communicated to you by MyChart, letter or phone within 4 business days after the clinic has received the results. If you do not hear from us within 7 days, please contact the clinic through AtBizzhart or phone. If you have a critical or abnormal lab result, we will notify you by phone as soon as possible.  Submit refill requests through [employer] or call your pharmacy and they will forward  the refill request to us. Please allow 3 business days for your refill to be completed.          Additional Information About Your Visit        [facility] Information     Mindlikes gives you secure access to your electronic health record. If you see a primary care provider, you can also send messages to your care team and make appointments. If you have questions, please call your primary care clinic.  If you do not have a primary care provider, please call [phone] and they will assist you.      Mindlikes is an electronic gateway that provides easy, online access to your medical records. With Mindlikes, you can request a clinic appointment, read your test results, renew a prescription or communicate with your care team.     To access your existing account, please contact your [facility] or call [phone] for assistance.        Care EveryWhere ID     This is your Care EveryWhere ID. This could be used by other organizations to access your [place] medical records  [id number]        Your Vitals Were     Pulse Pulse Oximetry                89 98%           Blood Pressure from Last 3 Encounters:   11/16/17 108/72   10/16/17 114/76   10/02/17 125/74    Weight from Last 3 Encounters:   12/17/16 70.3 kg (155 lb)   12/15/16 68 kg (150 lb)   11/14/16 68 kg (150 lb)              Today, you had the following     No orders found for display       Primary Care Provider Office Phone # Fax #    Ayala Limon MD PhD [phone]       [address]  [facility] [address]        Equal Access to Services     MELY GAMBLE : Hadii aad ku hadasho Soomaali, waaxda luqadaha, qaybta kaalmada adeegyada, waxay surjit mccoy . So [facility] [phone].    ATENCIÓN: Si habla español, tiene a luu disposición servicios gratuitos de asistencia lingüística. Llame al [phone].    We comply with applicable federal civil rights laws and [place] laws. We do not discriminate on the basis  of race, color, national origin, age, disability, sex, sexual orientation, or gender identity.            Thank you!     Thank you for choosing [facility]  for your care. Our goal is always to provide you with excellent care. Hearing back from our patients is one way we can continue to improve our services. Please take a few minutes to complete the written survey that you may receive in the mail after your visit with us. Thank you!             Your Updated Medication List - Protect others around you: Learn how to safely use, store and throw away your medicines at www.disposemymeds.org.          This list is accurate as of: 11/16/17  2:37 PM.  Always use your most recent med list.                   Brand Name Dispense Instructions for use Diagnosis    Catheters Misc     1 each    1 catheter every 28 days.    Atonic neurogenic bladder       dicyclomine 20 MG tablet    BENTYL    180 tablet    Take 1 tablet (20 mg) by mouth 2 times daily    Irritable bowel syndrome with diarrhea       FLUoxetine 20 MG capsule    PROzac    90 capsule    TAKE 1 CAPSULE EVERY DAY    Anxiety       * HOMEOPATHIC PRODUCTS EX      Drainage tone for swollen glands, sinus congestion and skin eruptions.        * HOMEOPATHIC PRODUCTS EX      Neuro-chord for numbness, irritability, and tingling.        LYRICA PO      Take 50 mg by mouth 3 times daily        medroxyPROGESTERone 10 MG tablet    PROVERA    135 tablet    TAKE 1 AND 1/2 TABLETS EVERY DAY    Dysmenorrhea       MICROKLENZ WOUND CLEANSER Liqd     240 mL    Externally apply topically daily For daily wound cares.    Ulcer of heel and midfoot, left, with fat layer exposed (H), Ingrowing nail       * order for DME     1 Units    Equipment being ordered: walking boot left    Tibia/fibula fracture, left, closed, initial encounter       * order for DME     2 Units    Equipment being ordered: Anchoring Device Flexi-Track LG    Neurogenic bladder       * order for DME     1 Units     Equipment being ordered: Insertion Tray Wheeler w/BZK Swab Catheter 10CC, Sterile    Neurogenic bladder       * order for DME     2 Units    Equipment being ordered: Drain Bag, Kenguard 2000ml. Urinary Bag with Anti-Refulx    Neurogenic bladder       * order for DME     1 Units    Equipment being ordered: Catheter Wheeler 2Way 30cc 18FR, Silicone Coated Latex    Neurogenic bladder       oxybutynin 5 MG tablet    DITROPAN    180 tablet    Take 1 tablet (5 mg) by mouth 2 times daily    Neurogenic bladder       * Notice:  This list has 7 medication(s) that are the same as other medications prescribed for you. Read the directions carefully, and ask your doctor or other care provider to review them with you.
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
## 2017-11-29 ENCOUNTER — TRANSFERRED RECORDS (OUTPATIENT)
Dept: HEALTH INFORMATION MANAGEMENT | Facility: CLINIC | Age: 47
End: 2017-11-29

## 2017-12-15 ENCOUNTER — OFFICE VISIT (OUTPATIENT)
Dept: PODIATRY | Facility: CLINIC | Age: 47
End: 2017-12-15
Payer: MEDICARE

## 2017-12-15 ENCOUNTER — ALLIED HEALTH/NURSE VISIT (OUTPATIENT)
Dept: PEDIATRICS | Facility: CLINIC | Age: 47
End: 2017-12-15
Payer: MEDICARE

## 2017-12-15 VITALS — OXYGEN SATURATION: 98 % | HEART RATE: 97 BPM

## 2017-12-15 DIAGNOSIS — L97.422 ULCER OF LEFT HEEL AND MIDFOOT WITH FAT LAYER EXPOSED (H): Primary | ICD-10-CM

## 2017-12-15 DIAGNOSIS — S90.822A BLISTER OF LEFT HEEL, INITIAL ENCOUNTER: ICD-10-CM

## 2017-12-15 DIAGNOSIS — Z23 NEED FOR PROPHYLACTIC VACCINATION AND INOCULATION AGAINST INFLUENZA: Primary | ICD-10-CM

## 2017-12-15 PROCEDURE — 99207 ZZC NO CHARGE NURSE ONLY: CPT

## 2017-12-15 PROCEDURE — 90471 IMMUNIZATION ADMIN: CPT

## 2017-12-15 PROCEDURE — 97597 DBRDMT OPN WND 1ST 20 CM/<: CPT | Performed by: PODIATRIST

## 2017-12-15 PROCEDURE — 90686 IIV4 VACC NO PRSV 0.5 ML IM: CPT

## 2017-12-15 RX ORDER — CLINDAMYCIN HCL 300 MG
300 CAPSULE ORAL 2 TIMES DAILY
Qty: 20 CAPSULE | Refills: 0 | Status: SHIPPED | OUTPATIENT
Start: 2017-12-15 | End: 2018-01-04

## 2017-12-15 RX ORDER — CIPROFLOXACIN 500 MG/1
500 TABLET, FILM COATED ORAL 2 TIMES DAILY
Qty: 28 TABLET | Refills: 0 | Status: SHIPPED | OUTPATIENT
Start: 2017-12-15 | End: 2018-01-04

## 2017-12-15 ASSESSMENT — PAIN SCALES - GENERAL: PAINLEVEL: NO PAIN (0)

## 2017-12-15 NOTE — MR AVS SNAPSHOT
After Visit Summary   12/15/2017    Priyanka Martinez    MRN: [id number]           Patient Information     Date Of Birth          1970        Visit Information        Provider Department      12/15/2017 3:00 PM Yobany Rojas DPM [facility]        Today's Diagnoses     Ulcer of left heel and midfoot with fat layer exposed (H)    -  1    Blister of left heel, initial encounter          Care Instructions    Thanks for coming today.  Ortho/Sports Medicine Clinic  [address] [place] [address]    To schedule future appointments in Ortho Clinic, you may call [phone].    To schedule ordered imaging by your provider:   Call Central Imaging [phone]    To schedule an injection ordered by your provider:  Call Central Imaging Injection scheduling line: [phone]  MyChart available online at:  [email or web address] ([employer] On Software Applications)hart    Please call if any further questions or concerns ([phone]).  Clinic hours 8 am to 5 pm.    Return to clinic (call) if symptoms worsen or fail to improve.            Follow-ups after your visit        Your next 10 appointments already scheduled     Dec 20, 2017  2:00 PM CST   Return Visit with Yobany Rojas DPM   [facility] ([facility])    [address] [place] [id number] [phone]            Dec 28, 2017  2:50 PM CST   PHYSICAL with Ayala Limon MD PhD   [facility])    [address] [place] [id number] [phone]              Who to contact     If you have questions or need follow up information about today's clinic visit or your schedule please contact [facility] directly at [phone].  Normal or non-critical lab and imaging results will be communicated to you by MyChart, letter or phone within 4 business days after the clinic has received the results. If you do not hear from us within 7  days, please contact the clinic through GRAVIDI or phone. If you have a critical or abnormal lab result, we will notify you by phone as soon as possible.  Submit refill requests through GRAVIDI or call your pharmacy and they will forward the refill request to us. Please allow 3 business days for your refill to be completed.          Additional Information About Your Visit        [employer] Information     GRAVIDI gives you secure access to your electronic health record. If you see a primary care provider, you can also send messages to your care team and make appointments. If you have questions, please call your primary care clinic.  If you do not have a primary care provider, please call [phone] and they will assist you.      GRAVIDI is an electronic gateway that provides easy, online access to your medical records. With GRAVIDI, you can request a clinic appointment, read your test results, renew a prescription or communicate with your care team.     To access your existing account, please contact your [facility] or call [phone] for assistance.        Care EveryWhere ID     This is your Care EveryWhere ID. This could be used by other organizations to access your [place] medical records  [id number]        Your Vitals Were     Pulse Pulse Oximetry                97 98%           Blood Pressure from Last 3 Encounters:   11/16/17 108/72   10/16/17 114/76   10/02/17 125/74    Weight from Last 3 Encounters:   12/17/16 70.3 kg (155 lb)   12/15/16 68 kg (150 lb)   11/14/16 68 kg (150 lb)              We Performed the Following     DEBRIDEMENT WOUND UP TO 20 SQ CM          Today's Medication Changes          These changes are accurate as of: 12/15/17 11:59 PM.  If you have any questions, ask your nurse or doctor.               Start taking these medicines.        Dose/Directions    ciprofloxacin 500 MG tablet   Commonly known as:  CIPRO   Used for:  Ulcer of left heel and midfoot with  fat layer exposed (H)   Started by:  Yobany Rojas DPM        Dose:  500 mg   Take 1 tablet (500 mg) by mouth 2 times daily   Quantity:  28 tablet   Refills:  0       clindamycin 300 MG capsule   Commonly known as:  CLEOCIN   Used for:  Ulcer of left heel and midfoot with fat layer exposed (H)   Started by:  Yobany Rojas DPM        Dose:  300 mg   Take 1 capsule (300 mg) by mouth 2 times daily   Quantity:  20 capsule   Refills:  0            Where to get your medicines      These medications were sent to [facility]/pharmacy #[id number] [address] [address]     Phone:  [phone]     ciprofloxacin 500 MG tablet    clindamycin 300 MG capsule                Primary Care Provider Office Phone # Fax #    Ayala Limon MD PhD [phone] [address]  [facility] [address]        Equal Access to Services     [facility] : Hadii nikunj kee hadasho Solucia, waaxda luqadaha, qaybta kaalmada adeegyada, ronnie mccoy . So [facility] [phone].    ATENCIÓN: Si habla español, tiene a luu disposición servicios gratuitos de asistencia lingüística. Ll[facility] [phone].    We comply with applicable federal civil rights laws and [place] laws. We do not discriminate on the basis of race, color, national origin, age, disability, sex, sexual orientation, or gender identity.            Thank you!     Thank you for choosing [facility]  for your care. Our goal is always to provide you with excellent care. Hearing back from our patients is one way we can continue to improve our services. Please take a few minutes to complete the written survey that you may receive in the mail after your visit with us. Thank you!             Your Updated Medication List - Protect others around you: Learn how to safely use, store and throw away your medicines at www.disposemymeds.org.          This list is accurate  as of: 12/15/17 11:59 PM.  Always use your most recent med list.                   Brand Name Dispense Instructions for use Diagnosis    Catheters Misc     1 each    1 catheter every 28 days.    Atonic neurogenic bladder       ciprofloxacin 500 MG tablet    CIPRO    28 tablet    Take 1 tablet (500 mg) by mouth 2 times daily    Ulcer of left heel and midfoot with fat layer exposed (H)       clindamycin 300 MG capsule    CLEOCIN    20 capsule    Take 1 capsule (300 mg) by mouth 2 times daily    Ulcer of left heel and midfoot with fat layer exposed (H)       dicyclomine 20 MG tablet    BENTYL    180 tablet    Take 1 tablet (20 mg) by mouth 2 times daily    Irritable bowel syndrome with diarrhea       FLUoxetine 20 MG capsule    PROzac    90 capsule    TAKE 1 CAPSULE EVERY DAY    Anxiety       * HOMEOPATHIC PRODUCTS EX      Drainage tone for swollen glands, sinus congestion and skin eruptions.        * HOMEOPATHIC PRODUCTS EX      Neuro-chord for numbness, irritability, and tingling.        LYRICA PO      Take 50 mg by mouth 3 times daily        medroxyPROGESTERone 10 MG tablet    PROVERA    135 tablet    TAKE 1 AND 1/2 TABLETS EVERY DAY    Dysmenorrhea       MICROKLENZ WOUND CLEANSER Liqd     240 mL    Externally apply topically daily For daily wound cares.    Ulcer of heel and midfoot, left, with fat layer exposed (H), Ingrowing nail       * order for DME     1 Units    Equipment being ordered: walking boot left    Tibia/fibula fracture, left, closed, initial encounter       * order for DME     2 Units    Equipment being ordered: Anchoring Device Flexi-Track LG    Neurogenic bladder       * order for DME     1 Units    Equipment being ordered: Insertion Tray Wheeler w/BZK Swab Catheter 10CC, Sterile    Neurogenic bladder       * order for DME     2 Units    Equipment being ordered: Drain Bag, Kenguard 2000ml. Urinary Bag with Anti-Refulx    Neurogenic bladder       * order for DME     1 Units    Equipment being ordered:  Catheter Wheeler 2Way 30cc 18FR, Silicone Coated Latex    Neurogenic bladder       oxybutynin 5 MG tablet    DITROPAN    180 tablet    Take 1 tablet (5 mg) by mouth 2 times daily    Neurogenic bladder       * Notice:  This list has 7 medication(s) that are the same as other medications prescribed for you. Read the directions carefully, and ask your doctor or other care provider to review them with you.
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
## 2017-12-15 NOTE — NURSING NOTE
"Priyanka Martinez's goals for this visit include: Recheck left foot  She requests these members of her care team be copied on today's visit information: yes    PCP: Ayala Limon    Referring Provider:  Referred Self, MD  No address on file    Chief Complaint   Patient presents with     RECHECK     Recheck left foot       Initial Pulse 97  SpO2 98% Estimated body mass index is 22.24 kg/(m^2) as calculated from the following:    Height as of 12/17/16: 1.778 m (5' 10\").    Weight as of 12/17/16: 70.3 kg (155 lb).  Medication Reconciliation: complete    "

## 2017-12-15 NOTE — MR AVS SNAPSHOT
After Visit Summary   12/15/2017    Priyanka Martinez    MRN: [id number]           Patient Information     Date Of Birth          1970        Visit Information        Provider Department      12/15/2017 2:40 PM MG ANCILLARY [facility]        Today's Diagnoses     Need for prophylactic vaccination and inoculation against influenza    -  1       Follow-ups after your visit        Your next 10 appointments already scheduled     Dec 28, 2017  2:50 PM CST   PHYSICAL with Ayala Limon MD PhD   [facility] ([facility])    [id number] [place] [address] [phone]              Who to contact     If you have questions or need follow up information about today's clinic visit or your schedule please contact [facility] directly at [phone].  Normal or non-critical lab and imaging results will be communicated to you by Youxiguhart, letter or phone within 4 business days after the clinic has received the results. If you do not hear from us within 7 days, please contact the clinic through Youxiguhart or phone. If you have a critical or abnormal lab result, we will notify you by phone as soon as possible.  Submit refill requests through Me!Box Media or call your pharmacy and they will forward the refill request to us. Please allow 3 business days for your refill to be completed.          Additional Information About Your Visit        Youxiguhart Information     Me!Box Media gives you secure access to your electronic health record. If you see a primary care provider, you can also send messages to your care team and make appointments. If you have questions, please call your primary care clinic.  If you do not have a primary care provider, please call [phone] and they will assist you.      Me!Box Media is an electronic gateway that provides easy, online access to your medical records. With Me!Box Media, you can request a clinic appointment, read your  test results, renew a prescription or communicate with your care team.     To access your existing account, please contact your [facility] or call [phone] for assistance.        Care EveryWhere ID     This is your Care EveryWhere ID. This could be used by other organizations to access your [facility] medical records  [id number]         Blood Pressure from Last 3 Encounters:   11/16/17 108/72   10/16/17 114/76   10/02/17 125/74    Weight from Last 3 Encounters:   12/17/16 155 lb (70.3 kg)   12/15/16 150 lb (68 kg)   11/14/16 150 lb (68 kg)              We Performed the Following     FLU VAC, SPLIT VIRUS IM > 3 YO (QUADRIVALENT) [39303]     Vaccine Administration, Initial [91926]        Primary Care Provider Office Phone # Fax #    Ayala Limon MD PhD [phone]       [address]  [facility] [place] [address]        Equal Access to Services     [facility] : Hadii aad ku hadasho Soomaali, waaxda luqadaha, qaybta kaalmada adeegyada, waxay idiin haydarlinn hermila mccoy . So [facility] [phone].    ATENCIÓN: Si habla español, tiene a luu disposición servicios gratuitos de asistencia lingüística. Llame al [phone].    We comply with applicable federal civil rights laws and [place] laws. We do not discriminate on the basis of race, color, national origin, age, disability, sex, sexual orientation, or gender identity.            Thank you!     Thank you for choosing [facility]  for your care. Our goal is always to provide you with excellent care. Hearing back from our patients is one way we can continue to improve our services. Please take a few minutes to complete the written survey that you may receive in the mail after your visit with us. Thank you!             Your Updated Medication List - Protect others around you: Learn how to safely use, store and throw away your medicines at www.disposemymeds.org.          This list is accurate as of: 12/15/17   3:07 PM.  Always use your most recent med list.                   Brand Name Dispense Instructions for use Diagnosis    Catheters Misc     1 each    1 catheter every 28 days.    Atonic neurogenic bladder       dicyclomine 20 MG tablet    BENTYL    180 tablet    Take 1 tablet (20 mg) by mouth 2 times daily    Irritable bowel syndrome with diarrhea       FLUoxetine 20 MG capsule    PROzac    90 capsule    TAKE 1 CAPSULE EVERY DAY    Anxiety       * HOMEOPATHIC PRODUCTS EX      Drainage tone for swollen glands, sinus congestion and skin eruptions.        * HOMEOPATHIC PRODUCTS EX      Neuro-chord for numbness, irritability, and tingling.        LYRICA PO      Take 50 mg by mouth 3 times daily        medroxyPROGESTERone 10 MG tablet    PROVERA    135 tablet    TAKE 1 AND 1/2 TABLETS EVERY DAY    Dysmenorrhea       MICROKLENZ WOUND CLEANSER Liqd     240 mL    Externally apply topically daily For daily wound cares.    Ulcer of heel and midfoot, left, with fat layer exposed (H), Ingrowing nail       * order for DME     1 Units    Equipment being ordered: walking boot left    Tibia/fibula fracture, left, closed, initial encounter       * order for DME     2 Units    Equipment being ordered: Anchoring Device Flexi-Track LG    Neurogenic bladder       * order for DME     1 Units    Equipment being ordered: Insertion Tray Wheeler w/BZK Swab Catheter 10CC, Sterile    Neurogenic bladder       * order for DME     2 Units    Equipment being ordered: Drain Bag, Kenguard 2000ml. Urinary Bag with Anti-Refulx    Neurogenic bladder       * order for DME     1 Units    Equipment being ordered: Catheter Wheeler 2Way 30cc 18FR, Silicone Coated Latex    Neurogenic bladder       oxybutynin 5 MG tablet    DITROPAN    180 tablet    Take 1 tablet (5 mg) by mouth 2 times daily    Neurogenic bladder       * Notice:  This list has 7 medication(s) that are the same as other medications prescribed for you. Read the directions carefully, and ask your  doctor or other care provider to review them with you.

## 2017-12-15 NOTE — PATIENT INSTRUCTIONS
Thanks for coming today.  Ortho[facility] Medicine Clinic  [address] [place] [address]    To schedule future appointments in Ortho Clinic, you may call [phone].    To schedule ordered imaging by your provider:   Call Central Imaging [phone]    To schedule an injection ordered by your provider:  Call Central Imaging Injection scheduling line: [phone]  Spiral Gatewayhart available online at:  [email or web address]    Please call if any further questions or concerns ([phone]).  Clinic hours 8 am to 5 pm.    Return to clinic (call) if symptoms worsen or fail to improve.

## 2017-12-15 NOTE — PROGRESS NOTES

## 2017-12-16 NOTE — PROGRESS NOTES
SUBJECTIVE FINDINGS:  A 47-year-old female returns to clinic for ulcers, left heel.  She relates that she just today noticed that it was wet, and she had gotten it wet, and that it was draining.  She relates no specific injuries.  She has not had anything rub on it.  She relates she did wear a pair of compression socks, new ones that she had just gotten.  That was the only thing she has done differently.      OBJECTIVE FINDINGS:  She has a left medial heel ulcer that is about 3-1/2 x 2-1/2 cm.  There is blistering with underlying ulcerations through the dermis and into the subcutaneous tissue.  There is surrounding erythema, edema.  No odor, no calor.  Some serosanguineous, fibrous type drainage.  She does have a lateral heel ulcer that is about 1-1/2 x 1 cm.  It is deep into the subcutaneous tissues.  There is some serosanguineous drainage.  Some mild erythema there.  No odor, no calor.  She does have some maceration at both sites.      ASSESSMENT AND PLAN:  Ulcer, left medial heel with blistering.  Ulcer, left lateral heel.  Diagnosis and treatment options discussed with the patient.  Local wound care done upon consent today.  I debrided the blister and the ulceration on the medial heel upon consent.  I am going to have her do a Wound Vashe wet-to-dry dressing to both sides.  She will d/c the Iodosorb.  This appears to be an infected friction blister, although it is hard to say exactly what caused the blistering.  She will return to clinic in 1 week.  I put her on Cipro and clindamycin, prescription given and use discussed with her.

## 2017-12-28 ENCOUNTER — OFFICE VISIT (OUTPATIENT)
Dept: PEDIATRICS | Facility: CLINIC | Age: 47
End: 2017-12-28
Payer: MEDICARE

## 2017-12-28 VITALS
TEMPERATURE: 98.6 F | HEIGHT: 70 IN | OXYGEN SATURATION: 96 % | DIASTOLIC BLOOD PRESSURE: 59 MMHG | HEART RATE: 90 BPM | SYSTOLIC BLOOD PRESSURE: 113 MMHG

## 2017-12-28 DIAGNOSIS — F41.1 GENERALIZED ANXIETY DISORDER: Primary | ICD-10-CM

## 2017-12-28 DIAGNOSIS — M79.2 NEUROPATHIC PAIN OF RIGHT FLANK: ICD-10-CM

## 2017-12-28 PROCEDURE — 99213 OFFICE O/P EST LOW 20 MIN: CPT | Performed by: INTERNAL MEDICINE

## 2017-12-28 ASSESSMENT — ANXIETY QUESTIONNAIRES
GAD7 TOTAL SCORE: 0
1. FEELING NERVOUS, ANXIOUS, OR ON EDGE: NOT AT ALL
2. NOT BEING ABLE TO STOP OR CONTROL WORRYING: NOT AT ALL
3. WORRYING TOO MUCH ABOUT DIFFERENT THINGS: NOT AT ALL
6. BECOMING EASILY ANNOYED OR IRRITABLE: NOT AT ALL
7. FEELING AFRAID AS IF SOMETHING AWFUL MIGHT HAPPEN: NOT AT ALL
5. BEING SO RESTLESS THAT IT IS HARD TO SIT STILL: NOT AT ALL

## 2017-12-28 ASSESSMENT — PATIENT HEALTH QUESTIONNAIRE - PHQ9
5. POOR APPETITE OR OVEREATING: NOT AT ALL
SUM OF ALL RESPONSES TO PHQ QUESTIONS 1-9: 4

## 2017-12-28 NOTE — MR AVS SNAPSHOT
After Visit Summary   12/28/2017    Priyanka Martinez    MRN: [id number]           Patient Information     Date Of Birth          1970        Visit Information        Provider Department      12/28/2017 2:50 PM Ayala Limon MD PhD [facility]        Today's Diagnoses     Generalized anxiety disorder    -  1      Care Instructions    Make appointment(s) for:   -- wellness in December 2018 with fasting lab.                   Follow-ups after your visit        Your next 10 appointments already scheduled     Jan 04, 2018  7:00 AM CST   Return Visit with Yobany Rojas DPM   [facility] ([facility])    [address] [place] [id number] [phone]              Who to contact     If you have questions or need follow up information about today's clinic visit or your schedule please contact [facility] directly at [phone].  Normal or non-critical lab and imaging results will be communicated to you by MyCheckhart, letter or phone within 4 business days after the clinic has received the results. If you do not hear from us within 7 days, please contact the clinic through MyCheckhart or phone. If you have a critical or abnormal lab result, we will notify you by phone as soon as possible.  Submit refill requests through Assurely or call your pharmacy and they will forward the refill request to us. Please allow 3 business days for your refill to be completed.          Additional Information About Your Visit        MyChart Information     Assurely gives you secure access to your electronic health record. If you see a primary care provider, you can also send messages to your care team and make appointments. If you have questions, please call your primary care clinic.  If you do not have a primary care provider, please call [phone] and they will assist you.      Assurely is an electronic gateway that provides easy, online access to  "your medical records. With [employer], you can request a clinic appointment, read your test results, renew a prescription or communicate with your care team.     To access your existing account, please contact your [facility] or call [phone] for assistance.        Care EveryWhere ID     This is your Care EveryWhere ID. This could be used by other organizations to access your [facility] medical records  [id number]        Your Vitals Were     Pulse Temperature Height Pulse Oximetry          90 98.6  F (37  C) (Temporal) 5' 10\" (1.778 m) 96%         Blood Pressure from Last 3 Encounters:   12/28/17 113/59   11/16/17 108/72   10/16/17 114/76    Weight from Last 3 Encounters:   12/17/16 155 lb (70.3 kg)   12/15/16 150 lb (68 kg)   11/14/16 150 lb (68 kg)              Today, you had the following     No orders found for display       Primary Care Provider Office Phone # Fax #    Ayala Limon MD PhD [phone]       [address]  [facility] [address]        Equal Access to Services     [facility] : Hadii aad ku hadasho Sonoreenali, waaxda luqadaha, qaybta kaalmada adeivania, ronnie mccoy . So [facility] [phone].    ATENCIÓN: Si habla español, tiene a luu disposición servicios gratuitos de asistencia lingüística. LlSelect [facility]ty [facility] - [facility] [phone].    We comply with applicable federal civil rights laws and [place] laws. We do not discriminate on the basis of race, color, national origin, age, disability, sex, sexual orientation, or gender identity.            Thank you!     Thank you for choosing [facility]  for your care. Our goal is always to provide you with excellent care. Hearing back from our patients is one way we can continue to improve our services. Please take a few minutes to complete the written survey that you may receive in the mail after your visit with us. Thank you!             Your Updated Medication List - Protect others " around you: Learn how to safely use, store and throw away your medicines at www.disposemymeds.org.          This list is accurate as of: 12/28/17  3:27 PM.  Always use your most recent med list.                   Brand Name Dispense Instructions for use Diagnosis    Catheters Misc     1 each    1 catheter every 28 days.    Atonic neurogenic bladder       ciprofloxacin 500 MG tablet    CIPRO    28 tablet    Take 1 tablet (500 mg) by mouth 2 times daily    Ulcer of left heel and midfoot with fat layer exposed (H)       clindamycin 300 MG capsule    CLEOCIN    20 capsule    Take 1 capsule (300 mg) by mouth 2 times daily    Ulcer of left heel and midfoot with fat layer exposed (H)       dicyclomine 20 MG tablet    BENTYL    180 tablet    Take 1 tablet (20 mg) by mouth 2 times daily    Irritable bowel syndrome with diarrhea       FLUoxetine 20 MG capsule    PROzac    90 capsule    TAKE 1 CAPSULE EVERY DAY    Anxiety       * HOMEOPATHIC PRODUCTS EX      Drainage tone for swollen glands, sinus congestion and skin eruptions.        * HOMEOPATHIC PRODUCTS EX      Neuro-chord for numbness, irritability, and tingling.        LYRICA PO      Take 50 mg by mouth 3 times daily        medroxyPROGESTERone 10 MG tablet    PROVERA    135 tablet    TAKE 1 AND 1/2 TABLETS EVERY DAY    Dysmenorrhea       MICROKLENZ WOUND CLEANSER Liqd     240 mL    Externally apply topically daily For daily wound cares.    Ulcer of heel and midfoot, left, with fat layer exposed (H), Ingrowing nail       * order for DME     1 Units    Equipment being ordered: walking boot left    Tibia/fibula fracture, left, closed, initial encounter       * order for DME     2 Units    Equipment being ordered: Anchoring Device Flexi-Track LG    Neurogenic bladder       * order for DME     1 Units    Equipment being ordered: Insertion Tray Wheeler w/BZK Swab Catheter 10CC, Sterile    Neurogenic bladder       * order for DME     2 Units    Equipment being ordered: Drain Bag,  Brigido 2000ml. Urinary Bag with Anti-Refulx    Neurogenic bladder       * order for DME     1 Units    Equipment being ordered: Catheter Wheeler 2Way 30cc 18FR, Silicone Coated Latex    Neurogenic bladder       oxybutynin 5 MG tablet    DITROPAN    180 tablet    Take 1 tablet (5 mg) by mouth 2 times daily    Neurogenic bladder       * Notice:  This list has 7 medication(s) that are the same as other medications prescribed for you. Read the directions carefully, and ask your doctor or other care provider to review them with you.

## 2017-12-28 NOTE — NURSING NOTE
"Chief Complaint   Patient presents with     Recheck Medication       Initial /59 (BP Location: Left arm, Patient Position: Chair, Cuff Size: Adult Regular)  Pulse 90  Temp 98.6  F (37  C) (Temporal)  Ht 5' 10\" (1.778 m)  SpO2 96% Estimated body mass index is 22.24 kg/(m^2) as calculated from the following:    Height as of 12/17/16: 5' 10\" (1.778 m).    Weight as of 12/17/16: 155 lb (70.3 kg).  Medication Reconciliation: complete     Alba Cabello MA      "

## 2017-12-28 NOTE — PROGRESS NOTES
SUBJECTIVE:   Priyanka Martinez is a 47 year old female who presents to clinic today for the following health issues:      Medication Followup of gabapentin    Taking Medication as prescribed: yes    Side Effects:  Yes due to keep switching medications    Medication Helping Symptoms:  NO     Patient sees [facility] pain clinic for chronic low back pain. Has had injection and on Lyrica 50 mg tid with very little breakthrough pain. The pain doctor wanted to increase Lyrica slightly but her insurance wouldn't approve. So she had to go back to gabapentin until she is able to get the Lyrica again but not until 1/15/18.     Here to follow up on KAVITHA. On fluoxetine 20 mg. Doing well. Phq9=3, KAVITHA 7=0    No other complaints.       Current Outpatient Prescriptions on File Prior to Visit:  ciprofloxacin (CIPRO) 500 MG tablet Take 1 tablet (500 mg) by mouth 2 times daily   HOMEOPATHIC PRODUCTS EX Drainage tone for swollen glands, sinus congestion and skin eruptions.   HOMEOPATHIC PRODUCTS EX Neuro-chord for numbness, irritability, and tingling.   Pregabalin (LYRICA PO) Take 50 mg by mouth 3 times daily   FLUoxetine (PROZAC) 20 MG capsule TAKE 1 CAPSULE EVERY DAY   order for DME Equipment being ordered: Anchoring Device Flexi-Track LG   order for DME Equipment being ordered: Insertion Tray Wheeler w/BZK Swab Catheter 10CC, Sterile   order for DME Equipment being ordered: Drain Bag, Kenguard 2000ml. Urinary Bag with Anti-Refulx   order for DME Equipment being ordered: Catheter Wheeler 2Way 30cc 18FR, Silicone Coated Latex   dicyclomine (BENTYL) 20 MG tablet Take 1 tablet (20 mg) by mouth 2 times daily   oxybutynin (DITROPAN) 5 MG tablet Take 1 tablet (5 mg) by mouth 2 times daily   medroxyPROGESTERone (PROVERA) 10 MG tablet TAKE 1 AND 1/2 TABLETS EVERY DAY   Wound Cleansers (MICROKLENZ WOUND CLEANSER) LIQD Externally apply topically daily For daily wound cares.   Catheters MISC 1 catheter every 28 days.   clindamycin (CLEOCIN) 300 MG  "capsule Take 1 capsule (300 mg) by mouth 2 times daily (Patient not taking: Reported on 12/28/2017)   order for DME Equipment being ordered: walking boot left (Patient not taking: Reported on 12/28/2017)   [DISCONTINUED] oxybutynin (DITROPAN) 5 MG tablet Take 1 tablet by mouth 3 times daily.     No current facility-administered medications on file prior to visit.       Problem list, Medication list, Allergies, and Medical/Social/Surgical histories reviewed in [facility] and updated as appropriate.    OBJECTIVE:                                                    /59 (BP Location: Left arm, Patient Position: Chair, Cuff Size: Adult Regular)  Pulse 90  Temp 98.6  F (37  C) (Temporal)  Ht 5' 10\" (1.778 m)  SpO2 96%    GEN: healthy, alert and no distress, sitting in wheelchair.        ASSESSMENT/PLAN:                                                      47 year old female with the following diagnoses and treatment plan:      ICD-10-CM    1. Generalized anxiety disorder F41.1    2. Neuropathic pain of right flank G57.91      -- doing well. Continue fluoxetine. Return in one year for wellness visit.   -- she also asked about an elbow brace that was given to her a while ago. She fell in the bath tub over a month ago, didn't hit anything but used the right arm to brace it. Has some discomfort at antecubital fossa. Does not want therapy or evaluation. Plan to find the old brace.   -- caution her on weight gain from Lyrica. We'll plan to do a weight on her next year.       Will call or return to clinic if worsening or symptoms not improving as discussed.  See Patient Instructions.        Ayala Limon MD-PhD  [facility] [place] [place]    (Note: Chart documentation was done in part with Dragon Voice Recognition software. Although reviewed after completion, some word and grammatical errors may remain.)    "

## 2017-12-29 ASSESSMENT — ANXIETY QUESTIONNAIRES: GAD7 TOTAL SCORE: 0

## 2018-01-04 ENCOUNTER — OFFICE VISIT (OUTPATIENT)
Dept: PODIATRY | Facility: CLINIC | Age: 48
End: 2018-01-04
Payer: MEDICARE

## 2018-01-04 VITALS — OXYGEN SATURATION: 98 % | HEART RATE: 86 BPM | DIASTOLIC BLOOD PRESSURE: 82 MMHG | SYSTOLIC BLOOD PRESSURE: 130 MMHG

## 2018-01-04 DIAGNOSIS — L97.421 ULCER OF LEFT HEEL AND MIDFOOT, LIMITED TO BREAKDOWN OF SKIN (H): Primary | ICD-10-CM

## 2018-01-04 PROCEDURE — 99212 OFFICE O/P EST SF 10 MIN: CPT | Performed by: PODIATRIST

## 2018-01-04 NOTE — MR AVS SNAPSHOT
After Visit Summary   2018    Priyanka Martinez    MRN: [id number]           Patient Information     Date Of Birth          1970        Visit Information        Provider Department      2018 7:00 AM Yobany Rojas DPM [facility]        Today's Diagnoses     Ulcer of left heel and midfoot, limited to breakdown of skin (H)    -  1      Care Instructions    Thanks for coming today.  Ortho/Sports Medicine Clinic  [address] [place], [place] [address]    To schedule future appointments in Ortho Clinic, you may call [phone].    To schedule ordered imaging by your provider:   Call [facility] [phone]    To schedule an injection ordered by your provider:  Call [facility] Injection scheduling line: [phone]  N2Carehart available online at:  [email or web address]    Please call if any further questions or concerns ([phone]).  Clinic hours 8 am to 5 pm.    Return to clinic (call) if symptoms worsen or fail to improve.            Follow-ups after your visit        Your next 10 appointments already scheduled     2018  7:00 AM CST   [facility]    [address] [place] [address] [phone]              Who to contact     If you have questions or need follow up information about today's clinic visit or your schedule please contact [facility] directly at [phone].  Normal or non-critical lab and imaging results will be communicated to you by MyChart, letter or phone within 4 business days after the clinic has received the results. If you do not hear from us within 7 days, please contact the clinic through N2Carehart or phone. If you have a critical or abnormal lab result, we will notify you by phone as soon as possible.  Submit refill requests through Facishare or call your pharmacy and they will forward the  refill request to us. Please allow 3 business days for your refill to be completed.          Additional Information About Your Visit        [email or web address] Communications Information     Inclinix gives you secure access to your electronic health record. If you see a primary care provider, you can also send messages to your care team and make appointments. If you have questions, please call your primary care clinic.  If you do not have a primary care provider, please call [phone] and they will assist you.      Inclinix is an electronic gateway that provides easy, online access to your medical records. With Inclinix, you can request a clinic appointment, read your test results, renew a prescription or communicate with your care team.     To access your existing account, please contact your [facility] or call [phone] for assistance.        Care EveryWhere ID     This is your Care EveryWhere ID. This could be used by other organizations to access your [place] medical records  [id number]        Your Vitals Were     Pulse Pulse Oximetry                86 98%           Blood Pressure from Last 3 Encounters:   01/04/18 130/82   12/28/17 113/59   11/16/17 108/72    Weight from Last 3 Encounters:   12/17/16 70.3 kg (155 lb)   12/15/16 68 kg (150 lb)   11/14/16 68 kg (150 lb)              Today, you had the following     No orders found for display       Primary Care Provider Office Phone # Fax #    Ayala Limon MD PhD [phone]       [address]  [facility] [address]        Equal Access to Services     MELY GAMBLE : Hadii nikunj ku hadasho Soomaali, waaxda luqadaha, qaybta kaalmada adeegyada, waxay surjit mccoy . So [facility] [phone].    ATENCIÓN: Si habla español, tiene a luu disposición servicios gratuitos de asistencia lingüística. Llame al [phone].    We comply with applicable federal civil rights laws and [place] laws. We do not discriminate on the basis of  race, color, national origin, age, disability, sex, sexual orientation, or gender identity.            Thank you!     Thank you for choosing [facility]  for your care. Our goal is always to provide you with excellent care. Hearing back from our patients is one way we can continue to improve our services. Please take a few minutes to complete the written survey that you may receive in the mail after your visit with us. Thank you!             Your Updated Medication List - Protect others around you: Learn how to safely use, store and throw away your medicines at www.disposemymeds.org.          This list is accurate as of: 1/4/18  8:44 AM.  Always use your most recent med list.                   Brand Name Dispense Instructions for use Diagnosis    Catheters Misc     1 each    1 catheter every 28 days.    Atonic neurogenic bladder       dicyclomine 20 MG tablet    BENTYL    180 tablet    Take 1 tablet (20 mg) by mouth 2 times daily    Irritable bowel syndrome with diarrhea       FLUoxetine 20 MG capsule    PROzac    90 capsule    TAKE 1 CAPSULE EVERY DAY    Anxiety       * HOMEOPATHIC PRODUCTS EX      Drainage tone for swollen glands, sinus congestion and skin eruptions.        * HOMEOPATHIC PRODUCTS EX      Neuro-chord for numbness, irritability, and tingling.        LYRICA PO      Take 50 mg by mouth 3 times daily        medroxyPROGESTERone 10 MG tablet    PROVERA    135 tablet    TAKE 1 AND 1/2 TABLETS EVERY DAY    Dysmenorrhea       MICROKLENZ WOUND CLEANSER Liqd     240 mL    Externally apply topically daily For daily wound cares.    Ulcer of heel and midfoot, left, with fat layer exposed (H), Ingrowing nail       * order for DME     1 Units    Equipment being ordered: walking boot left    Tibia/fibula fracture, left, closed, initial encounter       * order for DME     2 Units    Equipment being ordered: Anchoring Device Flexi-Track LG    Neurogenic bladder       * order for DME     1 Units     Equipment being ordered: Insertion Tray Wheeler w/BZK Swab Catheter 10CC, Sterile    Neurogenic bladder       * order for DME     2 Units    Equipment being ordered: Drain Bag, Kenguard 2000ml. Urinary Bag with Anti-Refulx    Neurogenic bladder       * order for DME     1 Units    Equipment being ordered: Catheter Wheeler 2Way 30cc 18FR, Silicone Coated Latex    Neurogenic bladder       oxybutynin 5 MG tablet    DITROPAN    180 tablet    Take 1 tablet (5 mg) by mouth 2 times daily    Neurogenic bladder       * Notice:  This list has 7 medication(s) that are the same as other medications prescribed for you. Read the directions carefully, and ask your doctor or other care provider to review them with you.

## 2018-01-04 NOTE — PATIENT INSTRUCTIONS
Thanks for coming today.  Ortho/Sports Medicine Clinic  [address] [place] [address]    To schedule future appointments in Ortho Clinic, you may call [phone].    To schedule ordered imaging by your provider:   Call [facility] [phone]    To schedule an injection ordered by your provider:  Call [facility] Injection scheduling line: [phone]  CreoPophart available online at:  [email or web address]    Please call if any further questions or concerns ([phone]).  Clinic hours 8 am to 5 pm.    Return to clinic (call) if symptoms worsen or fail to improve.

## 2018-01-04 NOTE — NURSING NOTE
"Priyanka Martinez's goals for this visit include: Recheck left heel ulcer  She requests these members of her care team be copied on today's visit information: yes    PCP: Ayala Limon    Referring Provider:  Referred Self, MD  No address on file    Chief Complaint   Patient presents with     RECHECK     Recheck left heel ulcer       Initial /82  Pulse 86  SpO2 98% Estimated body mass index is 22.24 kg/(m^2) as calculated from the following:    Height as of 12/17/16: 1.778 m (5' 10\").    Weight as of 12/17/16: 70.3 kg (155 lb).  Medication Reconciliation: complete      "
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
## 2018-01-17 ENCOUNTER — OFFICE VISIT (OUTPATIENT)
Dept: PODIATRY | Facility: CLINIC | Age: 48
End: 2018-01-17
Payer: MEDICARE

## 2018-01-17 VITALS — HEART RATE: 92 BPM | OXYGEN SATURATION: 98 % | SYSTOLIC BLOOD PRESSURE: 136 MMHG | DIASTOLIC BLOOD PRESSURE: 88 MMHG

## 2018-01-17 DIAGNOSIS — L97.422 ULCER OF LEFT HEEL AND MIDFOOT WITH FAT LAYER EXPOSED (H): Primary | ICD-10-CM

## 2018-01-17 PROCEDURE — 99212 OFFICE O/P EST SF 10 MIN: CPT | Performed by: PODIATRIST
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
## 2018-01-17 NOTE — MR AVS SNAPSHOT
After Visit Summary   1/17/2018    Priyanka Martinez    MRN: [id number]           Patient Information     Date Of Birth          1970        Visit Information        Provider Department      1/17/2018 7:00 AM Yobany Rojas DPM [facility]        Today's Diagnoses     Ulcer of left heel and midfoot with fat layer exposed (H)    -  1      Care Instructions    Thanks for coming today.  Ortho/Sports Medicine Clinic  [address] [place] [address]    To schedule future appointments in Ortho Clinic, you may call [phone].    To schedule ordered imaging by your Provider: Call [facility] at [phone]    Active Mind Technology available online at:   [email or web address]    Please call if any further questions or concerns [phone] and ask for the Orthopedic Department. Clinic hours 8 am to 5 pm.    Return to clinic if symptoms worsen.            Follow-ups after your visit        Your next 10 appointments already scheduled     Feb 07, 2018  7:00 AM CST   Return Visit with Yobany Rojas DPM   [facility] ([facility]    [address] [place] [address] [phone]              Who to contact     If you have questions or need follow up information about today's clinic visit or your schedule please contact [facility] directly at [phone].  Normal or non-critical lab and imaging results will be communicated to you by MyChart, letter or phone within 4 business days after the clinic has received the results. If you do not hear from us within 7 days, please contact the clinic through MyChart or phone. If you have a critical or abnormal lab result, we will notify you by phone as soon as possible.  Submit refill requests through Active Mind Technology or call your pharmacy and they will forward the refill request to us. Please allow 3 business days for your refill to be completed.          Additional Information  About Your Visit        Accel Diagnosticshart Information     Lyatiss gives you secure access to your electronic health record. If you see a primary care provider, you can also send messages to your care team and make appointments. If you have questions, please call your primary care clinic.  If you do not have a primary care provider, please call [phone] and they will assist you.      Lyatiss is an electronic gateway that provides easy, online access to your medical records. With Lyatiss, you can request a clinic appointment, read your test results, renew a prescription or communicate with your care team.     To access your existing account, please contact your [facility] or call [phone] for assistance.        Care EveryWhere ID     This is your Care EveryWhere ID. This could be used by other organizations to access your [place] medical records  [id number]        Your Vitals Were     Pulse Pulse Oximetry                92 98%           Blood Pressure from Last 3 Encounters:   01/17/18 136/88   01/04/18 130/82   12/28/17 113/59    Weight from Last 3 Encounters:   12/17/16 70.3 kg (155 lb)   12/15/16 68 kg (150 lb)   11/14/16 68 kg (150 lb)              Today, you had the following     No orders found for display       Primary Care Provider Office Phone # Fax #    Ayala Limon MD [facility] [phone]       [address]  [facility] [address]        Equal Access to Services     EDYTA [employer] : Hadii nikunj kee hadasho Solucia, waaxda luqadaha, qaybta kaalmada adealmayada, ronnie mccoy . So [facility] [phone].    ATENCIÓN: Si habla español, tiene a luu disposición servicios gratuitos de asistencia lingüística. Llame al [phone].    We comply with applicable federal civil rights laws and [place] laws. We do not discriminate on the basis of race, color, national origin, age, disability, sex, sexual orientation, or gender identity.            Thank you!      Thank you for choosing [facility]  for your care. Our goal is always to provide you with excellent care. Hearing back from our patients is one way we can continue to improve our services. Please take a few minutes to complete the written survey that you may receive in the mail after your visit with us. Thank you!             Your Updated Medication List - Protect others around you: Learn how to safely use, store and throw away your medicines at www.disposemymeds.org.          This list is accurate as of: 1/17/18 12:02 PM.  Always use your most recent med list.                   Brand Name Dispense Instructions for use Diagnosis    Catheters Misc     1 each    1 catheter every 28 days.    Atonic neurogenic bladder       dicyclomine 20 MG tablet    BENTYL    180 tablet    Take 1 tablet (20 mg) by mouth 2 times daily    Irritable bowel syndrome with diarrhea       FLUoxetine 20 MG capsule    PROzac    90 capsule    TAKE 1 CAPSULE EVERY DAY    Anxiety       * HOMEOPATHIC PRODUCTS EX      Drainage tone for swollen glands, sinus congestion and skin eruptions.        * HOMEOPATHIC PRODUCTS EX      Neuro-chord for numbness, irritability, and tingling.        LYRICA PO      Take 50 mg by mouth 3 times daily        medroxyPROGESTERone 10 MG tablet    PROVERA    135 tablet    TAKE 1 AND 1/2 TABLETS EVERY DAY    Dysmenorrhea       MICROKLENZ WOUND CLEANSER Liqd     240 mL    Externally apply topically daily For daily wound cares.    Ulcer of heel and midfoot, left, with fat layer exposed (H), Ingrowing nail       * order for DME     1 Units    Equipment being ordered: walking boot left    Tibia/fibula fracture, left, closed, initial encounter       * order for DME     2 Units    Equipment being ordered: Anchoring Device Flexi-Track LG    Neurogenic bladder       * order for DME     1 Units    Equipment being ordered: Insertion Tray Wheeler w/BZK Swab Catheter 10CC, Sterile    Neurogenic bladder       * order for  DME     2 Units    Equipment being ordered: Drain Bag, Kenguard 2000ml. Urinary Bag with Anti-Refulx    Neurogenic bladder       * order for DME     1 Units    Equipment being ordered: Catheter Wheeler 2Way 30cc 18FR, Silicone Coated Latex    Neurogenic bladder       oxybutynin 5 MG tablet    DITROPAN    180 tablet    Take 1 tablet (5 mg) by mouth 2 times daily    Neurogenic bladder       * Notice:  This list has 7 medication(s) that are the same as other medications prescribed for you. Read the directions carefully, and ask your doctor or other care provider to review them with you.

## 2018-01-17 NOTE — PATIENT INSTRUCTIONS
Thanks for coming today.  Ortho/Sports Medicine Clinic  [address] [place] [address]    To schedule future appointments in Ortho Clinic, you may call [phone].    To schedule ordered imaging by your Provider: Call [facility] at [phone]    P[employer] available online at:   [email or web address]    Please call if any further questions or concerns [phone] and ask for the Orthopedic Department. Clinic hours 8 am to 5 pm.    Return to clinic if symptoms worsen.

## 2018-01-17 NOTE — NURSING NOTE
"Priyanka Martinez's goals for this visit include: heel wound check   She requests these members of her care team be copied on today's visit information: yes    PCP: Ayala Limon    Referring Provider:  No referring provider defined for this encounter.    Chief Complaint   Patient presents with     RECHECK     left heel recheck        Initial /88  Pulse 92  SpO2 98% Estimated body mass index is 22.24 kg/(m^2) as calculated from the following:    Height as of 12/17/16: 1.778 m (5' 10\").    Weight as of 12/17/16: 70.3 kg (155 lb).  Medication Reconciliation: complete    "
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
## 2018-03-02 ENCOUNTER — MYC MEDICAL ADVICE (OUTPATIENT)
Dept: ORTHOPEDICS | Facility: CLINIC | Age: 48
End: 2018-03-02

## 2018-03-02 DIAGNOSIS — L97.529 ULCER OF LEFT FOOT, UNSPECIFIED ULCER STAGE (H): Primary | ICD-10-CM

## 2018-03-02 RX ORDER — CEFADROXIL 500 MG/1
500 CAPSULE ORAL 2 TIMES DAILY
Qty: 28 CAPSULE | Refills: 0 | Status: SHIPPED | OUTPATIENT
Start: 2018-03-02 | End: 2018-08-20

## 2018-03-02 NOTE — TELEPHONE ENCOUNTER
Called and talked with patient, she relays she has had an increase in redness in the last couple of days and now has swelling around the wound that is not going away with wearing Teds socks.  She states that last time she had an infection, this is what happened also.  She has had no fevers but has chills.  No increase in drainage.  Currently using a wet-dry Vashe dressing wrapped with gauze and changing once daily.  Let her know that I will talk with Dr. Rojas and call her back with a plan.  She expressed understanding of this.  Carmen Paez RN

## 2018-03-02 NOTE — TELEPHONE ENCOUNTER
Per Dr. Rojas prescription called into pharmacy.  Called patient and let her know. She will  at the pharmacy.  Carmen Paez RN

## 2018-03-12 ENCOUNTER — TRANSFERRED RECORDS (OUTPATIENT)
Dept: HEALTH INFORMATION MANAGEMENT | Facility: CLINIC | Age: 48
End: 2018-03-12

## 2018-03-14 ENCOUNTER — OFFICE VISIT (OUTPATIENT)
Dept: PODIATRY | Facility: CLINIC | Age: 48
End: 2018-03-14
Payer: MEDICARE

## 2018-03-14 VITALS — HEART RATE: 94 BPM | OXYGEN SATURATION: 96 % | SYSTOLIC BLOOD PRESSURE: 128 MMHG | DIASTOLIC BLOOD PRESSURE: 80 MMHG

## 2018-03-14 DIAGNOSIS — L97.422 ULCER OF LEFT HEEL AND MIDFOOT WITH FAT LAYER EXPOSED (H): Primary | ICD-10-CM

## 2018-03-14 DIAGNOSIS — R60.0 PERIPHERAL EDEMA: ICD-10-CM

## 2018-03-14 PROCEDURE — 97597 DBRDMT OPN WND 1ST 20 CM/<: CPT | Performed by: PODIATRIST

## 2018-03-14 NOTE — PATIENT INSTRUCTIONS
Thanks for coming today.  Ortho/Sports Medicine Clinic  [address] [place] [address]    To schedule future appointments in Ortho Clinic, you may call [phone].    To schedule ordered imaging by your Provider: Call [facility] at [phone]    Supply Vision available online at:   [email or web address]    Please call if any further questions or concerns [phone] and ask for the Orthopedic Department. Clinic hours 8 am to 5 pm.    Return to clinic if symptoms worsen.
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
## 2018-03-14 NOTE — MR AVS SNAPSHOT
After Visit Summary   3/14/2018    Priyanka Martinez    MRN: [id number]           Patient Information     Date Of Birth          1970        Visit Information        Provider Department      3/14/2018 2:30 PM Yobany Rojas DPM [facility]        Today's Diagnoses     Ulcer of left heel and midfoot with fat layer exposed (H)    -  1    Peripheral edema          Care Instructions    Thanks for coming today.  Ortho/Sports Medicine Clinic  [address] [place] [address]    To schedule future appointments in Ortho Clinic, you may call [phone].    To schedule ordered imaging by your Provider: Call [facility] at [phone]    Trunk Archive available online at:   [email or web address]    Please call if any further questions or concerns [phone] and ask for the Orthopedic Department. Clinic hours 8 am to 5 pm.    Return to clinic if symptoms worsen.            Follow-ups after your visit        Your next 10 appointments already scheduled     Mar 20, 2018  8:30 AM CDT   Return Visit with Yobany Rojas DPM   [facility] ([facility])    [address] [place] [address] [phone]              Who to contact     If you have questions or need follow up information about today's clinic visit or your schedule please contact [facility] directly at [phone].  Normal or non-critical lab and imaging results will be communicated to you by MyChart, letter or phone within 4 business days after the clinic has received the results. If you do not hear from us within 7 days, please contact the clinic through MyChart or phone. If you have a critical or abnormal lab result, we will notify you by phone as soon as possible.  Submit refill requests through Trunk Archive or call your pharmacy and they will forward the refill request to us. Please allow 3 business days for your refill to be completed.           Additional Information About Your Visit        Williams Furniture[employer] Information     BioDigital gives you secure access to your electronic health record. If you see a primary care provider, you can also send messages to your care team and make appointments. If you have questions, please call your primary care clinic.  If you do not have a primary care provider, please call [phone] and they will assist you.      BioDigital is an electronic gateway that provides easy, online access to your medical records. With BioDigital, you can request a clinic appointment, read your test results, renew a prescription or communicate with your care team.     To access your existing account, please contact your [facility] or call [phone] for assistance.        Care EveryWhere ID     This is your Care EveryWhere ID. This could be used by other organizations to access your [place] medical records  [id number]        Your Vitals Were     Pulse Pulse Oximetry                94 96%           Blood Pressure from Last 3 Encounters:   03/14/18 128/80   01/17/18 136/88   01/04/18 130/82    Weight from Last 3 Encounters:   12/17/16 70.3 kg (155 lb)   12/15/16 68 kg (150 lb)   11/14/16 68 kg (150 lb)              We Performed the Following     DEBRIDEMENT WOUND UP TO 20 SQ CM        Primary Care Provider Office Phone # Fax #    Ayala Limon MD PhD [phone]       [address]  [facility] [address]        Equal Access to Services     [facility]: Hadii aad ku hadasho Soomaali, waaxda luqadaha, qaybta kaalmada adeegyada, ronnie mccoy . So [facility] [phone].    ATENCIÓN: Si habla español, tiene a luu disposición servicios gratuitos de asistencia lingüística. Llame al [phone].    We comply with applicable federal civil rights laws and [place] laws. We do not discriminate on the basis of race, color, national origin, age, disability, sex, sexual orientation, or gender  identity.            Thank you!     Thank you for choosing [facility]  for your care. Our goal is always to provide you with excellent care. Hearing back from our patients is one way we can continue to improve our services. Please take a few minutes to complete the written survey that you may receive in the mail after your visit with us. Thank you!             Your Updated Medication List - Protect others around you: Learn how to safely use, store and throw away your medicines at www.disposemymeds.org.          This list is accurate as of 3/14/18 11:59 PM.  Always use your most recent med list.                   Brand Name Dispense Instructions for use Diagnosis    Catheters Misc     1 each    1 catheter every 28 days.    Atonic neurogenic bladder       cefadroxil 500 MG capsule    DURICEF    28 capsule    Take 1 capsule (500 mg) by mouth 2 times daily    Ulcer of left foot, unspecified ulcer stage (H)       dicyclomine 20 MG tablet    BENTYL    180 tablet    TAKE 1 TABLET TWICE DAILY    Irritable bowel syndrome with diarrhea       FLUoxetine 20 MG capsule    PROzac    90 capsule    TAKE 1 CAPSULE EVERY DAY    Anxiety       * HOMEOPATHIC PRODUCTS EX      Drainage tone for swollen glands, sinus congestion and skin eruptions.        * HOMEOPATHIC PRODUCTS EX      Neuro-chord for numbness, irritability, and tingling.        LYRICA PO      Take 50 mg by mouth 3 times daily        medroxyPROGESTERone 10 MG tablet    PROVERA    135 tablet    TAKE 1 AND 1/2 TABLETS EVERY DAY    Dysmenorrhea       MICROKLENZ WOUND CLEANSER Liqd     240 mL    Externally apply topically daily For daily wound cares.    Ulcer of heel and midfoot, left, with fat layer exposed (H), Ingrowing nail       * order for DME     1 Units    Equipment being ordered: walking boot left    Tibia/fibula fracture, left, closed, initial encounter       * order for DME     2 Units    Equipment being ordered: Anchoring Device Flexi-Track LG     Neurogenic bladder       * order for DME     1 Units    Equipment being ordered: Insertion Tray Wheeler w/BZK Swab Catheter 10CC, Sterile    Neurogenic bladder       * order for DME     2 Units    Equipment being ordered: Drain Bag, Kenguard 2000ml. Urinary Bag with Anti-Refulx    Neurogenic bladder       * order for DME     1 Units    Equipment being ordered: Catheter Wheeler 2Way 30cc 18FR, Silicone Coated Latex    Neurogenic bladder       oxybutynin 5 MG tablet    DITROPAN    180 tablet    TAKE 1 TABLET TWICE DAILY    Neurogenic bladder       * Notice:  This list has 7 medication(s) that are the same as other medications prescribed for you. Read the directions carefully, and ask your doctor or other care provider to review them with you.

## 2018-03-14 NOTE — NURSING NOTE
"Priyanka Martinez's goals for this visit include: Left heel check   She requests these members of her care team be copied on today's visit information: yes    PCP: Ayala Limon    Referring Provider:  No referring provider defined for this encounter.    Chief Complaint   Patient presents with     RECHECK     left heel check        Initial /80  Pulse 94  SpO2 96% Estimated body mass index is 22.24 kg/(m^2) as calculated from the following:    Height as of 12/17/16: 1.778 m (5' 10\").    Weight as of 12/17/16: 70.3 kg (155 lb).  Medication Reconciliation: complete    "
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
## 2018-03-20 ENCOUNTER — OFFICE VISIT (OUTPATIENT)
Dept: PODIATRY | Facility: CLINIC | Age: 48
End: 2018-03-20
Payer: COMMERCIAL

## 2018-03-20 VITALS — OXYGEN SATURATION: 98 % | HEART RATE: 91 BPM

## 2018-03-20 DIAGNOSIS — L97.422 ULCER OF LEFT HEEL AND MIDFOOT WITH FAT LAYER EXPOSED (H): Primary | ICD-10-CM

## 2018-03-20 PROCEDURE — 99212 OFFICE O/P EST SF 10 MIN: CPT | Performed by: PODIATRIST

## 2018-03-20 ASSESSMENT — PAIN SCALES - GENERAL: PAINLEVEL: NO PAIN (0)

## 2018-03-20 NOTE — MR AVS SNAPSHOT
After Visit Summary   3/20/2018    Priyanka Martinez    MRN: [id number]           Patient Information     Date Of Birth          1970        Visit Information        Provider Department      3/20/2018 8:30 AM Yobany Rojas DPM [facility]        Today's Diagnoses     Ulcer of left heel and midfoot with fat layer exposed (H)    -  1      Care Instructions    Thanks for coming today.  Ortho/Sports Medicine Clinic  [address] [place] [address]    To schedule future appointments in Ortho Clinic, you may call [phone].    To schedule ordered imaging by your provider:   Call [phone]    To schedule an injection ordered by your provider:  Call [facility] Injection scheduling line: [phone]  Beezaghart available online at:  [email or web address]    Please call if any further questions or concerns ([phone]).  Clinic hours 8 am to 5 pm.    Return to clinic (call) if symptoms worsen or fail to improve.            Follow-ups after your visit        Your next 10 appointments already scheduled     2018  7:30 AM CDT   Return Visit with Yobany Rojas DPM   [facility] ([facility])    [address] [place] [address] [phone]              Who to contact     If you have questions or need follow up information about today's clinic visit or your schedule please contact [facility] directly at [phone].  Normal or non-critical lab and imaging results will be communicated to you by MyChart, letter or phone within 4 business days after the clinic has received the results. If you do not hear from us within 7 days, please contact the clinic through Beezaghart or phone. If you have a critical or abnormal lab result, we will notify you by phone as soon as possible.  Submit refill requests through [employer] / cTrader or call your pharmacy and they will forward the refill  request to us. Please allow 3 business days for your refill to be completed.          Additional Information About Your Visit        GENELINKharSABIA Information     OPTIMIZERx gives you secure access to your electronic health record. If you see a primary care provider, you can also send messages to your care team and make appointments. If you have questions, please call your primary care clinic.  If you do not have a primary care provider, please call [phone] and they will assist you.      OPTIMIZERx is an electronic gateway that provides easy, online access to your medical records. With OPTIMIZERx, you can request a clinic appointment, read your test results, renew a prescription or communicate with your care team.     To access your existing account, please contact your [facility] or call [phone] for assistance.        Care EveryWhere ID     This is your Care EveryWhere ID. This could be used by other organizations to access your [place] medical records  [id number]        Your Vitals Were     Pulse Pulse Oximetry                91 98%           Blood Pressure from Last 3 Encounters:   03/14/18 128/80   01/17/18 136/88   01/04/18 130/82    Weight from Last 3 Encounters:   12/17/16 70.3 kg (155 lb)   12/15/16 68 kg (150 lb)   11/14/16 68 kg (150 lb)              Today, you had the following     No orders found for display       Primary Care Provider Office Phone # Fax #    Ayala Limon MD PhD [phone]       [address]  [facility] [place] [address]        Equal Access to Services     [facility] : Hadii aad ku hadasho Soomaali, waaxda luqadaha, qaybta kaalmada adeegyada, waxay surjit mccoy . So [facility] [phone].    ATENCIÓN: Si habla español, tiene a luu disposición servicios gratuitos de asistencia lingüística. Llduane al [phone].    We comply with applicable federal civil rights laws and [place] laws. We do not discriminate on the basis of race,  color, national origin, age, disability, sex, sexual orientation, or gender identity.            Thank you!     Thank you for choosing [facility]  for your care. Our goal is always to provide you with excellent care. Hearing back from our patients is one way we can continue to improve our services. Please take a few minutes to complete the written survey that you may receive in the mail after your visit with us. Thank you!             Your Updated Medication List - Protect others around you: Learn how to safely use, store and throw away your medicines at www.disposemymeds.org.          This list is accurate as of 3/20/18 11:59 PM.  Always use your most recent med list.                   Brand Name Dispense Instructions for use Diagnosis    Catheters Misc     1 each    1 catheter every 28 days.    Atonic neurogenic bladder       cefadroxil 500 MG capsule    DURICEF    28 capsule    Take 1 capsule (500 mg) by mouth 2 times daily    Ulcer of left foot, unspecified ulcer stage (H)       dicyclomine 20 MG tablet    BENTYL    180 tablet    TAKE 1 TABLET TWICE DAILY    Irritable bowel syndrome with diarrhea       FLUoxetine 20 MG capsule    PROzac    90 capsule    TAKE 1 CAPSULE EVERY DAY    Anxiety       * HOMEOPATHIC PRODUCTS EX      Drainage tone for swollen glands, sinus congestion and skin eruptions.        * HOMEOPATHIC PRODUCTS EX      Neuro-chord for numbness, irritability, and tingling.        LYRICA PO      Take 50 mg by mouth 3 times daily        medroxyPROGESTERone 10 MG tablet    PROVERA    135 tablet    TAKE 1 AND 1/2 TABLETS EVERY DAY    Dysmenorrhea       MICROKLENZ WOUND CLEANSER Liqd     240 mL    Externally apply topically daily For daily wound cares.    Ulcer of heel and midfoot, left, with fat layer exposed (H), Ingrowing nail       * order for DME     1 Units    Equipment being ordered: walking boot left    Tibia/fibula fracture, left, closed, initial encounter       * order for DME      2 Units    Equipment being ordered: Anchoring Device Flexi-Track LG    Neurogenic bladder       * order for DME     1 Units    Equipment being ordered: Insertion Tray Wheeler w/BZK Swab Catheter 10CC, Sterile    Neurogenic bladder       * order for DME     2 Units    Equipment being ordered: Drain Bag, Kenguard 2000ml. Urinary Bag with Anti-Refulx    Neurogenic bladder       * order for DME     1 Units    Equipment being ordered: Catheter Wheeler 2Way 30cc 18FR, Silicone Coated Latex    Neurogenic bladder       oxybutynin 5 MG tablet    DITROPAN    180 tablet    TAKE 1 TABLET TWICE DAILY    Neurogenic bladder       * Notice:  This list has 7 medication(s) that are the same as other medications prescribed for you. Read the directions carefully, and ask your doctor or other care provider to review them with you.

## 2018-03-20 NOTE — NURSING NOTE
"Priyanka Martinez's goals for this visit include: Recheck left heel ulcer  She requests these members of her care team be copied on today's visit information: yes    PCP: Ayala Limon    Referring Provider:  No referring provider defined for this encounter.    Chief Complaint   Patient presents with     RECHECK     Left heel ulcer       Initial Pulse 91  SpO2 98% Estimated body mass index is 22.24 kg/(m^2) as calculated from the following:    Height as of 12/17/16: 1.778 m (5' 10\").    Weight as of 12/17/16: 70.3 kg (155 lb).  Medication Reconciliation: complete    "
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
## 2018-03-20 NOTE — PATIENT INSTRUCTIONS
Thanks for coming today.  Ortho/Sports Medicine Clinic  [address] [place] [address]    To schedule future appointments in Ortho Clinic, you may call [phone].    To schedule ordered imaging by your provider:   Call Central Imaging [phone]    To schedule an injection ordered by your provider:  Call Central Imaging Injection scheduling line: [phone]  [employer] Nigeria[employer] available online at:  [email or web address]    Please call if any further questions or concerns ([phone]).  Clinic hours 8 am to 5 pm.    Return to clinic (call) if symptoms worsen or fail to improve.

## 2018-04-24 DIAGNOSIS — F41.9 ANXIETY: ICD-10-CM

## 2018-04-25 NOTE — TELEPHONE ENCOUNTER
FLUoxetine (PROZAC) 20 MG capsule 90 capsule 2 9/5/2017  No   Sig: TAKE 1 CAPSULE EVERY DAY     Last OV with Dr. Limon: 12/28/2017 (anxiety) -- doing well. Continue fluoxetine. Return in one year for wellness visit.     No future apts.     KAVITHA-7 SCORE 2/3/2014 2/17/2017 12/28/2017   Total Score 4 - -   Total Score - 1 0     SSRIs Protocol Passed4/24 7:43 PM   Recent (12 mo) or future (30 days) visit within the authorizing provider's specialty    Patient is age 18 or older    No active pregnancy on record    No positive pregnancy test in last 12 months     Refilled per [facility] protocol.    Mitzy Montiel RN

## 2018-04-30 ENCOUNTER — TELEPHONE (OUTPATIENT)
Dept: FAMILY MEDICINE | Facility: CLINIC | Age: 48
End: 2018-04-30

## 2018-04-30 NOTE — TELEPHONE ENCOUNTER
Panel Management Review      BP Readings from Last 1 Encounters:   03/14/18 128/80    , No results found for: A1C, Visit date not found  Last Office Visit with this department: Visit date not found    Fail List measure: pap      Patient is due/failing the following:   PAP    Action needed:   Cervical cancer screening    Type of outreach:    Sent letter.    Questions for provider review:    None                                                                                                                                    Alena Mac MA      Chart routed to  .

## 2018-05-21 ENCOUNTER — OFFICE VISIT (OUTPATIENT)
Dept: PODIATRY | Facility: CLINIC | Age: 48
End: 2018-05-21
Payer: COMMERCIAL

## 2018-05-21 VITALS — SYSTOLIC BLOOD PRESSURE: 124 MMHG | OXYGEN SATURATION: 97 % | DIASTOLIC BLOOD PRESSURE: 76 MMHG | HEART RATE: 94 BPM

## 2018-05-21 DIAGNOSIS — L97.422 ULCER OF LEFT HEEL AND MIDFOOT WITH FAT LAYER EXPOSED (H): Primary | ICD-10-CM

## 2018-05-21 PROCEDURE — 99213 OFFICE O/P EST LOW 20 MIN: CPT | Performed by: PODIATRIST
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
## 2018-05-21 NOTE — NURSING NOTE
Priyanka Martinez's chief complaint for this visit includes:  Chief Complaint   Patient presents with     RECHECK     Follow up left heal ulcer      PCP: Ayala Limon    Referring Provider:  No referring provider defined for this encounter.    /76 (BP Location: Right arm)  Pulse 94  SpO2 97% Data Unavailable       Jocy Alexandre CMA

## 2018-05-21 NOTE — PATIENT INSTRUCTIONS
Thanks for coming today.  Ortho/Sports Medicine Clinic  [address] [place] [address]    To schedule future appointments in Ortho Clinic, you may call [phone].    To schedule ordered imaging by your provider:   Call Central Imaging [phone]    To schedule an injection ordered by your provider:  Call Central Imaging Injection scheduling line: [phone]  [email or web address] available online at:  [email or web address]    Please call if any further questions or concerns ([phone]).  Clinic hours 8 am to 5 pm.    Return to clinic (call) if symptoms worsen or fail to improve.
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
## 2018-05-25 ENCOUNTER — TELEPHONE (OUTPATIENT)
Dept: PEDIATRICS | Facility: CLINIC | Age: 48
End: 2018-05-25

## 2018-05-29 NOTE — TELEPHONE ENCOUNTER
Insurance needs an explanation on taking both Dicyclomine and Oxybutynin. Please provide and I can submit to insurance.

## 2018-05-30 NOTE — TELEPHONE ENCOUNTER
The following is the associated diagnosis for the PA medications    oxybutynin (DITROPAN) 5 MG tablet  Neurogenic bladder [N31.9]     dicyclomine (BENTYL) 20 MG tablet  Irritable bowel syndrome with diarrhea [K58.0]     Please indicate if further information is needed.    Mitzy Montiel RN

## 2018-05-31 NOTE — TELEPHONE ENCOUNTER
Prior Authorization Approval    Authorization Effective Date: 5/31/2018  Authorization Expiration Date: 5/31/2019  Medication: oxybutynin (DITROPAN) 5 MG tablet - approved   Approved Dose/Quantity:   Reference #:     Insurance Company: [employer] - Phone [phone] Fax [phone]  Expected CoPay:       CoPay Card Available:      Foundation Assistance Needed:    Which Pharmacy is filling the prescription (Not needed for infusion/clinic administered): [employer] [facility] [id number]  Pharmacy Notified: No  Patient Notified: No

## 2018-05-31 NOTE — TELEPHONE ENCOUNTER
Patient has been stable on both of these medications and both are indicated.     Please complete the form back. I am not able to fill in the blanks on the current form.     I'm not sure if this is the same as PA. Will send to PA team to review and process.

## 2018-06-07 ENCOUNTER — OFFICE VISIT (OUTPATIENT)
Dept: PODIATRY | Facility: CLINIC | Age: 48
End: 2018-06-07
Payer: COMMERCIAL

## 2018-06-07 VITALS — OXYGEN SATURATION: 98 % | SYSTOLIC BLOOD PRESSURE: 138 MMHG | HEART RATE: 80 BPM | DIASTOLIC BLOOD PRESSURE: 80 MMHG

## 2018-06-07 DIAGNOSIS — L97.422 ULCER OF LEFT HEEL AND MIDFOOT WITH FAT LAYER EXPOSED (H): Primary | ICD-10-CM

## 2018-06-07 PROCEDURE — 99212 OFFICE O/P EST SF 10 MIN: CPT | Performed by: PODIATRIST

## 2018-06-07 ASSESSMENT — PAIN SCALES - GENERAL: PAINLEVEL: NO PAIN (0)
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
## 2018-06-07 NOTE — PATIENT INSTRUCTIONS
Thanks for coming today.  Ortho/Sports Medicine Clinic  [address] [place] [address]    To schedule future appointments in Ortho Clinic, you may call [phone].    To schedule ordered imaging by your provider:   Call Central Imaging [phone]    To schedule an injection ordered by your provider:  Call Central Imaging Injection scheduling line: [phone]  Intermolecularhart available online at:  [email or web address]    Please call if any further questions or concerns ([phone]).  Clinic hours 8 am to 5 pm.    Return to clinic (call) if symptoms worsen or fail to improve.

## 2018-06-07 NOTE — NURSING NOTE
Priyanka Martinez's chief complaint for this visit includes:  Chief Complaint   Patient presents with     RECHECK     Left foot check     PCP: Ayala Limon    Referring Provider:  No referring provider defined for this encounter.    /80  Pulse 80  SpO2 98%  No Pain (0)     Do you need any medication refills at today's visit?

## 2018-06-07 NOTE — MR AVS SNAPSHOT
After Visit Summary   2018    Priyanka Martinez    MRN: [id number]           Patient Information     Date Of Birth          1970        Visit Information        Provider Department      2018 7:30 AM Yobany Rojas DPM [facility]        Today's Diagnoses     Ulcer of left heel and midfoot with fat layer exposed (H)    -  1      Care Instructions    Thanks for coming today.  Ortho/Sports Medicine Clinic  [address] [place] [id number]    To schedule future appointments in Ortho Clinic, you may call [phone].    To schedule ordered imaging by your provider:   Call Central Imaging [phone]    To schedule an injection ordered by your provider:  Call Central Imaging Injection scheduling line: [phone]  ShomoLivehart available online at:  [email or web address]    Please call if any further questions or concerns ([phone]).  Clinic hours 8 am to 5 pm.    Return to clinic (call) if symptoms worsen or fail to improve.            Follow-ups after your visit        Your next 10 appointments already scheduled     2018  7:00 AM CDT   Return Visit with Yobany Rojas DPM   [facility] ([facility])    [address] [place] [id number] [phone]              Who to contact     If you have questions or need follow up information about today's clinic visit or your schedule please contact [facility] directly at [phone].  Normal or non-critical lab and imaging results will be communicated to you by MyChart, letter or phone within 4 business days after the clinic has received the results. If you do not hear from us within 7 days, please contact the clinic through [employer] or phone. If you have a critical or abnormal lab result, we will notify you by phone as soon as possible.  Submit refill requests through [employer] or call your pharmacy and they will forward the refill  request to us. Please allow 3 business days for your refill to be completed.          Additional Information About Your Visit        [employer] Information     [employer] gives you secure access to your electronic health record. If you see a primary care provider, you can also send messages to your care team and make appointments. If you have questions, please call your primary care clinic.  If you do not have a primary care provider, please call [phone] and they will assist you.      Stratio is an electronic gateway that provides easy, online access to your medical records. With [employer], you can request a clinic appointment, read your test results, renew a prescription or communicate with your care team.     To access your existing account, please contact your [facility] or call [phone] for assistance.        Care EveryWhere ID     This is your Care EveryWhere ID. This could be used by other organizations to access your [facility] medical records  [id number]        Your Vitals Were     Pulse Pulse Oximetry                80 98%           Blood Pressure from Last 3 Encounters:   06/07/18 138/80   05/21/18 124/76   03/14/18 128/80    Weight from Last 3 Encounters:   12/17/16 70.3 kg (155 lb)   12/15/16 68 kg (150 lb)   11/14/16 68 kg (150 lb)              Today, you had the following     No orders found for display       Primary Care Provider Office Phone # Fax #    Ayala Limon MD PhD [phone]       [address]  [facility] [address]        Equal Access to Services     MELY GAMBLE : Hadii aad ku hadasho Soomaali, waaxda luqadaha, qaybta kaalmada adeegyada, waxay surjit mccoy . So [facility] [phone].    ATENCIÓN: Si habla español, tiene a luu disposición servicios gratuitos de asistencia lingüística. Llduane al [phone].    We comply with applicable federal civil rights laws and [place] laws. We do not discriminate on the basis of race,  color, national origin, age, disability, sex, sexual orientation, or gender identity.            Thank you!     Thank you for choosing [facility]  for your care. Our goal is always to provide you with excellent care. Hearing back from our patients is one way we can continue to improve our services. Please take a few minutes to complete the written survey that you may receive in the mail after your visit with us. Thank you!             Your Updated Medication List - Protect others around you: Learn how to safely use, store and throw away your medicines at www.disposemymeds.org.          This list is accurate as of 6/7/18  3:34 PM.  Always use your most recent med list.                   Brand Name Dispense Instructions for use Diagnosis    Catheters Misc     1 each    1 catheter every 28 days.    Atonic neurogenic bladder       cefadroxil 500 MG capsule    DURICEF    28 capsule    Take 1 capsule (500 mg) by mouth 2 times daily    Ulcer of left foot, unspecified ulcer stage (H)       dicyclomine 20 MG tablet    BENTYL    180 tablet    TAKE 1 TABLET TWICE DAILY    Irritable bowel syndrome with diarrhea       FLUoxetine 20 MG capsule    PROzac    90 capsule    TAKE 1 CAPSULE EVERY DAY    Anxiety       * HOMEOPATHIC PRODUCTS EX      Drainage tone for swollen glands, sinus congestion and skin eruptions.        * HOMEOPATHIC PRODUCTS EX      Neuro-chord for numbness, irritability, and tingling.        LYRICA PO      Take 50 mg by mouth 3 times daily        medroxyPROGESTERone 10 MG tablet    PROVERA    135 tablet    TAKE 1 AND 1/2 TABLETS EVERY DAY    Dysmenorrhea       MICROKLENZ WOUND CLEANSER Liqd     240 mL    Externally apply topically daily For daily wound cares.    Ulcer of heel and midfoot, left, with fat layer exposed (H), Ingrowing nail       * order for DME     1 Units    Equipment being ordered: walking boot left    Tibia/fibula fracture, left, closed, initial encounter       * order for DME      2 Units    Equipment being ordered: Anchoring Device Flexi-Track LG    Neurogenic bladder       * order for DME     1 Units    Equipment being ordered: Insertion Tray Wheeler w/BZK Swab Catheter 10CC, Sterile    Neurogenic bladder       * order for DME     2 Units    Equipment being ordered: Drain Bag, Kenguard 2000ml. Urinary Bag with Anti-Refulx    Neurogenic bladder       * order for DME     1 Units    Equipment being ordered: Catheter Wheeler 2Way 30cc 18FR, Silicone Coated Latex    Neurogenic bladder       oxybutynin 5 MG tablet    DITROPAN    180 tablet    TAKE 1 TABLET TWICE DAILY    Neurogenic bladder       * Notice:  This list has 7 medication(s) that are the same as other medications prescribed for you. Read the directions carefully, and ask your doctor or other care provider to review them with you.

## 2018-07-19 ENCOUNTER — OFFICE VISIT (OUTPATIENT)
Dept: PODIATRY | Facility: CLINIC | Age: 48
End: 2018-07-19
Payer: COMMERCIAL

## 2018-07-19 VITALS — OXYGEN SATURATION: 98 % | HEART RATE: 89 BPM | DIASTOLIC BLOOD PRESSURE: 88 MMHG | SYSTOLIC BLOOD PRESSURE: 129 MMHG

## 2018-07-19 DIAGNOSIS — L97.422 ULCER OF LEFT HEEL AND MIDFOOT WITH FAT LAYER EXPOSED (H): Primary | ICD-10-CM

## 2018-07-19 PROCEDURE — 99212 OFFICE O/P EST SF 10 MIN: CPT | Performed by: PODIATRIST

## 2018-07-19 RX ORDER — CEPHALEXIN 500 MG/1
500 CAPSULE ORAL 2 TIMES DAILY
Qty: 28 CAPSULE | Refills: 0 | Status: SHIPPED | OUTPATIENT
Start: 2018-07-19 | End: 2018-08-20

## 2018-07-19 ASSESSMENT — PAIN SCALES - GENERAL: PAINLEVEL: NO PAIN (0)

## 2018-07-19 NOTE — NURSING NOTE
Priyanka Martinez's goals for this visit include:   Chief Complaint   Patient presents with     Left Foot - Follow Up For     RECHECK     Left foot       She requests these members of her care team be copied on today's visit information: Yes    PCP: Ayala Limon    Referring Provider:  No referring provider defined for this encounter.    /88 (BP Location: Left arm, Patient Position: Sitting, Cuff Size: Adult Large)  Pulse 89  SpO2 98%    Do you need any medication refills at today's visit? No
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
## 2018-07-19 NOTE — MR AVS SNAPSHOT
After Visit Summary   7/19/2018    Priyanka Martinez    MRN: [id number]           Patient Information     Date Of Birth          1970        Visit Information        Provider Department      7/19/2018 7:00 AM Yobany Rojas DPM [facility]        Today's Diagnoses     Ulcer of left heel and midfoot with fat layer exposed (H)    -  1       Follow-ups after your visit        Your next 10 appointments already scheduled     Aug 20, 2018  7:00 AM CDT   Return Visit with Yobany Rojas DPM   [facility] ([facility])    [address] [place] [address] [phone]              Who to contact     If you have questions or need follow up information about today's clinic visit or your schedule please contact [facility] directly at [phone].  Normal or non-critical lab and imaging results will be communicated to you by Gamisfactionhart, letter or phone within 4 business days after the clinic has received the results. If you do not hear from us within 7 days, please contact the clinic through Gamisfactionhart or phone. If you have a critical or abnormal lab result, we will notify you by phone as soon as possible.  Submit refill requests through [employer] or call your pharmacy and they will forward the refill request to us. Please allow 3 business days for your refill to be completed.          Additional Information About Your Visit        MyChart Information     [employer] gives you secure access to your electronic health record. If you see a primary care provider, you can also send messages to your care team and make appointments. If you have questions, please call your primary care clinic.  If you do not have a primary care provider, please call [phone] and they will assist you.      [employer] is an electronic gateway that provides easy, online access to your medical records. With [employer], you can request a clinic appointment, read  your test results, renew a prescription or communicate with your care team.     To access your existing account, please contact your [facility] or call [phone] for assistance.        Care EveryWhere ID     This is your Care EveryWhere ID. This could be used by other organizations to access your [place] medical records  [id number]        Your Vitals Were     Pulse Pulse Oximetry                89 98%           Blood Pressure from Last 3 Encounters:   07/19/18 129/88   06/07/18 138/80   05/21/18 124/76    Weight from Last 3 Encounters:   12/17/16 70.3 kg (155 lb)   12/15/16 68 kg (150 lb)   11/14/16 68 kg (150 lb)              Today, you had the following     No orders found for display         Today's Medication Changes          These changes are accurate as of 7/19/18 11:59 PM.  If you have any questions, ask your nurse or doctor.               Start taking these medicines.        Dose/Directions    cephALEXin 500 MG capsule   Commonly known as:  KEFLEX   Used for:  Ulcer of left heel and midfoot with fat layer exposed (H)   Started by:  Yobany Rojas DPM        Dose:  500 mg   Take 1 capsule (500 mg) by mouth 2 times daily   Quantity:  28 capsule   Refills:  0            Where to get your medicines      These medications were sent to [facility]/pharmacy [address] [place] [address] AT [address] [address]     Phone:  [phone]     cephALEXin 500 MG capsule                Primary Care Provider Office Phone # Fax #    Ayala Limon MD PhD [phone]       [address]  [facility] [address]        Equal Access to Services     [facility]: Hadii aad ku hadasho Soomaali, waaxda luqadaha, qaybta kaalmada olga, ronnie mendoza. So [facility] [phone].    ATENCIÓN: Si habla español, tiene a luu disposición servicios gratuitos de asistencia lingüística. Llame al [phone].    We  comply with applicable federal civil rights laws and [place] laws. We do not discriminate on the basis of race, color, national origin, age, disability, sex, sexual orientation, or gender identity.            Thank you!     Thank you for choosing [facility]  for your care. Our goal is always to provide you with excellent care. Hearing back from our patients is one way we can continue to improve our services. Please take a few minutes to complete the written survey that you may receive in the mail after your visit with us. Thank you!             Your Updated Medication List - Protect others around you: Learn how to safely use, store and throw away your medicines at www.disposemymeds.org.          This list is accurate as of 7/19/18 11:59 PM.  Always use your most recent med list.                   Brand Name Dispense Instructions for use Diagnosis    Catheters Misc     1 each    1 catheter every 28 days.    Atonic neurogenic bladder       cefadroxil 500 MG capsule    DURICEF    28 capsule    Take 1 capsule (500 mg) by mouth 2 times daily    Ulcer of left foot, unspecified ulcer stage (H)       cephALEXin 500 MG capsule    KEFLEX    28 capsule    Take 1 capsule (500 mg) by mouth 2 times daily    Ulcer of left heel and midfoot with fat layer exposed (H)       dicyclomine 20 MG tablet    BENTYL    180 tablet    TAKE 1 TABLET TWICE DAILY    Irritable bowel syndrome with diarrhea       FLUoxetine 20 MG capsule    PROzac    90 capsule    TAKE 1 CAPSULE EVERY DAY    Anxiety       * HOMEOPATHIC PRODUCTS EX      Drainage tone for swollen glands, sinus congestion and skin eruptions.        * HOMEOPATHIC PRODUCTS EX      Neuro-chord for numbness, irritability, and tingling.        LYRICA PO      Take 50 mg by mouth 3 times daily        medroxyPROGESTERone 10 MG tablet    PROVERA    135 tablet    TAKE 1 AND 1/2 TABLETS EVERY DAY    Dysmenorrhea       MICROKLENZ WOUND CLEANSER Liqd     240 mL    Externally  apply topically daily For daily wound cares.    Ulcer of heel and midfoot, left, with fat layer exposed (H), Ingrowing nail       * order for DME     1 Units    Equipment being ordered: walking boot left    Tibia/fibula fracture, left, closed, initial encounter       * order for DME     2 Units    Equipment being ordered: Anchoring Device Flexi-Track LG    Neurogenic bladder       * order for DME     1 Units    Equipment being ordered: Insertion Tray Wheeler w/BZK Swab Catheter 10CC, Sterile    Neurogenic bladder       * order for DME     2 Units    Equipment being ordered: Drain Bag, Kenguard 2000ml. Urinary Bag with Anti-Refulx    Neurogenic bladder       * order for DME     1 Units    Equipment being ordered: Catheter Wheeler 2Way 30cc 18FR, Silicone Coated Latex    Neurogenic bladder       oxybutynin 5 MG tablet    DITROPAN    180 tablet    TAKE 1 TABLET TWICE DAILY    Neurogenic bladder       * Notice:  This list has 7 medication(s) that are the same as other medications prescribed for you. Read the directions carefully, and ask your doctor or other care provider to review them with you.

## 2018-08-20 ENCOUNTER — E-VISIT (OUTPATIENT)
Dept: PEDIATRICS | Facility: CLINIC | Age: 48
End: 2018-08-20

## 2018-08-20 ENCOUNTER — MYC MEDICAL ADVICE (OUTPATIENT)
Dept: PEDIATRICS | Facility: CLINIC | Age: 48
End: 2018-08-20

## 2018-08-20 ENCOUNTER — OFFICE VISIT (OUTPATIENT)
Dept: FAMILY MEDICINE | Facility: CLINIC | Age: 48
End: 2018-08-20
Payer: COMMERCIAL

## 2018-08-20 ENCOUNTER — OFFICE VISIT (OUTPATIENT)
Dept: PODIATRY | Facility: CLINIC | Age: 48
End: 2018-08-20
Payer: COMMERCIAL

## 2018-08-20 VITALS — OXYGEN SATURATION: 98 % | HEART RATE: 94 BPM | DIASTOLIC BLOOD PRESSURE: 84 MMHG | SYSTOLIC BLOOD PRESSURE: 122 MMHG

## 2018-08-20 VITALS
TEMPERATURE: 98.6 F | SYSTOLIC BLOOD PRESSURE: 123 MMHG | OXYGEN SATURATION: 98 % | DIASTOLIC BLOOD PRESSURE: 78 MMHG | HEART RATE: 82 BPM

## 2018-08-20 DIAGNOSIS — R10.9 LEFT FLANK PAIN: Primary | ICD-10-CM

## 2018-08-20 DIAGNOSIS — Z93.59 SUPRAPUBIC CATHETER (H): ICD-10-CM

## 2018-08-20 DIAGNOSIS — G82.20 PARAPLEGIA (H): ICD-10-CM

## 2018-08-20 DIAGNOSIS — M54.5 LOW BACK PAIN, UNSPECIFIED BACK PAIN LATERALITY, UNSPECIFIED CHRONICITY, WITH SCIATICA PRESENCE UNSPECIFIED: Primary | ICD-10-CM

## 2018-08-20 DIAGNOSIS — L97.422 ULCER OF LEFT HEEL AND MIDFOOT WITH FAT LAYER EXPOSED (H): Primary | ICD-10-CM

## 2018-08-20 LAB
BACTERIA #/AREA URNS HPF: ABNORMAL /HPF
RBC #/AREA URNS AUTO: ABNORMAL /HPF
WBC #/AREA URNS AUTO: ABNORMAL /HPF
YEAST #/AREA URNS HPF: ABNORMAL /HPF

## 2018-08-20 PROCEDURE — 99212 OFFICE O/P EST SF 10 MIN: CPT | Performed by: PODIATRIST

## 2018-08-20 PROCEDURE — 87086 URINE CULTURE/COLONY COUNT: CPT | Performed by: NURSE PRACTITIONER

## 2018-08-20 PROCEDURE — 81001 URINALYSIS AUTO W/SCOPE: CPT | Performed by: NURSE PRACTITIONER

## 2018-08-20 PROCEDURE — 99214 OFFICE O/P EST MOD 30 MIN: CPT | Performed by: NURSE PRACTITIONER

## 2018-08-20 PROCEDURE — 87106 FUNGI IDENTIFICATION YEAST: CPT | Performed by: NURSE PRACTITIONER

## 2018-08-20 RX ORDER — CIPROFLOXACIN 500 MG/1
500 TABLET, FILM COATED ORAL 2 TIMES DAILY
Qty: 14 TABLET | Refills: 0 | Status: SHIPPED | OUTPATIENT
Start: 2018-08-20 | End: 2018-11-07

## 2018-08-20 NOTE — TELEPHONE ENCOUNTER
Dr. Limon is not in clinic today, the patient was here to see Dr. Rojas in Podiatry earlier this morning but has left the clinic.    Called patient, she is a paraplegic, she feels symptoms differently.    She has had a kidney infection one other time, she felt back and arm pain.  She feels that way this time as well.  She had blood in her urine on Saturday, she has less blood in her urine now, but it is still dark.  She denies fever/chills.    Routing to covering providers to please advise.   Patient states she tried to initiate an Evisit.    Olive Lam RN, [facility]

## 2018-08-20 NOTE — PATIENT INSTRUCTIONS
Thanks for coming today.  Ortho/Sports Medicine Clinic  [address] [place] [address]    To schedule future appointments in Ortho Clinic, you may call [phone].    To schedule ordered imaging by your Provider: Call [facility] at [phone]    Copytele available online at:   [email or web address]    Please call if any further questions or concerns [phone] and ask for the Orthopedic Department. Clinic hours 8 am to 5 pm.    Return to clinic if symptoms worsen.
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
## 2018-08-20 NOTE — TELEPHONE ENCOUNTER
Priyanka Martinez   to Ayala Limon MD PhD  8/20/18 7:11 AM   I have back       Received phone call from onsite lab stating that patient came into clinic this morning and dropped off a urine sample. Lab states no orders in chart. Reviewed chart and patient sent above [employer] message this morning, but no other recent notes.   Routing to RN team to call and triage patient.  Raina Santana, [facility]

## 2018-08-20 NOTE — TELEPHONE ENCOUNTER
Can we call and discuss Dr Limon is out and as with her history concerning Evisit may not appropriate.

## 2018-08-20 NOTE — MR AVS SNAPSHOT
After Visit Summary   8/20/2018    Priyanka Martinez    MRN: [id number]           Patient Information     Date Of Birth          1970        Visit Information        Provider Department      8/20/2018 8:28 AM Ayala Limon MD PhD [facility]        Today's Diagnoses     Low back pain, unspecified back pain laterality, unspecified chronicity, with sciatica presence unspecified    -  1       Follow-ups after your visit        Your next 10 appointments already scheduled     Aug 20, 2018  6:40 PM CDT   Office Visit with AMY Mehta CNP   [facility]    [address] [facility] [id number] [phone]           Bring a current list of meds and any records pertaining to this visit. For Physicals, please bring immunization records and any forms needing to be filled out. Please arrive 10 minutes early to complete paperwork.            Sep 11, 2018  7:30 AM CDT   Return Visit with Yobany Rojas DPM   [facility] ([facility])    [address] [place] [address] [phone]              Who to contact     If you have questions or need follow up information about today's clinic visit or your schedule please contact [facility] directly at [phone].  Normal or non-critical lab and imaging results will be communicated to you by MyChart, letter or phone within 4 business days after the clinic has received the results. If you do not hear from us within 7 days, please contact the clinic through MyChart or phone. If you have a critical or abnormal lab result, we will notify you by phone as soon as possible.  Submit refill requests through [employer] or call your pharmacy and they will forward the refill request to us. Please allow 3 business days for your refill to be completed.          Additional Information About Your Visit        [facility]  Information     [employer] gives you secure access to your electronic health record. If you see a primary care provider, you can also send messages to your care team and make appointments. If you have questions, please call your primary care clinic.  If you do not have a primary care provider, please call [phone] and they will assist you.      [employer] is an electronic gateway that provides easy, online access to your medical records. With [employer], you can request a clinic appointment, read your test results, renew a prescription or communicate with your care team.     To access your existing account, please contact your [facility] or call [phone] for assistance.        Care EveryWhere ID     This is your Care EveryWhere ID. This could be used by other organizations to access your [place] medical records  [id number]         Blood Pressure from Last 3 Encounters:   08/20/18 122/84   07/19/18 129/88   06/07/18 138/80    Weight from Last 3 Encounters:   12/17/16 155 lb (70.3 kg)   12/15/16 150 lb (68 kg)   11/14/16 150 lb (68 kg)              Today, you had the following     No orders found for display       Primary Care Provider Office Phone # Fax #    Ayala Limon MD PhD [phone]       [address] [facility] [address]        Equal Access to Services     [facility] : Hadii aad ku hadasho Soomaali, waaxda luqadaha, qaybta kaalmada adeegyada, waxizzy bermanin hayaan hermila kharastephanie la'susan . So [facility] [phone].    ATENCIÓN: Si habla español, tiene a luu disposición servicios gratuitos de asistencia lingüística. Llame al [phone].    We comply with applicable federal civil rights laws and [place] laws. We do not discriminate on the basis of race, color, national origin, age, disability, sex, sexual orientation, or gender identity.            Thank you!     Thank you for choosing [facility]  for your care. Our goal is always to provide you  with excellent care. Hearing back from our patients is one way we can continue to improve our services. Please take a few minutes to complete the written survey that you may receive in the mail after your visit with us. Thank you!             Your Updated Medication List - Protect others around you: Learn how to safely use, store and throw away your medicines at www.disposemymeds.org.          This list is accurate as of 8/20/18  4:45 PM.  Always use your most recent med list.                   Brand Name Dispense Instructions for use Diagnosis    Catheters Misc     1 each    1 catheter every 28 days.    Atonic neurogenic bladder       cefadroxil 500 MG capsule    DURICEF    28 capsule    Take 1 capsule (500 mg) by mouth 2 times daily    Ulcer of left foot, unspecified ulcer stage (H)       cephALEXin 500 MG capsule    KEFLEX    28 capsule    Take 1 capsule (500 mg) by mouth 2 times daily    Ulcer of left heel and midfoot with fat layer exposed (H)       dicyclomine 20 MG tablet    BENTYL    180 tablet    TAKE 1 TABLET TWICE DAILY    Irritable bowel syndrome with diarrhea       FLUoxetine 20 MG capsule    PROzac    90 capsule    TAKE 1 CAPSULE EVERY DAY    Anxiety       * HOMEOPATHIC PRODUCTS EX      Drainage tone for swollen glands, sinus congestion and skin eruptions.        * HOMEOPATHIC PRODUCTS EX      Neuro-chord for numbness, irritability, and tingling.        LYRICA PO      Take 50 mg by mouth 3 times daily        medroxyPROGESTERone 10 MG tablet    PROVERA    135 tablet    TAKE 1 AND 1/2 TABLETS EVERY DAY    Dysmenorrhea       MICROKLENZ WOUND CLEANSER Liqd     240 mL    Externally apply topically daily For daily wound cares.    Ulcer of heel and midfoot, left, with fat layer exposed (H), Ingrowing nail       * order for DME     1 Units    Equipment being ordered: walking boot left    Tibia/fibula fracture, left, closed, initial encounter       * order for DME     2 Units    Equipment being ordered:  Anchoring Device Flexi-Track LG    Neurogenic bladder       * order for DME     1 Units    Equipment being ordered: Insertion Tray Wheeler w/BZK Swab Catheter 10CC, Sterile    Neurogenic bladder       * order for DME     2 Units    Equipment being ordered: Drain Bag, Kenguard 2000ml. Urinary Bag with Anti-Refulx    Neurogenic bladder       * order for DME     1 Units    Equipment being ordered: Catheter Wheeler 2Way 30cc 18FR, Silicone Coated Latex    Neurogenic bladder       oxybutynin 5 MG tablet    DITROPAN    180 tablet    TAKE 1 TABLET TWICE DAILY    Neurogenic bladder       * Notice:  This list has 7 medication(s) that are the same as other medications prescribed for you. Read the directions carefully, and ask your doctor or other care provider to review them with you.

## 2018-08-20 NOTE — MR AVS SNAPSHOT
After Visit Summary   8/20/2018    Priyanka Martinez    MRN: [id number]           Patient Information     Date Of Birth          1970        Visit Information        Provider Department      8/20/2018 6:40 PM Urvashi Strickland APRN CNP [facility]        Today's Diagnoses     Left flank pain    -  1    Suprapubic catheter (H)        Paraplegia (H)          Care Instructions    Urine studies pending - we will notify you with results when available  Start cipro 1 tab every 12 hours x7 days  Drink plenty of water  If worsening or not improving, please follow up with primary care provider in 1-2 days        At [facility], we strive to deliver an exceptional experience to you, every time we see you.  If you receive a survey in the mail, please send us back your thoughts. We really do value your feedback.    Based on your medical history, these are the current health maintenance/preventive care services that you are due for (some may have been done at this visit.)  Health Maintenance Due   Topic Date Due     HIV SCREEN (SYSTEM ASSIGNED)  12/01/1990         Suggested websites for health information:  Www.Gamer Guides.Value Payment Systems : Up to date and easily searchable information on multiple topics.  Www.medlineplus.gov : medication info, interactive tutorials, watch real surgeries online  Www.familydoctor.org : good info from the Academy of Family Physicians  Www.cdc.gov : public health info, travel advisories, epidemics (H1N1)  Www.aap.org : children's health info, normal development, vaccinations  Www.health.state.[place].us : [place] dept of health, public health issues in [place], N1N1    Your care team:                            Family Medicine Internal Medicine   MD Saad Valentin MD Shantel Branch-Fleming, MD Katya Georgiev PA-C Nam Ho, MD Pediatrics   JUAN ALBERTO Sorensen, MD Saniya Rivrea CNP, MD    MD Annamarie Lewis APRN CNP      Clinic hours: Monday - Thursday 7 am-7 pm; Fridays 7 am-5 pm.   Urgent care: Monday - Friday 11 am-9 pm; Saturday and Sunday 9 am-5 pm.  Pharmacy : Monday -Thursday 8 am-8 pm; Friday 8 am-6 pm; Saturday and Sunday 9 am-5 pm.     Clinic: [phone]   Pharmacy: [phone]            Follow-ups after your visit        Your next 10 appointments already scheduled     Sep 11, 2018  7:30 AM CDT   Return Visit with Yobany Rojas DPM   [facility]    [address] [address] [place] [address] [phone]              Who to contact     If you have questions or need follow up information about today's clinic visit or your schedule please contact [facility] directly at [phone].  Normal or non-critical lab and imaging results will be communicated to you by MyChart, letter or phone within 4 business days after the clinic has received the results. If you do not hear from us within 7 days, please contact the clinic through MyChart or phone. If you have a critical or abnormal lab result, we will notify you by phone as soon as possible.  Submit refill requests through [employer] or call your pharmacy and they will forward the refill request to us. Please allow 3 business days for your refill to be completed.          Additional Information About Your Visit        [employer]ade [employer] Information     Rad[employer]point gives you secure access to your electronic health record. If you see a primary care provider, you can also send messages to your care team and make appointments. If you have questions, please call your primary care clinic.  If you do not have a primary care provider, please call [phone] and they will assist you.        Care EveryWhere ID     This is your Care EveryWhere ID. This could be used by other organizations to access your [place] medical records  [id number]        Your Vitals Were     Pulse  Temperature Last Period Pulse Oximetry Breastfeeding?       82 98.6  F (37  C) (Oral) (LMP Unknown) 98% No        Blood Pressure from Last 3 Encounters:   08/20/18 123/78   08/20/18 122/84   07/19/18 129/88    Weight from Last 3 Encounters:   12/17/16 155 lb (70.3 kg)   12/15/16 150 lb (68 kg)   11/14/16 150 lb (68 kg)              We Performed the Following     UA reflex to Microscopic and Culture     Urine Culture Aerobic Bacterial          Today's Medication Changes          These changes are accurate as of 8/20/18  7:07 PM.  If you have any questions, ask your nurse or doctor.               Start taking these medicines.        Dose/Directions    ciprofloxacin 500 MG tablet   Commonly known as:  CIPRO   Used for:  Left flank pain, Suprapubic catheter (H)   Started by:  Urvashi Strickland APRN CNP        Dose:  500 mg   Take 1 tablet (500 mg) by mouth 2 times daily   Quantity:  14 tablet   Refills:  0            Where to get your medicines      These medications were sent to [facility]/pharmacy #[id number] [address] [address]     Phone:  [phone]     ciprofloxacin 500 MG tablet                Primary Care Provider Office Phone # Fax #    Ayala Limon MD PhD [phone]       [address]  [facility] [address]        Equal Access to Services     MELY GAMBLE : Hadii nikunj kee hadasho Soomaali, waaxda luqadaha, qaybta kaalmada ronnie espinoza. So [facility] [phone].    ATENCIÓN: Si habla español, tiene a luu disposición servicios gratuitos de asistencia lingüística. Rowan al [phone].    We comply with applicable federal civil rights laws and [place] laws. We do not discriminate on the basis of race, color, national origin, age, disability, sex, sexual orientation, or gender identity.            Thank you!     Thank you for choosing [facility]  for your care. Our goal  is always to provide you with excellent care. Hearing back from our patients is one way we can continue to improve our services. Please take a few minutes to complete the written survey that you may receive in the mail after your visit with us. Thank you!             Your Updated Medication List - Protect others around you: Learn how to safely use, store and throw away your medicines at www.disposemymeds.org.          This list is accurate as of 8/20/18  7:07 PM.  Always use your most recent med list.                   Brand Name Dispense Instructions for use Diagnosis    Catheters Misc     1 each    1 catheter every 28 days.    Atonic neurogenic bladder       ciprofloxacin 500 MG tablet    CIPRO    14 tablet    Take 1 tablet (500 mg) by mouth 2 times daily    Left flank pain, Suprapubic catheter (H)       dicyclomine 20 MG tablet    BENTYL    180 tablet    TAKE 1 TABLET TWICE DAILY    Irritable bowel syndrome with diarrhea       FLUoxetine 20 MG capsule    PROzac    90 capsule    TAKE 1 CAPSULE EVERY DAY    Anxiety       * HOMEOPATHIC PRODUCTS EX      Drainage tone for swollen glands, sinus congestion and skin eruptions.        * HOMEOPATHIC PRODUCTS EX      Neuro-chord for numbness, irritability, and tingling.        LYRICA PO      Take 50 mg by mouth 3 times daily        medroxyPROGESTERone 10 MG tablet    PROVERA    135 tablet    TAKE 1 AND 1/2 TABLETS EVERY DAY    Dysmenorrhea       MICROKLENZ WOUND CLEANSER Liqd     240 mL    Externally apply topically daily For daily wound cares.    Ulcer of heel and midfoot, left, with fat layer exposed (H), Ingrowing nail       * order for DME     1 Units    Equipment being ordered: walking boot left    Tibia/fibula fracture, left, closed, initial encounter       * order for DME     2 Units    Equipment being ordered: Anchoring Device Flexi-Track LG    Neurogenic bladder       * order for DME     1 Units    Equipment being ordered: Insertion Tray Wheeler w/BZK Swab Catheter  10CC, Sterile    Neurogenic bladder       * order for DME     2 Units    Equipment being ordered: Drain Bag, Kenguard 2000ml. Urinary Bag with Anti-Refulx    Neurogenic bladder       * order for DME     1 Units    Equipment being ordered: Catheter Wheeler 2Way 30cc 18FR, Silicone Coated Latex    Neurogenic bladder       oxybutynin 5 MG tablet    DITROPAN    180 tablet    TAKE 1 TABLET TWICE DAILY    Neurogenic bladder       * Notice:  This list has 7 medication(s) that are the same as other medications prescribed for you. Read the directions carefully, and ask your doctor or other care provider to review them with you.

## 2018-08-20 NOTE — PROGRESS NOTES
"  SUBJECTIVE:   Priyanka Martinez is a 47 year old female who presents to clinic today for the following health issues:      URINARY TRACT SYMPTOMS  Onset: 2 days ago    Description:   Painful urination (Dysuria): no   Blood in urine (Hematuria): YES  Delay in urine (Hesitency): no     Intensity: mild    Progression of Symptoms:  same    Accompanying Signs & Symptoms:  Fever/chills: no   Flank pain: both sides, YES, Dull ache and sharp  Nausea and vomiting: no   Any vaginal symptoms: none  Abdominal/Pelvic Pain: YES- right side    History:   History of frequent UTI's: YES  History of kidney stones: no   Sexually Active: no   Possibility of pregnancy: No    Precipitating factors:   None    Therapies Tried and outcome: OTC advil or tylenol     47 year old female presents with concerns for kidney infection. 2 days ago had hematuria. Has suprapubic catheter. Back and arm are sore. Felt the same last time she had kidney infection few years ago. Had UTI about 4 months ago. No fever. No nausea or vomiting. Feels \"yucky.\" no hx kidney stone. Usually takes cipro for these symptoms. Paraplegia x14 years. Bactrim gives her heartburn.    Problem list and histories reviewed & adjusted, as indicated.  Additional history: as documented    Patient Active Problem List   Diagnosis     Paraplegia (H)     Suprapubic catheter (H)     Gastrointestinal problem     CARDIOVASCULAR SCREENING; LDL GOAL LESS THAN 160     Dysmenorrhea     Neuropathic pain of flank     Neurogenic bladder     Endometriosis     IBS (irritable bowel syndrome)     Generalized anxiety disorder     Past Surgical History:   Procedure Laterality Date     C SPINE FUSION,POSTER,7-12 SGMTS  2004    From T10 to L5     CHOLECYSTECTOMY  1990's     COLONOSCOPY       COLONOSCOPY Left 11/25/2014    Procedure: COMBINED COLONOSCOPY, SINGLE OR MULTIPLE BIOPSY/POLYPECTOMY BY BIOPSY;  Surgeon: Junior Galeano MD;  Location:  GI     ESOPHAGOSCOPY, GASTROSCOPY, DUODENOSCOPY " (EGD), COMBINED N/A 11/25/2014    Procedure: COMBINED ESOPHAGOSCOPY, GASTROSCOPY, DUODENOSCOPY (EGD), BIOPSY SINGLE OR MULTIPLE;  Surgeon: Junior Galeano MD;  Location:  GI       Social History   Substance Use Topics     Smoking status: Former Smoker     Quit date: 3/18/2012     Smokeless tobacco: Never Used      Comment: 7 cig a day off and on socially     Alcohol use No     Family History   Problem Relation Age of Onset     Hypertension Father      Heart Failure Maternal Grandmother          Current Outpatient Prescriptions   Medication Sig Dispense Refill     Catheters MISC 1 catheter every 28 days. 1 each 12     ciprofloxacin (CIPRO) 500 MG tablet Take 1 tablet (500 mg) by mouth 2 times daily 14 tablet 0     dicyclomine (BENTYL) 20 MG tablet TAKE 1 TABLET TWICE DAILY 180 tablet 2     FLUoxetine (PROZAC) 20 MG capsule TAKE 1 CAPSULE EVERY DAY 90 capsule 2     HOMEOPATHIC PRODUCTS EX Drainage tone for swollen glands, sinus congestion and skin eruptions.       HOMEOPATHIC PRODUCTS EX Neuro-chord for numbness, irritability, and tingling.       medroxyPROGESTERone (PROVERA) 10 MG tablet TAKE 1 AND 1/2 TABLETS EVERY  tablet 0     order for DME Equipment being ordered: Anchoring Device Flexi-Track LG 2 Units 12     order for DME Equipment being ordered: Insertion Tray Wheeler w/BZK Swab Catheter 10CC, Sterile 1 Units 12     order for DME Equipment being ordered: Drain Bag, Kenguard 2000ml. Urinary Bag with Anti-Refulx 2 Units 12     order for DME Equipment being ordered: Catheter Wheeler 2Way 30cc 18FR, Silicone Coated Latex 1 Units 12     order for DME Equipment being ordered: walking boot left 1 Units 0     oxybutynin (DITROPAN) 5 MG tablet TAKE 1 TABLET TWICE DAILY 180 tablet 2     Pregabalin (LYRICA PO) Take 50 mg by mouth 3 times daily       Wound Cleansers (MICROKLENZ WOUND CLEANSER) LIQD Externally apply topically daily For daily wound cares. 240 mL 5     [DISCONTINUED] oxybutynin (DITROPAN) 5 MG  tablet Take 1 tablet by mouth 3 times daily. 270 tablet 3     Allergies   Allergen Reactions     Hydrocodone Itching     Labs reviewed in EPIC    Reviewed and updated as needed this visit by clinical staff  Tobacco  Allergies  Meds  Problems  Med Hx  Surg Hx  Fam Hx  Soc Hx        Reviewed and updated as needed this visit by Provider  Allergies  Meds  Problems         ROS:  Constitutional, HEENT, cardiovascular, pulmonary, GI, , musculoskeletal, neuro, skin, endocrine and psych systems are negative, except as otherwise noted.    OBJECTIVE:     /78 (BP Location: Right arm, Patient Position: Chair, Cuff Size: Adult Regular)  Pulse 82  Temp 98.6  F (37  C) (Oral)  LMP  (LMP Unknown)  SpO2 98%  Breastfeeding? No  There is no height or weight on file to calculate BMI.  GENERAL: healthy, alert and no distress  NECK: no adenopathy, no asymmetry, masses, or scars and thyroid normal to palpation  RESP: lungs clear to auscultation - no rales, rhonchi or wheezes  CV: regular rate and rhythm, normal S1 S2, no S3 or S4, no murmur, click or rub, no peripheral edema and peripheral pulses strong  MS: no gross musculoskeletal defects noted, no edema  BACK: mild left CVA tenderness, no paralumbar tenderness  PSYCH: mentation appears normal, affect normal/bright    Diagnostic Test Results:  No results found for this or any previous visit (from the past 24 hour(s)).    ASSESSMENT/PLAN:         ICD-10-CM    1. Left flank pain R10.9 ciprofloxacin (CIPRO) 500 MG tablet     UA reflex to Microscopic and Culture     Urine Culture Aerobic Bacterial   2. Suprapubic catheter (H) Z93.59 ciprofloxacin (CIPRO) 500 MG tablet   3. Paraplegia (H) G82.20      Urine obtained from bag. Patient understands this is not sterile - she states this is what is usually done for her. Will treat empirically based on symptoms. Advised follow up with primary care provider in no more than 2 days.    Urine studies pending - we will notify you  with results when available  Start cipro 1 tab every 12 hours x7 days  Drink plenty of water  If worsening or not improving, please follow up with primary care provider in 1-2 days    See Patient Instructions    The benefits, risks and potential side effects were discussed in detail. Black box warnings discussed as relevant. All patient questions were answered. The patient was instructed to follow up immediately if any adverse reactions develop.    Patient verbalizes understanding and agrees with plan of care. Patient stable for discharge.      AMY Colvin [facility]

## 2018-08-20 NOTE — NURSING NOTE
Priyanka Martinez's chief complaint for this visit includes:  Chief Complaint   Patient presents with     RECHECK     heel check      PCP: Ayala Limon    Referring Provider:  No referring provider defined for this encounter.    /84  Pulse 94  SpO2 98%  Data Unavailable     Do you need any medication refills at today's visit? no

## 2018-08-20 NOTE — TELEPHONE ENCOUNTER
Called patient and explained.  She agrees to plan and will go to [facility] this evening, 6:40 pm with Urvashi Strickland PA-C.  Helped her schedule this appt.    Olive Lam RN, [facility]

## 2018-08-20 NOTE — MR AVS SNAPSHOT
After Visit Summary   8/20/2018    Priyanka Martinez    MRN: [id number]           Patient Information     Date Of Birth          1970        Visit Information        Provider Department      8/20/2018 7:00 AM Yobany Rojas DPM [facility]        Today's Diagnoses     Ulcer of left heel and midfoot with fat layer exposed (H)    -  1      Care Instructions    Thanks for coming today.  Ortho/Sports Medicine Clinic  [address] [place] [id number]    To schedule future appointments in Ortho Clinic, you may call [phone].    To schedule ordered imaging by your Provider: Call [facility] at [phone]    UIBLUEPRINT available online at:   Desi Hits/ISVWorld    Please call if any further questions or concerns [phone] and ask for the Orthopedic Department. Clinic hours 8 am to 5 pm.    Return to clinic if symptoms worsen.            Follow-ups after your visit        Your next 10 appointments already scheduled     Aug 20, 2018  7:50 AM CDT   LAB with LAB FIRST FLOOR [facility] ([facility])    [address] [place] [address] [phone]           Please do not eat 10-12 hours before your appointment if you are coming in fasting for labs on lipids, cholesterol, or glucose (sugar). This does not apply to pregnant women. Water, hot tea and black coffee (with nothing added) are okay. Do not drink other fluids, diet soda or chew gum.            Sep 11, 2018  7:30 AM CDT   Return Visit with Yobany Rojas DPM   [facility] ([facility])    [address] [place] [address] [phone]              Who to contact     If you have questions or need follow up information about today's clinic visit or your schedule please contact [facility] directly at [phone].  Normal or non-critical lab and imaging results will be communicated to  you by MyChart, letter or phone within 4 business days after the clinic has received the results. If you do not hear from us within 7 days, please contact the clinic through [employer] or phone. If you have a critical or abnormal lab result, we will notify you by phone as soon as possible.  Submit refill requests through [employer] or call your pharmacy and they will forward the refill request to us. Please allow 3 business days for your refill to be completed.          Additional Information About Your Visit        [employer] Information     M[employer] gives you secure access to your electronic health record. If you see a primary care provider, you can also send messages to your care team and make appointments. If you have questions, please call your primary care clinic.  If you do not have a primary care provider, please call [phone] and they will assist you.      MethylGene is an electronic gateway that provides easy, online access to your medical records. With [employer], you can request a clinic appointment, read your test results, renew a prescription or communicate with your care team.     To access your existing account, please contact your [facility] or call [phone] for assistance.        Care EveryWhere ID     This is your Care EveryWhere ID. This could be used by other organizations to access your [place] medical records  [id number]        Your Vitals Were     Pulse Pulse Oximetry                94 98%           Blood Pressure from Last 3 Encounters:   08/20/18 122/84   07/19/18 129/88   06/07/18 138/80    Weight from Last 3 Encounters:   12/17/16 70.3 kg (155 lb)   12/15/16 68 kg (150 lb)   11/14/16 68 kg (150 lb)              Today, you had the following     No orders found for display       Primary Care Provider Office Phone # Fax #    Ayala Limon MD PhD [phone] [address]  [facility] [address]        Equal Access to Services     MELY GAMBLE :  Hadii aad ku hadmelvao Soomaali, waaxda luqadaha, qaybta kaalmada adeivania, ronnie mendoza. So [facility] [phone].    ATENCIÓN: Si andreala esme, tiene a luu disposición servicios gratuitos de asistencia lingüística. Llame al [phone].    We comply with applicable federal civil rights laws and [place] laws. We do not discriminate on the basis of race, color, national origin, age, disability, sex, sexual orientation, or gender identity.            Thank you!     Thank you for choosing [facility]  for your care. Our goal is always to provide you with excellent care. Hearing back from our patients is one way we can continue to improve our services. Please take a few minutes to complete the written survey that you may receive in the mail after your visit with us. Thank you!             Your Updated Medication List - Protect others around you: Learn how to safely use, store and throw away your medicines at www.disposemymeds.org.          This list is accurate as of 8/20/18  7:27 AM.  Always use your most recent med list.                   Brand Name Dispense Instructions for use Diagnosis    Catheters Misc     1 each    1 catheter every 28 days.    Atonic neurogenic bladder       cefadroxil 500 MG capsule    DURICEF    28 capsule    Take 1 capsule (500 mg) by mouth 2 times daily    Ulcer of left foot, unspecified ulcer stage (H)       cephALEXin 500 MG capsule    KEFLEX    28 capsule    Take 1 capsule (500 mg) by mouth 2 times daily    Ulcer of left heel and midfoot with fat layer exposed (H)       dicyclomine 20 MG tablet    BENTYL    180 tablet    TAKE 1 TABLET TWICE DAILY    Irritable bowel syndrome with diarrhea       FLUoxetine 20 MG capsule    PROzac    90 capsule    TAKE 1 CAPSULE EVERY DAY    Anxiety       * HOMEOPATHIC PRODUCTS EX      Drainage tone for swollen glands, sinus congestion and skin eruptions.        * HOMEOPATHIC PRODUCTS EX      Neuro-chord for  numbness, irritability, and tingling.        LYRICA PO      Take 50 mg by mouth 3 times daily        medroxyPROGESTERone 10 MG tablet    PROVERA    135 tablet    TAKE 1 AND 1/2 TABLETS EVERY DAY    Dysmenorrhea       MICROKLENZ WOUND CLEANSER Liqd     240 mL    Externally apply topically daily For daily wound cares.    Ulcer of heel and midfoot, left, with fat layer exposed (H), Ingrowing nail       * order for DME     1 Units    Equipment being ordered: walking boot left    Tibia/fibula fracture, left, closed, initial encounter       * order for DME     2 Units    Equipment being ordered: Anchoring Device Flexi-Track LG    Neurogenic bladder       * order for DME     1 Units    Equipment being ordered: Insertion Tray Wheeler w/BZK Swab Catheter 10CC, Sterile    Neurogenic bladder       * order for DME     2 Units    Equipment being ordered: Drain Bag, Kenguard 2000ml. Urinary Bag with Anti-Refulx    Neurogenic bladder       * order for DME     1 Units    Equipment being ordered: Catheter Wheeler 2Way 30cc 18FR, Silicone Coated Latex    Neurogenic bladder       oxybutynin 5 MG tablet    DITROPAN    180 tablet    TAKE 1 TABLET TWICE DAILY    Neurogenic bladder       * Notice:  This list has 7 medication(s) that are the same as other medications prescribed for you. Read the directions carefully, and ask your doctor or other care provider to review them with you.

## 2018-08-20 NOTE — TELEPHONE ENCOUNTER
Called patient and helped her schedule at [facility].  She has to use a van for transportation but her sister is able to help her make it to her appt [facility].    Olive Lam RN, [facility]

## 2018-08-20 NOTE — PATIENT INSTRUCTIONS
Urine studies pending - we will notify you with results when available  Start cipro 1 tab every 12 hours x7 days  Drink plenty of water  If worsening or not improving, please follow up with primary care provider in 1-2 days        At [facility], we strive to deliver an exceptional experience to you, every time we see you.  If you receive a survey in the mail, please send us back your thoughts. We really do value your feedback.    Based on your medical history, these are the current health maintenance/preventive care services that you are due for (some may have been done at this visit.)  Health Maintenance Due   Topic Date Due     HIV SCREEN (SYSTEM ASSIGNED)  12/01/1990         Suggested websites for health information:  Www.Wyle.Amp'd Mobile : Up to date and easily searchable information on multiple topics.  Www.medlineplus.gov : medication info, interactive tutorials, watch real surgeries online  Www.familydoctor.org : good info from the Academy of Family Physicians  Www.cdc.gov : public health info, travel advisories, epidemics (H1N1)  Www.aap.org : children's health info, normal development, vaccinations  Www.health.state.[place].us : [place] dept of health, public health issues in [place], N1N1    Your care team:                            Family Medicine Internal Medicine   MD Saad Valentin MD Shantel Branch-Fleming, MD Katya Georgiev PA-C Nam Ho, MD Pediatrics   JUAN ALBERTO Sorensen, MD Saniya Rivera CNP, MD Deborah Mielke, MD Kim Thein, APRN CNP      Clinic hours: Monday - Thursday 7 am-7 pm; Fridays 7 am-5 pm.   Urgent care: Monday - Friday 11 am-9 pm; Saturday and Sunday 9 am-5 pm.  Pharmacy : Monday -Thursday 8 am-8 pm; Friday 8 am-6 pm; Saturday and Sunday 9 am-5 pm.     Clinic: [phone]   Pharmacy: [phone]

## 2018-08-21 LAB
ALBUMIN UR-MCNC: 10 MG/DL
APPEARANCE UR: ABNORMAL
BACTERIA SPEC CULT: ABNORMAL
BILIRUB UR QL STRIP: NEGATIVE
COLOR UR AUTO: YELLOW
GLUCOSE UR STRIP-MCNC: NEGATIVE MG/DL
HGB UR QL STRIP: NEGATIVE
KETONES UR STRIP-MCNC: NEGATIVE MG/DL
LEUKOCYTE ESTERASE UR QL STRIP: ABNORMAL
NITRATE UR QL: NEGATIVE
PH UR STRIP: 6 PH (ref 5–7)
SOURCE: ABNORMAL
SP GR UR STRIP: 1.01 (ref 1–1.03)
SPECIMEN SOURCE: ABNORMAL
UROBILINOGEN UR STRIP-MCNC: NORMAL MG/DL (ref 0–2)

## 2018-09-06 ENCOUNTER — MYC MEDICAL ADVICE (OUTPATIENT)
Dept: PEDIATRICS | Facility: CLINIC | Age: 48
End: 2018-09-06

## 2018-09-06 DIAGNOSIS — R44.9 ABNORMAL FEELING: ICD-10-CM

## 2018-09-06 DIAGNOSIS — R35.89 POLYURIA: Primary | ICD-10-CM

## 2018-09-06 NOTE — TELEPHONE ENCOUNTER
Routed [employer] message to PCP    Rosemary Majano RN,   [facility]

## 2018-09-11 ENCOUNTER — TELEPHONE (OUTPATIENT)
Dept: ORTHOPEDICS | Facility: CLINIC | Age: 48
End: 2018-09-11

## 2018-09-11 ENCOUNTER — OFFICE VISIT (OUTPATIENT)
Dept: PODIATRY | Facility: CLINIC | Age: 48
End: 2018-09-11
Payer: COMMERCIAL

## 2018-09-11 VITALS — SYSTOLIC BLOOD PRESSURE: 114 MMHG | HEART RATE: 75 BPM | OXYGEN SATURATION: 98 % | DIASTOLIC BLOOD PRESSURE: 75 MMHG

## 2018-09-11 DIAGNOSIS — R35.89 POLYURIA: ICD-10-CM

## 2018-09-11 DIAGNOSIS — R44.9 ABNORMAL FEELING: ICD-10-CM

## 2018-09-11 DIAGNOSIS — L97.422 ULCER OF LEFT HEEL AND MIDFOOT WITH FAT LAYER EXPOSED (H): Primary | ICD-10-CM

## 2018-09-11 LAB — GLUCOSE SERPL-MCNC: 86 MG/DL (ref 70–99)

## 2018-09-11 PROCEDURE — 82947 ASSAY GLUCOSE BLOOD QUANT: CPT | Performed by: INTERNAL MEDICINE

## 2018-09-11 PROCEDURE — 99213 OFFICE O/P EST LOW 20 MIN: CPT | Performed by: PODIATRIST

## 2018-09-11 PROCEDURE — 36415 COLL VENOUS BLD VENIPUNCTURE: CPT | Performed by: INTERNAL MEDICINE

## 2018-09-11 NOTE — MR AVS SNAPSHOT
After Visit Summary   2018    Priyanka Martinez    MRN: [id number]           Patient Information     Date Of Birth          1970        Visit Information        Provider Department      2018 7:30 AM Yobany Rojas DPM [facility]        Today's Diagnoses     Ulcer of left heel and midfoot with fat layer exposed (H)    -  1      Care Instructions    Thanks for coming today.  Ortho/Sports Medicine Clinic  [address] [place] [address]    To schedule future appointments in Ortho Clinic, you may call [phone].    To schedule ordered imaging by your provider:   Call Central Imaging [phone]    To schedule an injection ordered by your provider:  Call Central Imaging Injection scheduling line: [phone]  [email or web address] available online at:  [email or web address]    Please call if any further questions or concerns ([phone]).  Clinic hours 8 am to 5 pm.    Return to clinic (call) if symptoms worsen or fail to improve.            Follow-ups after your visit        Your next 10 appointments already scheduled     Sep 25, 2018  8:30 AM CDT   Return Visit with Yobany Rojas DPM   [facility] ([facility])    [address] [place] [id number] [phone]              Who to contact     If you have questions or need follow up information about today's clinic visit or your schedule please contact [facility] directly at [phone].  Normal or non-critical lab and imaging results will be communicated to you by MyChart, letter or phone within 4 business days after the clinic has received the results. If you do not hear from us within 7 days, please contact the clinic through [employer] or phone. If you have a critical or abnormal lab result, we will notify you by phone as soon as possible.  Submit refill requests through The Vetted Net or call your pharmacy and they will forward the refill  request to us. Please allow 3 business days for your refill to be completed.          Additional Information About Your Visit        [employer]switch[employer] Information     Hashgo gives you secure access to your electronic health record. If you see a primary care provider, you can also send messages to your care team and make appointments. If you have questions, please call your primary care clinic.  If you do not have a primary care provider, please call [phone] and they will assist you.      Hashgo is an electronic gateway that provides easy, online access to your medical records. With Hashgo, you can request a clinic appointment, read your test results, renew a prescription or communicate with your care team.     To access your existing account, please contact your [facility] or call [phone] for assistance.        Care EveryWhere ID     This is your Care EveryWhere ID. This could be used by other organizations to access your [facility] medical records  [id number]        Your Vitals Were     Pulse Last Period Pulse Oximetry             75 (LMP Unknown) 98%          Blood Pressure from Last 3 Encounters:   09/11/18 114/75   08/20/18 123/78   08/20/18 122/84    Weight from Last 3 Encounters:   12/17/16 70.3 kg (155 lb)   12/15/16 68 kg (150 lb)   11/14/16 68 kg (150 lb)              Today, you had the following     No orders found for display       Primary Care Provider Office Phone # Fax #    Ayala Limon MD PhD [phone]       [address]  [facility] [address]        Equal Access to Services     MELY GAMBLE : Hadii aad ku hadasho Soomaali, waaxda luqadaha, qaybta kaalmada adeegyada, ronnie mccoy . So [facility] [phone].    ATENCIÓN: Si habla español, tiene a luu disposición servicios gratuitos de asistencia lingüística. Llame al [phone].    We comply with applicable federal civil rights laws and [place] laws. We do not discriminate on  the basis of race, color, national origin, age, disability, sex, sexual orientation, or gender identity.            Thank you!     Thank you for choosing [facility]  for your care. Our goal is always to provide you with excellent care. Hearing back from our patients is one way we can continue to improve our services. Please take a few minutes to complete the written survey that you may receive in the mail after your visit with us. Thank you!             Your Updated Medication List - Protect others around you: Learn how to safely use, store and throw away your medicines at www.disposemymeds.org.          This list is accurate as of 9/11/18  9:10 AM.  Always use your most recent med list.                   Brand Name Dispense Instructions for use Diagnosis    Catheters Misc     1 each    1 catheter every 28 days.    Atonic neurogenic bladder       ciprofloxacin 500 MG tablet    CIPRO    14 tablet    Take 1 tablet (500 mg) by mouth 2 times daily    Left flank pain, Suprapubic catheter (H)       dicyclomine 20 MG tablet    BENTYL    180 tablet    TAKE 1 TABLET TWICE DAILY    Irritable bowel syndrome with diarrhea       FLUoxetine 20 MG capsule    PROzac    90 capsule    TAKE 1 CAPSULE EVERY DAY    Anxiety       * HOMEOPATHIC PRODUCTS EX      Drainage tone for swollen glands, sinus congestion and skin eruptions.        * HOMEOPATHIC PRODUCTS EX      Neuro-chord for numbness, irritability, and tingling.        LYRICA PO      Take 50 mg by mouth 3 times daily        medroxyPROGESTERone 10 MG tablet    PROVERA    135 tablet    TAKE 1 AND 1/2 TABLETS EVERY DAY    Dysmenorrhea       MICROKLENZ WOUND CLEANSER Liqd     240 mL    Externally apply topically daily For daily wound cares.    Ulcer of heel and midfoot, left, with fat layer exposed (H), Ingrowing nail       * order for DME     1 Units    Equipment being ordered: walking boot left    Tibia/fibula fracture, left, closed, initial encounter       *  order for DME     2 Units    Equipment being ordered: Anchoring Device Flexi-Track LG    Neurogenic bladder       * order for DME     1 Units    Equipment being ordered: Insertion Tray Wheeler w/BZK Swab Catheter 10CC, Sterile    Neurogenic bladder       * order for DME     2 Units    Equipment being ordered: Drain Bag, Kenguard 2000ml. Urinary Bag with Anti-Refulx    Neurogenic bladder       * order for DME     1 Units    Equipment being ordered: Catheter Wheeler 2Way 30cc 18FR, Silicone Coated Latex    Neurogenic bladder       oxybutynin 5 MG tablet    DITROPAN    180 tablet    TAKE 1 TABLET TWICE DAILY    Neurogenic bladder       * Notice:  This list has 7 medication(s) that are the same as other medications prescribed for you. Read the directions carefully, and ask your doctor or other care provider to review them with you.
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
## 2018-09-11 NOTE — NURSING NOTE
Priyanka Martinez's chief complaint for this visit includes:  Chief Complaint   Patient presents with     RECHECK     3 wk f/u     PCP: Ayala iLmon    Referring Provider:  No referring provider defined for this encounter.    /75  Pulse 75  LMP  (LMP Unknown)  SpO2 98%  Data Unavailable     Do you need any medication refills at today's visit? no

## 2018-09-11 NOTE — TELEPHONE ENCOUNTER
Financial Counselor Review for Apligraf, Dermagraft or Puraply AM: Puraply and Apilgraf     Procedure to be performed (include CPT code): No     Diagnosis code (include ICD-10 code): L97.422     Coverage and patient financial responsibility information:YES     Does patient need to be contacted by Financial Counselor:YES     Has the patient tried Apligraf, Dermagraft or Puraply before: NO     Note: Do not use abbreviations and route encounter to MG POD    Carmen Paez RN

## 2018-09-11 NOTE — PATIENT INSTRUCTIONS
Thanks for coming today.  Ortho/Sports Medicine Clinic  [address] [place] [address]    To schedule future appointments in Ortho Clinic, you may call [phone].    To schedule ordered imaging by your provider:   Call Central Imaging [phone]    To schedule an injection ordered by your provider:  Call Central Imaging Injection scheduling line: [phone]  Adnavance Technologieshart available online at:  [email or web address]    Please call if any further questions or concerns ([phone]).  Clinic hours 8 am to 5 pm.    Return to clinic (call) if symptoms worsen or fail to improve.
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
## 2018-09-12 NOTE — PROGRESS NOTES
Dear Priyanka,   Here are your recent results which are within the expected range.  Please continue with your current plan of care.     Please call or Mychart to our office if you have further questions.     Ayala Limon MD-PhD

## 2018-09-18 ENCOUNTER — TRANSFERRED RECORDS (OUTPATIENT)
Dept: HEALTH INFORMATION MANAGEMENT | Facility: CLINIC | Age: 48
End: 2018-09-18

## 2018-09-18 NOTE — TELEPHONE ENCOUNTER
Per [place] of [employer] benefits: Apligraf is covered for patients that have a diagnosis of Diabetic foot ulcers or Venous Stasis ulcers, this would not be covered for the diagnosis that was sent over to them by Medicare. This is covered by [facility] benefits, So patient has a deductible of $1200 which is met so patient is covered 80/20 till out of pocket of $4750. Patient is good with this info and would like to start this product on next Tuesday's visit.           [facility] doesn't cover PuraPly Am, call ref. # [id number] Tamiko 09/18/18 9:12am    Under Medicare it does cover PuraPly Am and no PA is needed for this policy and would be covered 80/20 for ICD10: L97.422

## 2018-09-23 DIAGNOSIS — N94.6 DYSMENORRHEA: ICD-10-CM

## 2018-09-24 RX ORDER — MEDROXYPROGESTERONE ACETATE 10 MG
TABLET ORAL
Qty: 135 TABLET | Refills: 0 | Status: SHIPPED | OUTPATIENT
Start: 2018-09-24 | End: 2018-10-24

## 2018-09-24 NOTE — TELEPHONE ENCOUNTER
medroxyPROGESTERone (PROVERA) 10 MG tablet 135 tablet 0 6/11/2018  No   Sig: TAKE 1 AND 1/2 TABLETS EVERY DAY     Last OV with Dr. Limon: 12/28/2017    Next 5 appointments (look out 90 days)     Sep 25, 2018  8:30 AM CDT   [facility] Yobany Rojas DPM   [facility]    [address] [place] [address] [phone]                Refilled per [facility] protocol.    Mitzy Montiel RN

## 2018-09-25 ENCOUNTER — OFFICE VISIT (OUTPATIENT)
Dept: PODIATRY | Facility: CLINIC | Age: 48
End: 2018-09-25
Payer: COMMERCIAL

## 2018-09-25 VITALS — SYSTOLIC BLOOD PRESSURE: 144 MMHG | OXYGEN SATURATION: 98 % | DIASTOLIC BLOOD PRESSURE: 82 MMHG | HEART RATE: 89 BPM

## 2018-09-25 DIAGNOSIS — L97.422 ULCER OF LEFT HEEL AND MIDFOOT WITH FAT LAYER EXPOSED (H): Primary | ICD-10-CM

## 2018-09-25 PROCEDURE — 15275 SKIN SUB GRAFT FACE/NK/HF/G: CPT | Performed by: PODIATRIST
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
## 2018-09-25 NOTE — PATIENT INSTRUCTIONS
Thanks for coming today.  Ortho/Sports Medicine Clinic  [address] [place] [address]    To schedule future appointments in Ortho Clinic, you may call [phone].    To schedule ordered imaging by your provider:   Call Central Imaging [phone]    To schedule an injection ordered by your provider:  Call Central Imaging Injection scheduling line: [phone]  [email or web address] available online at:  [email or web address]    Please call if any further questions or concerns ([phone]).  Clinic hours 8 am to 5 pm.    Return to clinic (call) if symptoms worsen or fail to improve.
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
## 2018-09-25 NOTE — MR AVS SNAPSHOT
After Visit Summary   2018    Priyanka Martinez    MRN: [id number]           Patient Information     Date Of Birth          1970        Visit Information        Provider Department      2018 8:30 AM Yobany Rojas DPM [facility]        Today's Diagnoses     Ulcer of left heel and midfoot with fat layer exposed (H)    -  1      Care Instructions    Thanks for coming today.  Ortho/Sports Medicine Clinic  [address] [place] [address]    To schedule future appointments in Ortho Clinic, you may call [phone].    To schedule ordered imaging by your provider:   Call Central Imaging [phone]    To schedule an injection ordered by your provider:  Call Central Imaging Injection scheduling line: [phone]  Aqutohart available online at:  [email or web address]    Please call if any further questions or concerns ([phone]).  Clinic hours 8 am to 5 pm.    Return to clinic (call) if symptoms worsen or fail to improve.          Follow-ups after your visit        Your next 10 appointments already scheduled     Oct 02, 2018  7:00 AM CDT   Return Visit with Yobany Rojas DPM   [facility] ([facility]    [address] [place] [address] [phone]              Who to contact     If you have questions or need follow up information about today's clinic visit or your schedule please contact [facility] directly at [phone].  Normal or non-critical lab and imaging results will be communicated to you by MyChart, letter or phone within 4 business days after the clinic has received the results. If you do not hear from us within 7 days, please contact the clinic through Aqutohart or phone. If you have a critical or abnormal lab result, we will notify you by phone as soon as possible.  Submit refill requests through Short Fuze or call your pharmacy and they will forward the refill  request to us. Please allow 3 business days for your refill to be completed.          Additional Information About Your Visit        Electron DatabaseharPurewire Information     [employer] gives you secure access to your electronic health record. If you see a primary care provider, you can also send messages to your care team and make appointments. If you have questions, please call your primary care clinic.  If you do not have a primary care provider, please call [phone] and they will assist you.      [employer] is an electronic gateway that provides easy, online access to your medical records. With [employer], you can request a clinic appointment, read your test results, renew a prescription or communicate with your care team.     To access your existing account, please contact your [facility] or call [phone] for assistance.        Care EveryWhere ID     This is your Care EveryWhere ID. This could be used by other organizations to access your [place] medical records  [id number]        Your Vitals Were     Pulse Pulse Oximetry                89 98%           Blood Pressure from Last 3 Encounters:   09/25/18 144/82   09/11/18 114/75   08/20/18 123/78    Weight from Last 3 Encounters:   12/17/16 70.3 kg (155 lb)   12/15/16 68 kg (150 lb)   11/14/16 68 kg (150 lb)              We Performed the Following     HC SKIN SUB GRAFT F/S/N/H/F/G/M/D /<100SCM /<1ST 25 SCM     TISSUE APLIGRAF SUPPLY, PER SQ CM        Primary Care Provider Office Phone # Fax #    Ayala Limon MD PhD [phone]       [address]  [facility] [address]        Equal Access to Services     [facility]: Hadii aad ku hadasho Soomaali, waaxda luqadaha, qaybta kaalmada olga, ronnie mccoy . So [facility] [phone].    ATENCIÓN: Si habla español, tiene a luu disposición servicios gratuitos de asistencia lingüística. Llame al [phone].    We comply with applicable federal civil rights laws and  [place] laws. We do not discriminate on the basis of race, color, national origin, age, disability, sex, sexual orientation, or gender identity.            Thank you!     Thank you for choosing [facility]  for your care. Our goal is always to provide you with excellent care. Hearing back from our patients is one way we can continue to improve our services. Please take a few minutes to complete the written survey that you may receive in the mail after your visit with us. Thank you!             Your Updated Medication List - Protect others around you: Learn how to safely use, store and throw away your medicines at www.disposemymeds.org.          This list is accurate as of 9/25/18  9:11 AM.  Always use your most recent med list.                   Brand Name Dispense Instructions for use Diagnosis    BACLOFEN PO      Take by mouth daily        Catheters Misc     1 each    1 catheter every 28 days.    Atonic neurogenic bladder       ciprofloxacin 500 MG tablet    CIPRO    14 tablet    Take 1 tablet (500 mg) by mouth 2 times daily    Left flank pain, Suprapubic catheter (H)       dicyclomine 20 MG tablet    BENTYL    180 tablet    TAKE 1 TABLET TWICE DAILY    Irritable bowel syndrome with diarrhea       FLUoxetine 20 MG capsule    PROzac    90 capsule    TAKE 1 CAPSULE EVERY DAY    Anxiety       * HOMEOPATHIC PRODUCTS EX      Drainage tone for swollen glands, sinus congestion and skin eruptions.        * HOMEOPATHIC PRODUCTS EX      Neuro-chord for numbness, irritability, and tingling.        LYRICA PO      Take 50 mg by mouth 3 times daily        medroxyPROGESTERone 10 MG tablet    PROVERA    135 tablet    TAKE 1 AND 1/2 TABLETS EVERY DAY    Dysmenorrhea       MICROKLENZ WOUND CLEANSER Liqd     240 mL    Externally apply topically daily For daily wound cares.    Ulcer of heel and midfoot, left, with fat layer exposed (H), Ingrowing nail       * order for DME     1 Units    Equipment being ordered:  walking boot left    Tibia/fibula fracture, left, closed, initial encounter       * order for DME     2 Units    Equipment being ordered: Anchoring Device Flexi-Track LG    Neurogenic bladder       * order for DME     1 Units    Equipment being ordered: Insertion Tray Wheeler w/BZK Swab Catheter 10CC, Sterile    Neurogenic bladder       * order for DME     2 Units    Equipment being ordered: Drain Bag, Kenguard 2000ml. Urinary Bag with Anti-Refulx    Neurogenic bladder       * order for DME     1 Units    Equipment being ordered: Catheter Wheeler 2Way 30cc 18FR, Silicone Coated Latex    Neurogenic bladder       oxybutynin 5 MG tablet    DITROPAN    180 tablet    TAKE 1 TABLET TWICE DAILY    Neurogenic bladder       * Notice:  This list has 7 medication(s) that are the same as other medications prescribed for you. Read the directions carefully, and ask your doctor or other care provider to review them with you.

## 2018-09-27 ENCOUNTER — TELEPHONE (OUTPATIENT)
Dept: PODIATRY | Facility: CLINIC | Age: 48
End: 2018-09-27

## 2018-09-27 NOTE — TELEPHONE ENCOUNTER
[facility] Call Center    Phone Message    May a detailed message be left on voicemail: yes    Reason for Call: Patient is unable to come to Tuesday appt, called to cancel. States she needs to be seen next week on either Monday or Thursday. Please call to discuss.     Action Taken: Message routed to:  Adult Clinics: Podiatry p [phone]

## 2018-10-04 ENCOUNTER — OFFICE VISIT (OUTPATIENT)
Dept: PODIATRY | Facility: CLINIC | Age: 48
End: 2018-10-04
Payer: COMMERCIAL

## 2018-10-04 VITALS — DIASTOLIC BLOOD PRESSURE: 78 MMHG | SYSTOLIC BLOOD PRESSURE: 124 MMHG | OXYGEN SATURATION: 96 % | HEART RATE: 90 BPM

## 2018-10-04 DIAGNOSIS — L97.422 ULCER OF LEFT HEEL AND MIDFOOT WITH FAT LAYER EXPOSED (H): Primary | ICD-10-CM

## 2018-10-04 PROCEDURE — 99212 OFFICE O/P EST SF 10 MIN: CPT | Performed by: PODIATRIST

## 2018-10-04 NOTE — NURSING NOTE
Priyanka Martinez's chief complaint for this visit includes:  Chief Complaint   Patient presents with     RECHECK     apligraf follow up     PCP: Ayala Limon    Referring Provider:  No referring provider defined for this encounter.    /78  Pulse 90  SpO2 96%  Data Unavailable     Do you need any medication refills at today's visit? no

## 2018-10-04 NOTE — MR AVS SNAPSHOT
After Visit Summary   10/4/2018    Priyanka Martinez    MRN: [id number]           Patient Information     Date Of Birth          1970        Visit Information        Provider Department      10/4/2018 7:30 AM Yobany Rjoas DPM [facility]        Today's Diagnoses     Ulcer of left heel and midfoot with fat layer exposed (H)    -  1      Care Instructions    Thanks for coming today.  Ortho/Sports Medicine Clinic  [address] [place] [address]    To schedule future appointments in Ortho Clinic, you may call [phone].    To schedule ordered imaging by your provider:   Call Central Imaging [phone]    To schedule an injection ordered by your provider:  Call Central Imaging Injection scheduling line: [phone]  Aasonnhart available online at:  [email or web address]    Please call if any further questions or concerns ([phone]).  Clinic hours 8 am to 5 pm.    Return to clinic (call) if symptoms worsen or fail to improve.            Follow-ups after your visit        Your next 10 appointments already scheduled     Oct 17, 2018  8:30 AM CDT   Return Visit with Yobany Rojas DPM   [facility] ([facility]    [address] [place] [address] [phone]              Who to contact     If you have questions or need follow up information about today's clinic visit or your schedule please contact [facility] directly at [phone].  Normal or non-critical lab and imaging results will be communicated to you by MyChart, letter or phone within 4 business days after the clinic has received the results. If you do not hear from us within 7 days, please contact the clinic through Aasonnhart or phone. If you have a critical or abnormal lab result, we will notify you by phone as soon as possible.  Submit refill requests through Foremost or call your pharmacy and they will forward the refill  request to us. Please allow 3 business days for your refill to be completed.          Additional Information About Your Visit        Every1MobileharPlaySight Information     [employer] gives you secure access to your electronic health record. If you see a primary care provider, you can also send messages to your care team and make appointments. If you have questions, please call your primary care clinic.  If you do not have a primary care provider, please call [phone] and they will assist you.      [employer] is an electronic gateway that provides easy, online access to your medical records. With [employer], you can request a clinic appointment, read your test results, renew a prescription or communicate with your care team.     To access your existing account, please contact your [facility] or call [phone] for assistance.        Care EveryWhere ID     This is your Care EveryWhere ID. This could be used by other organizations to access your [place] medical records  [id number]        Your Vitals Were     Pulse Pulse Oximetry                90 96%           Blood Pressure from Last 3 Encounters:   10/04/18 124/78   09/25/18 144/82   09/11/18 114/75    Weight from Last 3 Encounters:   12/17/16 70.3 kg (155 lb)   12/15/16 68 kg (150 lb)   11/14/16 68 kg (150 lb)              Today, you had the following     No orders found for display       Primary Care Provider Office Phone # Fax #    Ayala Limon MD PhD [phone]       [address]  [facility] [address]        Equal Access to Services     [facility] : Hadii aad ku hadasho Soomaali, waaxda luqadaha, qaybta kaalmada adeegyada, waxay surjit mccoy . So [facility] [phone].    ATENCIÓN: Si habla español, tiene a luu disposición servicios gratuitos de asistencia lingüística. Llduane al [phone].    We comply with applicable federal civil rights laws and [place] laws. We do not discriminate on the basis of race,  color, national origin, age, disability, sex, sexual orientation, or gender identity.            Thank you!     Thank you for choosing [facility]  for your care. Our goal is always to provide you with excellent care. Hearing back from our patients is one way we can continue to improve our services. Please take a few minutes to complete the written survey that you may receive in the mail after your visit with us. Thank you!             Your Updated Medication List - Protect others around you: Learn how to safely use, store and throw away your medicines at www.disposemymeds.org.          This list is accurate as of 10/4/18 12:30 PM.  Always use your most recent med list.                   Brand Name Dispense Instructions for use Diagnosis    BACLOFEN PO      Take by mouth daily        Catheters Misc     1 each    1 catheter every 28 days.    Atonic neurogenic bladder       ciprofloxacin 500 MG tablet    CIPRO    14 tablet    Take 1 tablet (500 mg) by mouth 2 times daily    Left flank pain, Suprapubic catheter (H)       dicyclomine 20 MG tablet    BENTYL    180 tablet    TAKE 1 TABLET TWICE DAILY    Irritable bowel syndrome with diarrhea       FLUoxetine 20 MG capsule    PROzac    90 capsule    TAKE 1 CAPSULE EVERY DAY    Anxiety       * HOMEOPATHIC PRODUCTS EX      Drainage tone for swollen glands, sinus congestion and skin eruptions.        * HOMEOPATHIC PRODUCTS EX      Neuro-chord for numbness, irritability, and tingling.        LYRICA PO      Take 50 mg by mouth 3 times daily        medroxyPROGESTERone 10 MG tablet    PROVERA    135 tablet    TAKE 1 AND 1/2 TABLETS EVERY DAY    Dysmenorrhea       MICROKLENZ WOUND CLEANSER Liqd     240 mL    Externally apply topically daily For daily wound cares.    Ulcer of heel and midfoot, left, with fat layer exposed (H), Ingrowing nail       * order for DME     1 Units    Equipment being ordered: walking boot left    Tibia/fibula fracture, left, closed,  initial encounter       * order for DME     2 Units    Equipment being ordered: Anchoring Device Flexi-Track LG    Neurogenic bladder       * order for DME     1 Units    Equipment being ordered: Insertion Tray Wheeler w/BZK Swab Catheter 10CC, Sterile    Neurogenic bladder       * order for DME     2 Units    Equipment being ordered: Drain Bag, Kenguard 2000ml. Urinary Bag with Anti-Refulx    Neurogenic bladder       * order for DME     1 Units    Equipment being ordered: Catheter Wheeler 2Way 30cc 18FR, Silicone Coated Latex    Neurogenic bladder       oxybutynin 5 MG tablet    DITROPAN    180 tablet    TAKE 1 TABLET TWICE DAILY    Neurogenic bladder       * Notice:  This list has 7 medication(s) that are the same as other medications prescribed for you. Read the directions carefully, and ask your doctor or other care provider to review them with you.
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
## 2018-10-04 NOTE — PATIENT INSTRUCTIONS
Thanks for coming today.  Ortho/Sports Medicine Clinic  [address] [place] [address]    To schedule future appointments in Ortho Clinic, you may call [phone].    To schedule ordered imaging by your provider:   Call Central Imaging [phone]    To schedule an injection ordered by your provider:  Call Central Imaging Injection scheduling line: [phone]  [employer] Pharm[employer]art available online at:  [email or web address]    Please call if any further questions or concerns ([phone]).  Clinic hours 8 am to 5 pm.    Return to clinic (call) if symptoms worsen or fail to improve.

## 2018-10-17 ENCOUNTER — OFFICE VISIT (OUTPATIENT)
Dept: PODIATRY | Facility: CLINIC | Age: 48
End: 2018-10-17
Payer: COMMERCIAL

## 2018-10-17 VITALS — HEART RATE: 96 BPM | DIASTOLIC BLOOD PRESSURE: 89 MMHG | OXYGEN SATURATION: 98 % | SYSTOLIC BLOOD PRESSURE: 147 MMHG

## 2018-10-17 DIAGNOSIS — L97.422 ULCER OF LEFT HEEL AND MIDFOOT WITH FAT LAYER EXPOSED (H): Primary | ICD-10-CM

## 2018-10-17 PROCEDURE — 99212 OFFICE O/P EST SF 10 MIN: CPT | Performed by: PODIATRIST
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
## 2018-10-17 NOTE — MR AVS SNAPSHOT
After Visit Summary   10/17/2018    Priyanka Martinez    MRN: [id number]           Patient Information     Date Of Birth          1970        Visit Information        Provider Department      10/17/2018 8:30 AM Yobany Rojas DPM [facility]        Today's Diagnoses     Ulcer of left heel and midfoot with fat layer exposed (H)    -  1       Follow-ups after your visit        Your next 10 appointments already scheduled     Oct 24, 2018  7:00 AM CDT   Return Visit with Yobany Rojas DPM   [facility] ([facility])    [id number] [place] [address] [phone]              Who to contact     If you have questions or need follow up information about today's clinic visit or your schedule please contact [facility] directly at [phone].  Normal or non-critical lab and imaging results will be communicated to you by StarGreetzhart, letter or phone within 4 business days after the clinic has received the results. If you do not hear from us within 7 days, please contact the clinic through StarGreetzhart or phone. If you have a critical or abnormal lab result, we will notify you by phone as soon as possible.  Submit refill requests through Netac or call your pharmacy and they will forward the refill request to us. Please allow 3 business days for your refill to be completed.          Additional Information About Your Visit        MyChart Information     Netac gives you secure access to your electronic health record. If you see a primary care provider, you can also send messages to your care team and make appointments. If you have questions, please call your primary care clinic.  If you do not have a primary care provider, please call [phone] and they will assist you.      Netac is an electronic gateway that provides easy, online access to your medical records. With Netac, you can request a clinic appointment, read  your test results, renew a prescription or communicate with your care team.     To access your existing account, please contact your [facility] or call [phone] for assistance.        Care EveryWhere ID     This is your Care EveryWhere ID. This could be used by other organizations to access your [facility] medical records  [id number]        Your Vitals Were     Pulse Pulse Oximetry                96 98%           Blood Pressure from Last 3 Encounters:   10/17/18 147/89   10/04/18 124/78   09/25/18 144/82    Weight from Last 3 Encounters:   12/17/16 70.3 kg (155 lb)   12/15/16 68 kg (150 lb)   11/14/16 68 kg (150 lb)              Today, you had the following     No orders found for display       Primary Care Provider Office Phone # Fax #    Ayala Limon MD PhD [phone]       [address]  [facility] [address]        Equal Access to Services     [facility] : Hadii aad ku hadasho Soomaali, waaxda luqadaha, qaybta kaalmada adeegyada, waxay antonellain haydarlinn hermila mccoy . So [facility] [phone].    ATENCIÓN: Si habla español, tiene a luu disposición servicios gratuitos de asistencia lingüística. Llame al [phone].    We comply with applicable federal civil rights laws and [place] laws. We do not discriminate on the basis of race, color, national origin, age, disability, sex, sexual orientation, or gender identity.            Thank you!     Thank you for choosing [facility]  for your care. Our goal is always to provide you with excellent care. Hearing back from our patients is one way we can continue to improve our services. Please take a few minutes to complete the written survey that you may receive in the mail after your visit with us. Thank you!             Your Updated Medication List - Protect others around you: Learn how to safely use, store and throw away your medicines at www.disposemymeds.org.          This list is accurate as of  10/17/18 12:38 PM.  Always use your most recent med list.                   Brand Name Dispense Instructions for use Diagnosis    BACLOFEN PO      Take by mouth daily        Catheters Misc     1 each    1 catheter every 28 days.    Atonic neurogenic bladder       ciprofloxacin 500 MG tablet    CIPRO    14 tablet    Take 1 tablet (500 mg) by mouth 2 times daily    Left flank pain, Suprapubic catheter (H)       dicyclomine 20 MG tablet    BENTYL    180 tablet    TAKE 1 TABLET TWICE DAILY    Irritable bowel syndrome with diarrhea       FLUoxetine 20 MG capsule    PROzac    90 capsule    TAKE 1 CAPSULE EVERY DAY    Anxiety       * HOMEOPATHIC PRODUCTS EX      Drainage tone for swollen glands, sinus congestion and skin eruptions.        * HOMEOPATHIC PRODUCTS EX      Neuro-chord for numbness, irritability, and tingling.        LYRICA PO      Take 50 mg by mouth 3 times daily        medroxyPROGESTERone 10 MG tablet    PROVERA    135 tablet    TAKE 1 AND 1/2 TABLETS EVERY DAY    Dysmenorrhea       MICROKLENZ WOUND CLEANSER Liqd     240 mL    Externally apply topically daily For daily wound cares.    Ulcer of heel and midfoot, left, with fat layer exposed (H), Ingrowing nail       * order for DME     1 Units    Equipment being ordered: walking boot left    Tibia/fibula fracture, left, closed, initial encounter       * order for DME     2 Units    Equipment being ordered: Anchoring Device Flexi-Track LG    Neurogenic bladder       * order for DME     1 Units    Equipment being ordered: Insertion Tray Wheeler w/BZK Swab Catheter 10CC, Sterile    Neurogenic bladder       * order for DME     2 Units    Equipment being ordered: Drain Bag, Kenguard 2000ml. Urinary Bag with Anti-Refulx    Neurogenic bladder       * order for DME     1 Units    Equipment being ordered: Catheter Wheeler 2Way 30cc 18FR, Silicone Coated Latex    Neurogenic bladder       oxybutynin 5 MG tablet    DITROPAN    180 tablet    TAKE 1 TABLET TWICE DAILY     Neurogenic bladder       * Notice:  This list has 7 medication(s) that are the same as other medications prescribed for you. Read the directions carefully, and ask your doctor or other care provider to review them with you.

## 2018-10-17 NOTE — NURSING NOTE
Priyanka Martinez's chief complaint for this visit includes:  Chief Complaint   Patient presents with     RECHECK     left foot ulcer     PCP: Ayala Limon    Referring Provider:  No referring provider defined for this encounter.    /89  Pulse 96  SpO2 98%  Data Unavailable     Do you need any medication refills at today's visit? no
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
## 2018-10-24 ENCOUNTER — OFFICE VISIT (OUTPATIENT)
Dept: PODIATRY | Facility: CLINIC | Age: 48
End: 2018-10-24
Payer: COMMERCIAL

## 2018-10-24 ENCOUNTER — TRANSFERRED RECORDS (OUTPATIENT)
Dept: HEALTH INFORMATION MANAGEMENT | Facility: CLINIC | Age: 48
End: 2018-10-24

## 2018-10-24 VITALS — DIASTOLIC BLOOD PRESSURE: 64 MMHG | HEART RATE: 103 BPM | OXYGEN SATURATION: 96 % | SYSTOLIC BLOOD PRESSURE: 104 MMHG

## 2018-10-24 DIAGNOSIS — L97.422 ULCER OF LEFT HEEL AND MIDFOOT WITH FAT LAYER EXPOSED (H): Primary | ICD-10-CM

## 2018-10-24 PROCEDURE — 15275 SKIN SUB GRAFT FACE/NK/HF/G: CPT | Performed by: PODIATRIST

## 2018-10-24 NOTE — MR AVS SNAPSHOT
After Visit Summary   10/24/2018    Priyanka Martinez    MRN: [id number]           Patient Information     Date Of Birth          1970        Visit Information        Provider Department      10/24/2018 7:00 AM Yobany Rojas DPM [facility]        Today's Diagnoses     Ulcer of left heel and midfoot with fat layer exposed (H)    -  1      Care Instructions    Thanks for coming today.  Ortho/Sports Medicine Clinic  [address] [place] [address]    To schedule future appointments in Ortho Clinic, you may call [phone].    To schedule ordered imaging by your provider:   Call Central Imaging [phone]    To schedule an injection ordered by your provider:  Call Central Imaging Injection scheduling line: [phone]  [employer] Lightwavet available online at:  [email or web address]    Please call if any further questions or concerns ([phone]).  Clinic hours 8 am to 5 pm.    Return to clinic (call) if symptoms worsen or fail to improve.            Follow-ups after your visit        Your next 10 appointments already scheduled     2018  7:00 AM CST   Return Visit with Yobany Rojas DPM   [facility] ([facility])    [address] [place] [address] [phone]              Who to contact     If you have questions or need follow up information about today's clinic visit or your schedule please contact [facility] directly at [phone].  Normal or non-critical lab and imaging results will be communicated to you by MyChart, letter or phone within 4 business days after the clinic has received the results. If you do not hear from us within 7 days, please contact the clinic through [employer] China Life Insurancehart or phone. If you have a critical or abnormal lab result, we will notify you by phone as soon as possible.  Submit refill requests through PSS Systems or call your pharmacy and they will forward the  refill request to us. Please allow 3 business days for your refill to be completed.          Additional Information About Your Visit        [employer] Information     Wiziva gives you secure access to your electronic health record. If you see a primary care provider, you can also send messages to your care team and make appointments. If you have questions, please call your primary care clinic.  If you do not have a primary care provider, please call [phone] and they will assist you.      Wiziva is an electronic gateway that provides easy, online access to your medical records. With Wiziva, you can request a clinic appointment, read your test results, renew a prescription or communicate with your care team.     To access your existing account, please contact your [facility] or call [phone] for assistance.        Care EveryWhere ID     This is your Care EveryWhere ID. This could be used by other organizations to access your [facility] medical records  [id number]        Your Vitals Were     Pulse Pulse Oximetry                103 96%           Blood Pressure from Last 3 Encounters:   10/24/18 104/64   10/17/18 147/89   10/04/18 124/78    Weight from Last 3 Encounters:   12/17/16 70.3 kg (155 lb)   12/15/16 68 kg (150 lb)   11/14/16 68 kg (150 lb)              We Performed the Following     HC SKIN SUB GRAFT F/S/N/H/F/G/M/D /<100SCM /<1ST 25 SCM     TISSUE APLIGRAF SUPPLY, PER SQ CM        Primary Care Provider Office Phone # Fax #    Ayala Limon MD PhD [phone]       [address]  [facility] [address]        Equal Access to Services     [facility]: Hadii aad ku hadasho Soomaali, waaxda luqadaha, qaybta kaalmada olga, ronnie mccoy . So [facility] [phone].    ATENCIÓN: Si habla español, tiene a luu disposición servicios gratuitos de asistencia lingüística. Llame al [phone].    We comply with applicable federal civil rights  laws and [place] laws. We do not discriminate on the basis of race, color, national origin, age, disability, sex, sexual orientation, or gender identity.            Thank you!     Thank you for choosing [facility]  for your care. Our goal is always to provide you with excellent care. Hearing back from our patients is one way we can continue to improve our services. Please take a few minutes to complete the written survey that you may receive in the mail after your visit with us. Thank you!             Your Updated Medication List - Protect others around you: Learn how to safely use, store and throw away your medicines at www.disposemymeds.org.          This list is accurate as of 10/24/18  7:34 AM.  Always use your most recent med list.                   Brand Name Dispense Instructions for use Diagnosis    BACLOFEN PO      Take by mouth daily        Catheters Misc     1 each    1 catheter every 28 days.    Atonic neurogenic bladder       ciprofloxacin 500 MG tablet    CIPRO    14 tablet    Take 1 tablet (500 mg) by mouth 2 times daily    Left flank pain, Suprapubic catheter (H)       dicyclomine 20 MG tablet    BENTYL    180 tablet    TAKE 1 TABLET TWICE DAILY    Irritable bowel syndrome with diarrhea       FLUoxetine 20 MG capsule    PROzac    90 capsule    TAKE 1 CAPSULE EVERY DAY    Anxiety       * HOMEOPATHIC PRODUCTS EX      Drainage tone for swollen glands, sinus congestion and skin eruptions.        * HOMEOPATHIC PRODUCTS EX      Neuro-chord for numbness, irritability, and tingling.        LYRICA PO      Take 50 mg by mouth 3 times daily        medroxyPROGESTERone 10 MG tablet    PROVERA    135 tablet    TAKE 1 AND 1/2 TABLETS EVERY DAY    Dysmenorrhea       MICROKLENZ WOUND CLEANSER Liqd     240 mL    Externally apply topically daily For daily wound cares.    Ulcer of heel and midfoot, left, with fat layer exposed (H), Ingrowing nail       * order for DME     1 Units    Equipment being  ordered: walking boot left    Tibia/fibula fracture, left, closed, initial encounter       * order for DME     2 Units    Equipment being ordered: Anchoring Device Flexi-Track LG    Neurogenic bladder       * order for DME     1 Units    Equipment being ordered: Insertion Tray Wheeler w/BZK Swab Catheter 10CC, Sterile    Neurogenic bladder       * order for DME     2 Units    Equipment being ordered: Drain Bag, Kenguard 2000ml. Urinary Bag with Anti-Refulx    Neurogenic bladder       * order for DME     1 Units    Equipment being ordered: Catheter Wheeler 2Way 30cc 18FR, Silicone Coated Latex    Neurogenic bladder       oxybutynin 5 MG tablet    DITROPAN    180 tablet    TAKE 1 TABLET TWICE DAILY    Neurogenic bladder       * Notice:  This list has 7 medication(s) that are the same as other medications prescribed for you. Read the directions carefully, and ask your doctor or other care provider to review them with you.

## 2018-10-24 NOTE — PATIENT INSTRUCTIONS
Thanks for coming today.  Ortho/Sports Medicine Clinic  [address] [place] [address]    To schedule future appointments in Ortho Clinic, you may call [phone].    To schedule ordered imaging by your provider:   Call Central Imaging [phone]    To schedule an injection ordered by your provider:  Call Central Imaging Injection scheduling line: [phone]  Rhythmia Medicalhart available online at:  [email or web address]    Please call if any further questions or concerns ([phone]).  Clinic hours 8 am to 5 pm.    Return to clinic (call) if symptoms worsen or fail to improve.
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
## 2018-10-24 NOTE — NURSING NOTE
Priyanka Martinez's chief complaint for this visit includes:  Chief Complaint   Patient presents with     RECHECK     left foot follow up      PCP: Ayala Limon    Referring Provider:  No referring provider defined for this encounter.    /64  Pulse 103  SpO2 96%  Data Unavailable     Do you need any medication refills at today's visit? No
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
## 2018-11-01 ENCOUNTER — HOSPITAL ENCOUNTER (OUTPATIENT)
Dept: WOUND CARE | Facility: CLINIC | Age: 48
Discharge: HOME | End: 2018-11-01
Attending: PHYSICIAN ASSISTANT | Admitting: PHYSICIAN ASSISTANT
Payer: COMMERCIAL

## 2018-11-01 DIAGNOSIS — L89.324 PRESSURE ULCER OF ISCHIUM, LEFT, STAGE IV (H): ICD-10-CM

## 2018-11-01 DIAGNOSIS — L89.324 PRESSURE INJURY OF LEFT ISCHIUM, STAGE 4 (H): Primary | ICD-10-CM

## 2018-11-01 PROCEDURE — G0463 HOSPITAL OUTPT CLINIC VISIT: HCPCS | Mod: 25

## 2018-11-01 PROCEDURE — 11042 DBRDMT SUBQ TIS 1ST 20SQCM/<: CPT

## 2018-11-01 PROCEDURE — 11042 DBRDMT SUBQ TIS 1ST 20SQCM/<: CPT | Performed by: PHYSICIAN ASSISTANT

## 2018-11-01 PROCEDURE — 99203 OFFICE O/P NEW LOW 30 MIN: CPT | Mod: 25 | Performed by: PHYSICIAN ASSISTANT
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
## 2018-11-01 NOTE — PROGRESS NOTES
Patient arrived for wound care visit. Certified Wound Care Nurse time spent evaluating patient record, completed a full evaluation and documented wound(s) & becki-wound skin; provided recommendation based on treatment plan. Applied dressing, reviewed discharge instructions, patient education, and discussed plan of care with appropriate medical team staff members and patient and/or family members.

## 2018-11-07 ENCOUNTER — OFFICE VISIT (OUTPATIENT)
Dept: PODIATRY | Facility: CLINIC | Age: 48
End: 2018-11-07
Payer: COMMERCIAL

## 2018-11-07 ENCOUNTER — OFFICE VISIT (OUTPATIENT)
Dept: PEDIATRICS | Facility: CLINIC | Age: 48
End: 2018-11-07
Payer: COMMERCIAL

## 2018-11-07 ENCOUNTER — MYC MEDICAL ADVICE (OUTPATIENT)
Dept: PEDIATRICS | Facility: CLINIC | Age: 48
End: 2018-11-07

## 2018-11-07 VITALS — DIASTOLIC BLOOD PRESSURE: 65 MMHG | SYSTOLIC BLOOD PRESSURE: 108 MMHG | HEART RATE: 95 BPM

## 2018-11-07 VITALS
OXYGEN SATURATION: 99 % | SYSTOLIC BLOOD PRESSURE: 108 MMHG | TEMPERATURE: 96.6 F | HEART RATE: 95 BPM | DIASTOLIC BLOOD PRESSURE: 65 MMHG

## 2018-11-07 DIAGNOSIS — Z23 FLU VACCINE NEED: ICD-10-CM

## 2018-11-07 DIAGNOSIS — L97.422 ULCER OF LEFT HEEL AND MIDFOOT WITH FAT LAYER EXPOSED (H): Primary | ICD-10-CM

## 2018-11-07 DIAGNOSIS — F41.9 ANXIETY: ICD-10-CM

## 2018-11-07 DIAGNOSIS — R61 ABNORMAL FLUSHING AND SWEATING: Primary | ICD-10-CM

## 2018-11-07 DIAGNOSIS — D72.825 BANDEMIA: ICD-10-CM

## 2018-11-07 DIAGNOSIS — Z11.4 SCREENING FOR HIV (HUMAN IMMUNODEFICIENCY VIRUS): ICD-10-CM

## 2018-11-07 DIAGNOSIS — R23.2 ABNORMAL FLUSHING AND SWEATING: Primary | ICD-10-CM

## 2018-11-07 LAB
BASOPHILS # BLD AUTO: 0 10E9/L (ref 0–0.2)
BASOPHILS NFR BLD AUTO: 0.2 %
CRP SERPL-MCNC: 228 MG/L (ref 0–8)
DIFFERENTIAL METHOD BLD: ABNORMAL
EOSINOPHIL # BLD AUTO: 0.1 10E9/L (ref 0–0.7)
EOSINOPHIL NFR BLD AUTO: 0.5 %
ERYTHROCYTE [DISTWIDTH] IN BLOOD BY AUTOMATED COUNT: 13.2 % (ref 10–15)
HCT VFR BLD AUTO: 34.4 % (ref 35–47)
HGB BLD-MCNC: 10.6 G/DL (ref 11.7–15.7)
HIV 1+2 AB+HIV1 P24 AG SERPL QL IA: NONREACTIVE
IMM GRANULOCYTES # BLD: 0.1 10E9/L (ref 0–0.4)
IMM GRANULOCYTES NFR BLD: 0.8 %
LYMPHOCYTES # BLD AUTO: 1.2 10E9/L (ref 0.8–5.3)
LYMPHOCYTES NFR BLD AUTO: 7.6 %
MCH RBC QN AUTO: 26.6 PG (ref 26.5–33)
MCHC RBC AUTO-ENTMCNC: 30.8 G/DL (ref 31.5–36.5)
MCV RBC AUTO: 86 FL (ref 78–100)
MONOCYTES # BLD AUTO: 1 10E9/L (ref 0–1.3)
MONOCYTES NFR BLD AUTO: 6.4 %
NEUTROPHILS # BLD AUTO: 13.4 10E9/L (ref 1.6–8.3)
NEUTROPHILS NFR BLD AUTO: 84.5 %
PLATELET # BLD AUTO: 408 10E9/L (ref 150–450)
RBC # BLD AUTO: 3.99 10E12/L (ref 3.8–5.2)
TSH SERPL DL<=0.005 MIU/L-ACNC: 0.66 MU/L (ref 0.4–4)
WBC # BLD AUTO: 15.8 10E9/L (ref 4–11)

## 2018-11-07 PROCEDURE — 36415 COLL VENOUS BLD VENIPUNCTURE: CPT | Performed by: INTERNAL MEDICINE

## 2018-11-07 PROCEDURE — 99214 OFFICE O/P EST MOD 30 MIN: CPT | Mod: 25 | Performed by: INTERNAL MEDICINE

## 2018-11-07 PROCEDURE — 86140 C-REACTIVE PROTEIN: CPT | Performed by: INTERNAL MEDICINE

## 2018-11-07 PROCEDURE — 84443 ASSAY THYROID STIM HORMONE: CPT | Performed by: INTERNAL MEDICINE

## 2018-11-07 PROCEDURE — 87389 HIV-1 AG W/HIV-1&-2 AB AG IA: CPT | Performed by: INTERNAL MEDICINE

## 2018-11-07 PROCEDURE — 90471 IMMUNIZATION ADMIN: CPT | Performed by: INTERNAL MEDICINE

## 2018-11-07 PROCEDURE — 85025 COMPLETE CBC W/AUTO DIFF WBC: CPT | Performed by: INTERNAL MEDICINE

## 2018-11-07 PROCEDURE — 90686 IIV4 VACC NO PRSV 0.5 ML IM: CPT | Performed by: INTERNAL MEDICINE

## 2018-11-07 PROCEDURE — 99213 OFFICE O/P EST LOW 20 MIN: CPT | Performed by: PODIATRIST

## 2018-11-07 ASSESSMENT — PAIN SCALES - GENERAL: PAINLEVEL: NO PAIN (0)

## 2018-11-07 NOTE — PROGRESS NOTES
Dr. Rojas and Priyanka Carter saw me today for feeling hot/sweats and feeling sick for the last 2 weeks. Her WBC is elevated with neutrophilia. I'm adding a CRP. I have not seen her wounds. You've seen her recently. Based on your exams, if you have concern about wound infection/steomyelitis. I would appreciate your input. Thanks.    Ayala

## 2018-11-07 NOTE — PROGRESS NOTES
SUBJECTIVE:   Priyanka Martinez is a 47 year old female who presents to clinic today for the following health issues:      Medication Followup of all meds, also started having hot flashes about 2 weeks ago    Taking Medication as prescribed: yes    Side Effects:  None    Medication Helping Symptoms:  yes     Last 2 weeks experiencing episodic hot flashes, warm and flushed, and hot on the chest and face. When she is not ht, she then is cold. She feels sick to her stomach when she has the hot flashes. She does not feel like eating.     She has recently developed ulcer on the left heel/midfoot and stage 4 ulcer at the ischium. She is being treated by podiatry and [place] [facility]. She had a podiatry appointment earlier today to check the foot wound.     Denies fever, URI symptoms,  symptoms. Does not have a period, on Provera daily. Has not had missed dose.     ROS:  Constitutional, HEENT, cardiovascular, pulmonary, gi and gu systems are negative, except as otherwise noted.         Current Outpatient Prescriptions on File Prior to Visit:  BACLOFEN PO Take by mouth daily   dicyclomine (BENTYL) 20 MG tablet Take 1 tablet (20 mg) by mouth 2 times daily   FLUoxetine (PROZAC) 20 MG capsule TAKE 1 CAPSULE EVERY DAY   medroxyPROGESTERone (PROVERA) 10 MG tablet TAKE 1 AND 1/2 TABLETS EVERY DAY   oxybutynin (DITROPAN) 5 MG tablet Take 1 tablet (5 mg) by mouth 2 times daily   Pregabalin (LYRICA PO) Take 50 mg by mouth 3 times daily   Wound Cleansers (MICROKLENZ WOUND CLEANSER) LIQD Externally apply topically daily For daily wound cares.   Catheters MISC 1 catheter every 28 days.   HOMEOPATHIC PRODUCTS EX Drainage tone for swollen glands, sinus congestion and skin eruptions.   HOMEOPATHIC PRODUCTS EX Neuro-chord for numbness, irritability, and tingling.   order for DME Equipment being ordered: [employer] Phone [phone] Fax [phone] Dressing 8X8 Mesalt   Qty 15 sheetsSecondary Dressing nonsterile 4X4  gauze Qty 200 count loafSecondary Dressing 2 in medipore tape Qty 2 rollsLength of Need: 1 monthFrequency of dressing change: daily   order for DME Equipment being ordered: Anchoring Device Flexi-Track LG   order for DME Equipment being ordered: Insertion Tray Wheeler w/BZK Swab Catheter 10CC, Sterile   order for DME Equipment being ordered: Drain Bag, Kenguard 2000ml. Urinary Bag with Anti-Refulx   order for DME Equipment being ordered: Catheter Wheeler 2Way 30cc 18FR, Silicone Coated Latex   order for DME Equipment being ordered: walking boot left   [DISCONTINUED] oxybutynin (DITROPAN) 5 MG tablet Take 1 tablet by mouth 3 times daily.     No current facility-administered medications on file prior to visit.        Patient Active Problem List   Diagnosis     Paraplegia (H)     Suprapubic catheter (H)     Gastrointestinal problem     CARDIOVASCULAR SCREENING; LDL GOAL LESS THAN 160     Dysmenorrhea     Neuropathic pain of flank     Neurogenic bladder     Endometriosis     IBS (irritable bowel syndrome)     Generalized anxiety disorder     Pressure ulcer of ischium, left, stage IV (H)     Past Surgical History:   Procedure Laterality Date     C SPINE FUSION,POSTER,7-12 SGMTS  2004    From T10 to L5     CHOLECYSTECTOMY  1990's     COLONOSCOPY       COLONOSCOPY Left 11/25/2014    Procedure: COMBINED COLONOSCOPY, SINGLE OR MULTIPLE BIOPSY/POLYPECTOMY BY BIOPSY;  Surgeon: Junior Galeano MD;  Location:  GI     ESOPHAGOSCOPY, GASTROSCOPY, DUODENOSCOPY (EGD), COMBINED N/A 11/25/2014    Procedure: COMBINED ESOPHAGOSCOPY, GASTROSCOPY, DUODENOSCOPY (EGD), BIOPSY SINGLE OR MULTIPLE;  Surgeon: Junior Galeano MD;  Location:  GI       Social History   Substance Use Topics     Smoking status: Former Smoker     Quit date: 3/18/2012     Smokeless tobacco: Never Used      Comment: 7 cig a day off and on socially     Alcohol use No     Family History   Problem Relation Age of Onset     Hypertension Father      Heart  Failure Maternal Grandmother              Problem list, Medication list, Allergies, and Medical/Social/Surgical histories reviewed in [facility] and updated as appropriate.    OBJECTIVE:                                                    /65  Pulse 95  Temp 96.6  F (35.9  C) (Temporal)  SpO2 99%    GENERAL: healthy, alert and no distress        ASSESSMENT/PLAN:                                                      47 year old female with the following diagnoses and treatment plan:      ICD-10-CM    1. Abnormal flushing and sweating R23.2 CBC with platelets and differential    R61 TSH with free T4 reflex   2. Flu vaccine need Z23 HC FLU VAC PRESRV FREE QUAD SPLIT VIR 3+YRS IM   3. Screening for HIV (human immunodeficiency virus) Z11.4 HIV Antigen Antibody Combo   4. Anxiety F41.9        -- flushing/sweating/sick feeling: will obtain CBC and thyroid to rule out potential infectious cause and hormonal cause. If they are normal, may consider treating this as peimenopausal hot flashes. Options include OTC primrose oil, black cohosh, serotonin specific reuptake inhibitor. She is already on fluoxetine 20 mg for anxiety, we can increase this dose to see if this helps. I also thought to do UA but pt declined. She reported she knows her UTI symptoms and she does not have any.   -- updated flu vaccine.   -- will update her on the test results and recommendations.     Will call or return to clinic if worsening or symptoms not improving as discussed.  See Patient Instructions.      Ayala Limon MD-PhD  [facility]    (Note: Chart documentation was done in part with Dragon Voice Recognition software. Although reviewed after completion, some word and grammatical errors may remain.)

## 2018-11-07 NOTE — TELEPHONE ENCOUNTER
Notes Recorded by Ayala Limon MD PhD on 11/7/2018 at 11:36 AM  Please call patient: her WBC is elevated and the marker for inflammation CRP is very high. Dr. Rojas has low suspicion of the heel being infected but in light of the findings, ordered a foot xray. The wound care specialist had ordered MRI of the pelvis after her visit on 11/1/18. I would like her to get both the MRI and the foot xray done as soon as possible to look for source of infection. If possible, getting UA to cover all the basis. I ordered the UA.     Ayala Limon MD PhD           Ref Range & Units 8:32 AM   1yr ago   2yr ago        CRP Inflammation 0.0 - 8.0 mg/L 228.0 (H) <2.9 35.0 (H)     Resulting Agency  [facility]          Specimen Collected: 11/07/18  8:32 AM Last Resulted: 11/07/18 11:21 AM                                CBC with platelets and differential   Status:  Final result   Visible to patient:  Yes (MyChart) Dx:  Abnormal flushing and sweating Order: [id number]       Notes Recorded by Ayala Limon MD PhD on 11/7/2018 at 11:36 AM  Please call patient: her WBC is elevated and the marker for inflammation CRP is very high. Dr. Rojas has low suspicion of the heel being infected but in light of the findings, ordered a foot xray. The wound care specialist had ordered MRI of the pelvis after her visit on 11/1/18. I would like her to get both the MRI and the foot xray done as soon as possible to look for source of infection. If possible, getting UA to cover all the basis. I ordered the UA.     Ayala Limon MD PhD  ------    Notes Recorded by Ayala Limon MD PhD on 11/7/2018 at 10:41 AM  Dr. Rojas and Priyanka Carter saw me today for feeling hot/sweats and feeling sick for the last 2 weeks. Her WBC is elevated with neutrophilia. I'm adding a CRP. I have not seen her wounds. You've seen her recently. Based on your exams, if you have concern about wound infection/steomyelitis. I would appreciate your input. Thanks.    Ayala           Ref Range &  Units 8:32 AM  (11/7/18) 1yr ago  (5/3/17) 1yr ago  (3/28/17)      WBC 4.0 - 11.0 10e9/L 15.8 (H) 6.6 7.4      RBC Count 3.8 - 5.2 10e12/L 3.99 4.71 4.67      Hemoglobin 11.7 - 15.7 g/dL 10.6 (L) 14.0 13.8      Hematocrit 35.0 - 47.0 % 34.4 (L) 42.0 40.8      MCV 78 - 100 fl 86 89 87      MCH 26.5 - 33.0 pg 26.6 29.7 29.6      MCHC 31.5 - 36.5 g/dL 30.8 (L) 33.3 33.8      RDW 10.0 - 15.0 % 13.2 13.5 13.8      Platelet Count 150 - 450 10e9/L 408 284 270      Diff Method  Automated Method Automated Method Automated Method      % Neutrophils % 84.5 59.8 56.1      % Lymphocytes % 7.6 28.5 32.8      % Monocytes % 6.4 9.7 8.5      % Eosinophils % 0.5 1.5 2.2      % Basophils % 0.2 0.5 0.4      % Immature Granulocytes % 0.8        Absolute Neutrophil 1.6 - 8.3 10e9/L 13.4 (H) 4.0 4.1      Absolute Lymphocytes 0.8 - 5.3 10e9/L 1.2 1.9 2.4      Absolute Monocytes 0.0 - 1.3 10e9/L 1.0 0.6 0.6      Absolute Eosinophils 0.0 - 0.7 10e9/L 0.1 0.1 0.2      Absolute Basophils 0.0 - 0.2 10e9/L 0.0 0.0 0.0      Abs Immature Granulocytes 0 - 0.4 10e9/L 0.1       [facility]ing Agency  [facility]          Specimen Collected: 11/07/18  8:32 AM Last Resulted: 11/07/18  8:43 AM                                   Other Results from This Encounter         TSH with free T4 reflex   Status:  Final result   Visible to patient:  Yes (MyChart) Dx:  Abnormal flushing and sweating Order: [id number]       Notes Recorded by Ayala Limon MD PhD on 11/7/2018 at 10:41 AM  Dr. Rojas and Priyanka Carter saw me today for feeling hot/sweats and feeling sick for the last 2 weeks. Her WBC is elevated with neutrophilia. I'm adding a CRP. I have not seen her wounds. You've seen her recently. Based on your exams, if you have concern about wound infection/steomyelitis. I would appreciate your input. Thanks.    Ayala        Ref Range & Units 8:32 AM       TSH 0.40 - 4.00 mU/L 0.66     [facility]ing Agency  [facility]          Specimen Collected: 11/07/18  8:32 AM Last Resulted:  11/07/18  9:07 AM

## 2018-11-07 NOTE — MR AVS SNAPSHOT
After Visit Summary   11/7/2018    Priyanka Martinez    MRN: [id number]           Patient Information     Date Of Birth          1970        Visit Information        Provider Department      11/7/2018 8:30 AM Ayala Limon MD PhD [facility]        Today's Diagnoses     Abnormal flushing and sweating    -  1    Flu vaccine need        Screening for HIV (human immunodeficiency virus)        Anxiety          Care Instructions    Make appointment(s) for:   -- get labs today.           Follow-ups after your visit        Your next 10 appointments already scheduled     Nov 07, 2018  8:30 AM CST   Lester Long with Ayala Limon MD PhD   [facility] ([facility])    [address] [place] [id number] [phone]            Nov 08, 2018 12:30 PM CST   MR PELVIS BONE WO & W CONTRAST with MGMR1   [facility] ([facility])    [address] [place] [id number] [phone]           How do I prepare for my exam? (Food and drink instructions) **If you will be receiving sedation or general anesthesia, please see special notes below.**  How do I prepare for my exam? (Other instructions) Take your medicines as usual, unless your doctor tells you not to. You may or may not receive intravenous (IV) contrast for this exam pending the discretion of the Radiologist.  You do not need to do anything special to prepare.  **If you will be receiving sedation or general anesthesia, please see special notes below.**  What should I wear: The MRI machine uses a strong magnet. Please wear clothes without metal (snaps, zippers). A sweatsuit works well, or we may give you a hospital gown. Please remove any body piercings and hair extensions before you arrive. You will also remove watches, jewelry, hairpins, wallets, dentures, partial dental plates and hearing aids. You may wear contact lenses, and you may be able to wear  your rings. We have a safe place to keep your personal items, but it is safer to leave them at home.  How long does the exam take: Most tests take 30 to 60 minutes.  HOWEVER, IF YOUR DOCTOR PRESCRIBES ANESTHESIA please plan on spending four to five hours in the recovery room.  What should I bring:  Bring a list of your current medicines to your exam (including vitamins, minerals and over-the-counter drugs).  Do I need a :  **If you will be receiving sedation or general anesthesia, please see special notes below.**  What should I do after the exam: No Restrictions, You may resume normal activities.  What is this test: MRI (magnetic resonance imaging) uses a strong magnet and radio waves to look inside the body. An MRA (magnetic resonance angiogram) does the same thing, but it lets us look at your blood vessels. A computer turns the radio waves into pictures showing cross sections of the body, much like slices of bread. This helps us see any problems more clearly. You may receive fluid (called  contrast ) before or during your scan. The fluid helps us see the pictures better. We give the fluid through an IV (small needle in your arm).  Who should I call with questions:  Please call the Imaging Department at your exam site with any questions. Directions, parking instructions, and other information is available on our website, [email or web address].  How do I prepare if I m having sedation or anesthesia? **IMPORTANT** THE INSTRUCTIONS BELOW ARE ONLY FOR THOSE PATIENTS WHO HAVE BEEN TOLD THEY WILL RECEIVE SEDATION OR GENERAL ANESTHESIA DURING THEIR MRI PROCEDURE:  IF YOU WILL RECEIVE SEDATION (take medicine to help you relax during your exam): You must get the medicine from your doctor before you arrive. Bring the medicine to the exam. Do not take it at home. Arrive one hour early. Bring someone who can take you home after the test. Your medicine will make you sleepy. After the exam, you may not drive, take a bus or  take a taxi by yourself. No eating 8 hours before your exam. You may have clear liquids up until 4 hours before your exam. (Clear liquids include water, clear tea, black coffee and fruit juice without pulp.)  IF YOU WILL RECEIVE ANESTHESIA (be asleep for your exam): Arrive 1 1/2 hours early. Bring someone who can take you home after the test. You may not drive, take a bus or take a taxi by yourself. No eating 8 hours before your exam. You may have clear liquids up until 4 hours before your exam. (Clear liquids include water, clear tea, black coffee and fruit juice without pulp.)            Nov 13, 2018  8:15 AM CST   Return Visit with Za Carter PA-C   [facility] ([facility])    [address] [address]   [phone]            Dec 05, 2018  7:00 AM CST   [facility] Visit with Yobany Rojas DPM   [facility]    [id number] [place] [id number] [phone]              Who to contact     If you have questions or need follow up information about today's clinic visit or your schedule please contact [facility] directly at [phone].  Normal or non-critical lab and imaging results will be communicated to you by Selvzhart, letter or phone within 4 business days after the clinic has received the results. If you do not hear from us within 7 days, please contact the clinic through Selvzhart or phone. If you have a critical or abnormal lab result, we will notify you by phone as soon as possible.  Submit refill requests through [employer] or call your pharmacy and they will forward the refill request to us. Please allow 3 business days for your refill to be completed.          Additional Information About Your Visit        [employer] Information     [employer] gives you secure access to your electronic health record. If you see a primary care provider, you can also send  messages to your care team and make appointments. If you have questions, please call your primary care clinic.  If you do not have a primary care provider, please call [phone] and they will assist you.      Yakimbi is an electronic gateway that provides easy, online access to your medical records. With Yakimbi, you can request a clinic appointment, read your test results, renew a prescription or communicate with your care team.     To access your existing account, please contact your [facility] or call [phone] for assistance.        Care EveryWhere ID     This is your Care EveryWhere ID. This could be used by other organizations to access your [place] medical records  [id number]        Your Vitals Were     Pulse Temperature Pulse Oximetry             95 96.6  F (35.9  C) (Temporal) 99%          Blood Pressure from Last 3 Encounters:   11/07/18 108/65   11/07/18 108/65   10/24/18 104/64    Weight from Last 3 Encounters:   12/17/16 155 lb (70.3 kg)   12/15/16 150 lb (68 kg)   11/14/16 150 lb (68 kg)              We Performed the Following     CBC with platelets and differential     HC FLU VAC PRESRV FREE QUAD SPLIT VIR 3+YRS IM     HIV Antigen Antibody Combo     TSH with free T4 reflex        Primary Care Provider Office Phone # Fax #    Ayala Limon MD PhD [phone]       [address]  [facility] [address]        Equal Access to Services     [employer] : Hadii aad ku hadasho Sonoreenali, waaxda luqadaha, qaybta kaalmada adeemiliada, ronnie mccoy . So [facility] [phone].    ATENCIÓN: Si habla español, tiene a luu disposición servicios gratuitos de asistencia lingüística. Llame al [phone].    We comply with applicable federal civil rights laws and [place] laws. We do not discriminate on the basis of race, color, national origin, age, disability, sex, sexual orientation, or gender identity.            Thank you!     Thank you for  [facility]  for your care. Our goal is always to provide you with excellent care. Hearing back from our patients is one way we can continue to improve our services. Please take a few minutes to complete the written survey that you may receive in the mail after your visit with us. Thank you!             Your Updated Medication List - Protect others around you: Learn how to safely use, store and throw away your medicines at www.disposemymeds.org.          This list is accurate as of 11/7/18  8:28 AM.  Always use your most recent med list.                   Brand Name Dispense Instructions for use Diagnosis    BACLOFEN PO      Take by mouth daily        Catheters Misc     1 each    1 catheter every 28 days.    Atonic neurogenic bladder       dicyclomine 20 MG tablet    BENTYL    180 tablet    Take 1 tablet (20 mg) by mouth 2 times daily    Irritable bowel syndrome with diarrhea       FLUoxetine 20 MG capsule    PROzac    90 capsule    TAKE 1 CAPSULE EVERY DAY    Anxiety       * HOMEOPATHIC PRODUCTS EX      Drainage tone for swollen glands, sinus congestion and skin eruptions.        * HOMEOPATHIC PRODUCTS EX      Neuro-chord for numbness, irritability, and tingling.        LYRICA PO      Take 50 mg by mouth 3 times daily        medroxyPROGESTERone 10 MG tablet    PROVERA    135 tablet    TAKE 1 AND 1/2 TABLETS EVERY DAY    Dysmenorrhea       MICROKLENZ WOUND CLEANSER Liqd     240 mL    Externally apply topically daily For daily wound cares.    Ulcer of heel and midfoot, left, with fat layer exposed (H), Ingrowing nail       * order for DME     1 Units    Equipment being ordered: walking boot left    Tibia/fibula fracture, left, closed, initial encounter       * order for DME     2 Units    Equipment being ordered: Anchoring Device Flexi-Track LG    Neurogenic bladder       * order for DME     1 Units    Equipment being ordered: Insertion Tray Wheeler w/BZK Swab Catheter 10CC, Sterile     Neurogenic bladder       * order for DME     2 Units    Equipment being ordered: Drain Bag, Kenguard 2000ml. Urinary Bag with Anti-Refulx    Neurogenic bladder       * order for DME     1 Units    Equipment being ordered: Catheter Wheeler 2Way 30cc 18FR, Silicone Coated Latex    Neurogenic bladder       order for DME     30 days    Equipment being ordered: [facility] Phone [phone] Fax [phone]  Primary Dressing 8X8 Mesalt   Qty 15 sheets Secondary Dressing nonsterile 4X4 gauze Qty 200 count loaf Secondary Dressing 2 in medipore tape Qty 2 rolls Length of Need: 1 month Frequency of dressing change: daily    Pressure injury of left ischium, stage 4 (H)       oxybutynin 5 MG tablet    DITROPAN    180 tablet    Take 1 tablet (5 mg) by mouth 2 times daily    Neurogenic bladder       * Notice:  This list has 7 medication(s) that are the same as other medications prescribed for you. Read the directions carefully, and ask your doctor or other care provider to review them with you.

## 2018-11-07 NOTE — PROGRESS NOTES
Please call patient: her WBC is elevated and the marker for inflammation CRP is very high. Dr. Rojas has low suspicion of the heel being infected but in light of the findings, ordered a foot xray. The wound care specialist had ordered MRI of the pelvis after her visit on 11/1/18. I would like her to get both the MRI and the foot xray done as soon as possible to look for source of infection. If possible, getting UA to cover all the basis. I ordered the UA.     Ayala Limon MD PhD

## 2018-11-07 NOTE — NURSING NOTE
Priyanka Martinez's goals for this visit include:   Chief Complaint   Patient presents with     Left Foot - Follow Up For     RECHECK     Apligraf       She requests these members of her care team be copied on today's visit information: PCP    PCP: Ayala Limon    Referring Provider:  No referring provider defined for this encounter.    /65 (BP Location: Right arm, Patient Position: Chair, Cuff Size: Adult Regular)  Pulse 95    Do you need any medication refills at today's visit? None    Yvonne Estevez, DEISY

## 2018-11-07 NOTE — NURSING NOTE
Injectable Influenza Immunization Documentation    1.  Is the person to be vaccinated sick today?  No    2. Does the person to be vaccinated have an allergy to eggs or to a component of the vaccine?  No    3. Has the person to be vaccinated today ever had a serious reaction to influenza vaccine in the past?  No    4. Has the person to be vaccinated ever had Guillain-Ashburn syndrome?  No     Form completed by Maryanne EAGLE
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
## 2018-11-07 NOTE — TELEPHONE ENCOUNTER
Patient given lab results below.  She has already scheduled the MRI for tomorrow.  Transferred her to schedule the xray.  Informed patient she can have the urine done at any time, the order is in and I will place a lab appt.    Rosemary Majano RN,   [facility]
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
## 2018-11-07 NOTE — PATIENT INSTRUCTIONS
Thanks for coming today.  Ortho[facility] Medicine Clinic  [address] [place] [address]    To schedule future appointments in Ortho Clinic, you may call [phone].    To schedule ordered imaging by your provider:   Call Central Imaging [phone]    To schedule an injection ordered by your provider:  Call Central Imaging Injection scheduling line: [phone]  1-4 Allhart available online at:  [email or web address]    Please call if any further questions or concerns ([phone]).  Clinic hours 8 am to 5 pm.    Return to clinic (call) if symptoms worsen or fail to improve.

## 2018-11-07 NOTE — MR AVS SNAPSHOT
After Visit Summary   2018    Priyanka Martinez    MRN: [id number]           Patient Information     Date Of Birth          1970        Visit Information        Provider Department      2018 7:00 AM Yobany Rojas DPM [facility]        Today's Diagnoses     Ulcer of left heel and midfoot with fat layer exposed (H)    -  1      Care Instructions    Thanks for coming today.  Ortho[facility] Medicine Clinic  [address] [place] [id number]    To schedule future appointments in Ortho Clinic, you may call [phone].    To schedule ordered imaging by your provider:   Call Central Imaging [phone]    To schedule an injection ordered by your provider:  Call Central Imaging Injection scheduling line: [phone]  GuardiCore available online at:  [employer]    Please call if any further questions or concerns ([phone]).  Clinic hours 8 am to 5 pm.    Return to clinic (call) if symptoms worsen or fail to improve.            Follow-ups after your visit        Your next 10 appointments already scheduled     2018 12:30 PM CST   MR PELVIS BONE WO & W CONTRAST with MGMR1   [facility] ([facility])    [address] [place] [id number] [phone]           How do I prepare for my exam? (Food and drink instructions) **If you will be receiving sedation or general anesthesia, please see special notes below.**  How do I prepare for my exam? (Other instructions) Take your medicines as usual, unless your doctor tells you not to. You may or may not receive intravenous (IV) contrast for this exam pending the discretion of the Radiologist.  You do not need to do anything special to prepare.  **If you will be receiving sedation or general anesthesia, please see special notes below.**  What should I wear: The MRI machine uses a strong magnet. Please wear clothes without metal (snaps, zippers).  A sweatsuit works well, or we may give you a hospital gown. Please remove any body piercings and hair extensions before you arrive. You will also remove watches, jewelry, hairpins, wallets, dentures, partial dental plates and hearing aids. You may wear contact lenses, and you may be able to wear your rings. We have a safe place to keep your personal items, but it is safer to leave them at home.  How long does the exam take: Most tests take 30 to 60 minutes.  HOWEVER, IF YOUR DOCTOR PRESCRIBES ANESTHESIA please plan on spending four to five hours in the recovery room.  What should I bring:  Bring a list of your current medicines to your exam (including vitamins, minerals and over-the-counter drugs).  Do I need a :  **If you will be receiving sedation or general anesthesia, please see special notes below.**  What should I do after the exam: No Restrictions, You may resume normal activities.  What is this test: MRI (magnetic resonance imaging) uses a strong magnet and radio waves to look inside the body. An MRA (magnetic resonance angiogram) does the same thing, but it lets us look at your blood vessels. A computer turns the radio waves into pictures showing cross sections of the body, much like slices of bread. This helps us see any problems more clearly. You may receive fluid (called  contrast ) before or during your scan. The fluid helps us see the pictures better. We give the fluid through an IV (small needle in your arm).  Who should I call with questions:  Please call the Imaging Department at your exam site with any questions. Directions, parking instructions, and other information is available on our website, [email or web address].  How do I prepare if I m having sedation or anesthesia? **IMPORTANT** THE INSTRUCTIONS BELOW ARE ONLY FOR THOSE PATIENTS WHO HAVE BEEN TOLD THEY WILL RECEIVE SEDATION OR GENERAL ANESTHESIA DURING THEIR MRI PROCEDURE:  IF YOU WILL RECEIVE SEDATION (take medicine to help you relax  during your exam): You must get the medicine from your doctor before you arrive. Bring the medicine to the exam. Do not take it at home. Arrive one hour early. Bring someone who can take you home after the test. Your medicine will make you sleepy. After the exam, you may not drive, take a bus or take a taxi by yourself. No eating 8 hours before your exam. You may have clear liquids up until 4 hours before your exam. (Clear liquids include water, clear tea, black coffee and fruit juice without pulp.)  IF YOU WILL RECEIVE ANESTHESIA (be asleep for your exam): Arrive 1 1/2 hours early. Bring someone who can take you home after the test. You may not drive, take a bus or take a taxi by yourself. No eating 8 hours before your exam. You may have clear liquids up until 4 hours before your exam. (Clear liquids include water, clear tea, black coffee and fruit juice without pulp.)            Nov 13, 2018  8:15 AM CST   Return Visit with Za Carter PA-C   [facility] ([facility])    [address] [place] [id number]   [phone]            Dec 05, 2018  7:00 AM CST   Return Visit with Yobany Rojas DPM   [facility] ([facility])    [address] [place] [id number] [phone]              Future tests that were ordered for you today     Open Future Orders        Priority Expected Expires Ordered    *UA reflex to Microscopic and Culture ([place]; [facility]; [facility]; [facility]; [facility]; [facility]; [place]; [place]) Routine  11/7/2019 11/7/2018    XR Foot Left G/E 3 Views Routine 11/7/2018 11/7/2019 11/7/2018            Who to contact     If you have questions or need follow up information about today's clinic visit or your schedule please contact [facility] directly at [phone].  Normal or non-critical lab and imaging results will be communicated  to you by Swag[employer], letter or phone within 4 business days after the clinic has received the results. If you do not hear from us within 7 days, please contact the clinic through [employer] or phone. If you have a critical or abnormal lab result, we will notify you by phone as soon as possible.  Submit refill requests through [employer] or call your pharmacy and they will forward the refill request to us. Please allow 3 business days for your refill to be completed.          Additional Information About Your Visit        [employer] Information     [employer] gives you secure access to your electronic health record. If you see a primary care provider, you can also send messages to your care team and make appointments. If you have questions, please call your primary care clinic.  If you do not have a primary care provider, please call [phone] and they will assist you.      [employer] is an electronic gateway that provides easy, online access to your medical records. With [employer], you can request a clinic appointment, read your test results, renew a prescription or communicate with your care team.     To access your existing account, please contact your [facility] or call [phone] for assistance.        Care EveryWhere ID     This is your Care EveryWhere ID. This could be used by other organizations to access your [facility] medical records  [id number]        Your Vitals Were     Pulse                   95            Blood Pressure from Last 3 Encounters:   11/07/18 108/65   11/07/18 108/65   10/24/18 104/64    Weight from Last 3 Encounters:   12/17/16 70.3 kg (155 lb)   12/15/16 68 kg (150 lb)   11/14/16 68 kg (150 lb)               Primary Care Provider Office Phone # Fax #    Ayala Limon MD PhD [phone]       [address]  [facility] [address]        Equal Access to Services     MELY GAMBLE AH: reginald Bowden qaybta kaalmada adeegyada, waxay  surjit moralesalma bocanegra'aan ah. So [facility] [phone].    ATENCIÓN: Si lila victoria, tiene a luu disposición servicios gratuitos de asistencia lingüística. Rowan ashley [phone].    We comply with applicable federal civil rights laws and [place] laws. We do not discriminate on the basis of race, color, national origin, age, disability, sex, sexual orientation, or gender identity.            Thank you!     Thank you for choosing [facility]  for your care. Our goal is always to provide you with excellent care. Hearing back from our patients is one way we can continue to improve our services. Please take a few minutes to complete the written survey that you may receive in the mail after your visit with us. Thank you!             Your Updated Medication List - Protect others around you: Learn how to safely use, store and throw away your medicines at www.disposemymeds.org.          This list is accurate as of 11/7/18  1:13 PM.  Always use your most recent med list.                   Brand Name Dispense Instructions for use Diagnosis    BACLOFEN PO      Take by mouth daily        Catheters Misc     1 each    1 catheter every 28 days.    Atonic neurogenic bladder       dicyclomine 20 MG tablet    BENTYL    180 tablet    Take 1 tablet (20 mg) by mouth 2 times daily    Irritable bowel syndrome with diarrhea       FLUoxetine 20 MG capsule    PROzac    90 capsule    TAKE 1 CAPSULE EVERY DAY    Anxiety       * HOMEOPATHIC PRODUCTS EX      Drainage tone for swollen glands, sinus congestion and skin eruptions.        * HOMEOPATHIC PRODUCTS EX      Neuro-chord for numbness, irritability, and tingling.        LYRICA PO      Take 50 mg by mouth 3 times daily        medroxyPROGESTERone 10 MG tablet    PROVERA    135 tablet    TAKE 1 AND 1/2 TABLETS EVERY DAY    Dysmenorrhea       MICROKLENZ WOUND CLEANSER Liqd     240 mL    Externally apply topically daily For daily wound cares.    Ulcer of heel and midfoot,  left, with fat layer exposed (H), Ingrowing nail       * order for DME     1 Units    Equipment being ordered: walking boot left    Tibia/fibula fracture, left, closed, initial encounter       * order for DME     2 Units    Equipment being ordered: Anchoring Device Flexi-Track LG    Neurogenic bladder       * order for DME     1 Units    Equipment being ordered: Insertion Tray Wheeler w/BZK Swab Catheter 10CC, Sterile    Neurogenic bladder       * order for DME     2 Units    Equipment being ordered: Drain Bag, Kenguard 2000ml. Urinary Bag with Anti-Refulx    Neurogenic bladder       * order for DME     1 Units    Equipment being ordered: Catheter Wheeler 2Way 30cc 18FR, Silicone Coated Latex    Neurogenic bladder       order for DME     30 days    Equipment being ordered: [facility] Phone [phone] Fax [phone]  Primary Dressing 8X8 Mesalt   Qty 15 sheets Secondary Dressing nonsterile 4X4 gauze Qty 200 count loaf Secondary Dressing 2 in medipore tape Qty 2 rolls Length of Need: 1 month Frequency of dressing change: daily    Pressure injury of left ischium, stage 4 (H)       oxybutynin 5 MG tablet    DITROPAN    180 tablet    Take 1 tablet (5 mg) by mouth 2 times daily    Neurogenic bladder       * Notice:  This list has 7 medication(s) that are the same as other medications prescribed for you. Read the directions carefully, and ask your doctor or other care provider to review them with you.

## 2018-11-08 ENCOUNTER — RADIANT APPOINTMENT (OUTPATIENT)
Dept: GENERAL RADIOLOGY | Facility: CLINIC | Age: 48
End: 2018-11-08
Attending: PODIATRIST
Payer: COMMERCIAL

## 2018-11-08 ENCOUNTER — RADIANT APPOINTMENT (OUTPATIENT)
Dept: MRI IMAGING | Facility: CLINIC | Age: 48
End: 2018-11-08
Attending: PHYSICIAN ASSISTANT
Payer: COMMERCIAL

## 2018-11-08 DIAGNOSIS — L89.324 PRESSURE INJURY OF LEFT ISCHIUM, STAGE 4 (H): ICD-10-CM

## 2018-11-08 DIAGNOSIS — R82.90 ABNORMAL URINE FINDINGS: Primary | ICD-10-CM

## 2018-11-08 DIAGNOSIS — L97.422 ULCER OF LEFT HEEL AND MIDFOOT WITH FAT LAYER EXPOSED (H): ICD-10-CM

## 2018-11-08 DIAGNOSIS — D72.825 BANDEMIA: ICD-10-CM

## 2018-11-08 LAB
ALBUMIN UR-MCNC: NEGATIVE MG/DL
AMORPH CRY #/AREA URNS HPF: ABNORMAL /HPF
APPEARANCE UR: ABNORMAL
BACTERIA #/AREA URNS HPF: ABNORMAL /HPF
BILIRUB UR QL STRIP: NEGATIVE
COLOR UR AUTO: ABNORMAL
GLUCOSE UR STRIP-MCNC: NEGATIVE MG/DL
HGB UR QL STRIP: NEGATIVE
KETONES UR STRIP-MCNC: 40 MG/DL
LEUKOCYTE ESTERASE UR QL STRIP: ABNORMAL
NITRATE UR QL: POSITIVE
PH UR STRIP: 6.5 PH (ref 5–7)
RADIOLOGIST FLAGS: NORMAL
RBC #/AREA URNS AUTO: ABNORMAL /HPF
SOURCE: ABNORMAL
SP GR UR STRIP: 1.01 (ref 1–1.03)
UROBILINOGEN UR STRIP-MCNC: NORMAL MG/DL (ref 0–2)
WBC #/AREA URNS AUTO: ABNORMAL /HPF

## 2018-11-08 PROCEDURE — 87186 SC STD MICRODIL/AGAR DIL: CPT | Performed by: INTERNAL MEDICINE

## 2018-11-08 PROCEDURE — 73630 X-RAY EXAM OF FOOT: CPT | Mod: LT | Performed by: RADIOLOGY

## 2018-11-08 PROCEDURE — 87086 URINE CULTURE/COLONY COUNT: CPT | Performed by: INTERNAL MEDICINE

## 2018-11-08 PROCEDURE — 87088 URINE BACTERIA CULTURE: CPT | Performed by: INTERNAL MEDICINE

## 2018-11-08 PROCEDURE — 72197 MRI PELVIS W/O & W/DYE: CPT | Performed by: RADIOLOGY

## 2018-11-08 PROCEDURE — A9585 GADOBUTROL INJECTION: HCPCS | Performed by: PHYSICIAN ASSISTANT

## 2018-11-08 PROCEDURE — 81001 URINALYSIS AUTO W/SCOPE: CPT | Performed by: INTERNAL MEDICINE

## 2018-11-08 RX ORDER — GADOBUTROL 604.72 MG/ML
7.5 INJECTION INTRAVENOUS ONCE
Status: COMPLETED | OUTPATIENT
Start: 2018-11-08 | End: 2018-11-08

## 2018-11-08 RX ADMIN — GADOBUTROL 7.5 ML: 604.72 INJECTION INTRAVENOUS at 13:42

## 2018-11-08 NOTE — PROGRESS NOTES
Dear Priyanka,   Your recent result are within acceptable range or at baseline. Please continue with your current plan of care.       Please call or Mychart to our office if you have further questions.     Ayala Limon MD-PhD

## 2018-11-09 ENCOUNTER — HOSPITAL ENCOUNTER (INPATIENT)
Facility: CLINIC | Age: 48
LOS: 4 days | Discharge: HOME HEALTH | DRG: 854 | End: 2018-11-13
Attending: EMERGENCY MEDICINE | Admitting: INTERNAL MEDICINE
Payer: COMMERCIAL

## 2018-11-09 ENCOUNTER — TELEPHONE (OUTPATIENT)
Dept: PEDIATRICS | Facility: CLINIC | Age: 48
End: 2018-11-09

## 2018-11-09 DIAGNOSIS — A49.01 INFECTION DUE TO STAPHYLOCOCCUS AUREUS: ICD-10-CM

## 2018-11-09 DIAGNOSIS — L08.9 INFECTED WOUND: ICD-10-CM

## 2018-11-09 DIAGNOSIS — N39.0 URINARY TRACT INFECTION ASSOCIATED WITH CATHETERIZATION OF URINARY TRACT, UNSPECIFIED INDWELLING URINARY CATHETER TYPE, INITIAL ENCOUNTER (H): ICD-10-CM

## 2018-11-09 DIAGNOSIS — T14.8XXA INFECTED WOUND: ICD-10-CM

## 2018-11-09 DIAGNOSIS — E87.6 HYPOKALEMIA: ICD-10-CM

## 2018-11-09 DIAGNOSIS — G82.20 PARAPLEGIA (H): Primary | ICD-10-CM

## 2018-11-09 DIAGNOSIS — M86.9 OSTEOMYELITIS OF PELVIC REGION (H): ICD-10-CM

## 2018-11-09 DIAGNOSIS — M86.9 OSTEOMYELITIS, UNSPECIFIED SITE, UNSPECIFIED TYPE (H): ICD-10-CM

## 2018-11-09 DIAGNOSIS — L97.329 LOWER LIMB ULCER, ANKLE, LEFT, WITH UNSPECIFIED SEVERITY (H): ICD-10-CM

## 2018-11-09 DIAGNOSIS — A49.9 POLYMICROBIAL BACTERIAL INFECTION: ICD-10-CM

## 2018-11-09 DIAGNOSIS — L89.324 PRESSURE INJURY OF LEFT BUTTOCK, STAGE 4 (H): ICD-10-CM

## 2018-11-09 DIAGNOSIS — T83.511A URINARY TRACT INFECTION ASSOCIATED WITH CATHETERIZATION OF URINARY TRACT, UNSPECIFIED INDWELLING URINARY CATHETER TYPE, INITIAL ENCOUNTER (H): ICD-10-CM

## 2018-11-09 DIAGNOSIS — A41.9 SEPSIS, DUE TO UNSPECIFIED ORGANISM: ICD-10-CM

## 2018-11-09 LAB
ALBUMIN SERPL-MCNC: 2 G/DL (ref 3.4–5)
ALP SERPL-CCNC: 91 U/L (ref 40–150)
ALT SERPL W P-5'-P-CCNC: 66 U/L (ref 0–50)
ANION GAP SERPL CALCULATED.3IONS-SCNC: 6 MMOL/L (ref 3–14)
AST SERPL W P-5'-P-CCNC: 36 U/L (ref 0–45)
BASOPHILS # BLD AUTO: 0 10E9/L (ref 0–0.2)
BASOPHILS NFR BLD AUTO: 0.1 %
BILIRUB SERPL-MCNC: 0.1 MG/DL (ref 0.2–1.3)
BUN SERPL-MCNC: 4 MG/DL (ref 7–30)
CALCIUM SERPL-MCNC: 7.8 MG/DL (ref 8.5–10.1)
CHLORIDE SERPL-SCNC: 101 MMOL/L (ref 94–109)
CO2 SERPL-SCNC: 29 MMOL/L (ref 20–32)
CREAT SERPL-MCNC: 0.37 MG/DL (ref 0.52–1.04)
CRP SERPL-MCNC: 180 MG/L (ref 0–8)
DIFFERENTIAL METHOD BLD: ABNORMAL
EOSINOPHIL # BLD AUTO: 0.1 10E9/L (ref 0–0.7)
EOSINOPHIL NFR BLD AUTO: 0.4 %
ERYTHROCYTE [DISTWIDTH] IN BLOOD BY AUTOMATED COUNT: 13.7 % (ref 10–15)
GFR SERPL CREATININE-BSD FRML MDRD: >90 ML/MIN/1.7M2
GLUCOSE SERPL-MCNC: 91 MG/DL (ref 70–99)
HCT VFR BLD AUTO: 30.5 % (ref 35–47)
HGB BLD-MCNC: 9.6 G/DL (ref 11.7–15.7)
IMM GRANULOCYTES # BLD: 0.1 10E9/L (ref 0–0.4)
IMM GRANULOCYTES NFR BLD: 0.3 %
LACTATE BLD-SCNC: 1.1 MMOL/L (ref 0.7–2)
LIPASE SERPL-CCNC: 145 U/L (ref 73–393)
LYMPHOCYTES # BLD AUTO: 1.9 10E9/L (ref 0.8–5.3)
LYMPHOCYTES NFR BLD AUTO: 12.3 %
MCH RBC QN AUTO: 26.7 PG (ref 26.5–33)
MCHC RBC AUTO-ENTMCNC: 31.5 G/DL (ref 31.5–36.5)
MCV RBC AUTO: 85 FL (ref 78–100)
MONOCYTES # BLD AUTO: 1.3 10E9/L (ref 0–1.3)
MONOCYTES NFR BLD AUTO: 8.1 %
NEUTROPHILS # BLD AUTO: 12.3 10E9/L (ref 1.6–8.3)
NEUTROPHILS NFR BLD AUTO: 78.8 %
NRBC # BLD AUTO: 0 10*3/UL
NRBC BLD AUTO-RTO: 0 /100
PLATELET # BLD AUTO: 425 10E9/L (ref 150–450)
POTASSIUM SERPL-SCNC: 3.3 MMOL/L (ref 3.4–5.3)
PROT SERPL-MCNC: 6.9 G/DL (ref 6.8–8.8)
RBC # BLD AUTO: 3.6 10E12/L (ref 3.8–5.2)
SODIUM SERPL-SCNC: 136 MMOL/L (ref 133–144)
WBC # BLD AUTO: 15.7 10E9/L (ref 4–11)

## 2018-11-09 PROCEDURE — 96361 HYDRATE IV INFUSION ADD-ON: CPT

## 2018-11-09 PROCEDURE — 83690 ASSAY OF LIPASE: CPT | Performed by: EMERGENCY MEDICINE

## 2018-11-09 PROCEDURE — 12000000 ZZH R&B MED SURG/OB

## 2018-11-09 PROCEDURE — 25000132 ZZH RX MED GY IP 250 OP 250 PS 637: Performed by: INTERNAL MEDICINE

## 2018-11-09 PROCEDURE — 96376 TX/PRO/DX INJ SAME DRUG ADON: CPT

## 2018-11-09 PROCEDURE — 96367 TX/PROPH/DG ADDL SEQ IV INF: CPT

## 2018-11-09 PROCEDURE — 99285 EMERGENCY DEPT VISIT HI MDM: CPT | Mod: 25

## 2018-11-09 PROCEDURE — 96375 TX/PRO/DX INJ NEW DRUG ADDON: CPT

## 2018-11-09 PROCEDURE — 87040 BLOOD CULTURE FOR BACTERIA: CPT | Performed by: EMERGENCY MEDICINE

## 2018-11-09 PROCEDURE — 83605 ASSAY OF LACTIC ACID: CPT | Performed by: EMERGENCY MEDICINE

## 2018-11-09 PROCEDURE — A9270 NON-COVERED ITEM OR SERVICE: HCPCS | Mod: GY | Performed by: EMERGENCY MEDICINE

## 2018-11-09 PROCEDURE — 80053 COMPREHEN METABOLIC PANEL: CPT | Performed by: EMERGENCY MEDICINE

## 2018-11-09 PROCEDURE — 25000132 ZZH RX MED GY IP 250 OP 250 PS 637: Performed by: EMERGENCY MEDICINE

## 2018-11-09 PROCEDURE — 99223 1ST HOSP IP/OBS HIGH 75: CPT | Mod: AI | Performed by: INTERNAL MEDICINE

## 2018-11-09 PROCEDURE — 86140 C-REACTIVE PROTEIN: CPT | Performed by: EMERGENCY MEDICINE

## 2018-11-09 PROCEDURE — 85025 COMPLETE CBC W/AUTO DIFF WBC: CPT | Performed by: EMERGENCY MEDICINE

## 2018-11-09 PROCEDURE — 96365 THER/PROPH/DIAG IV INF INIT: CPT

## 2018-11-09 PROCEDURE — 25000128 H RX IP 250 OP 636: Performed by: INTERNAL MEDICINE

## 2018-11-09 PROCEDURE — 25000128 H RX IP 250 OP 636: Performed by: EMERGENCY MEDICINE

## 2018-11-09 RX ORDER — VANCOMYCIN HYDROCHLORIDE 1 G/200ML
1000 INJECTION, SOLUTION INTRAVENOUS ONCE
Status: COMPLETED | OUTPATIENT
Start: 2018-11-09 | End: 2018-11-09

## 2018-11-09 RX ORDER — LOPERAMIDE HCL 2 MG
6 CAPSULE ORAL
COMMUNITY
End: 2018-12-12

## 2018-11-09 RX ORDER — AMOXICILLIN 250 MG
1 CAPSULE ORAL 2 TIMES DAILY PRN
Status: DISCONTINUED | OUTPATIENT
Start: 2018-11-09 | End: 2018-11-13 | Stop reason: HOSPADM

## 2018-11-09 RX ORDER — ACETAMINOPHEN 500 MG
1000 TABLET ORAL ONCE
Status: COMPLETED | OUTPATIENT
Start: 2018-11-09 | End: 2018-11-09

## 2018-11-09 RX ORDER — DICYCLOMINE HCL 20 MG
20 TABLET ORAL
Status: DISCONTINUED | OUTPATIENT
Start: 2018-11-10 | End: 2018-11-13 | Stop reason: HOSPADM

## 2018-11-09 RX ORDER — ONDANSETRON 2 MG/ML
4 INJECTION INTRAMUSCULAR; INTRAVENOUS
Status: COMPLETED | OUTPATIENT
Start: 2018-11-09 | End: 2018-11-09

## 2018-11-09 RX ORDER — MORPHINE SULFATE 2 MG/ML
2 INJECTION, SOLUTION INTRAMUSCULAR; INTRAVENOUS
Status: COMPLETED | OUTPATIENT
Start: 2018-11-09 | End: 2018-11-09

## 2018-11-09 RX ORDER — SODIUM CHLORIDE 9 MG/ML
INJECTION, SOLUTION INTRAVENOUS CONTINUOUS
Status: DISCONTINUED | OUTPATIENT
Start: 2018-11-09 | End: 2018-11-12

## 2018-11-09 RX ORDER — MORPHINE SULFATE 2 MG/ML
2 INJECTION, SOLUTION INTRAMUSCULAR; INTRAVENOUS ONCE
Status: COMPLETED | OUTPATIENT
Start: 2018-11-09 | End: 2018-11-09

## 2018-11-09 RX ORDER — DICYCLOMINE HCL 20 MG
20 TABLET ORAL
COMMUNITY
End: 2019-02-26

## 2018-11-09 RX ORDER — POTASSIUM CHLORIDE 7.45 MG/ML
10 INJECTION INTRAVENOUS
Status: DISCONTINUED | OUTPATIENT
Start: 2018-11-09 | End: 2018-11-13 | Stop reason: HOSPADM

## 2018-11-09 RX ORDER — POTASSIUM CHLORIDE 1.5 G/1.58G
40 POWDER, FOR SOLUTION ORAL ONCE
Status: COMPLETED | OUTPATIENT
Start: 2018-11-09 | End: 2018-11-09

## 2018-11-09 RX ORDER — POTASSIUM CHLORIDE 1.5 G/1.58G
20-40 POWDER, FOR SOLUTION ORAL
Status: DISCONTINUED | OUTPATIENT
Start: 2018-11-09 | End: 2018-11-13 | Stop reason: HOSPADM

## 2018-11-09 RX ORDER — MEDROXYPROGESTERONE ACETATE 10 MG
10 TABLET ORAL DAILY
Status: DISCONTINUED | OUTPATIENT
Start: 2018-11-10 | End: 2018-11-13 | Stop reason: HOSPADM

## 2018-11-09 RX ORDER — PIPERACILLIN SODIUM, TAZOBACTAM SODIUM 3; .375 G/15ML; G/15ML
3.38 INJECTION, POWDER, LYOPHILIZED, FOR SOLUTION INTRAVENOUS EVERY 6 HOURS
Status: DISCONTINUED | OUTPATIENT
Start: 2018-11-09 | End: 2018-11-13

## 2018-11-09 RX ORDER — ACETAMINOPHEN 325 MG/1
650 TABLET ORAL EVERY 4 HOURS PRN
Status: DISCONTINUED | OUTPATIENT
Start: 2018-11-09 | End: 2018-11-13 | Stop reason: HOSPADM

## 2018-11-09 RX ORDER — ONDANSETRON 2 MG/ML
4 INJECTION INTRAMUSCULAR; INTRAVENOUS EVERY 6 HOURS PRN
Status: DISCONTINUED | OUTPATIENT
Start: 2018-11-09 | End: 2018-11-13 | Stop reason: HOSPADM

## 2018-11-09 RX ORDER — MULTIVIT WITH MINERALS/LUTEIN
1000 TABLET ORAL EVERY EVENING
COMMUNITY

## 2018-11-09 RX ORDER — BACLOFEN 10 MG/1
10 TABLET ORAL AT BEDTIME
Status: DISCONTINUED | OUTPATIENT
Start: 2018-11-09 | End: 2018-11-11

## 2018-11-09 RX ORDER — HYDROMORPHONE HYDROCHLORIDE 1 MG/ML
0.2 INJECTION, SOLUTION INTRAMUSCULAR; INTRAVENOUS; SUBCUTANEOUS
Status: DISCONTINUED | OUTPATIENT
Start: 2018-11-09 | End: 2018-11-10

## 2018-11-09 RX ORDER — POTASSIUM CHLORIDE 29.8 MG/ML
20 INJECTION INTRAVENOUS
Status: DISCONTINUED | OUTPATIENT
Start: 2018-11-09 | End: 2018-11-13 | Stop reason: HOSPADM

## 2018-11-09 RX ORDER — ONDANSETRON 4 MG/1
4 TABLET, ORALLY DISINTEGRATING ORAL EVERY 6 HOURS PRN
Status: DISCONTINUED | OUTPATIENT
Start: 2018-11-09 | End: 2018-11-13 | Stop reason: HOSPADM

## 2018-11-09 RX ORDER — POTASSIUM CHLORIDE 1500 MG/1
20-40 TABLET, EXTENDED RELEASE ORAL
Status: DISCONTINUED | OUTPATIENT
Start: 2018-11-09 | End: 2018-11-13 | Stop reason: HOSPADM

## 2018-11-09 RX ORDER — POLYETHYLENE GLYCOL 3350 17 G/17G
17 POWDER, FOR SOLUTION ORAL DAILY PRN
Status: DISCONTINUED | OUTPATIENT
Start: 2018-11-09 | End: 2018-11-13 | Stop reason: HOSPADM

## 2018-11-09 RX ORDER — NALOXONE HYDROCHLORIDE 0.4 MG/ML
.1-.4 INJECTION, SOLUTION INTRAMUSCULAR; INTRAVENOUS; SUBCUTANEOUS
Status: DISCONTINUED | OUTPATIENT
Start: 2018-11-09 | End: 2018-11-12

## 2018-11-09 RX ORDER — OXYBUTYNIN CHLORIDE 5 MG/1
5 TABLET ORAL 2 TIMES DAILY
Status: DISCONTINUED | OUTPATIENT
Start: 2018-11-09 | End: 2018-11-13 | Stop reason: HOSPADM

## 2018-11-09 RX ORDER — CIDER VINEGAR 300 MG
300 TABLET ORAL EVERY MORNING
Status: ON HOLD | COMMUNITY
End: 2018-11-13

## 2018-11-09 RX ORDER — MAGNESIUM CARB/ALUMINUM HYDROX 105-160MG
148 TABLET,CHEWABLE ORAL
Status: DISCONTINUED | OUTPATIENT
Start: 2018-11-09 | End: 2018-11-13 | Stop reason: HOSPADM

## 2018-11-09 RX ORDER — POTASSIUM CL/LIDO/0.9 % NACL 10MEQ/0.1L
10 INTRAVENOUS SOLUTION, PIGGYBACK (ML) INTRAVENOUS
Status: DISCONTINUED | OUTPATIENT
Start: 2018-11-09 | End: 2018-11-13 | Stop reason: HOSPADM

## 2018-11-09 RX ORDER — CEFTRIAXONE 2 G/1
2 INJECTION, POWDER, FOR SOLUTION INTRAMUSCULAR; INTRAVENOUS ONCE
Status: COMPLETED | OUTPATIENT
Start: 2018-11-09 | End: 2018-11-09

## 2018-11-09 RX ORDER — AMOXICILLIN 250 MG
2 CAPSULE ORAL 2 TIMES DAILY PRN
Status: DISCONTINUED | OUTPATIENT
Start: 2018-11-09 | End: 2018-11-13 | Stop reason: HOSPADM

## 2018-11-09 RX ORDER — OXYCODONE HYDROCHLORIDE 5 MG/1
5-10 TABLET ORAL
Status: DISCONTINUED | OUTPATIENT
Start: 2018-11-09 | End: 2018-11-10 | Stop reason: ALTCHOICE

## 2018-11-09 RX ORDER — BENZETHONIUM CHLORIDE
CLEANSER (ML) TOPICAL DAILY
Status: DISCONTINUED | OUTPATIENT
Start: 2018-11-09 | End: 2018-11-09

## 2018-11-09 RX ORDER — VANCOMYCIN HYDROCHLORIDE 1 G/200ML
1000 INJECTION, SOLUTION INTRAVENOUS EVERY 8 HOURS
Status: DISCONTINUED | OUTPATIENT
Start: 2018-11-10 | End: 2018-11-11

## 2018-11-09 RX ORDER — PREGABALIN 100 MG/1
100 CAPSULE ORAL 3 TIMES DAILY
Status: DISCONTINUED | OUTPATIENT
Start: 2018-11-09 | End: 2018-11-13 | Stop reason: HOSPADM

## 2018-11-09 RX ADMIN — CEFTRIAXONE SODIUM 2 G: 2 INJECTION, POWDER, FOR SOLUTION INTRAMUSCULAR; INTRAVENOUS at 18:25

## 2018-11-09 RX ADMIN — OXYBUTYNIN CHLORIDE 5 MG: 5 TABLET ORAL at 22:00

## 2018-11-09 RX ADMIN — ENOXAPARIN SODIUM 40 MG: 40 INJECTION SUBCUTANEOUS at 22:19

## 2018-11-09 RX ADMIN — MORPHINE SULFATE 2 MG: 2 INJECTION, SOLUTION INTRAMUSCULAR; INTRAVENOUS at 18:05

## 2018-11-09 RX ADMIN — ONDANSETRON 4 MG: 2 INJECTION INTRAMUSCULAR; INTRAVENOUS at 17:34

## 2018-11-09 RX ADMIN — VANCOMYCIN HYDROCHLORIDE 1000 MG: 1 INJECTION, SOLUTION INTRAVENOUS at 19:11

## 2018-11-09 RX ADMIN — POTASSIUM CHLORIDE 40 MEQ: 1.5 POWDER, FOR SOLUTION ORAL at 18:25

## 2018-11-09 RX ADMIN — MORPHINE SULFATE 2 MG: 2 INJECTION, SOLUTION INTRAMUSCULAR; INTRAVENOUS at 19:45

## 2018-11-09 RX ADMIN — PIPERACILLIN SODIUM,TAZOBACTAM SODIUM 3.38 G: 3; .375 INJECTION, POWDER, FOR SOLUTION INTRAVENOUS at 22:12

## 2018-11-09 RX ADMIN — PREGABALIN 100 MG: 100 CAPSULE ORAL at 22:00

## 2018-11-09 RX ADMIN — ACETAMINOPHEN 1000 MG: 500 TABLET, FILM COATED ORAL at 17:34

## 2018-11-09 RX ADMIN — SODIUM CHLORIDE 1000 ML: 9 INJECTION, SOLUTION INTRAVENOUS at 17:32

## 2018-11-09 RX ADMIN — MORPHINE SULFATE 2 MG: 2 INJECTION, SOLUTION INTRAMUSCULAR; INTRAVENOUS at 17:34

## 2018-11-09 RX ADMIN — SODIUM CHLORIDE: 9 INJECTION, SOLUTION INTRAVENOUS at 22:12

## 2018-11-09 RX ADMIN — Medication 0.2 MG: at 22:49

## 2018-11-09 ASSESSMENT — ENCOUNTER SYMPTOMS
WOUND: 1
FEVER: 1
DIAPHORESIS: 1
DIARRHEA: 0
CONSTIPATION: 0
HEMATURIA: 0
ABDOMINAL PAIN: 1
DYSURIA: 0
NAUSEA: 1
VOMITING: 0
BACK PAIN: 0

## 2018-11-09 NOTE — ED PROVIDER NOTES
History     Chief Complaint:  Fever    HPI   Priyanka Martinez is a 47 year old female who presents with a fever. The patient states that she has had sharp and punching pain in her abdomen that lasts anywhere from a few seconds to a couple minutes which has been chronic, but changed over the last couple weeks prompting her to visit her primary care physician. She goes into her doctor weekly to get her foot checked due to an ulcer that's been there for 2 years, which she states has been getting better. She also states that she burned her back many months back, but recently fell in the shower a few months ago which opened her back wound, which she states is very deep. The patient's primary doctor ordered a urine sample and her wound doctor ordered an MRI, both just yesterday and the results are shown below. Dr. Limon from her clinic recommended she visit the ED if she develops a fever, and to come in to the hospital for IV antibiotics, prompting her to visit the ED today. The patient stated she developed a fever of 101 today, and has been having hot flashes at night for the past 2 weeks along with episodes of diaphoresis. Due to her open wound, the patient states she has been having nausea as well due to the look and smell of her wounds. The patient denies any urinary or bowel symptoms, as well as any other associated symptoms.     11/08/2018 Foot XR:  Since 2016, increased soft tissue swelling particularly  distal to the metatarsophalangeal joints, particularly over the first  metatarsophalangeal joint. No radiographic evidence of suspicious  osteolysis to suggest osteomyelitis.    MR Pelvis 11/08/2018  Impression:  1. Deep left ischial ulcer extending to underlying bone with  associated active left ischial and inferior pubic ramus osteomyelitis.  2. Regional soft tissue edema and enhancement, may be reactive vs.  deep soft tissue infection including myositis.  3. Asymmetric left greater trochanteric bursal fluid.  Given lack of  direct communication with the deep soft tissue defect, favoring to  represent aseptic bursitis.  4. Large left inguinal and iliac chain lymph nodes are likely  reactive.  5. Rectal fistula. Consider dedicated rectal MRI if clinically  indicated.    11/08/2018 UA:  Color Urine Light Yellow    Final 11/08/2018 11:53 AM MG   Appearance Urine Slightly Cloudy    Final 11/08/2018 11:53 AM MG   Glucose Urine Negative  NEG^Negative mg/dL Final 11/08/2018 11:53 AM MG   Bilirubin Urine Negative  NEG^Negative  Final 11/08/2018 11:53 AM MG   Ketones Urine 40 (A) NEG^Negative mg/dL Final 11/08/2018 11:53 AM MG   Specific Gravity Urine 1.007  1.003 - 1.035  Final 11/08/2018 11:53 AM MG   Blood Urine Negative  NEG^Negative  Final 11/08/2018 11:53 AM MG   pH Urine 6.5  5.0 - 7.0 pH Final 11/08/2018 11:53 AM MG   Protein Albumin Urine Negative  NEG^Negative mg/dL Final 11/08/2018 11:53 AM MG   Urobilinogen mg/dL Normal  0.0 - 2.0 mg/dL Final 11/08/2018 11:53 AM MG   Nitrite Urine Positive (A) NEG^Negative  Final 11/08/2018 11:53 AM MG   Leukocyte Esterase Urine Large (A) NEG^Negative  Final 11/08/2018 11:53 AM MG   Source Midstream Urine    Final 11/08/2018 11:53 AM      Allergies:  Hydrocodone    Medications:    Baclofen  Catheters  Bentyl  Prozac  Homeopathic products  Provera  Ditropan  Lyrica    Past Medical History:    Abdominal Pain  Diarrhea  Paraplegic immobility syndrome  Tobacco use disorder    Past Surgical History:    C spine fusion from T10 to L5  Cholecystectomy    Family History:    Hypertension    Social History:  Marital Status: Single  Smoking Status: Former  Alcohol Use: No    Review of Systems   Constitutional: Positive for diaphoresis and fever.   Cardiovascular: Negative for chest pain.   Gastrointestinal: Positive for abdominal pain and nausea. Negative for constipation, diarrhea and vomiting.   Genitourinary: Negative for dysuria and hematuria.   Musculoskeletal: Negative for back pain.   Skin:  "Positive for wound.   All other systems reviewed and are negative.    Physical Exam     Patient Vitals for the past 24 hrs:   BP Temp Temp src Pulse Heart Rate Resp SpO2 Height Weight   11/09/18 1925 - - - - 87 18 - - -   11/09/18 1924 - 98.5  F (36.9  C) Oral - - - - - -   11/09/18 1919 - - - - 92 14 - - -   11/09/18 1916 - - - - 92 19 94 % - -   11/09/18 1847 - - - - 95 13 96 % - -   11/09/18 1845 - - - - 106 14 94 % - -   11/09/18 1830 123/75 - - - 98 15 99 % - -   11/09/18 1804 - - - - 98 15 - - -   11/09/18 1800 (!) 106/98 - - - 97 18 - - -   11/09/18 1747 - - - - 106 20 99 % - -   11/09/18 1743 - - - - 101 16 100 % - -   11/09/18 1741 110/72 - - - 103 15 99 % - -   11/09/18 1703 117/67 100.8  F (38.2  C) Oral 110 110 16 100 % 1.778 m (5' 10\") 72.6 kg (160 lb)       Physical Exam  SKIN:  Warm, dry.  Healing pressure ulcer of the left medial foot/ankle.  No drainage or foul odor.  Erythematous dry skin of the right thigh under the suprapubic catheter tubing.  Bandaged decubital ulcer of the left buttock.  No jaundice.  HEMATOLOGIC/IMMUNOLOGIC/LYMPHATIC:  No pallor.  No petechia or purpura.  HENT:  No JVD.  EYES:  Conjunctivae normal.  Anicteric sclera.  CARDIOVASCULAR:  Tachycardic rate with regular rhythm.  No murmur.  RESPIRATORY:  No respiratory distress, breath sounds equal and normal.  GASTROINTESTINAL:  Soft abdomen with active bowel sounds.  Barely touching the upper abdomen causes the patient to wince (she says her baseline when examined).  No mass or distension.  GENITOURINARY:  Suprapubic catheter draining cloudy yellow urine with sediment in the tubing.  MUSCULOSKELETAL:  Normal body habitus.  NEUROLOGIC:  Alert, conversant.  Insensate from mid-abdomen to toes bilateral.  Complete paresis of the lower limbs.  PSYCHIATRIC:  Normal mood.    Emergency Department Course     Laboratory:  CBC: WNL. (WBC 15.7 (H), HGB 9.6 (L), )   CRP inflammation: 180.0 (H)  CMP: Potassium 3.3 (L), Calcium 7.8 (L), " ALT 66 (H), Bilirubin total 0.1 (L), BUN 4 (L), Albumin 2.0 (L), o/w WNL. (Creatinine 0.37 (L))  Lipase: 145  Lactic acid: 1.1  Blood Culture: Pending    Interventions:  1732: NS 1L IV Bolus  1734: 2 mg Morphine IV  1734: 4 mg Zofran IV  1734: 1,00 mg Tylenol PO  1805: 2 mg Morphine IV  1825: 2 g Rocephin IV  1825: 40 mEq Klor-Con PO    Emergency Department Course:  Past medical records, nursing notes, and vitals reviewed.  1709: I performed an exam of the patient and obtained history, as documented above.  1814: Spoke with Dr. Russ regarding patient    IV inserted and blood drawn.    admitted to hospital    Impression & Plan      Medical Decision Making:  Priyanka Martinez is a 47 year old female who presents to the ED for evaluation of primary care concern, based on symptoms and imaging, for osteomyelitis/soft tissue deep space infection. I would agree. I reviewed the imaging and UA performed yesterday. She had additional testing performed here which still revealed elevated inflammatory markers but a bit improved from 2 days ago. Lactic acid negative so I do not think the patient is septic nor suffering a necrotizing infection as she is well appearing. Opted for IV rocephin and vancomycin with respect to treating osteomyelitis.    Diagnosis:    ICD-10-CM   1. Osteomyelitis, unspecified site, unspecified type (H) M86.9   2. Pressure injury of left buttock, stage 4 (H) L89.324   3. Lower limb ulcer, ankle, left, with unspecified severity (H) L97.329   4. Urinary tract infection associated with catheterization of urinary tract, unspecified indwelling urinary catheter type, initial encounter (H) T83.511A    N39.0   5. Hypokalemia E87.6       Disposition:  Admitted to inpatient med bed    Zurdo Bruno  11/9/2018    EMERGENCY DEPARTMENT    I, Zurdo Bruno, am serving as a scribe at 5:09 PM on 11/9/2018 to document services personally performed by Negro Sepulveda MD based on my observations and the provider's  statements to me.        Negro Sepulveda MD  11/10/18 [id number]

## 2018-11-09 NOTE — LETTER
Transition Communication Hand-off for Care Transitions to Next Level of Care Provider    Name: Priyanka Martinez  : 1970  MRN #: [id number]  Primary Care Provider: Ayala Limon  Primary Care MD Name: Dr Limon  Primary Clinic: [address]  [place] [address]  Primary Care Clinic Name:  [facility]  Reason for Hospitalization:  Hypokalemia [E87.6]  Lower limb ulcer, ankle, left, with unspecified severity (H) [L97.329]  Urinary tract infection associated with catheterization of urinary tract, unspecified indwelling urinary catheter type, initial encounter (H) [T83.511A, N39.0]  Osteomyelitis, unspecified site, unspecified type (H) [M86.9]  Pressure injury of left buttock, stage 4 (H) [L89.324]  Admit Date/Time: 2018  4:57 PM  Discharge Date: 18  Payor Source: Payor: [employer] / Plan: [employer] / Product Type: Indemnity /     Readmission Assessment Measure (DUANE) Risk Score/category:  low           Reason for Communication Hand-off Referral: No support or lacking a support system  Other Stage 4 ulcer    Discharge Plan: Home with [facility]       Concern for non-adherence with plan of care: No  Discharge Needs Assessment:  Needs       Most Recent Value    Current Discharge Risk lack of support system/caregiver, lives alone, physical impairment    # of Referrals Placed by [facility] Clinic Care Coordination, Homecare, Home Infusion, Scheduled Follow-up appointments, Communication hand-offs to next level of Care Providers          Follow-up specialty is recommended: Yes    Follow-up plan:  Future Appointments  Date Time Provider Department Center   2018 9:00 AM [facility]C [facility]   2018 7:00 AM Yobany Rojas DPM [facility] [place]       Any outstanding tests or procedures:        Referrals     Future Labs/Procedures    Home care nursing referral     Comments:    RN skilled nursing visit. RN to assess wound    Your provider has  ordered home care nursing services. If you have not been contacted within 2 days of your discharge please call the inpatient department phone number at [phone] .            Key Recommendations:  Pt was admitted with Sepsis, L buttock wound with left ischial and inferior pubic ramus osteomyelitis.  Pt has been a paraplegic for 15 years.  There was concern that she possibly had a fistula.  [place]-rectal consulted along with ID.  Pt has a biopsy of her left ischial tuberosity on 11/12/18.  Pathology is pending, no culture was done.  Pt is going home on IV Ertapenem.  The wound nurse was concerned that pt should have a home nurse daily for dressing changes due to location of wounds and they are tunneled, so wounds require packing.  Pt was open to having a home care RN a few days per week.  She will follow with the [facility] on 11/21/18.  It is recommended that she follows up with Dr Limon within a week and will need  a CBC and a BMP.  There are orders for a weekly CBC to be drawn by the home infusion nurse. I called the clinic today to get her an appointment with Dr Limon.  She will need to be fit in and then call the pt for appointment time.  Please address if the home infusion nurse needs to draw the BMP if pt is not able to be seen in clinic within a week.    Thank-you,    Lesly Gee  RN Care Coordinator  [facility]    AVS/Discharge Summary is the source of truth; this is a helpful guide for improved communication of patient story

## 2018-11-09 NOTE — TELEPHONE ENCOUNTER
I called patient and inform her of MRI result and urine culture result.  MRI indicated osteomyelitis and possibly rectal fistula.  Urine culture is pending.  Initial UA does suggest infection in the urinary tract as well.  I recommended that patient go into the hospital for inpatient treatment. She will need debridement, wound culture and IV antibiotic. Patient reported that he she is not running fever.  Last night was the first night she did not have sweats.  She has out-of-town guests today and will not be able to go into the hospital today.  She plans to go tomorrow.  Her wound care specialist are at [facility].  She will go to [facility] emergency room for evaluation and admission tomorrow. She does not have symptoms of sepsis. I did not start her on antibiotic today. Advised her to go to the hospital immediately if she develops fever.

## 2018-11-09 NOTE — IP AVS SNAPSHOT
"[place] [address] [facility]IT: [phone]                                              INTERAGENCY TRANSFER FORM - PHYSICIAN ORDERS   2018                    Hospital Admission Date: 2018  CEDRIC MENDOZA   : 1970  Sex: Female        Attending Provider: Diana Russ MD     Allergies:  Hydrocodone    Infection:  None   Service:  HOSPITALIST    Ht:  1.778 m (5' 10\")   Wt:  72.6 kg (160 lb)   Admission Wt:  72.6 kg (160 lb)    BMI:  22.96 kg/m 2   BSA:  1.89 m 2            Patient PCP Information     Provider PCP Type    Ayala Limon MD PhD General      ED Clinical Impression     Diagnosis Description Comment Added By Time Added    Osteomyelitis, unspecified site, unspecified type (H) [M86.9] Osteomyelitis, unspecified site, unspecified type (H) [M86.9]  Negro Sepulveda MD 2018  6:10 PM    Pressure injury of left buttock, stage 4 (H) [L89.324] Pressure injury of left buttock, stage 4 (H) [L89.324]  Negro Sepulveda MD 2018  6:10 PM    Lower limb ulcer, ankle, left, with unspecified severity (H) [L97.329] Lower limb ulcer, ankle, left, with unspecified severity (H) [L97.329]  Negro Sepulveda MD 2018  6:10 PM    Urinary tract infection associated with catheterization of urinary tract, unspecified indwelling urinary catheter type, initial encounter (H) [T83.511A, N39.0] Urinary tract infection associated with catheterization of urinary tract, unspecified indwelling urinary catheter type, initial encounter (H) [T83.511A, N39.0]  Negro Sepulveda MD 2018  6:11 PM    Hypokalemia [E87.6] Hypokalemia [E87.6]  Negro Sepulveda MD 2018  6:11 PM      Hospital Problems as of 2018              Priority Class Noted POA    Paraplegia (H) Medium  2012 Yes    Sepsis (H) Medium  2018 Yes    Infected wound High  11/10/2018 Yes    Osteomyelitis of pelvic region (H) High  11/10/2018 Yes    Polymicrobial bacterial infection High  11/10/2018 " "Yes    Infection due to Staphylococcus aureus High  11/10/2018 Yes      Non-Hospital Problems as of 11/13/2018              Priority Class Noted    Neuropathic pain of flank Medium  1/30/2012    Neurogenic bladder Medium  1/30/2012    Suprapubic catheter (H) Low  1/30/2012    Gastrointestinal problem Low  1/30/2012    CARDIOVASCULAR SCREENING; LDL GOAL LESS THAN 160 Low  1/30/2012    Dysmenorrhea Low  1/30/2012    Endometriosis Low  4/19/2012    IBS (irritable bowel syndrome) Low  4/19/2012    Generalized anxiety disorder Medium  12/6/2012    Pressure ulcer of ischium, left, stage IV (H) Medium  11/1/2018      Code Status History     Date Active Date Inactive Code Status Order ID Comments User Context    11/13/2018  3:39 PM  Full Code [id number]  John oRbertson,  Outpatient    11/9/2018  8:53 PM 11/13/2018  3:39 PM Full Code [id number]  Diana Russ MD Inpatient         Medication Review      START taking        Dose / Directions Comments    ertapenem 1 GM injection   Commonly known as:  INVanz   Used for:  Pressure injury of left buttock, stage 4 (H)        Dose:  1 g   Inject 1 g into the vein every 24 hours for 10 days CBC with differential, creatinine, SGOT weekly while on this medication to be faxed to Dr. Barlow office.   Quantity:  100 mL   Refills:  0          CONTINUE these medications which have NOT CHANGED        Dose / Directions Comments    ascorbic acid 1000 MG Tabs   Commonly known as:  vitamin C        Dose:  1000 mg   Take 1,000 mg by mouth daily   Refills:  0        BIATAIN ADHESIVE FOAM DRESSING 4\"X4\" Pads        Apply topically daily   Refills:  0        Catheters Misc   Used for:  Atonic neurogenic bladder        Dose:  1 catheter   1 catheter every 28 days.   Quantity:  1 each   Refills:  12    Supplies:  Mark ([id number])- Quantity 2,  Mark 5529,  Mark [id number]- 16 Bulgarian,  Dukal 7465- 1 Box,  Convatec- Flexi-trak,  Bard [id number]       dicyclomine 20 MG tablet   Commonly known " as:  BENTYL        Dose:  20 mg   Take 20 mg by mouth 3 times daily (with meals) Takes every time she eats   Refills:  0        FLUoxetine 20 MG capsule   Commonly known as:  PROzac   Used for:  Anxiety        TAKE 1 CAPSULE EVERY DAY   Quantity:  90 capsule   Refills:  0        loperamide 2 MG capsule   Commonly known as:  IMODIUM        Dose:  6 mg   Take 6 mg by mouth 3 times daily (with meals)   Refills:  0        LYRICA PO        Dose:  100 mg   Take 100 mg by mouth 3 times daily   Refills:  0        medroxyPROGESTERone 10 MG tablet   Commonly known as:  PROVERA   Used for:  Dysmenorrhea        TAKE 1 AND 1/2 TABLETS EVERY DAY   Quantity:  135 tablet   Refills:  0        * order for DME   Used for:  Tibia/fibula fracture, left, closed, initial encounter        Equipment being ordered: walking boot left   Quantity:  1 Units   Refills:  0        * order for DME   Used for:  Neurogenic bladder        Equipment being ordered: Anchoring Device Flexi-Track LG   Quantity:  2 Units   Refills:  12        * order for DME   Used for:  Neurogenic bladder        Equipment being ordered: Insertion Tray Wheeler w/BZK Swab Catheter 10CC, Sterile   Quantity:  1 Units   Refills:  12        * order for DME   Used for:  Neurogenic bladder        Equipment being ordered: Drain Bag, Kenguard 2000ml. Urinary Bag with Anti-Refulx   Quantity:  2 Units   Refills:  12        * order for DME   Used for:  Neurogenic bladder        Equipment being ordered: Catheter Wheeler 2Way 30cc 18FR, Silicone Coated Latex   Quantity:  1 Units   Refills:  12        order for DME   Used for:  Pressure injury of left ischium, stage 4 (H)        Equipment being ordered: [employer] [facility] Phone [phone] Fax [phone]  Primary Dressing 8X8 Mesalt   Qty 15 sheets Secondary Dressing nonsterile 4X4 gauze Qty 200 count loaf Secondary Dressing 2 in medipore tape Qty 2 rolls Length of Need: 1 month Frequency of dressing change: daily   Quantity:  30 days    Refills:  0        oxybutynin 5 MG tablet   Commonly known as:  DITROPAN   Used for:  Neurogenic bladder        Dose:  5 mg   Take 1 tablet (5 mg) by mouth 2 times daily   Quantity:  180 tablet   Refills:  0        Psyllium 500 MG Caps        Dose:  1500 mg   Take 1,500 mg by mouth every morning   Refills:  0        * Notice:  This list has 5 medication(s) that are the same as other medications prescribed for you. Read the directions carefully, and ask your doctor or other care provider to review them with you.      STOP taking     Apple Cider Vinegar 300 MG Tabs           BACLOFEN PO           CRANBERRY PO           GNP GINGKO BILOBA EXTRACT PO           GUARANA PO           HOMEOPATHIC PRODUCTS EX                     Further instructions from your care team       A referral has been sent to [facility] for IV antibiotic therapy and management of your wounds.  They will call you for scheduling.  Their number is [phone].    Summary of Visit     Reason for your hospital stay       Wound infection             After Care     Activity       Your activity upon discharge: activity as tolerated       Diet       Follow this diet upon discharge: Orders Placed This Encounter      Advance Diet as Tolerated: Regular Diet Adult             Referrals     Home care nursing referral       RN skilled nursing visit. RN to assess wound    Your provider has ordered home care nursing services. If you have not been contacted within 2 days of your discharge please call the inpatient department phone number at [phone] .             Your next 10 appointments already scheduled     Nov 21, 2018  9:00 AM CST   Return Visit with Za Carter PA-C   [facility] ([facility])    [address] [facility] [address]  [facility] [address]   [phone]            Dec 05, 2018  7:00 AM CST   Return Visit with Yobany Rojas DPM   Counts include 234 beds at the [facility]  [facility]    [address] [address] [place] [address] [phone]              Follow-Up Appointment Instructions     Future Labs/Procedures    Follow-up and recommended labs and tests      Comments:    Follow up with primary care provider, Ayala Limon, within 7 days for hospital follow- up.  The following labs/tests are recommended: cbc/bmp.    Follow up with wound care and infectious disease as directed.   Follow up with infusion center and surgery as directed.      Follow-Up Appointment Instructions     Follow-up and recommended labs and tests        Follow up with primary care provider, Ayala Limon, within 7 days for hospital follow- up.  The following labs/tests are recommended: cbc/bmp.    Follow up with wound care and infectious disease as directed.   Follow up with infusion center and surgery as directed.             Statement of Approval     Ordered          11/13/18 3540  I have reviewed and agree with all the recommendations and orders detailed in this document.  EFFECTIVE NOW     Approved and electronically signed by:  John Robertson DO           11/13/18 3475  I have reviewed and agree with all the recommendations and orders detailed in this document.     Approved and electronically signed by:  Osei Barlow MD

## 2018-11-09 NOTE — IP AVS SNAPSHOT
MRN:[id number]                      After Visit Summary   11/9/2018    Priyanka Martinez    MRN: [id number]           Thank you!     Thank you for choosing [facility] for your care. Our goal is always to provide you with excellent care. Hearing back from our patients is one way we can continue to improve our services. Please take a few minutes to complete the written survey that you may receive in the mail after you visit with us. Thank you!        Patient Information     Date Of Birth          1970        Designated Caregiver       Most Recent Value    Caregiver    Will someone help with your care after discharge? no      About your hospital stay     You were admitted on:  November 9, 2018 You last received care in the:  [facility] [address] [facility]    You were discharged on:  November 13, 2018        Reason for your hospital stay       Wound infection                  Who to Call     For medical emergencies, please call 911.  For non-urgent questions about your medical care, please call your primary care provider or clinic, [phone]          Attending Provider     Provider Specialty    Negro Seuplveda MD Emergency Medicine    [facility], Diana Chamberlain MD Internal Medicine       Primary Care Provider Office Phone # Fax #    Ayala Limon MD PhD [phone]      After Care Instructions     Activity       Your activity upon discharge: activity as tolerated            Diet       Follow this diet upon discharge: Orders Placed This Encounter      Advance Diet as Tolerated: Regular Diet Adult            Wound care and dressings       Instructions to care for your wounds at home:  Wound care: Left ischial tuberosity twice a day and as needed  Clean wound with MicroKlenz  Dampen kerlix roll with Dakins. Squeeze out excess solution  Using Q-tip or gloved finger pack Dakins deep to wound bed. Open 6-10 cm from 6-12  O'clock.   Pack to skin level and cut off excess  Cover with  Mepilex sacral    Left medial malleolus: Daily dressing change  Clean wound with MicroKlenz  Cut Quarter size piece of Aqua Cell AG, Moisten with NS  and cover wound bed  Cover with Mepiex border                  Follow-up Appointments     Follow-up and recommended labs and tests        Follow up with primary care provider, Ayala Limon, within 7 days for hospital follow- up.  The following labs/tests are recommended: cbc/bmp.    Follow up with wound care and infectious disease as directed.   Follow up with surgery as directed.            Follow-up and recommended labs and tests        Follow up with primary care provider, Ayala Limon, within 7 days for hospital follow- up.  Dr Limon's staff will call you for appointment scheduling.  If you do not receive a call from the [facility] by 11/15, please call this clinic at [phone].    Follow up at the [facility] as scheduled for you on 11/21/18 at 9:00am as scheduled for you.                  Your next 10 appointments already scheduled     Nov 21, 2018  9:00 AM CST   Return Visit with Za Carter PA-C   [facility] ([facility])    [address] [place] [address] [phone]            Dec 05, 2018  7:00 AM CST   Return Visit with Yobany Rojas DPM   [facility]    [address] [place] [address] [phone]              Additional Services     Home care nursing referral       RN skilled nursing visit. RN to assess wound    Your provider has ordered home care nursing services. If you have not been contacted within 2 days of your discharge please call the inpatient department phone number at [phone] .                  Further instructions from your care team       A referral has been sent to [facility] for IV antibiotic therapy and management of your wounds.  They will call you for  "scheduling.  Their number is [phone].    [facility]:   -Please reinforce dressing change with client. Ensure pt packs to base of wound tunnel @ 12 o'clock. Approx 10cm  -Pt can resume her Lt medial malleolus ulcer dressing utilized prior to hospitalization    Wound care: Lt IT BID and prn        Lt IT:          -Clean wound with MicroKlenz         -Dampen kerlix roll with Dakins. Squeeze out excess solution         -Using Q-tip or gloved finger pack Dakins deep to wound bed. Open 6-10 cm from 6-12            O'clock.           -Pack to skin level and cut off excess          -Cover with Mepilex sacral or gauze and tape         Chronic Lt medial malleolus: Daily           -Clean wound with MicroKlenz           -Cut Quarter size piece of Aqua Cell AG, Moisten with NS  and cover wound bed or Biatin Silver            - Cover with Mepiex border or gauze and tape    Pending Results     Date and Time Order Name Status Description    11/12/2018 1543 Surgical pathology exam In process     11/9/2018 1720 Blood culture Preliminary     11/9/2018 1720 Blood culture Preliminary             Statement of Approval     Ordered          11/13/18 1539  I have reviewed and agree with all the recommendations and orders detailed in this document.  EFFECTIVE NOW     Approved and electronically signed by:  John Robertson DO           11/13/18 7936  I have reviewed and agree with all the recommendations and orders detailed in this document.     Approved and electronically signed by:  Osei Barlow MD             Admission Information     Date & Time Provider Department Dept. Phone    11/9/2018 Diana Russ MD [facility] [address] [facility] [phone]      Your Vitals Were     Blood Pressure Pulse Temperature Respirations Height Weight    106/70 (BP Location: Left arm) 110 98  F (36.7  C) (Oral) 17 1.778 m (5' 10\") 72.6 kg (160 lb)    Pulse Oximetry BMI (Body Mass Index)                98% 22.96 kg/m2        " "  [employer] Information     [employer] gives you secure access to your electronic health record. If you see a primary care provider, you can also send messages to your care team and make appointments. If you have questions, please call your primary care clinic.  If you do not have a primary care provider, please call [phone] and they will assist you.        Care EveryWhere ID     This is your Care EveryWhere ID. This could be used by other organizations to access your [facility] medical records  [id number]        Equal Access to Services     MELY GAMBLE : Hadii aad ku hadasho Soomaali, waaxda luqadaha, qaybta kaalmada adeegyada, waxay antonellain haydarlinn hermila mccoy . So [facility] [phone].    ATENCIÓN: Si habla español, tiene a luu disposición servicios gratuitos de asistencia lingüística. Llame al [phone].    We comply with applicable federal civil rights laws and [place] laws. We do not discriminate on the basis of race, color, national origin, age, disability, sex, sexual orientation, or gender identity.               Review of your medicines      START taking        Dose / Directions    ertapenem 1 GM injection   Commonly known as:  INVanz   Used for:  Pressure injury of left buttock, stage 4 (H)        Dose:  1 g   Inject 1 g into the vein every 24 hours for 10 days CBC with differential, creatinine, SGOT weekly while on this medication to be faxed to Dr. Barlow office.   Quantity:  100 mL   Refills:  0         CONTINUE these medicines which have NOT CHANGED        Dose / Directions    ascorbic acid 1000 MG Tabs   Commonly known as:  vitamin C        Dose:  1000 mg   Take 1,000 mg by mouth daily   Refills:  0       BIATAIN ADHESIVE FOAM DRESSING 4\"X4\" Pads        Apply topically daily   Refills:  0       Catheters Misc   Used for:  Atonic neurogenic bladder        Dose:  1 catheter   1 catheter every 28 days.   Quantity:  1 each   Refills:  12       dicyclomine 20 MG tablet   Commonly known as:  " BENTYL        Dose:  20 mg   Take 20 mg by mouth 3 times daily (with meals) Takes every time she eats   Refills:  0       FLUoxetine 20 MG capsule   Commonly known as:  PROzac   Used for:  Anxiety        TAKE 1 CAPSULE EVERY DAY   Quantity:  90 capsule   Refills:  0       loperamide 2 MG capsule   Commonly known as:  IMODIUM        Dose:  6 mg   Take 6 mg by mouth 3 times daily (with meals)   Refills:  0       LYRICA PO        Dose:  100 mg   Take 100 mg by mouth 3 times daily   Refills:  0       medroxyPROGESTERone 10 MG tablet   Commonly known as:  PROVERA   Used for:  Dysmenorrhea        TAKE 1 AND 1/2 TABLETS EVERY DAY   Quantity:  135 tablet   Refills:  0       * order for DME   Used for:  Tibia/fibula fracture, left, closed, initial encounter        Equipment being ordered: walking boot left   Quantity:  1 Units   Refills:  0       * order for DME   Used for:  Neurogenic bladder        Equipment being ordered: Anchoring Device Flexi-Track LG   Quantity:  2 Units   Refills:  12       * order for DME   Used for:  Neurogenic bladder        Equipment being ordered: Insertion Tray Wheeler w/BZK Swab Catheter 10CC, Sterile   Quantity:  1 Units   Refills:  12       * order for DME   Used for:  Neurogenic bladder        Equipment being ordered: Drain Bag, Kenguard 2000ml. Urinary Bag with Anti-Refulx   Quantity:  2 Units   Refills:  12       * order for DME   Used for:  Neurogenic bladder        Equipment being ordered: Catheter Wheeler 2Way 30cc 18FR, Silicone Coated Latex   Quantity:  1 Units   Refills:  12       order for DME   Used for:  Pressure injury of left ischium, stage 4 (H)        Equipment being ordered: [employer] Phone [phone] Fax [phone]  Primary Dressing 8X8 Mesalt   Qty 15 sheets Secondary Dressing nonsterile 4X4 gauze Qty 200 count loaf Secondary Dressing 2 in medipore tape Qty 2 rolls Length of Need: 1 month Frequency of dressing change: daily   Quantity:  30 days   Refills:  0        oxybutynin 5 MG tablet   Commonly known as:  DITROPAN   Used for:  Neurogenic bladder        Dose:  5 mg   Take 1 tablet (5 mg) by mouth 2 times daily   Quantity:  180 tablet   Refills:  0       Psyllium 500 MG Caps   Notes to Patient:  Resume home schedule         Dose:  1500 mg   Take 1,500 mg by mouth every morning   Refills:  0       * Notice:  This list has 5 medication(s) that are the same as other medications prescribed for you. Read the directions carefully, and ask your doctor or other care provider to review them with you.      STOP taking     Apple Cider Vinegar 300 MG Tabs           BACLOFEN PO           CRANBERRY PO           GNP GINGKO BILOBA EXTRACT PO           GUARANA PO           HOMEOPATHIC PRODUCTS EX                Where to get your medicines      Information about where to get these medications is not yet available     ! Ask your nurse or doctor about these medications     ertapenem 1 GM injection                Protect others around you: Learn how to safely use, store and throw away your medicines at www.disposemymeds.org.        ANTIBIOTIC INSTRUCTION     You've Been Prescribed an Antibiotic - Now What?  Your healthcare team thinks that you or your loved one might have an infection. Some infections can be treated with antibiotics, which are powerful, life-saving drugs. Like all medications, antibiotics have side effects and should only be used when necessary. There are some important things you should know about your antibiotic treatment.      Your healthcare team may run tests before you start taking an antibiotic.    Your team may take samples (e.g., from your blood, urine or other areas) to run tests to look for bacteria. These test can be important to determine if you need an antibiotic at all and, if you do, which antibiotic will work best.      Within a few days, your healthcare team might change or even stop your antibiotic.    Your team may start you on an antibiotic while they are  working to find out what is making you sick.    Your team might change your antibiotic because test results show that a different antibiotic would be better to treat your infection.    In some cases, once your team has more information, they learn that you do not need an antibiotic at all. They may find out that you don't have an infection, or that the antibiotic you're taking won't work against your infection. For example, an infection caused by a virus can't be treated with antibiotics. Staying on an antibiotic when you don't need it is more likely to be harmful than helpful.      You may experience side effects from your antibiotic.    Like all medications, antibiotics have side effects. Some of these can be serious.    Let you healthcare team know if you have any known allergies when you are admitted to the hospital.    One significant side effect of nearly all antibiotics is the risk of severe and sometimes deadly diarrhea caused by Clostridium difficile (C. Difficile). This occurs when a person takes antibiotics because some good germs are destroyed. Antibiotic use allows C. diificile to take over, putting patients at high risk for this serious infection.    As a patient or caregiver, it is important to understand your or your loved one's antibiotic treatment. It is especially important for caregivers to speak up when patients can't speak for themselves. Here are some important questions to ask your healthcare team.    What infection is this antibiotic treating and how do you know I have that infection?    What side effects might occur from this antibiotic?    How long will I need to take this antibiotic?    Is it safe to take this antibiotic with other medications or supplements (e.g., vitamins) that I am taking?     Are there any special directions I need to know about taking this antibiotic? For example, should I take it with food?    How will I be monitored to know whether my infection is responding to the  "antibiotic?    What tests may help to make sure the right antibiotic is prescribed for me?      Information provided by:  www.cdc.gov/getsmart  U.S. Department of Health and Human Services  Centers for disease Control and Prevention  National Center for Emerging and Zoonotic Infectious Diseases  Division of Healthcare Quality Promotion             Medication List: This is a list of all your medications and when to take them. Check marks below indicate your daily home schedule. Keep this list as a reference.      Medications           Morning Afternoon Evening Bedtime As Needed    ascorbic acid 1000 MG Tabs   Commonly known as:  vitamin C   Take 1,000 mg by mouth daily   Last time this was given:  1,000 mg on 11/13/2018  8:36 AM   Next Dose Due:  11/14/2018 9am                                    BIATAIN ADHESIVE FOAM DRESSING 4\"X4\" Pads   Apply topically daily                                Catheters Misc   1 catheter every 28 days.                                dicyclomine 20 MG tablet   Commonly known as:  BENTYL   Take 20 mg by mouth 3 times daily (with meals) Takes every time she eats   Last time this was given:  20 mg on 11/13/2018 12:51 PM   Next Dose Due:  11/13/2018 dinner time                                          ertapenem 1 GM injection   Commonly known as:  INVanz   Inject 1 g into the vein every 24 hours for 10 days CBC with differential, creatinine, SGOT weekly while on this medication to be faxed to Dr. Barlow office.                                FLUoxetine 20 MG capsule   Commonly known as:  PROzac   TAKE 1 CAPSULE EVERY DAY   Last time this was given:  20 mg on 11/13/2018  8:36 AM   Next Dose Due:  11/14/2018 9am                                    loperamide 2 MG capsule   Commonly known as:  IMODIUM   Take 6 mg by mouth 3 times daily (with meals)   Last time this was given:  6 mg on 11/13/2018 12:51 PM   Next Dose Due:  11/13/2018 with dinner                                          LYRICA PO "   Take 100 mg by mouth 3 times daily   Last time this was given:  100 mg on 11/13/2018  2:12 PM   Next Dose Due:  11/14/2018 9am                                    medroxyPROGESTERone 10 MG tablet   Commonly known as:  PROVERA   TAKE 1 AND 1/2 TABLETS EVERY DAY   Last time this was given:  10 mg on 11/13/2018  8:35 AM   Next Dose Due:  11/14/2018 9am                                    * order for DME   Equipment being ordered: walking boot left                                * order for DME   Equipment being ordered: Anchoring Device Flexi-Track LG                                * order for DME   Equipment being ordered: Insertion Tray Wheeler w/BZK Swab Catheter 10CC, Sterile                                * order for DME   Equipment being ordered: Drain Bag, Kenguard 2000ml. Urinary Bag with Anti-Refulx                                * order for DME   Equipment being ordered: Catheter Wheeler 2Way 30cc 18FR, Silicone Coated Latex                                order for DME   Equipment being ordered: [employer] Phone [phone] Fax [phone]  Primary Dressing 8X8 Mesalt   Qty 15 sheets Secondary Dressing nonsterile 4X4 gauze Qty 200 count loaf Secondary Dressing 2 in medipore tape Qty 2 rolls Length of Need: 1 month Frequency of dressing change: daily                                oxybutynin 5 MG tablet   Commonly known as:  DITROPAN   Take 1 tablet (5 mg) by mouth 2 times daily   Last time this was given:  5 mg on 11/13/2018  8:35 AM   Next Dose Due:  11/13/2018 9pm                                      Psyllium 500 MG Caps   Take 1,500 mg by mouth every morning   Notes to Patient:  Resume home schedule                                 * Notice:  This list has 5 medication(s) that are the same as other medications prescribed for you. Read the directions carefully, and ask your doctor or other care provider to review them with you.

## 2018-11-09 NOTE — IP AVS SNAPSHOT
[facility] [address] [facility]    [address] [place] [address]    Phone:  [phone]                                       After Visit Summary   11/9/2018    Priyanka Martinez    MRN: [id number]           After Visit Summary Signature Page     I have received my discharge instructions, and my questions have been answered. I have discussed any challenges I see with this plan with the nurse or doctor.    ..........................................................................................................................................  Patient/Patient Representative Signature      ..........................................................................................................................................  Patient Representative Print Name and Relationship to Patient    ..................................................               ................................................  Date                                   Time    ..........................................................................................................................................  Reviewed by Signature/Title    ...................................................              ..............................................  Date                                               Time          22EPIC Rev 08/18

## 2018-11-10 PROBLEM — L08.9 INFECTED WOUND: Status: ACTIVE | Noted: 2018-11-10

## 2018-11-10 PROBLEM — A49.01 INFECTION DUE TO STAPHYLOCOCCUS AUREUS: Status: ACTIVE | Noted: 2018-11-10

## 2018-11-10 PROBLEM — T14.8XXA INFECTED WOUND: Status: ACTIVE | Noted: 2018-11-10

## 2018-11-10 PROBLEM — M86.9 OSTEOMYELITIS OF PELVIC REGION (H): Status: ACTIVE | Noted: 2018-11-10

## 2018-11-10 PROBLEM — A49.9 POLYMICROBIAL BACTERIAL INFECTION: Status: ACTIVE | Noted: 2018-11-10

## 2018-11-10 LAB
ALBUMIN SERPL-MCNC: 1.6 G/DL (ref 3.4–5)
ALP SERPL-CCNC: 76 U/L (ref 40–150)
ALT SERPL W P-5'-P-CCNC: 47 U/L (ref 0–50)
ANION GAP SERPL CALCULATED.3IONS-SCNC: 6 MMOL/L (ref 3–14)
AST SERPL W P-5'-P-CCNC: 18 U/L (ref 0–45)
BASOPHILS # BLD AUTO: 0 10E9/L (ref 0–0.2)
BASOPHILS NFR BLD AUTO: 0.2 %
BILIRUB SERPL-MCNC: 0.4 MG/DL (ref 0.2–1.3)
BUN SERPL-MCNC: 3 MG/DL (ref 7–30)
CALCIUM SERPL-MCNC: 7.4 MG/DL (ref 8.5–10.1)
CHLORIDE SERPL-SCNC: 108 MMOL/L (ref 94–109)
CO2 SERPL-SCNC: 25 MMOL/L (ref 20–32)
CREAT SERPL-MCNC: 0.45 MG/DL (ref 0.52–1.04)
DIFFERENTIAL METHOD BLD: ABNORMAL
EOSINOPHIL # BLD AUTO: 0.1 10E9/L (ref 0–0.7)
EOSINOPHIL NFR BLD AUTO: 1 %
ERYTHROCYTE [DISTWIDTH] IN BLOOD BY AUTOMATED COUNT: 13.8 % (ref 10–15)
GFR SERPL CREATININE-BSD FRML MDRD: >90 ML/MIN/1.7M2
GLUCOSE SERPL-MCNC: 87 MG/DL (ref 70–99)
HCT VFR BLD AUTO: 26.5 % (ref 35–47)
HGB BLD-MCNC: 8.5 G/DL (ref 11.7–15.7)
IMM GRANULOCYTES # BLD: 0 10E9/L (ref 0–0.4)
IMM GRANULOCYTES NFR BLD: 0.2 %
LACTATE BLD-SCNC: 0.6 MMOL/L (ref 0.7–2)
LYMPHOCYTES # BLD AUTO: 1.8 10E9/L (ref 0.8–5.3)
LYMPHOCYTES NFR BLD AUTO: 16.2 %
MCH RBC QN AUTO: 27.1 PG (ref 26.5–33)
MCHC RBC AUTO-ENTMCNC: 32.1 G/DL (ref 31.5–36.5)
MCV RBC AUTO: 84 FL (ref 78–100)
MONOCYTES # BLD AUTO: 0.9 10E9/L (ref 0–1.3)
MONOCYTES NFR BLD AUTO: 7.9 %
NEUTROPHILS # BLD AUTO: 8.2 10E9/L (ref 1.6–8.3)
NEUTROPHILS NFR BLD AUTO: 74.5 %
NRBC # BLD AUTO: 0 10*3/UL
NRBC BLD AUTO-RTO: 0 /100
PLATELET # BLD AUTO: 322 10E9/L (ref 150–450)
POTASSIUM SERPL-SCNC: 3.9 MMOL/L (ref 3.4–5.3)
PROT SERPL-MCNC: 5.9 G/DL (ref 6.8–8.8)
RBC # BLD AUTO: 3.14 10E12/L (ref 3.8–5.2)
SODIUM SERPL-SCNC: 139 MMOL/L (ref 133–144)
VANCOMYCIN SERPL-MCNC: 16.7 MG/L
WBC # BLD AUTO: 11 10E9/L (ref 4–11)

## 2018-11-10 PROCEDURE — 25000132 ZZH RX MED GY IP 250 OP 250 PS 637: Performed by: INTERNAL MEDICINE

## 2018-11-10 PROCEDURE — 83605 ASSAY OF LACTIC ACID: CPT | Performed by: INTERNAL MEDICINE

## 2018-11-10 PROCEDURE — 85025 COMPLETE CBC W/AUTO DIFF WBC: CPT | Performed by: INTERNAL MEDICINE

## 2018-11-10 PROCEDURE — 80202 ASSAY OF VANCOMYCIN: CPT | Performed by: INTERNAL MEDICINE

## 2018-11-10 PROCEDURE — 36415 COLL VENOUS BLD VENIPUNCTURE: CPT | Performed by: INTERNAL MEDICINE

## 2018-11-10 PROCEDURE — 25000128 H RX IP 250 OP 636: Performed by: INTERNAL MEDICINE

## 2018-11-10 PROCEDURE — 12000000 ZZH R&B MED SURG/OB

## 2018-11-10 PROCEDURE — 80053 COMPREHEN METABOLIC PANEL: CPT | Performed by: INTERNAL MEDICINE

## 2018-11-10 PROCEDURE — 25000125 ZZHC RX 250

## 2018-11-10 PROCEDURE — 99233 SBSQ HOSP IP/OBS HIGH 50: CPT | Performed by: INTERNAL MEDICINE

## 2018-11-10 RX ORDER — GUARANA/CALCIUM/PHOSPHORUS
200 CAPSULE ORAL DAILY
Status: DISCONTINUED | OUTPATIENT
Start: 2018-11-10 | End: 2018-11-11

## 2018-11-10 RX ORDER — LOPERAMIDE HCL 2 MG
6 CAPSULE ORAL
Status: DISCONTINUED | OUTPATIENT
Start: 2018-11-10 | End: 2018-11-13 | Stop reason: HOSPADM

## 2018-11-10 RX ORDER — ASCORBIC ACID 500 MG
1000 TABLET ORAL DAILY
Status: DISCONTINUED | OUTPATIENT
Start: 2018-11-10 | End: 2018-11-13 | Stop reason: HOSPADM

## 2018-11-10 RX ORDER — MORPHINE SULFATE 15 MG/1
15 TABLET ORAL EVERY 4 HOURS PRN
Status: DISCONTINUED | OUTPATIENT
Start: 2018-11-10 | End: 2018-11-13 | Stop reason: HOSPADM

## 2018-11-10 RX ORDER — CIDER VINEGAR 300 MG
300 TABLET ORAL EVERY MORNING
Status: DISCONTINUED | OUTPATIENT
Start: 2018-11-10 | End: 2018-11-11

## 2018-11-10 RX ORDER — DICYCLOMINE HCL 20 MG
20 TABLET ORAL
Status: DISCONTINUED | OUTPATIENT
Start: 2018-11-10 | End: 2018-11-10

## 2018-11-10 RX ORDER — GINSENG 100 MG
200 CAPSULE ORAL DAILY
Status: DISCONTINUED | OUTPATIENT
Start: 2018-11-10 | End: 2018-11-11

## 2018-11-10 RX ORDER — HYDROMORPHONE HYDROCHLORIDE 1 MG/ML
0.5 INJECTION, SOLUTION INTRAMUSCULAR; INTRAVENOUS; SUBCUTANEOUS
Status: DISCONTINUED | OUTPATIENT
Start: 2018-11-10 | End: 2018-11-13 | Stop reason: HOSPADM

## 2018-11-10 RX ADMIN — Medication 0.5 MG: at 22:04

## 2018-11-10 RX ADMIN — OXYBUTYNIN CHLORIDE 5 MG: 5 TABLET ORAL at 08:11

## 2018-11-10 RX ADMIN — BACLOFEN 10 MG: 10 TABLET ORAL at 21:58

## 2018-11-10 RX ADMIN — PIPERACILLIN SODIUM,TAZOBACTAM SODIUM 3.38 G: 3; .375 INJECTION, POWDER, FOR SOLUTION INTRAVENOUS at 14:17

## 2018-11-10 RX ADMIN — ACETAMINOPHEN 650 MG: 325 TABLET, FILM COATED ORAL at 16:20

## 2018-11-10 RX ADMIN — VANCOMYCIN HYDROCHLORIDE 1000 MG: 1 INJECTION, SOLUTION INTRAVENOUS at 20:48

## 2018-11-10 RX ADMIN — SODIUM CHLORIDE: 9 INJECTION, SOLUTION INTRAVENOUS at 14:18

## 2018-11-10 RX ADMIN — OXYCODONE HYDROCHLORIDE AND ACETAMINOPHEN 1000 MG: 500 TABLET ORAL at 14:16

## 2018-11-10 RX ADMIN — PIPERACILLIN SODIUM,TAZOBACTAM SODIUM 3.38 G: 3; .375 INJECTION, POWDER, FOR SOLUTION INTRAVENOUS at 21:58

## 2018-11-10 RX ADMIN — VANCOMYCIN HYDROCHLORIDE 1000 MG: 1 INJECTION, SOLUTION INTRAVENOUS at 12:10

## 2018-11-10 RX ADMIN — PIPERACILLIN SODIUM,TAZOBACTAM SODIUM 3.38 G: 3; .375 INJECTION, POWDER, FOR SOLUTION INTRAVENOUS at 08:11

## 2018-11-10 RX ADMIN — SODIUM CHLORIDE 1000 ML: 9 INJECTION, SOLUTION INTRAVENOUS at 00:36

## 2018-11-10 RX ADMIN — VANCOMYCIN HYDROCHLORIDE 1000 MG: 1 INJECTION, SOLUTION INTRAVENOUS at 04:45

## 2018-11-10 RX ADMIN — DICYCLOMINE HYDROCHLORIDE 20 MG: 20 TABLET ORAL at 08:11

## 2018-11-10 RX ADMIN — ENOXAPARIN SODIUM 40 MG: 40 INJECTION SUBCUTANEOUS at 21:58

## 2018-11-10 RX ADMIN — MEDROXYPROGESTERONE ACETATE 10 MG: 10 TABLET ORAL at 08:11

## 2018-11-10 RX ADMIN — Medication 0.2 MG: at 09:47

## 2018-11-10 RX ADMIN — PREGABALIN 100 MG: 100 CAPSULE ORAL at 08:11

## 2018-11-10 RX ADMIN — FLUOXETINE 20 MG: 20 CAPSULE ORAL at 08:11

## 2018-11-10 RX ADMIN — PREGABALIN 100 MG: 100 CAPSULE ORAL at 21:59

## 2018-11-10 RX ADMIN — DICYCLOMINE HYDROCHLORIDE 20 MG: 20 TABLET ORAL at 16:23

## 2018-11-10 RX ADMIN — LOPERAMIDE HYDROCHLORIDE 6 MG: 2 CAPSULE ORAL at 16:20

## 2018-11-10 RX ADMIN — PIPERACILLIN SODIUM,TAZOBACTAM SODIUM 3.38 G: 3; .375 INJECTION, POWDER, FOR SOLUTION INTRAVENOUS at 02:41

## 2018-11-10 RX ADMIN — PREGABALIN 100 MG: 100 CAPSULE ORAL at 14:17

## 2018-11-10 RX ADMIN — OXYBUTYNIN CHLORIDE 5 MG: 5 TABLET ORAL at 21:59

## 2018-11-10 ASSESSMENT — ACTIVITIES OF DAILY LIVING (ADL)
ADLS_ACUITY_SCORE: 15
EATING: 0 - INDEPENDENT
TRANSFERRING: 1-->ASSISTIVE EQUIPMENT
DRESS: 0-->INDEPENDENT
COGNITION: 0 - NO COGNITION ISSUES REPORTED
COMMUNICATION: 0 - UNDERSTANDS/COMMUNICATES WITHOUT DIFFICULTY
RETIRED_EATING: 0-->INDEPENDENT
ADLS_ACUITY_SCORE: 14
DRESS: 0 - INDEPENDENT
TOILETING: 1 - ASSISTIVE EQUIPMENT
TOILETING: 0-->INDEPENDENT
ADLS_ACUITY_SCORE: 13
SWALLOWING: 0-->SWALLOWS FOODS/LIQUIDS WITHOUT DIFFICULTY
AMBULATION: 1 - ASSISTIVE EQUIPMENT
TRANSFERRING: 1 - ASSISTIVE EQUIPMENT
BATHING: 2 - ASSISTIVE PERSON
ADLS_ACUITY_SCORE: 13
FALL_HISTORY_WITHIN_LAST_SIX_MONTHS: NO
RETIRED_COMMUNICATION: 0-->UNDERSTANDS/COMMUNICATES WITHOUT DIFFICULTY
SWALLOWING: 0 - SWALLOWS FOODS/LIQUIDS WITHOUT DIFFICULTY
ADLS_ACUITY_SCORE: 15
AMBULATION: 1-->ASSISTIVE EQUIPMENT
BATHING: 0-->INDEPENDENT
ADLS_ACUITY_SCORE: 14

## 2018-11-10 NOTE — PHARMACY-ADMISSION MEDICATION HISTORY
"Admission medication history interview status for the 11/9/2018  admission is complete. See EPIC admission navigator for prior to admission medications     Medication history source reliability:Good, pt interview and bottles    Actions taken by pharmacist (provider contacted, etc): Baclofen qhs only. Bentyl TIDWM. Added Loperamide, Biatain wound care, Psyllium, multiple vitamins and herbals. Modified pregabalin dose    Additional medication history information not noted on PTA med list : webpaged md about changes    Medication reconciliation/reorder completed by provider prior to medication history? Yes    Time spent in this activity: 20 minutes    Prior to Admission medications    Medication Sig Last Dose Taking? Auth Provider   Apple Cider Vinegar 300 MG TABS Take 300 mg by mouth every morning 11/9/2018 at am Yes Unknown, Entered By History   ascorbic acid (VITAMIN C) 1000 MG TABS Take 1,000 mg by mouth daily 11/9/2018 at Unknown time Yes Unknown, Entered By History   BACLOFEN PO Take 10 mg by mouth At Bedtime  11/8/2018 at hs Yes Reported, Patient   CRANBERRY PO Take 100 mg by mouth daily 11/9/2018 at am Yes Unknown, Entered By History   dicyclomine (BENTYL) 20 MG tablet Take 20 mg by mouth 3 times daily (with meals) Takes every time she eats 11/9/2018 at 1500 Yes Unknown, Entered By History   FLUoxetine (PROZAC) 20 MG capsule TAKE 1 CAPSULE EVERY DAY 11/9/2018 at am Yes Ayala Limon MD PhD   Gauze Pads & Dressings (BIATAIN ADHESIVE FOAM DRESSING) 4\"X4\" PADS Apply topically daily  Yes Unknown, Entered By History   Ginkgo Biloba (GNP GINGKO BILOBA EXTRACT PO) Take 30 mg by mouth daily 11/9/2018 at Unknown time Yes Unknown, Entered By History   GuaranaJeremy cupana, (GUARANA PO) Take 200 mg by mouth daily 11/9/2018 at am Yes Unknown, Entered By History   loperamide (IMODIUM) 2 MG capsule Take 6 mg by mouth 3 times daily (with meals) 11/9/2018 at Unknown time Yes Unknown, Entered By History   medroxyPROGESTERone " (PROVERA) 10 MG tablet TAKE 1 AND 1/2 TABLETS EVERY DAY 11/9/2018 at am Yes Ayala Limon MD PhD   oxybutynin (DITROPAN) 5 MG tablet Take 1 tablet (5 mg) by mouth 2 times daily 11/9/2018 at am Yes Ayala Limon MD PhD   Pregabalin (LYRICA PO) Take 100 mg by mouth 3 times daily  11/9/2018 at 1400 Yes Reported, Patient   Psyllium 500 MG CAPS Take 1,500 mg by mouth every morning 11/9/2018 at am Yes Unknown, Entered By History

## 2018-11-10 NOTE — PROGRESS NOTES
[facility]  Hospitalist Progress Note    Date of Service (when I saw the patient): 11/10/2018    Jesús Avendaño MD  [facility]  Pager [phone]    Assessment & Plan   47 year old female who presents with fevers, night sweats, tachycardia, and L sided rapidly expanding TI decub.      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   - improved on IV Zosyn and Vanco, WBC is WNL and fever curve has been normal today  - BP is still mildly hypotensive and HR a little tachy at 110  - continue IVF support for BP at this time  - Ortho has seen, not surgical, recommending bone biopsy to help with abx plan  - Infectious Disease consulted  - anticipate prolonged course of IV abx for discontinue, will see what ID recommends, will also need midline if that is plan  - I ordered IR bone biopsy of the left ischium for Sunday 11/11  - WOC consulted for ischial wound but not available until Monday, so wound care by RN until then, pack with guaze and cover   - pain is significant, did well with morphine in ED, now has dilaudid but dose is too low, increase to 05mg q2, but I did encourage patient to use an oral as 1st line, reports hydrocodone allergy and is afraid to take oxycodone, is ok with MSIR q4 PRN, dilaudid as breakthrough     2.) Possible fistula- this is new.   The MRI recommends a rectal MRI for evaluation of this fistula. I will consult colorectal surgery for evaluation of this fistula and what imaging or intervention needed  -consulted colorectal surgery, will see what they recommend, they have come by today per the patient but awaiting plan from attending  -May need rectal MRI     3.) Possible UTI-   Culture was from the johnson bag so it is meaningless. Even if it grows it would not represent any infection. Will send a new UA reflex from her bladder. However still unlikely to be a real infection given her neurogenic bladder.   -resent  sample from bladder on admission  - >100K of 2 strains gram +, already on broad spectrums, will await final culture results for identification and susceptibility of bacteria     4.) Neuropathic pain  -cont pta Lyrica     5.) Anxiety  - mood stable, continue PTA Prozac     6.) Dysmenorrhea  -cont pta meds        DVT Prophylaxis: Enoxaparin (Lovenox) SQ  Code Status: Full Code     Disposition: Possibly Monday or Tuesday based on bone biopsy timing and discharge abx plan.    Jesús Bryantjamila Avendaño    Interval History   Reports 10/10 hip pain when dilaudid wears off, goes to 5/10 when given and lasts for about 30 minutes.  Denies any other new symptoms today.    -Data reviewed today: I reviewed all new labs and imaging results over the last 24 hours. I personally reviewed no images or EKG's today.    Physical Exam   Temp: 98.5  F (36.9  C) Temp src: Oral BP: 102/68 Pulse: 110 Heart Rate: 89 Resp: 20 SpO2: 95 % O2 Device: None (Room air)    Vitals:    11/09/18 1703   Weight: 72.6 kg (160 lb)     Vital Signs with Ranges  Temp:  [97.9  F (36.6  C)-100.8  F (38.2  C)] 98.5  F (36.9  C)  Pulse:  [110] 110  Heart Rate:  [] 89  Resp:  [13-20] 20  BP: ()/(53-98) 102/68  SpO2:  [94 %-100 %] 95 %       Constitutional: NAD, afebrile, A+Ox4  Respiratory: CTA B  Cardiovascular: RRR, no murmur  GI: S, NT, ND, normal BS  Skin/Integumen:  2x3cm stage 2-3 ulcer with serosanguinous drainage       Medications     sodium chloride 100 mL/hr at 11/09/18 2212       Apple Cider Vinegar  300 mg Oral QAM     ascorbic acid  1,000 mg Oral Daily     baclofen (LIORESAL) tablet 10 mg  10 mg Oral At Bedtime     cranberry  200 mg Oral Daily     dicyclomine  20 mg Oral TID w/meals     enoxaparin  40 mg Subcutaneous Q24H     FLUoxetine  20 mg Oral Daily     ginkgo biloba  30 mg Oral Daily     Guarana  200 mg Oral Daily     loperamide  6 mg Oral TID w/meals     medroxyPROGESTERone  10 mg Oral Daily     oxybutynin  5 mg Oral BID      piperacillin-tazobactam  3.375 g Intravenous Q6H     pregabalin (LYRICA) capsule 100 mg  100 mg Oral TID     Psyllium  1,500 mg Oral QAM     sodium chloride (PF)  3 mL Intracatheter Q8H     vancomycin (VANCOCIN) IV  1,000 mg Intravenous Q8H       Data     Recent Labs  Lab 11/10/18  0755 11/09/18  1726 11/07/18  0832   WBC 11.0 15.7* 15.8*   HGB 8.5* 9.6* 10.6*   MCV 84 85 86    425 408    136  --    POTASSIUM 3.9 3.3*  --    CHLORIDE 108 101  --    CO2 25 29  --    BUN 3* 4*  --    CR 0.45* 0.37*  --    ANIONGAP 6 6  --    AC 7.4* 7.8*  --    GLC 87 91  --    ALBUMIN 1.6* 2.0*  --    PROTTOTAL 5.9* 6.9  --    BILITOTAL 0.4 0.1*  --    ALKPHOS 76 91  --    ALT 47 66*  --    AST 18 36  --    LIPASE  --  145  --        No results found for this or any previous visit (from the past 24 hour(s)).

## 2018-11-10 NOTE — PHARMACY-VANCOMYCIN DOSING SERVICE
Pharmacy Vancomycin Initial Note  Date of Service 2018  Patient's  1970  47 year old, female    Indication: Bone and Joint Infection/sepsis    Current estimated CrCl = Estimated Creatinine Clearance: 220.1 mL/min (based on Cr of 0.37).    Creatinine for last 3 days  2018:  5:26 PM Creatinine 0.37 mg/dL    Recent Vancomycin Level(s) for last 3 days  No results found for requested labs within last 72 hours.      Vancomycin IV Administrations (past 72 hours)                   vancomycin (VANCOCIN) 1000 mg in dextrose 5% 200 mL PREMIX (mg) 1,000 mg New Bag 18 1911                Nephrotoxins and other renal medications (Future)    Start     Dose/Rate Route Frequency Ordered Stop    11/10/18 0300  vancomycin (VANCOCIN) 1000 mg in dextrose 5% 200 mL PREMIX      1,000 mg  200 mL/hr over 1 Hours Intravenous EVERY 8 HOURS 18 2108      18 2100  piperacillin-tazobactam (ZOSYN) 3.375 g vial to attach to  mL bag     Comments:  Pharmacy can adjust dose based on renal function.    3.375 g  over 30 Minutes Intravenous EVERY 6 HOURS 18 2053            Contrast Orders - past 72 hours     None                Plan:  1.  Start vancomycin  1000 mg IV q8h.   2.  Goal Trough Level: 15-20 mg/L   3.  Pharmacy will check trough levels as appropriate in 1-3 Days.    4. Serum creatinine levels will be ordered every other day for at least 10 days while on concomitant nephrotoxins.    5. [place] method utilized to dose vancomycin therapy: Method 1    Dior Zuñiga, PharmD
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
## 2018-11-10 NOTE — PROGRESS NOTES
RECEIVING UNIT ED HANDOFF REVIEW    ED Nurse Handoff Report was reviewed by: Jayjay Garcia on November 9, 2018 at 7:29 PM

## 2018-11-10 NOTE — PLAN OF CARE
Problem: Patient Care Overview  Goal: Plan of Care/Patient Progress Review  Outcome: Improving  A&O x 4, up with assist of one, vss, paraplegic, on RA, no c/o of pain , can turn and reposition self, suprapubic catheter patent, left buttocks dressing changed,left ankle dressing C/D/I, wound consult ordeerd, seen by colorectal and ortho, recommending IR bone biopsy, continues on IV abx, appetite fair, discharge pending progress

## 2018-11-10 NOTE — PROGRESS NOTES
Paged on-call Sukhdeepder regarding pt's low bp. BP has been running in 80's over 50's, asymptomatic. Pt. Here with possible sepsis. 1000 L of bolus given in ED. Order obtained for 1000 L of bolus.

## 2018-11-10 NOTE — PLAN OF CARE
Problem: Patient Care Overview  Goal: Plan of Care/Patient Progress Review  Outcome: No Change  Pt. Alert and oriented. VSS ex pressure running in the 80's over 50's, resolved with 1000 L of bolus. Lactate 0.6. BLE parapalegic. Sacral wound dressing, CDI, no drainage noted. Dressing to LLE CDI. C/O L hip pain,  managed with Dilaudid IV. Continue to monitor.

## 2018-11-10 NOTE — PROGRESS NOTES
Hospitalist service cross-cover note:     Paged regarding BP of 80s/50s. Asymptomatic. Here with concern for sepsis due to buttock wound / osteomyelitis. Has only received 1L IV fluids in ED. Will give an additional 1L normal saline and monitor.     Alvaro Batres MD   Hospitalist  [phone]
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
## 2018-11-11 LAB
ANION GAP SERPL CALCULATED.3IONS-SCNC: 6 MMOL/L (ref 3–14)
BACTERIA SPEC CULT: ABNORMAL
BACTERIA SPEC CULT: ABNORMAL
BASOPHILS # BLD AUTO: 0 10E9/L (ref 0–0.2)
BASOPHILS NFR BLD AUTO: 0.2 %
BUN SERPL-MCNC: 4 MG/DL (ref 7–30)
CALCIUM SERPL-MCNC: 7.7 MG/DL (ref 8.5–10.1)
CHLORIDE SERPL-SCNC: 112 MMOL/L (ref 94–109)
CO2 SERPL-SCNC: 26 MMOL/L (ref 20–32)
CREAT SERPL-MCNC: 0.6 MG/DL (ref 0.52–1.04)
DIFFERENTIAL METHOD BLD: ABNORMAL
EOSINOPHIL # BLD AUTO: 0.1 10E9/L (ref 0–0.7)
EOSINOPHIL NFR BLD AUTO: 1 %
ERYTHROCYTE [DISTWIDTH] IN BLOOD BY AUTOMATED COUNT: 14 % (ref 10–15)
GFR SERPL CREATININE-BSD FRML MDRD: >90 ML/MIN/1.7M2
GLUCOSE SERPL-MCNC: 88 MG/DL (ref 70–99)
HCT VFR BLD AUTO: 27.6 % (ref 35–47)
HGB BLD-MCNC: 8.6 G/DL (ref 11.7–15.7)
IMM GRANULOCYTES # BLD: 0 10E9/L (ref 0–0.4)
IMM GRANULOCYTES NFR BLD: 0.3 %
LYMPHOCYTES # BLD AUTO: 1.6 10E9/L (ref 0.8–5.3)
LYMPHOCYTES NFR BLD AUTO: 14.3 %
MCH RBC QN AUTO: 26.5 PG (ref 26.5–33)
MCHC RBC AUTO-ENTMCNC: 31.2 G/DL (ref 31.5–36.5)
MCV RBC AUTO: 85 FL (ref 78–100)
MONOCYTES # BLD AUTO: 0.9 10E9/L (ref 0–1.3)
MONOCYTES NFR BLD AUTO: 8.3 %
NEUTROPHILS # BLD AUTO: 8.6 10E9/L (ref 1.6–8.3)
NEUTROPHILS NFR BLD AUTO: 75.9 %
NRBC # BLD AUTO: 0 10*3/UL
NRBC BLD AUTO-RTO: 0 /100
PLATELET # BLD AUTO: 353 10E9/L (ref 150–450)
POTASSIUM SERPL-SCNC: 3.6 MMOL/L (ref 3.4–5.3)
RBC # BLD AUTO: 3.24 10E12/L (ref 3.8–5.2)
SODIUM SERPL-SCNC: 144 MMOL/L (ref 133–144)
SPECIMEN SOURCE: ABNORMAL
VANCOMYCIN SERPL-MCNC: 22 MG/L
WBC # BLD AUTO: 11.8 10E9/L (ref 4–11)

## 2018-11-11 PROCEDURE — 80048 BASIC METABOLIC PNL TOTAL CA: CPT | Performed by: INTERNAL MEDICINE

## 2018-11-11 PROCEDURE — 85025 COMPLETE CBC W/AUTO DIFF WBC: CPT | Performed by: INTERNAL MEDICINE

## 2018-11-11 PROCEDURE — 36415 COLL VENOUS BLD VENIPUNCTURE: CPT | Performed by: INTERNAL MEDICINE

## 2018-11-11 PROCEDURE — 25000128 H RX IP 250 OP 636: Performed by: INTERNAL MEDICINE

## 2018-11-11 PROCEDURE — 99232 SBSQ HOSP IP/OBS MODERATE 35: CPT | Performed by: HOSPITALIST

## 2018-11-11 PROCEDURE — 25000125 ZZHC RX 250

## 2018-11-11 PROCEDURE — 12000000 ZZH R&B MED SURG/OB

## 2018-11-11 PROCEDURE — 80202 ASSAY OF VANCOMYCIN: CPT | Performed by: INTERNAL MEDICINE

## 2018-11-11 PROCEDURE — 25000132 ZZH RX MED GY IP 250 OP 250 PS 637: Performed by: INTERNAL MEDICINE

## 2018-11-11 RX ORDER — VANCOMYCIN HYDROCHLORIDE 1 G/200ML
1000 INJECTION, SOLUTION INTRAVENOUS EVERY 12 HOURS
Status: DISCONTINUED | OUTPATIENT
Start: 2018-11-11 | End: 2018-11-11

## 2018-11-11 RX ORDER — VANCOMYCIN HYDROCHLORIDE 1 G/200ML
1000 INJECTION, SOLUTION INTRAVENOUS EVERY 12 HOURS
Status: DISCONTINUED | OUTPATIENT
Start: 2018-11-12 | End: 2018-11-12

## 2018-11-11 RX ADMIN — LOPERAMIDE HYDROCHLORIDE 6 MG: 2 CAPSULE ORAL at 13:40

## 2018-11-11 RX ADMIN — OXYBUTYNIN CHLORIDE 5 MG: 5 TABLET ORAL at 20:43

## 2018-11-11 RX ADMIN — OXYBUTYNIN CHLORIDE 5 MG: 5 TABLET ORAL at 08:13

## 2018-11-11 RX ADMIN — PREGABALIN 100 MG: 100 CAPSULE ORAL at 20:43

## 2018-11-11 RX ADMIN — LOPERAMIDE HYDROCHLORIDE 6 MG: 2 CAPSULE ORAL at 17:30

## 2018-11-11 RX ADMIN — SODIUM CHLORIDE: 9 INJECTION, SOLUTION INTRAVENOUS at 02:44

## 2018-11-11 RX ADMIN — DICYCLOMINE HYDROCHLORIDE 20 MG: 20 TABLET ORAL at 08:13

## 2018-11-11 RX ADMIN — SODIUM CHLORIDE: 9 INJECTION, SOLUTION INTRAVENOUS at 22:01

## 2018-11-11 RX ADMIN — VANCOMYCIN HYDROCHLORIDE 1000 MG: 1 INJECTION, SOLUTION INTRAVENOUS at 04:02

## 2018-11-11 RX ADMIN — DICYCLOMINE HYDROCHLORIDE 20 MG: 20 TABLET ORAL at 17:30

## 2018-11-11 RX ADMIN — LOPERAMIDE HYDROCHLORIDE 6 MG: 2 CAPSULE ORAL at 08:13

## 2018-11-11 RX ADMIN — FLUOXETINE 20 MG: 20 CAPSULE ORAL at 08:13

## 2018-11-11 RX ADMIN — PIPERACILLIN SODIUM,TAZOBACTAM SODIUM 3.38 G: 3; .375 INJECTION, POWDER, FOR SOLUTION INTRAVENOUS at 08:16

## 2018-11-11 RX ADMIN — PREGABALIN 100 MG: 100 CAPSULE ORAL at 13:40

## 2018-11-11 RX ADMIN — ACETAMINOPHEN 650 MG: 325 TABLET, FILM COATED ORAL at 02:48

## 2018-11-11 RX ADMIN — MEDROXYPROGESTERONE ACETATE 10 MG: 10 TABLET ORAL at 08:12

## 2018-11-11 RX ADMIN — DICYCLOMINE HYDROCHLORIDE 20 MG: 20 TABLET ORAL at 13:41

## 2018-11-11 RX ADMIN — PIPERACILLIN SODIUM,TAZOBACTAM SODIUM 3.38 G: 3; .375 INJECTION, POWDER, FOR SOLUTION INTRAVENOUS at 20:34

## 2018-11-11 RX ADMIN — PREGABALIN 100 MG: 100 CAPSULE ORAL at 08:13

## 2018-11-11 RX ADMIN — OXYCODONE HYDROCHLORIDE AND ACETAMINOPHEN 1000 MG: 500 TABLET ORAL at 08:13

## 2018-11-11 RX ADMIN — PSYLLIUM HUSK 1 PACKET: 3.4 POWDER ORAL at 09:48

## 2018-11-11 RX ADMIN — PIPERACILLIN SODIUM,TAZOBACTAM SODIUM 3.38 G: 3; .375 INJECTION, POWDER, FOR SOLUTION INTRAVENOUS at 02:45

## 2018-11-11 RX ADMIN — PIPERACILLIN SODIUM,TAZOBACTAM SODIUM 3.38 G: 3; .375 INJECTION, POWDER, FOR SOLUTION INTRAVENOUS at 15:17

## 2018-11-11 RX ADMIN — VANCOMYCIN HYDROCHLORIDE 1000 MG: 1 INJECTION, SOLUTION INTRAVENOUS at 13:50

## 2018-11-11 ASSESSMENT — ACTIVITIES OF DAILY LIVING (ADL)
ADLS_ACUITY_SCORE: 13
ADLS_ACUITY_SCORE: 15
ADLS_ACUITY_SCORE: 15
ADLS_ACUITY_SCORE: 13
ADLS_ACUITY_SCORE: 15
ADLS_ACUITY_SCORE: 13

## 2018-11-11 NOTE — PHARMACY-VANCOMYCIN DOSING SERVICE
Pharmacy Vancomycin Note  Date of Service November 10, 2018  Patient's  1970   47 year old, female    Indication: Bone and Joint Infection  Goal Trough Level: 15-20 mg/L  Day of Therapy: 2  Current Vancomycin regimen:  1000 mg IV q8h    Current estimated CrCl = Estimated Creatinine Clearance: 180.9 mL/min (based on Cr of 0.45).    Creatinine for last 3 days  2018:  5:26 PM Creatinine 0.37 mg/dL  11/10/2018:  7:55 AM Creatinine 0.45 mg/dL    Recent Vancomycin Levels (past 3 days)  11/10/2018:  7:10 PM Vancomycin Level 16.7 mg/L    Vancomycin IV Administrations (past 72 hours)                   vancomycin (VANCOCIN) 1000 mg in dextrose 5% 200 mL PREMIX (mg) 1,000 mg New Bag 11/10/18 1210     1,000 mg New Bag  0445    vancomycin (VANCOCIN) 1000 mg in dextrose 5% 200 mL PREMIX (mg) 1,000 mg New Bag 18 1911                Nephrotoxins and other renal medications (Future)    Start     Dose/Rate Route Frequency Ordered Stop    11/10/18 0300  vancomycin (VANCOCIN) 1000 mg in dextrose 5% 200 mL PREMIX      1,000 mg  200 mL/hr over 1 Hours Intravenous EVERY 8 HOURS 18 2108      18 2100  piperacillin-tazobactam (ZOSYN) 3.375 g vial to attach to  mL bag     Comments:  Pharmacy can adjust dose based on renal function.    3.375 g  over 30 Minutes Intravenous EVERY 6 HOURS 18 2053               Contrast Orders - past 72 hours     None          Interpretation of levels and current regimen:  Trough level is  Therapeutic    Has serum creatinine changed > 50% in last 72 hours: No    Urine output:  unable to determine    Renal Function: Stable    Plan:  1.  Continue Current Dose  2.  Pharmacy will check trough levels as appropriate in 1-3 Days.    3. Serum creatinine levels will be ordered daily for the first week of therapy and at least twice weekly for subsequent weeks.      Dior Zuñiga, PharmD        .

## 2018-11-11 NOTE — PROGRESS NOTES
"[facility]  Hospitalist Progress Note        John Robertson, DO  2018        Interval History:      Patient states that she is doing well, discussed plan of care including CV surgery team plans for review with radiology on Monday per notes. In addition, ID and Ortho plans reviewed and discussed.          Assessment and Plan:        47 year old female who presents with fevers, night sweats, tachycardia, and L sided rapidly expanding TI decub.       Sepsis 2/2 L buttock wound with left ischial and inferior pubic ramus osteomyelitis Improved on IV Zosyn and Vanco, WBC is WNL and fever curve has been normal today  - Ortho following.   - Infectious Disease following.   - Vanc/Zosyn.   - Defer IR bone biopsy to Ortho, ID.   - WOC consulted for ischial wound  - Pain control.       Possible fistula: The MRI recommends a rectal MRI for evaluation of this fistula.   - Consult colorectal surgery for evaluation       UTI: Culture was from the johnson bag so it is meaningless. Even if it grows it would not represent any infection. Will send a new UA reflex from her bladder. However still unlikely to be a real infection given her neurogenic bladder.   - >100K of staph aureus and enterococcus faecalis, defer antibiotics to ID.       Neuropathic pain  - Continue pta Lyrica      Anxiety mood stable.   - Continue PTA Prozac      Dysmenorrhea  - Continue pta meds      DVT Prophylaxis: Enoxaparin subcutaneous    Code: Full       Disposition: Likely few days pending imaging assessment and consolidation of care plan.                    Physical Exam:      Heart Rate: 80    Blood pressure 91/63, pulse 110, temperature 98.1  F (36.7  C), temperature source Oral, resp. rate 16, height 1.778 m (5' 10\"), weight 72.6 kg (160 lb), SpO2 96 %, not currently breastfeeding.    Vitals:    18 1703   Weight: 72.6 kg (160 lb)       Vital Sign Ranges  Temperature Temp  Av  F (37.2  C)  Min: 98  F (36.7  C)  Max: " "100.2  F (37.9  C)   Blood pressure Systolic (24hrs), Av , Min:91 , Max:99        Diastolic (24hrs), Av, Min:51, Max:68      Pulse No Data Recorded   Respirations Resp  Av.6  Min: 16  Max: 20   Pulse oximetry SpO2  Av.7 %  Min: 94 %  Max: 97 %     Vital Signs with Ranges  Temp:  [98  F (36.7  C)-100.2  F (37.9  C)] 98.1  F (36.7  C)  Heart Rate:  [] 80  Resp:  [16-20] 16  BP: (91-99)/(51-68) 91/63  SpO2:  [94 %-97 %] 96 %    I/O Last 3 Shifts:   I/O last 3 completed shifts:  In: 2799 [P.O.:180; I.V.:2619]  Out: 3500 [Urine:3500]    I/O past 24 hours:     Intake/Output Summary (Last 24 hours) at 18 0906  Last data filed at 18 0700   Gross per 24 hour   Intake             2799 ml   Output             3850 ml   Net            -1051 ml     GENERAL: Alert and oriented. NAD. Conversational, appropriate. Pleasant.   HEENT: Normocephalic. EOMI. No icterus or injection. Nares normal.   LUNGS: Clear to auscultation. No dyspnea at rest.   HEART: Regular rate. Extremities perfused.   ABDOMEN: Soft, nontender, and nondistended. Positive bowel sounds.   NEUROLOGIC: Sitting in wheel chair.           Prior to Admission Medications:        Prescriptions Prior to Admission   Medication Sig Dispense Refill Last Dose     Apple Cider Vinegar 300 MG TABS Take 300 mg by mouth every morning   2018 at am     ascorbic acid (VITAMIN C) 1000 MG TABS Take 1,000 mg by mouth daily   2018 at Unknown time     BACLOFEN PO Take 10 mg by mouth At Bedtime    2018 at hs     CRANBERRY PO Take 100 mg by mouth daily   2018 at am     dicyclomine (BENTYL) 20 MG tablet Take 20 mg by mouth 3 times daily (with meals) Takes every time she eats   2018 at 1500     FLUoxetine (PROZAC) 20 MG capsule TAKE 1 CAPSULE EVERY DAY 90 capsule 0 2018 at am     Gauze Pads & Dressings (BIATAIN ADHESIVE FOAM DRESSING) 4\"X4\" PADS Apply topically daily        Ginkgo Biloba (GNP GINGKO BILOBA EXTRACT PO) Take 30 mg " by mouth daily   11/9/2018 at Unknown time     Gary Jeremy mishra, (GUARANA PO) Take 200 mg by mouth daily   11/9/2018 at am     loperamide (IMODIUM) 2 MG capsule Take 6 mg by mouth 3 times daily (with meals)   11/9/2018 at Unknown time     medroxyPROGESTERone (PROVERA) 10 MG tablet TAKE 1 AND 1/2 TABLETS EVERY  tablet 0 11/9/2018 at am     oxybutynin (DITROPAN) 5 MG tablet Take 1 tablet (5 mg) by mouth 2 times daily 180 tablet 0 11/9/2018 at am     Pregabalin (LYRICA PO) Take 100 mg by mouth 3 times daily    11/9/2018 at 1400     Psyllium 500 MG CAPS Take 1,500 mg by mouth every morning   11/9/2018 at am     Catheters MISC 1 catheter every 28 days. 1 each 12 Taking     HOMEOPATHIC PRODUCTS EX Drainage tone for swollen glands, sinus congestion and skin eruptions.   on hold     HOMEOPATHIC PRODUCTS EX Neuro-chord for numbness, irritability, and tingling.   on hold     order for DME Equipment being ordered: [employer] Phone [phone] Fax [phone]    Primary Dressing 8X8 Mesalt   Qty 15 sheets  Secondary Dressing nonsterile 4X4 gauze Qty 200 count loaf  Secondary Dressing 2 in medipore tape Qty 2 rolls  Length of Need: 1 month  Frequency of dressing change: daily 30 days 0 Taking     order for DME Equipment being ordered: Anchoring Device Flexi-Track LG 2 Units 12 Taking     order for DME Equipment being ordered: Insertion Tray Wheeler w/BZK Swab Catheter 10CC, Sterile 1 Units 12 Taking     order for DME Equipment being ordered: Drain Bag, Kenguard 2000ml. Urinary Bag with Anti-Refulx 2 Units 12 Taking     order for DME Equipment being ordered: Catheter Wheeler 2Way 30cc 18FR, Silicone Coated Latex 1 Units 12 Taking     order for DME Equipment being ordered: walking boot left 1 Units 0 Taking            Medications:        Current Facility-Administered Medications   Medication Last Rate     sodium chloride 100 mL/hr at 11/11/18 0244     Current Facility-Administered Medications   Medication  Dose Route Frequency     Apple Cider Vinegar  300 mg Oral QAM     ascorbic acid  1,000 mg Oral Daily     baclofen (LIORESAL) tablet 10 mg  10 mg Oral At Bedtime     cranberry  200 mg Oral Daily     dicyclomine  20 mg Oral TID w/meals     enoxaparin  40 mg Subcutaneous Q24H     FLUoxetine  20 mg Oral Daily     Ginkgo Biloba 30 mg per one-half tablet  30 mg Oral Daily     Guarana  200 mg Oral Daily     loperamide  6 mg Oral TID w/meals     medroxyPROGESTERone  10 mg Oral Daily     oxybutynin  5 mg Oral BID     piperacillin-tazobactam  3.375 g Intravenous Q6H     pregabalin (LYRICA) capsule 100 mg  100 mg Oral TID     psyllium  1 packet Oral Daily     sodium chloride (PF)  3 mL Intracatheter Q8H     vancomycin (VANCOCIN) IV  1,000 mg Intravenous Q8H     Current Facility-Administered Medications   Medication Dose Route Frequency     acetaminophen  650 mg Oral Q4H PRN     HYDROmorphone  0.5 mg Intravenous Q2H PRN     magnesium citrate  148 mL Oral Once PRN     melatonin  1 mg Oral At Bedtime PRN     morphine  15 mg Oral Q4H PRN     naloxone  0.1-0.4 mg Intravenous Q2 Min PRN     ondansetron  4 mg Oral Q6H PRN    Or     ondansetron  4 mg Intravenous Q6H PRN     polyethylene glycol  17 g Oral Daily PRN     potassium chloride  20-40 mEq Oral or Feeding Tube Q2H PRN     potassium chloride with lidocaine  10 mEq Intravenous Q1H PRN     potassium chloride  10 mEq Intravenous Q1H PRN     potassium chloride  20 mEq Intravenous Q1H PRN     potassium chloride  20-40 mEq Oral Q2H PRN     senna-docusate  1 tablet Oral BID PRN    Or     senna-docusate  2 tablet Oral BID PRN     sodium chloride (PF)  3 mL Intracatheter Q1H PRN            Data:      Lab data reviewed.     Recent Labs  Lab 11/11/18  0818 11/10/18  0755 11/09/18  1726   HGB 8.6* 8.5* 9.6*   MCV 85 84 85    322 425    139 136   POTASSIUM 3.6 3.9 3.3*   CHLORIDE 112* 108 101   CO2 26 25 29   BUN 4* 3* 4*   CR 0.60 0.45* 0.37*   ANIONGAP 6 6 6   AC 7.7* 7.4*  7.8*   [facility] [phone]           Imaging:      Imaging data reviewed.     Dr. John Robertson D.O.  [facility]  Pager [phone]

## 2018-11-11 NOTE — PLAN OF CARE
Problem: Patient Care Overview  Goal: Plan of Care/Patient Progress Review  Outcome: No Change  A/ox4. Assist of one with slide board to the wheel chair. Paraplegic from the waist down. Independently positions in bed, weight shift assistance provided as needed. Denies and pain. VSS except temp of 100.1. Tylenol given now down to 98.0. Left ischeal pressure ulcer, dressing change per nursing every shift. L. Inner heel ulcer, patient completes dressing care on own. Suprapubic catheter in place with good output. No BM's this shift. NS running at 100ml/hr. Receiving iv zosyn and vanco. Regular diet tolerating with fair appetite. Wound care, ortho, colorectal, ID and IR following. Left bone biopsy scheduled for today at 0900 by IR. Patient is aware. Nursing continue to monitor.

## 2018-11-11 NOTE — PLAN OF CARE
Problem: Patient Care Overview  Goal: Plan of Care/Patient Progress Review  Outcome: Improving  A&O x 4, up wit assist of  one to the wheel chair, vss, o c/o of pain, on RA,paraplegic, left buttocks wound and left foot dressings changed, colorectal, ID and ortho following, continues on iv abx, IVF at 100 ml/ hr, good appetite, Wound consult pending, suprapubic catheter in place, good urine OP, plan for CT guided  Bone biopsy tomorrow, discharge pending progress.

## 2018-11-11 NOTE — PLAN OF CARE
Problem: Patient Care Overview  Goal: Plan of Care/Patient Progress Review  Outcome: No Change  A+Ox4, independently positioning in bed. 1 assist with tx from bed to w/c. Tolerating regular diet. Dressing to left ischial changed, CDI. Left inner heel ulcer changed, dressing CDI. Suprapubic catheter patent, intact, good output.  IV anx. IVF @ 100. Temp 100.6 gave tylenol, 98.6. Other VSS, on RA. [place]-rectal/ID/Ortho/ WOC (pending) all following. Nursing will continue to monitor.
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
## 2018-11-11 NOTE — PROGRESS NOTES
Colorectal Surgery Progress Note      Interval History:  No acute events overnight. Low grade temp to 100.1.  Feeling better.  She's on a good bowel program at home with very infrequent fecal incontinence.  She feels the home fiber supplement works better than the one here.    Physical Exam:   Temp:  [98  F (36.7  C)-100.2  F (37.9  C)] 98.1  F (36.7  C)  Heart Rate:  [] 80  Resp:  [16-20] 16  BP: (91-99)/(51-68) 91/63  SpO2:  [94 %-97 %] 96 %  General: Alert, oriented, appears comfortable, NAD.  Respiratory: breathing non labored  Rectal:  Left ischiorectal fossa wound clean.  Dressing changed.  No fecal staining.  No clinical signs of a fistula.    Data:   All laboratory and imaging data in the past 24 hours reviewed  I/O last 3 completed shifts:  In: 2799 [P.O.:180; I.V.:2619]  Out: 3500 [Urine:3500]  Recent Labs   Lab Test  11/11/18   0818  11/10/18   0755  11/09/18   1726  08/04/11   WBC  11.8*  11.0  15.7*   < >   --    HGB  8.6*  8.5*  9.6*   < >   --    PLT  353  322  425   < >   --    INR   --    --    --    --   1.0    < > = values in this interval not displayed.      Recent Labs   Lab Test  11/11/18   0818  11/10/18   0755  11/09/18   1726   NA  144  139  136   POTASSIUM  3.6  3.9  3.3*   CHLORIDE  112*  108  101   CO2  26  25  29   BUN  4*  3*  4*   CR  0.60  0.45*  0.37*   ANIONGAP  6  6  6   AC  7.7*  7.4*  7.8*   GLC  88  87  91        Recent Labs   Lab Test  11/10/18   0755   PROTTOTAL  5.9*   ALBUMIN  1.6*   BILITOTAL  0.4   ALKPHOS  76   AST  18   ALT  47       Assessment and Plan:     I've looked at her MRI multiple times and not clear on the fistula read.  Will have tomorrow's team review with dedicated rectal MRI radiologist.  May possibly need EUA if the MRI interpretation is confirmed.  Unless a fistula to the wound is confirmed, which does not appear to be present clinically, I do not think she would benefit significantly from a diverting colostomy.    Andie Herrmann MD    Colorectal Surgery  [phone] [facility]  [phone] Office/Call service

## 2018-11-12 ENCOUNTER — MEDICAL CORRESPONDENCE (OUTPATIENT)
Dept: HEALTH INFORMATION MANAGEMENT | Facility: CLINIC | Age: 48
End: 2018-11-12

## 2018-11-12 ENCOUNTER — APPOINTMENT (OUTPATIENT)
Dept: CT IMAGING | Facility: CLINIC | Age: 48
DRG: 854 | End: 2018-11-12
Attending: INTERNAL MEDICINE
Payer: COMMERCIAL

## 2018-11-12 LAB
ANION GAP SERPL CALCULATED.3IONS-SCNC: 7 MMOL/L (ref 3–14)
BUN SERPL-MCNC: 4 MG/DL (ref 7–30)
CALCIUM SERPL-MCNC: 7.7 MG/DL (ref 8.5–10.1)
CHLORIDE SERPL-SCNC: 116 MMOL/L (ref 94–109)
CO2 SERPL-SCNC: 24 MMOL/L (ref 20–32)
CREAT SERPL-MCNC: 0.6 MG/DL (ref 0.52–1.04)
ERYTHROCYTE [DISTWIDTH] IN BLOOD BY AUTOMATED COUNT: 14.3 % (ref 10–15)
GFR SERPL CREATININE-BSD FRML MDRD: >90 ML/MIN/1.7M2
GLUCOSE SERPL-MCNC: 94 MG/DL (ref 70–99)
HCT VFR BLD AUTO: 26.1 % (ref 35–47)
HGB BLD-MCNC: 8.3 G/DL (ref 11.7–15.7)
INR PPP: 1.31 (ref 0.86–1.14)
MCH RBC QN AUTO: 26.8 PG (ref 26.5–33)
MCHC RBC AUTO-ENTMCNC: 31.8 G/DL (ref 31.5–36.5)
MCV RBC AUTO: 84 FL (ref 78–100)
PLATELET # BLD AUTO: 364 10E9/L (ref 150–450)
POTASSIUM SERPL-SCNC: 3.7 MMOL/L (ref 3.4–5.3)
RBC # BLD AUTO: 3.1 10E12/L (ref 3.8–5.2)
SODIUM SERPL-SCNC: 145 MMOL/L (ref 133–144)
SODIUM SERPL-SCNC: 147 MMOL/L (ref 133–144)
WBC # BLD AUTO: 9.9 10E9/L (ref 4–11)

## 2018-11-12 PROCEDURE — 25000132 ZZH RX MED GY IP 250 OP 250 PS 637: Performed by: INTERNAL MEDICINE

## 2018-11-12 PROCEDURE — 96374 THER/PROPH/DIAG INJ IV PUSH: CPT

## 2018-11-12 PROCEDURE — 88307 TISSUE EXAM BY PATHOLOGIST: CPT | Performed by: PHYSICIAN ASSISTANT

## 2018-11-12 PROCEDURE — 88311 DECALCIFY TISSUE: CPT | Mod: 26 | Performed by: PHYSICIAN ASSISTANT

## 2018-11-12 PROCEDURE — 0QB33ZX EXCISION OF LEFT PELVIC BONE, PERCUTANEOUS APPROACH, DIAGNOSTIC: ICD-10-PCS | Performed by: PHYSICIAN ASSISTANT

## 2018-11-12 PROCEDURE — 84295 ASSAY OF SERUM SODIUM: CPT | Performed by: HOSPITALIST

## 2018-11-12 PROCEDURE — 97602 WOUND(S) CARE NON-SELECTIVE: CPT

## 2018-11-12 PROCEDURE — 88311 DECALCIFY TISSUE: CPT | Performed by: PHYSICIAN ASSISTANT

## 2018-11-12 PROCEDURE — 25000128 H RX IP 250 OP 636: Performed by: HOSPITALIST

## 2018-11-12 PROCEDURE — 85027 COMPLETE CBC AUTOMATED: CPT | Performed by: HOSPITALIST

## 2018-11-12 PROCEDURE — 12000000 ZZH R&B MED SURG/OB

## 2018-11-12 PROCEDURE — 25000128 H RX IP 250 OP 636: Performed by: PHYSICIAN ASSISTANT

## 2018-11-12 PROCEDURE — 85610 PROTHROMBIN TIME: CPT | Performed by: RADIOLOGY

## 2018-11-12 PROCEDURE — 99232 SBSQ HOSP IP/OBS MODERATE 35: CPT | Performed by: HOSPITALIST

## 2018-11-12 PROCEDURE — 25000128 H RX IP 250 OP 636: Performed by: INTERNAL MEDICINE

## 2018-11-12 PROCEDURE — 25000125 ZZHC RX 250: Performed by: PHYSICIAN ASSISTANT

## 2018-11-12 PROCEDURE — 99152 MOD SED SAME PHYS/QHP 5/>YRS: CPT

## 2018-11-12 PROCEDURE — 27211108 CT BONE BIOPSY SUPERFICIAL

## 2018-11-12 PROCEDURE — 25000132 ZZH RX MED GY IP 250 OP 250 PS 637: Performed by: HOSPITALIST

## 2018-11-12 PROCEDURE — 40000902 ZZH STATISTIC WOC PT EDUCATION, 16-30 MIN

## 2018-11-12 PROCEDURE — 36415 COLL VENOUS BLD VENIPUNCTURE: CPT | Performed by: RADIOLOGY

## 2018-11-12 PROCEDURE — 80048 BASIC METABOLIC PNL TOTAL CA: CPT | Performed by: HOSPITALIST

## 2018-11-12 PROCEDURE — G0463 HOSPITAL OUTPT CLINIC VISIT: HCPCS

## 2018-11-12 PROCEDURE — 96376 TX/PRO/DX INJ SAME DRUG ADON: CPT

## 2018-11-12 PROCEDURE — 36415 COLL VENOUS BLD VENIPUNCTURE: CPT | Performed by: HOSPITALIST

## 2018-11-12 PROCEDURE — 88307 TISSUE EXAM BY PATHOLOGIST: CPT | Mod: 26 | Performed by: PHYSICIAN ASSISTANT

## 2018-11-12 RX ORDER — BISACODYL 10 MG
10 SUPPOSITORY, RECTAL RECTAL DAILY PRN
Status: DISCONTINUED | OUTPATIENT
Start: 2018-11-12 | End: 2018-11-13 | Stop reason: HOSPADM

## 2018-11-12 RX ORDER — FENTANYL CITRATE 50 UG/ML
25-50 INJECTION, SOLUTION INTRAMUSCULAR; INTRAVENOUS EVERY 5 MIN PRN
Status: DISCONTINUED | OUTPATIENT
Start: 2018-11-12 | End: 2018-11-13

## 2018-11-12 RX ORDER — DEXTROSE MONOHYDRATE 50 MG/ML
INJECTION, SOLUTION INTRAVENOUS CONTINUOUS
Status: DISCONTINUED | OUTPATIENT
Start: 2018-11-12 | End: 2018-11-13

## 2018-11-12 RX ORDER — FLUMAZENIL 0.1 MG/ML
0.2 INJECTION, SOLUTION INTRAVENOUS
Status: DISCONTINUED | OUTPATIENT
Start: 2018-11-12 | End: 2018-11-13

## 2018-11-12 RX ORDER — LIDOCAINE HYDROCHLORIDE 10 MG/ML
30 INJECTION, SOLUTION EPIDURAL; INFILTRATION; INTRACAUDAL; PERINEURAL ONCE
Status: DISCONTINUED | OUTPATIENT
Start: 2018-11-12 | End: 2018-11-13 | Stop reason: HOSPADM

## 2018-11-12 RX ORDER — LIDOCAINE HYDROCHLORIDE 10 MG/ML
1-30 INJECTION, SOLUTION EPIDURAL; INFILTRATION; INTRACAUDAL; PERINEURAL
Status: COMPLETED | OUTPATIENT
Start: 2018-11-12 | End: 2018-11-12

## 2018-11-12 RX ORDER — NALOXONE HYDROCHLORIDE 0.4 MG/ML
.1-.4 INJECTION, SOLUTION INTRAMUSCULAR; INTRAVENOUS; SUBCUTANEOUS
Status: DISCONTINUED | OUTPATIENT
Start: 2018-11-12 | End: 2018-11-13 | Stop reason: HOSPADM

## 2018-11-12 RX ADMIN — MIDAZOLAM HYDROCHLORIDE 0.5 MG: 1 INJECTION, SOLUTION INTRAMUSCULAR; INTRAVENOUS at 14:29

## 2018-11-12 RX ADMIN — OXYBUTYNIN CHLORIDE 5 MG: 5 TABLET ORAL at 22:25

## 2018-11-12 RX ADMIN — DICYCLOMINE HYDROCHLORIDE 20 MG: 20 TABLET ORAL at 15:39

## 2018-11-12 RX ADMIN — LIDOCAINE HYDROCHLORIDE 7 ML: 10 INJECTION, SOLUTION EPIDURAL; INFILTRATION; INTRACAUDAL; PERINEURAL at 14:55

## 2018-11-12 RX ADMIN — PREGABALIN 100 MG: 100 CAPSULE ORAL at 22:25

## 2018-11-12 RX ADMIN — MORPHINE SULFATE 15 MG: 15 TABLET ORAL at 23:05

## 2018-11-12 RX ADMIN — FLUOXETINE 20 MG: 20 CAPSULE ORAL at 09:53

## 2018-11-12 RX ADMIN — PIPERACILLIN SODIUM,TAZOBACTAM SODIUM 3.38 G: 3; .375 INJECTION, POWDER, FOR SOLUTION INTRAVENOUS at 03:16

## 2018-11-12 RX ADMIN — Medication 10 MG: at 18:05

## 2018-11-12 RX ADMIN — FENTANYL CITRATE 25 MCG: 50 INJECTION, SOLUTION INTRAMUSCULAR; INTRAVENOUS at 14:28

## 2018-11-12 RX ADMIN — PIPERACILLIN SODIUM,TAZOBACTAM SODIUM 3.38 G: 3; .375 INJECTION, POWDER, FOR SOLUTION INTRAVENOUS at 09:54

## 2018-11-12 RX ADMIN — PREGABALIN 100 MG: 100 CAPSULE ORAL at 05:47

## 2018-11-12 RX ADMIN — LOPERAMIDE HYDROCHLORIDE 6 MG: 2 CAPSULE ORAL at 23:05

## 2018-11-12 RX ADMIN — OXYCODONE HYDROCHLORIDE AND ACETAMINOPHEN 1000 MG: 500 TABLET ORAL at 09:52

## 2018-11-12 RX ADMIN — PREGABALIN 100 MG: 100 CAPSULE ORAL at 15:39

## 2018-11-12 RX ADMIN — ACETAMINOPHEN 650 MG: 325 TABLET, FILM COATED ORAL at 23:05

## 2018-11-12 RX ADMIN — PIPERACILLIN SODIUM,TAZOBACTAM SODIUM 3.38 G: 3; .375 INJECTION, POWDER, FOR SOLUTION INTRAVENOUS at 22:08

## 2018-11-12 RX ADMIN — VANCOMYCIN HYDROCHLORIDE 1000 MG: 1 INJECTION, SOLUTION INTRAVENOUS at 01:57

## 2018-11-12 RX ADMIN — PIPERACILLIN SODIUM,TAZOBACTAM SODIUM 3.38 G: 3; .375 INJECTION, POWDER, FOR SOLUTION INTRAVENOUS at 15:38

## 2018-11-12 RX ADMIN — LOPERAMIDE HYDROCHLORIDE 6 MG: 2 CAPSULE ORAL at 15:39

## 2018-11-12 RX ADMIN — DICYCLOMINE HYDROCHLORIDE 20 MG: 20 TABLET ORAL at 23:05

## 2018-11-12 RX ADMIN — DEXTROSE MONOHYDRATE: 50 INJECTION, SOLUTION INTRAVENOUS at 09:12

## 2018-11-12 RX ADMIN — MEDROXYPROGESTERONE ACETATE 10 MG: 10 TABLET ORAL at 09:52

## 2018-11-12 RX ADMIN — OXYBUTYNIN CHLORIDE 5 MG: 5 TABLET ORAL at 09:53

## 2018-11-12 ASSESSMENT — ACTIVITIES OF DAILY LIVING (ADL)
ADLS_ACUITY_SCORE: 12
ADLS_ACUITY_SCORE: 12
ADLS_ACUITY_SCORE: 13

## 2018-11-12 NOTE — PROGRESS NOTES
RADIOLOGY PROCEDURE NOTE  Patient name: Priyanka Martinez  MRN: [id number]  : 1970    Pre-procedure diagnosis: Left ischial tuberosity osteomyelitis  Post-procedure diagnosis: Same    Procedure Date/Time: 2018  2:44 PM  Procedure: Left ischial tuberosity bone biopsy  Estimated blood loss: None  Specimen(s) collected with description: 2 bone cores placed in sterile saline  The patient tolerated the procedure well with no immediate complications.  Significant findings:none    See imaging dictation for procedural details.  Reviewed with Dr. Albert.    Provider name: Rohan Means  Assistant(s):None

## 2018-11-12 NOTE — PROGRESS NOTES
COLON & RECTAL SURGERY  PROGRESS NOTE    November 12, 2018    SUBJECTIVE:  The patient is feeling fine. She denies chills or fevers. She would like to know what the plan is going forward.     OBJECTIVE:  Temp:  [98  F (36.7  C)-98.9  F (37.2  C)] 98  F (36.7  C)  Heart Rate:  [82-99] 82  Resp:  [16-17] 17  BP: ()/(60-67) 98/65  SpO2:  [95 %-99 %] 96 %    Intake/Output Summary (Last 24 hours) at 11/12/18 0912  Last data filed at 11/12/18 0708   Gross per 24 hour   Intake             3080 ml   Output             2825 ml   Net              255 ml       GENERAL:  Awake, alert, no acute distress,   HEAD - Nomocephalic atraumatic  SCLERA anicteric  EXTREMITIES warm and well perfused      LABS:  Lab Results   Component Value Date    WBC 9.9 11/12/2018     Lab Results   Component Value Date    HGB 8.3 11/12/2018     Lab Results   Component Value Date    HCT 26.1 11/12/2018     Lab Results   Component Value Date     11/12/2018     Last Basic Metabolic Panel:  Lab Results   Component Value Date     11/12/2018      Lab Results   Component Value Date    POTASSIUM 3.7 11/12/2018     Lab Results   Component Value Date    CHLORIDE 116 11/12/2018     Lab Results   Component Value Date    AC 7.7 11/12/2018     Lab Results   Component Value Date    CO2 24 11/12/2018     Lab Results   Component Value Date    BUN 4 11/12/2018     Lab Results   Component Value Date    CR 0.60 11/12/2018     Lab Results   Component Value Date    GLC 94 11/12/2018       ASSESSMENT/PLAN:47 year old woman with sepsis secondary to osteomyelitis and UTI. Thought to have a possible fistula. WBC normalized and afebrile this morning. Plan for CT guided bone biopsy today  1. Dr. Aviles to review MRI with Dr. Hay.   2. No plans for surgery at this time      Anna Verde PA-C  [facility]  [phone]      CRS Staff  Seen and examined on evening of 11/12.  Late entry.  Feeling fine at this time.  Has some  induration and dressed wound of left buttock.  ELISE and palpation of the area shows no fistula.  Will review imaging with radiology but by my review of the imaging, I'm not sure I see a fistula.  Discussed option of possible colsotomy to assist with treating her decubitus wounds.  She is not interested in this.    Luis Aviles MD  Colorectal Surgery  [phone] (office)  [phone] (pager)  [email or web address]

## 2018-11-12 NOTE — PLAN OF CARE
Problem: Patient Care Overview  Goal: Plan of Care/Patient Progress Review   Patient is A&O, VSS on RA. Denies pain at this time. Patient repositioning self independently in bed, encouragement to spend time off of bottom; patient following. Dressing to coccyx changed, dressing saturated with serosanguinous drainage; tunneling present. Dressing changed on ankle also, cleansed with normal saline and dry gauze applied. Patient on Vanco and Zosyn. NS @100. Plan is for CT guided bone biopsy today. Continue to monitor.

## 2018-11-12 NOTE — PHARMACY-VANCOMYCIN DOSING SERVICE
Pharmacy Vancomycin Note  Date of Service 2018  Patient's  1970   47 year old, female    Indication: Intra-abdominal infection and Osteomyelitis  Goal Trough Level: 15-20 mg/L  Day of Therapy: 3  Current Vancomycin regimen:  1000 mg IV q8h    Current estimated CrCl = Estimated Creatinine Clearance: 135.7 mL/min (based on Cr of 0.6).    Creatinine for last 3 days  2018:  5:26 PM Creatinine 0.37 mg/dL  11/10/2018:  7:55 AM Creatinine 0.45 mg/dL  2018:  8:18 AM Creatinine 0.60 mg/dL    Recent Vancomycin Levels (past 3 days)  11/10/2018:  7:10 PM Vancomycin Level 16.7 mg/L  2018:  7:25 PM Vancomycin Level 22.0 mg/L    Vancomycin IV Administrations (past 72 hours)                   vancomycin (VANCOCIN) 1000 mg in dextrose 5% 200 mL PREMIX (mg) 1,000 mg New Bag 18 1350     1,000 mg New Bag  0402     1,000 mg New Bag 11/10/18 2048     1,000 mg New Bag  1210     1,000 mg New Bag  0445                Nephrotoxins and other renal medications (Future)    Start     Dose/Rate Route Frequency Ordered Stop    18 0200  vancomycin (VANCOCIN) 1000 mg in dextrose 5% 200 mL PREMIX      1,000 mg  200 mL/hr over 1 Hours Intravenous EVERY 12 HOURS 18 2100  piperacillin-tazobactam (ZOSYN) 3.375 g vial to attach to  mL bag     Comments:  Pharmacy can adjust dose based on renal function.    3.375 g  over 30 Minutes Intravenous EVERY 6 HOURS 18 2053               Contrast Orders - past 72 hours     None          Interpretation of levels and current regimen:  Trough level is  Supratherapeutic    Has serum creatinine changed > 50% in last 72 hours: Yes, approximately 50%    Urine output:  good urine output - per RN pt put out 600mL over 6 hours    Renal Function: Worsening?    Plan:  1.  Decrease Dose to 1000mg IV q12h.  Low threshold to change to intermittent dosing of scr rises any further.    2.  Pharmacy will check trough levels as appropriate in 1-3  Days.    3. Serum creatinine levels will be ordered daily for the first week of therapy and at least twice weekly for subsequent weeks.      Calvin SonD        .

## 2018-11-12 NOTE — PROGRESS NOTES
Rohan GUTIERREZ and Dr Melara in pre procedure to assess, consent and explain procedure .  All questions answered and pt appears to accept and understand. Consent obtained prior to sedation.  Time out completed prior to sedation.

## 2018-11-12 NOTE — PROGRESS NOTES
Pt tolerated procedure well with minimal sedation and no c/o pain during biopsy.  Report to inpatient nurse via orange communication tool.  Biopsy site without bleeding or hematoma at this time.  Gauze and tape over wound until return to room and pt aware to ask RN to redress with appropriate dressing per pt request.

## 2018-11-12 NOTE — PROGRESS NOTES
[facility]    Infectious Disease Progress Note    Date of Service (when I saw the patient): 11/12/2018     Assessment & Plan   Priyanka Martinez is a 47 year old female who was admitted on 11/9/2018.     G82.20) Paraplegia (H)  (primary encounter diagnosis)  (L89.324) Pressure injury of left buttock, stage 4 (H)  (L97.329) Lower limb ulcer, ankle, left, with unspecified severity (H)  (T83.511A,  N39.0) Urinary tract infection associated with catheterization of urinary tract, unspecified indwelling urinary catheter type, initial encounter (H)  (T14.8XXA,  L08.9) Infected wound  (M86.9) Osteomyelitis of pelvic region (H)  (A49.9) Polymicrobial bacterial infection  (A49.01) Infection due to Staphylococcus aureus  (A41.9) Sepsis, due to unspecified organism (H)     Osteo L IT / pelvis, biopsy of the bone today.   ? Fistula to rectum ... Defer to CR surgery management of that, per today's note no concern.     For now broad spectrum antimicrobial agents, zosyn alone   Consider diverting colostomy as keeping wound free of contamination might otherwise prove extremely difficult  dw pt how needs multifaceted approach  - nutrition  - off load  - keep clean  - debride prn  - antibiotics if/when infection  - ? Divert fecal stream  Will follow up after the bone biopsy.     Barby Albert MD    Interval History   Afebrile   Feels better     Physical Exam   Temp: 98  F (36.7  C) Temp src: Oral BP: 98/65   Heart Rate: 82 Resp: 17 SpO2: 96 % O2 Device: None (Room air)    Vitals:    11/09/18 1703   Weight: 72.6 kg (160 lb)     Vital Signs with Ranges  Temp:  [98  F (36.7  C)-98.9  F (37.2  C)] 98  F (36.7  C)  Heart Rate:  [82-99] 82  Resp:  [16-17] 17  BP: ()/(60-67) 98/65  SpO2:  [95 %-99 %] 96 %    Constitutional: Awake, alert, cooperative, no apparent distress  Lungs: Clear to auscultation bilaterally, no crackles or wheezing  Cardiovascular: Regular rate and rhythm, normal S1 and S2, and no murmur  noted  Abdomen: Normal bowel sounds, soft, non-distended, non-tender  Skin: No rashes, no cyanosis, no edema  Other:    Medications     D5W 75 mL/hr at 11/12/18 0912       ascorbic acid  1,000 mg Oral Daily     dicyclomine  20 mg Oral TID w/meals     FLUoxetine  20 mg Oral Daily     loperamide  6 mg Oral TID w/meals     medroxyPROGESTERone  10 mg Oral Daily     oxybutynin  5 mg Oral BID     piperacillin-tazobactam  3.375 g Intravenous Q6H     pregabalin (LYRICA) capsule 100 mg  100 mg Oral TID     psyllium  1 packet Oral Daily     sodium chloride (PF)  3 mL Intracatheter Q8H       Data   All microbiology laboratory data reviewed.  Recent Labs   Lab Test  11/12/18   0802  11/11/18   0818  11/10/18   0755   WBC  9.9  11.8*  11.0   HGB  8.3*  8.6*  8.5*   HCT  26.1*  27.6*  26.5*   MCV  84  85  84   PLT  364  353  322     Recent Labs   Lab Test  11/12/18   0802  11/11/18   0818  11/10/18   0755   CR  0.60  0.60  0.45*     Recent Labs   Lab Test  03/28/17   1518   SED  7     Recent Labs   Lab Test  11/09/18   1742  11/09/18   1726  11/08/18   1150  08/20/18   0600  10/02/17   0900  02/20/17   2130  05/11/15   1230  11/06/14   1514  04/13/12   1543   CULT  No growth after 3 days  No growth after 3 days  >100,000 colonies/mL  Staphylococcus aureus  *  >100,000 colonies/mL  Enterococcus faecalis  *  50,000 to 100,000 colonies/mL  Candida albicans / dubliniensis  Candida albicans and Candida dubliniensis are not routinely speciated  Susceptibility testing not routinely done  *  50,000 to 100,000 colonies/mL  Pseudomonas aeruginosa  *  50,000 to 100,000 colonies/mL  Klebsiella oxytoca  *  >100,000 colonies/mL mixed urogenital harjit Susceptibility testing not routinely   done    >100,000 colonies/mL mixed urogenital harjit Susceptibility testing not routinely   done    >100,000 colonies/mL Pseudomonas aeruginosa*  >100,000 colonies/mL Klebsiella pneumoniae >100,000 colonies/mL Strain 2 Klebsiella pneumoniae

## 2018-11-12 NOTE — PROGRESS NOTES
[facility]  WO Nurse Inpatient Adult Pressure Injury (PI) Assessment      Assessment of PI(s) on pt's:  Lt It with inferior pubic ramus osteomyelitis and Lt medial malleolus PI    Data:   Patient History:     Priyanka Martinez is a 47 year old female who was admitted on 2018.      G82.20) Paraplegia (H)  (primary encounter diagnosis)  (L89.324) Pressure injury of left buttock, stage 4 (H)  (L97.329) Lower limb ulcer, ankle, left, with unspecified severity (H)  (T83.511A,  N39.0) Urinary tract infection associated with catheterization of urinary tract, unspecified indwelling urinary catheter type, initial encounter (H)  (T14.8XXA,  L08.9) Infected wound  (M86.9) Osteomyelitis of pelvic region (H)  (A49.9) Polymicrobial bacterial infection  (A49.01) Infection due to Staphylococcus aureus  (A41.9) Sepsis, due to unspecified organism (H)      Rj Assessment and sub scores:   Rj Score  Av  Min: 13  Max: 16    Positioning: pt turns well per self    Mattress:  Atmos air      Moisture Management:  SP for UIC; Brief for FIC    Catheter secured? Yes SP cath      Current Diet / Nutrition:     Active Diet Order: NPO for BX today          Labs:   Recent Labs   Lab Test  18   0802   11/10/18   0755  18   1726   ALBUMIN   --    --   1.6*  2.0*   HGB  8.3*   < >  8.5*  9.6*   INR  1.31*   --    --    --    WBC  9.9   < >  11.0  15.7*   CRP   --    --    --   180.0*    < > = values in this interval not displayed.                                                                                                                          Pressure Injury Assessment  (location):     #1:  :  Lt It with inferior pubic ramus osteomyelitis    Wound Base: Unable to visualize base but bone paplated    Specific Dimensions (length x width x depth, in cm) :  4.0cm x 4.0cm x 10.0cm    Tunnelin.0cm @ 6 o'clock                          8.5cm  @ 9 o'clock                         10.0cm  @ 12  o'clock    Undermining: NA    Palpation of the wound bed: bone palpated with q-tip @ base    Slough appearance: unable to determine    Eschar appearance: Unable to determine    Periwound Skin: intact with old scarring    Color: pink to pale pink/whitish scarring    Temperature warm    Drainage: Moderate to large serous looking drainage     Odor: None    Pain: insensate    #2:  Lt medial malleolus         History: ulcer is 2 years old and followed by DR Rojas. See progress notes from 11-7-18.                        Trial of Apligraf. Daily dressing changes with Biatain Silver. Hope to re-apply apligraf                          In a month           Wound Base: 2.5cm x 3.0cm with 60& pink/red cental wound bed and 40% surrounding                                       White fibrin build-up  Tunneling: Na  Undermining: NA  Palpation of the wound bed: no bone palpable.   Slough appearance: fibrin buildup  Eschar appearance: none    Periwound Skin: intact with slight erythema  Temperature warm    Drainage: scant serous-sang looking drainage     Odor: None    Pain: insensate           Intervention:     Patient's chart evaluated.      Rj Interventions:  Current Rj Interventions and Care Plan reviewed and updated, appropriate at this time.    Wounds assessed and dressing re-applied    Orders  In Epic    Supplies In pt room and orders placed for wound care    Discussed plan of care with Patient, mom and Nursingi    All patient / family questions answered: Yes           Assessment:     Pressure Injuries: All community acquired        #1:  Lt It with inferior pubic ramus osteomyelitis: Stage 4 PI; await results of bone bx        #2:  Chronic Lt medial malleolus: healing probable stage 3, no local s/s infection         Plan:     Nursing to notify the Provider(s) and re-consult the [facility] Nurse if wound(s) deteriorate(s)or if the wound care plan needs reevaluation.      Wound care: Lt IT BID and prn; Lt medial malleolus:  Daily        Lt IT:          -Clean wound with MicroKlenz         -Dampen kerlix roll with Dakins. Squeeze out excess solution         -Using Q-tip or gloved finger pack Dakins deep to wound bed. Open 6-10 cm from 6-12            O'clock.           -Pack to skin level and cut off excess          -Cover with Mepilex sacral         Chronic Lt medial malleolus:           -Clean wound with MicroKlenz           -Cut Quarter size piece of Aqua Cell AG, Moisten with NS  and cover wound bed           - Cover with Mepiex border            WOC Nurse will return: Weekly and prn

## 2018-11-12 NOTE — PLAN OF CARE
Problem: Patient Care Overview  Goal: Plan of Care/Patient Progress Review  Outcome: No Change  [id number]: Patient is A&O, VSS on RA. Denies pain at this time. Patient repositioning self independently in bed, encouragement to spend time off of bottom; patient following. Dressing coccyx changed, dressing saturated with serosanguinous drainage; tunneling present. Dressing changed on ankle also, cleansed with normal saline and dry gauze applied. Patient on Vanco and Zosyn. NS @100. Plan is for CT guided bone biopsy tomorrow. Continue to monitor.

## 2018-11-12 NOTE — PLAN OF CARE
Problem: Patient Care Overview  Goal: Plan of Care/Patient Progress Review  Outcome: No Change  Pt is A&O x 4, up wit assist of  one to the wheel chair. VSS on RA, except soft BP. Denies pain. Pt says she is light headed today because she has not eaten anything. NPO for bone biopsy. Paraplegic, left buttocks wound and left foot dressings changed per WOC RN.  Colorectal Surgery/ID/Ortho are following. Continues on IV Zosyn, Vanco discontinued.  IVF at 75 ml/hr, Na improving, 145. Suprapubic catheter in place, good urine output. Discharge pending progress.

## 2018-11-12 NOTE — PROGRESS NOTES
"[facility]  Hospitalist Progress Note        John Robertson, DO  11/12/2018        Interval History:      Patient awaiting bone biopsy this am, discussed plan of care, verbalized understanding.          Assessment and Plan:        47 year old female who presents with fevers, night sweats, tachycardia, and L sided rapidly expanding TI decub.       Sepsis, L buttock wound with left ischial and inferior pubic ramus osteomyelitis Improved on IV Zosyn and Vanco  - Ortho following.   - Infectious Disease following.   - Vanc/Zosyn.   - Defer IR bone biopsy to Ortho, ID.   - WOC consulted for ischial wound  - Pain control.   - Bone biopsy planned for today, defer to ID and colorectal team regarding MRI vs CT biopsy, etc. May need to consider surgical resection/debridement pending results.       Possible fistula: The MRI on admission recommended a rectal MRI for evaluation of this fistula, however, discussed with radiology and surgery team per report.   - Consulted colorectal surgery   - Defer investigations to surgical team.       Hypernatremia: Sodium elevated on 11/12.   - D5W.   - Repeat Na improved.     UTI: Culture was from the johnson bag so it is meaningless. Even if it grows it would not represent any infection. Will send a new UA reflex from her bladder. However still unlikely to be a real infection given her neurogenic bladder.   - >100K of staph aureus and enterococcus faecalis.   - Continue on Vanc/Zosyn at this time.       Neuropathic pain  - Continue pta Lyrica      Anxiety mood stable.   - Continue PTA Prozac      Dysmenorrhea  - Continue pta meds      DVT Prophylaxis: Enoxaparin subcutaneous     Code: Full       Disposition: Likely few days pending biopsy and consolidation of antibiotic plans for discharge.                    Physical Exam:      Heart Rate: 82    Blood pressure 98/65, pulse 110, temperature 98  F (36.7  C), temperature source Oral, resp. rate 17, height 1.778 m (5' 10\"), " weight 72.6 kg (160 lb), SpO2 96 %, not currently breastfeeding.    Vitals:    18 1703   Weight: 72.6 kg (160 lb)       Vital Sign Ranges  Temperature Temp  Av.5  F (36.9  C)  Min: 98  F (36.7  C)  Max: 98.9  F (37.2  C)   Blood pressure Systolic (24hrs), Av , Min:98 , Max:106        Diastolic (24hrs), Av, Min:60, Max:67      Pulse No Data Recorded   Respirations Resp  Av.3  Min: 16  Max: 17   Pulse oximetry SpO2  Av.8 %  Min: 95 %  Max: 99 %     Vital Signs with Ranges  Temp:  [98  F (36.7  C)-98.9  F (37.2  C)] 98  F (36.7  C)  Heart Rate:  [82-99] 82  Resp:  [16-17] 17  BP: ()/(60-67) 98/65  SpO2:  [95 %-99 %] 96 %    I/O Last 3 Shifts:   I/O last 3 completed shifts:  In: 3080 [P.O.:1130; I.V.:1950]  Out: 3875 [Urine:3875]    I/O past 24 hours:     Intake/Output Summary (Last 24 hours) at 18 0831  Last data filed at 18 0708   Gross per 24 hour   Intake             3080 ml   Output             2825 ml   Net              255 ml     GENERAL: Alert and oriented. NAD. Conversational, appropriate. Pleasant. Jovial, playing cards.   HEENT: Normocephalic. EOMI. No icterus or injection. Nares normal.   LUNGS: Clear to auscultation. No dyspnea at rest.   HEART: Regular rate. Extremities perfused.   ABDOMEN: Soft, nontender, and nondistended. Positive bowel sounds.   NEUROLOGIC: Sitting in hospital bed.           Prior to Admission Medications:        Prescriptions Prior to Admission   Medication Sig Dispense Refill Last Dose     Apple Cider Vinegar 300 MG TABS Take 300 mg by mouth every morning   2018 at am     ascorbic acid (VITAMIN C) 1000 MG TABS Take 1,000 mg by mouth daily   2018 at Unknown time     BACLOFEN PO Take 10 mg by mouth At Bedtime    2018 at hs     CRANBERRY PO Take 100 mg by mouth daily   2018 at am     dicyclomine (BENTYL) 20 MG tablet Take 20 mg by mouth 3 times daily (with meals) Takes every time she eats   2018 at 1500      "FLUoxetine (PROZAC) 20 MG capsule TAKE 1 CAPSULE EVERY DAY 90 capsule 0 11/9/2018 at am     Gauze Pads & Dressings (BIATAIN ADHESIVE FOAM DRESSING) 4\"X4\" PADS Apply topically daily        Ginkgo Biloba (GNP GINGKO BILOBA EXTRACT PO) Take 30 mg by mouth daily   11/9/2018 at Unknown time     Guarana, Thoria cupana, (GUARANA PO) Take 200 mg by mouth daily   11/9/2018 at am     loperamide (IMODIUM) 2 MG capsule Take 6 mg by mouth 3 times daily (with meals)   11/9/2018 at Unknown time     medroxyPROGESTERone (PROVERA) 10 MG tablet TAKE 1 AND 1/2 TABLETS EVERY  tablet 0 11/9/2018 at am     oxybutynin (DITROPAN) 5 MG tablet Take 1 tablet (5 mg) by mouth 2 times daily 180 tablet 0 11/9/2018 at am     Pregabalin (LYRICA PO) Take 100 mg by mouth 3 times daily    11/9/2018 at 1400     Psyllium 500 MG CAPS Take 1,500 mg by mouth every morning   11/9/2018 at am     Catheters MISC 1 catheter every 28 days. 1 each 12 Taking     HOMEOPATHIC PRODUCTS EX Drainage tone for swollen glands, sinus congestion and skin eruptions.   on hold     HOMEOPATHIC PRODUCTS EX Neuro-chord for numbness, irritability, and tingling.   on hold     order for DME Equipment being ordered: [employer] Phone [phone] Fax [phone]    Primary Dressing 8X8 Mesalt   Qty 15 sheets  Secondary Dressing nonsterile 4X4 gauze Qty 200 count loaf  Secondary Dressing 2 in medipore tape Qty 2 rolls  Length of Need: 1 month  Frequency of dressing change: daily 30 days 0 Taking     order for DME Equipment being ordered: Anchoring Device Flexi-Track LG 2 Units 12 Taking     order for DME Equipment being ordered: Insertion Tray Wheeler w/BZK Swab Catheter 10CC, Sterile 1 Units 12 Taking     order for DME Equipment being ordered: Drain Bag, Kenguard 2000ml. Urinary Bag with Anti-Refulx 2 Units 12 Taking     order for DME Equipment being ordered: Catheter Wheeler 2Way 30cc 18FR, Silicone Coated Latex 1 Units 12 Taking     order for DME Equipment being " ordered: walking boot left 1 Units 0 Taking            Medications:        Current Facility-Administered Medications   Medication Last Rate     sodium chloride 100 mL/hr at 11/11/18 2201     Current Facility-Administered Medications   Medication Dose Route Frequency     ascorbic acid  1,000 mg Oral Daily     dicyclomine  20 mg Oral TID w/meals     FLUoxetine  20 mg Oral Daily     loperamide  6 mg Oral TID w/meals     medroxyPROGESTERone  10 mg Oral Daily     oxybutynin  5 mg Oral BID     piperacillin-tazobactam  3.375 g Intravenous Q6H     pregabalin (LYRICA) capsule 100 mg  100 mg Oral TID     psyllium  1 packet Oral Daily     sodium chloride (PF)  3 mL Intracatheter Q8H     vancomycin (VANCOCIN) IV  1,000 mg Intravenous Q12H     Current Facility-Administered Medications   Medication Dose Route Frequency     acetaminophen  650 mg Oral Q4H PRN     HYDROmorphone  0.5 mg Intravenous Q2H PRN     magnesium citrate  148 mL Oral Once PRN     melatonin  1 mg Oral At Bedtime PRN     morphine  15 mg Oral Q4H PRN     naloxone  0.1-0.4 mg Intravenous Q2 Min PRN     ondansetron  4 mg Oral Q6H PRN    Or     ondansetron  4 mg Intravenous Q6H PRN     polyethylene glycol  17 g Oral Daily PRN     potassium chloride  20-40 mEq Oral or Feeding Tube Q2H PRN     potassium chloride with lidocaine  10 mEq Intravenous Q1H PRN     potassium chloride  10 mEq Intravenous Q1H PRN     potassium chloride  20 mEq Intravenous Q1H PRN     potassium chloride  20-40 mEq Oral Q2H PRN     senna-docusate  1 tablet Oral BID PRN    Or     senna-docusate  2 tablet Oral BID PRN     sodium chloride (PF)  3 mL Intracatheter Q1H PRN            Data:      Lab data reviewed.     Recent Labs  Lab 11/12/18  0802 11/11/18  0818 11/10/18  0755   HGB 8.3* 8.6* 8.5*   MCV 84 85 84    353 322   INR 1.31*  --   --    * 144 139   POTASSIUM 3.7 3.6 3.9   CHLORIDE 116* 112* 108   CO2 24 26 25   BUN 4* 4* 3*   CR 0.60 0.60 0.45*   ANIONGAP 7 6 6   AC 7.7* 7.7*  7.4*   [facility] 94 88 87           Imaging:      Imaging data reviewed.     Dr. John Robertson D.O.  [facility]  Pager [phone]

## 2018-11-12 NOTE — PLAN OF CARE
Problem: Patient Care Overview  Goal: Plan of Care/Patient Progress Review  Outcome: No Change   Patient is A&O, VSS on RA. Denies pain at this time. Patient repositioning self independently in bed, encouragement to spend time off of bottom; patient following. Dressing to coccyx changed, dressing saturated with serosanguinous drainage; tunneling present. Dressing changed on ankle also, cleansed with normal saline and dry gauze applied. Patient on Vanco and Zosyn. NS @100. Plan is for CT guided bone biopsy tomorrow. Continue to monitor.

## 2018-11-12 NOTE — PROGRESS NOTES
INTERNAL MEDICINE UPDATE    Changed bone biopsy from IR to CT guided bone biopsy per Radiology request.    Kevin Quiñones Jr., MD  [phone] (p)  Text Page

## 2018-11-13 ENCOUNTER — TELEPHONE (OUTPATIENT)
Dept: PEDIATRICS | Facility: CLINIC | Age: 48
End: 2018-11-13

## 2018-11-13 ENCOUNTER — HOME INFUSION (PRE-WILLOW HOME INFUSION) (OUTPATIENT)
Dept: PHARMACY | Facility: CLINIC | Age: 48
End: 2018-11-13

## 2018-11-13 VITALS
BODY MASS INDEX: 22.9 KG/M2 | WEIGHT: 160 LBS | HEART RATE: 110 BPM | TEMPERATURE: 98 F | RESPIRATION RATE: 17 BRPM | DIASTOLIC BLOOD PRESSURE: 70 MMHG | SYSTOLIC BLOOD PRESSURE: 106 MMHG | OXYGEN SATURATION: 98 % | HEIGHT: 70 IN

## 2018-11-13 LAB
ANION GAP SERPL CALCULATED.3IONS-SCNC: 3 MMOL/L (ref 3–14)
BUN SERPL-MCNC: 4 MG/DL (ref 7–30)
CALCIUM SERPL-MCNC: 7.6 MG/DL (ref 8.5–10.1)
CHLORIDE SERPL-SCNC: 112 MMOL/L (ref 94–109)
CO2 SERPL-SCNC: 28 MMOL/L (ref 20–32)
CREAT SERPL-MCNC: 0.51 MG/DL (ref 0.52–1.04)
ERYTHROCYTE [DISTWIDTH] IN BLOOD BY AUTOMATED COUNT: 14.3 % (ref 10–15)
GFR SERPL CREATININE-BSD FRML MDRD: >90 ML/MIN/1.7M2
GLUCOSE SERPL-MCNC: 89 MG/DL (ref 70–99)
HCT VFR BLD AUTO: 27.1 % (ref 35–47)
HGB BLD-MCNC: 8.5 G/DL (ref 11.7–15.7)
LACTATE BLD-SCNC: 0.9 MMOL/L (ref 0.7–2)
MCH RBC QN AUTO: 26.8 PG (ref 26.5–33)
MCHC RBC AUTO-ENTMCNC: 31.4 G/DL (ref 31.5–36.5)
MCV RBC AUTO: 86 FL (ref 78–100)
PLATELET # BLD AUTO: 390 10E9/L (ref 150–450)
POTASSIUM SERPL-SCNC: 3.7 MMOL/L (ref 3.4–5.3)
RBC # BLD AUTO: 3.17 10E12/L (ref 3.8–5.2)
SODIUM SERPL-SCNC: 143 MMOL/L (ref 133–144)
WBC # BLD AUTO: 9.6 10E9/L (ref 4–11)

## 2018-11-13 PROCEDURE — 40000902 ZZH STATISTIC WOC PT EDUCATION, 16-30 MIN

## 2018-11-13 PROCEDURE — 85027 COMPLETE CBC AUTOMATED: CPT | Performed by: HOSPITALIST

## 2018-11-13 PROCEDURE — 25000132 ZZH RX MED GY IP 250 OP 250 PS 637: Performed by: INTERNAL MEDICINE

## 2018-11-13 PROCEDURE — 99239 HOSP IP/OBS DSCHRG MGMT >30: CPT | Performed by: HOSPITALIST

## 2018-11-13 PROCEDURE — 27210218 ZZH KIT SHRLOCK 4FR SNGL LUM PICC

## 2018-11-13 PROCEDURE — 80048 BASIC METABOLIC PNL TOTAL CA: CPT | Performed by: HOSPITALIST

## 2018-11-13 PROCEDURE — 36569 INSJ PICC 5 YR+ W/O IMAGING: CPT

## 2018-11-13 PROCEDURE — 25000128 H RX IP 250 OP 636: Performed by: INTERNAL MEDICINE

## 2018-11-13 PROCEDURE — 83605 ASSAY OF LACTIC ACID: CPT | Performed by: HOSPITALIST

## 2018-11-13 PROCEDURE — 36415 COLL VENOUS BLD VENIPUNCTURE: CPT | Performed by: HOSPITALIST

## 2018-11-13 PROCEDURE — 25000125 ZZHC RX 250: Performed by: INTERNAL MEDICINE

## 2018-11-13 RX ORDER — ERTAPENEM 1 G/1
1 INJECTION, POWDER, LYOPHILIZED, FOR SOLUTION INTRAMUSCULAR; INTRAVENOUS EVERY 24 HOURS
Status: DISCONTINUED | OUTPATIENT
Start: 2018-11-13 | End: 2018-11-13 | Stop reason: HOSPADM

## 2018-11-13 RX ADMIN — PIPERACILLIN SODIUM,TAZOBACTAM SODIUM 3.38 G: 3; .375 INJECTION, POWDER, FOR SOLUTION INTRAVENOUS at 08:39

## 2018-11-13 RX ADMIN — PREGABALIN 100 MG: 100 CAPSULE ORAL at 06:43

## 2018-11-13 RX ADMIN — PSYLLIUM HUSK 1 PACKET: 3.4 POWDER ORAL at 08:43

## 2018-11-13 RX ADMIN — ERTAPENEM SODIUM 1 G: 1 INJECTION, POWDER, LYOPHILIZED, FOR SOLUTION INTRAMUSCULAR; INTRAVENOUS at 12:52

## 2018-11-13 RX ADMIN — DICYCLOMINE HYDROCHLORIDE 20 MG: 20 TABLET ORAL at 12:51

## 2018-11-13 RX ADMIN — OXYBUTYNIN CHLORIDE 5 MG: 5 TABLET ORAL at 08:35

## 2018-11-13 RX ADMIN — PREGABALIN 100 MG: 100 CAPSULE ORAL at 14:12

## 2018-11-13 RX ADMIN — LOPERAMIDE HYDROCHLORIDE 6 MG: 2 CAPSULE ORAL at 08:36

## 2018-11-13 RX ADMIN — DICYCLOMINE HYDROCHLORIDE 20 MG: 20 TABLET ORAL at 08:36

## 2018-11-13 RX ADMIN — MEDROXYPROGESTERONE ACETATE 10 MG: 10 TABLET ORAL at 08:35

## 2018-11-13 RX ADMIN — PIPERACILLIN SODIUM,TAZOBACTAM SODIUM 3.38 G: 3; .375 INJECTION, POWDER, FOR SOLUTION INTRAVENOUS at 03:19

## 2018-11-13 RX ADMIN — LIDOCAINE HYDROCHLORIDE 5 ML: 10 INJECTION, SOLUTION EPIDURAL; INFILTRATION; INTRACAUDAL; PERINEURAL at 12:30

## 2018-11-13 RX ADMIN — OXYCODONE HYDROCHLORIDE AND ACETAMINOPHEN 1000 MG: 500 TABLET ORAL at 08:36

## 2018-11-13 RX ADMIN — LOPERAMIDE HYDROCHLORIDE 6 MG: 2 CAPSULE ORAL at 12:51

## 2018-11-13 RX ADMIN — FLUOXETINE 20 MG: 20 CAPSULE ORAL at 08:36

## 2018-11-13 ASSESSMENT — ACTIVITIES OF DAILY LIVING (ADL)
ADLS_ACUITY_SCORE: 12

## 2018-11-13 NOTE — DISCHARGE INSTRUCTIONS
A referral has been sent to [facility] for IV antibiotic therapy and management of your wounds.  They will call you for scheduling.  Their number is [phone].    C:   -Please reinforce dressing change with client. Ensure pt packs to base of wound tunnel @ 12 o'clock. Approx 10cm  -Pt can resume her Lt medial malleolus ulcer dressing utilized prior to hospitalization    Wound care: Lt IT BID and prn        Lt IT:          -Clean wound with MicroKlenz         -Dampen kerlix roll with Dakins. Squeeze out excess solution         -Using Q-tip or gloved finger pack Dakins deep to wound bed. Open 6-10 cm from 6-12            O'clock.           -Pack to skin level and cut off excess          -Cover with Mepilex sacral or gauze and tape         Chronic Lt medial malleolus: Daily           -Clean wound with MicroKlenz           -Cut Quarter size piece of Aqua Cell AG, Moisten with NS  and cover wound bed or Biatin Silver            - Cover with Mepiex border or gauze and tape

## 2018-11-13 NOTE — TELEPHONE ENCOUNTER
Patient admitted to hospital on 11/09/18. Patient is requesting to see Dr. Limon the week of 11/26. Current openings are same day or pediatric appointment slots. Routing message to PCP for review.  Lona Baker CMA

## 2018-11-13 NOTE — PROGRESS NOTES
[facility]    Infectious Disease Progress Note    Date of Service (when I saw the patient): 11/13/2018     Assessment & Plan   Priyanka Martinez is a 47 year old female who was admitted on 11/9/2018.     G82.20) Paraplegia (H)  (primary encounter diagnosis)  (L89.324) Pressure injury of left buttock, stage 4 (H)  (L97.329) Lower limb ulcer, ankle, left, with unspecified severity (H)  (T83.511A,  N39.0) Urinary tract infection associated with catheterization of urinary tract, unspecified indwelling urinary catheter type, initial encounter (H)  (T14.8XXA,  L08.9) Infected wound  (M86.9) Osteomyelitis of pelvic region (H)  (A49.9) Polymicrobial bacterial infection  (A49.01) Infection due to Staphylococcus aureus  (A41.9) Sepsis, due to unspecified organism (H)     Osteo L IT / pelvis, biopsy of the bone today.   ? Fistula to rectum ... Defer to CR surgery management of that, per surgery this is not a fistula.      Recommendations:   No debridement surgery being planned. Will try treating with antibiotics alone, once a day ertapenem a choice.    Noted bone biopsy done but no cultures sent, only path.   Consider diverting colostomy as keeping wound free of contamination might otherwise prove extremely difficult  dw pt how needs multifaceted approach  - nutrition  - off load  - keep clean  - debride prn  - antibiotics if/when infection  - ? Divert fecal stream        Barby Albert MD    Interval History   Afebrile   Feels better     Physical Exam   Temp: 98.2  F (36.8  C) Temp src: Oral BP: (!) 89/55   Heart Rate: 93 Resp: 16 SpO2: 95 % O2 Device: None (Room air)    Vitals:    11/09/18 1703   Weight: 72.6 kg (160 lb)     Vital Signs with Ranges  Temp:  [98.2  F (36.8  C)-98.7  F (37.1  C)] 98.2  F (36.8  C)  Heart Rate:  [] 93  Resp:  [16-20] 16  BP: ()/(55-90) 89/55  SpO2:  [94 %-99 %] 95 %    Constitutional: Awake, alert, cooperative, no apparent distress  Lungs: Clear to auscultation  bilaterally, no crackles or wheezing  Cardiovascular: Regular rate and rhythm, normal S1 and S2, and no murmur noted  Abdomen: Normal bowel sounds, soft, non-distended, non-tender  Skin: No rashes, no cyanosis, no edema  Other:    Medications     D5W 75 mL/hr at 11/12/18 0912       ascorbic acid  1,000 mg Oral Daily     dicyclomine  20 mg Oral TID w/meals     FLUoxetine  20 mg Oral Daily     lidocaine (PF)  30 mL Subcutaneous Once     loperamide  6 mg Oral TID w/meals     medroxyPROGESTERone  10 mg Oral Daily     oxybutynin  5 mg Oral BID     piperacillin-tazobactam  3.375 g Intravenous Q6H     pregabalin (LYRICA) capsule 100 mg  100 mg Oral TID     psyllium  1 packet Oral Daily     sodium chloride (PF)  3 mL Intracatheter Q8H       Data   All microbiology laboratory data reviewed.  Recent Labs   Lab Test  11/13/18   0758  11/12/18   0802  11/11/18   0818   WBC  9.6  9.9  11.8*   HGB  8.5*  8.3*  8.6*   HCT  27.1*  26.1*  27.6*   MCV  86  84  85   PLT  390  364  353     Recent Labs   Lab Test  11/13/18   0758  11/12/18   0802  11/11/18   0818   CR  0.51*  0.60  0.60     Recent Labs   Lab Test  03/28/17   1518   SED  7     Recent Labs   Lab Test  11/09/18   1742  11/09/18   1726  11/08/18   1150  08/20/18   0600  10/02/17   0900  02/20/17   2130  05/11/15   1230  11/06/14   1514  04/13/12   1543   CULT  No growth after 4 days  No growth after 4 days  >100,000 colonies/mL  Staphylococcus aureus  *  >100,000 colonies/mL  Enterococcus faecalis  *  50,000 to 100,000 colonies/mL  Candida albicans / dubliniensis  Candida albicans and Candida dubliniensis are not routinely speciated  Susceptibility testing not routinely done  *  50,000 to 100,000 colonies/mL  Pseudomonas aeruginosa  *  50,000 to 100,000 colonies/mL  Klebsiella oxytoca  *  >100,000 colonies/mL mixed urogenital harjit Susceptibility testing not routinely   done    >100,000 colonies/mL mixed urogenital harjit Susceptibility testing not routinely   done     >100,000 colonies/mL Pseudomonas aeruginosa*  >100,000 colonies/mL Klebsiella pneumoniae >100,000 colonies/mL Strain 2 Klebsiella pneumoniae

## 2018-11-13 NOTE — CONSULTS
Care Transition Initial Assessment - RN        Met with: Patient.  DATA   Active Problems:    Paraplegia (H)    Sepsis (H)    Infected wound    Osteomyelitis of pelvic region (H)    Polymicrobial bacterial infection    Infection due to Staphylococcus aureus       Cognitive Status: awake, alert and oriented.        Contact information and PCP information verified: Yes  Lives With: alone     Insurance concerns: No Insurance issues identified, pt has an out of pocket expense for IV antibiotics.  She is aware and in agreement.  ASSESSMENT  Patient currently receives the following services: NA        Identified issues/concerns regarding health management: Pt lives alone.  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  Leigh, the WOC RN was concerned that pt would need more intense wound management at home.  Pt does not have any home care involved presently.  Discussed wound management with pt.  She is in agreement for a home care RN.  She has not been out of bed for three days.  Discussed having home PT also.  She declines needing any home PT and is confident that she will regain her upper body strength once she is home.  Her mother is going to stay with her for a few days.  Dr Albert had reported that from an ID standpoint pt could be discharged.  PICC line is placed.  Will need to clarify length of IV therapy with Dr Albert tomorrow.  Rounding Hospitalist in agreement for discharge, and is aware that Care Coordinator will address length of IV therapy with ID in AM.    PLAN  Financial costs for the patient include: Co-pays  Patient given options and choices for discharge: Home Infusion  Patient/family is agreeable to the plan?  Yes: Home today with [facility]    Patient anticipates needs for home equipment: No  Plan/Disposition: Home     Appointments:     MAGDALENA 11/21/18  PCP: Dr Limon's office will call pt with appt time

## 2018-11-13 NOTE — PLAN OF CARE
Problem: Patient Care Overview  Goal: Plan of Care/Patient Progress Review  Outcome: No Change  Patient A/Ox4. Up with assist of one to wheel chair. Independently positions in bed. Paraplegic. VSS on room air. Denies and pain this shift. Suprapubic cath. In place with good output. Tolerating regular diet well. L ischeal pressure injury redressed per WOC orders. L hip incision site dressed with mepiliex, CDI. 1 mucous BM this shift. Incontinent of bowel at times. NS running at 75ml/hr. Receiving IV zosyn. WOC, ID colorectal all following. Left hip ID and bone biopsy done 11/12. Discharge pending biopsy results and abx plan.

## 2018-11-13 NOTE — PROGRESS NOTES
Patient A&0x4. Able to shift self in bed. Dressing on left buttock has a moderate amount of drainage. Dressing changed 3 times this shift. Stool noted on packing in wound Packing changed as well. C/o L hip and low back pain pain this evening. Lungs clear. Had bowel movement today.

## 2018-11-13 NOTE — PROGRESS NOTES
"[facility]  Hospitalist Progress Note        John Robertson, DO  2018        Interval History:      Patient states that she is doing well. Reviewed plan of care at length. Awaiting bone biopsy results. Patient verbalized understanding.          Assessment and Plan:        47 year old female who presents with fevers, night sweats, tachycardia, and L sided rapidly expanding TI decub.       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  - Infectious Disease following.   - Antibiotics per ID.  - Bone biopsy pending.   - WOC consulted   - Pain control.       Possible fistula: The MRI on admission recommended a rectal MRI for evaluation of this fistula, however, discussed with radiology and surgery team per report.   - Consulted colorectal surgery       Hypernatremia: Sodium elevated on .   - Repeat Na improved.      UTI: Culture was from the johnson bag so it is meaningless. Even if it grows it would not represent any infection. Will send a new UA reflex from her bladder. However still unlikely to be a real infection given her neurogenic bladder.   - >100K of staph aureus and enterococcus faecalis.   - Antibiotics per ID discretion.        Neuropathic pain  - Continue pta Lyrica      Anxiety mood stable.   - Continue PTA Prozac      Dysmenorrhea  - Continue pta meds      DVT Prophylaxis: Enoxaparin subcutaneous      Code: Full       Disposition: Likely few days pending biopsy results and consolidation of antibiotic/surgical plans prior to discharge.                    Physical Exam:      Heart Rate: 84    Blood pressure 109/72, pulse 110, temperature 98.2  F (36.8  C), temperature source Oral, resp. rate 17, height 1.778 m (5' 10\"), weight 72.6 kg (160 lb), SpO2 98 %, not currently breastfeeding.    Vitals:    18 1703   Weight: 72.6 kg (160 lb)       Vital Sign Ranges  Temperature Temp  Av.4  F (36.9 " " C)  Min: 98.2  F (36.8  C)  Max: 98.7  F (37.1  C)   Blood pressure Systolic (24hrs), Av , Min:87 , Max:151        Diastolic (24hrs), Av, Min:55, Max:90      Pulse No Data Recorded   Respirations Resp  Av.9  Min: 16  Max: 20   Pulse oximetry SpO2  Av.2 %  Min: 94 %  Max: 99 %     Vital Signs with Ranges  Temp:  [98.2  F (36.8  C)-98.7  F (37.1  C)] 98.2  F (36.8  C)  Heart Rate:  [] 84  Resp:  [16-20] 17  BP: ()/(55-90) 109/72  SpO2:  [94 %-99 %] 98 %    I/O Last 3 Shifts:   I/O last 3 completed shifts:  In: -   Out: 2500 [Urine:2500]    I/O past 24 hours:     Intake/Output Summary (Last 24 hours) at 18 0931  Last data filed at 18 0900   Gross per 24 hour   Intake              240 ml   Output             2250 ml   Net            -2010 ml     GENERAL: Alert and oriented. NAD. Conversational, appropriate. Pleasant. Jovial, playing cards.   HEENT: Normocephalic. EOMI. No icterus or injection. Nares normal.   LUNGS: Clear to auscultation. No dyspnea at rest.   HEART: Regular rate. Extremities perfused.   ABDOMEN: Soft, nontender, and nondistended. Positive bowel sounds.   NEUROLOGIC: Sitting in hospital bed.           Prior to Admission Medications:        Prescriptions Prior to Admission   Medication Sig Dispense Refill Last Dose     Apple Cider Vinegar 300 MG TABS Take 300 mg by mouth every morning   2018 at am     ascorbic acid (VITAMIN C) 1000 MG TABS Take 1,000 mg by mouth daily   2018 at Unknown time     BACLOFEN PO Take 10 mg by mouth At Bedtime    2018 at hs     CRANBERRY PO Take 100 mg by mouth daily   2018 at am     dicyclomine (BENTYL) 20 MG tablet Take 20 mg by mouth 3 times daily (with meals) Takes every time she eats   2018 at 1500     FLUoxetine (PROZAC) 20 MG capsule TAKE 1 CAPSULE EVERY DAY 90 capsule 0 2018 at am     Gauze Pads & Dressings (BIATAIN ADHESIVE FOAM DRESSING) 4\"X4\" PADS Apply topically daily        Ginkgo Biloba (GNP " GINGKO BILOBA EXTRACT PO) Take 30 mg by mouth daily   11/9/2018 at Unknown time     Guarana, Jeremy mockana, (GUARANA PO) Take 200 mg by mouth daily   11/9/2018 at am     loperamide (IMODIUM) 2 MG capsule Take 6 mg by mouth 3 times daily (with meals)   11/9/2018 at Unknown time     medroxyPROGESTERone (PROVERA) 10 MG tablet TAKE 1 AND 1/2 TABLETS EVERY  tablet 0 11/9/2018 at am     oxybutynin (DITROPAN) 5 MG tablet Take 1 tablet (5 mg) by mouth 2 times daily 180 tablet 0 11/9/2018 at am     Pregabalin (LYRICA PO) Take 100 mg by mouth 3 times daily    11/9/2018 at 1400     Psyllium 500 MG CAPS Take 1,500 mg by mouth every morning   11/9/2018 at am     Catheters MISC 1 catheter every 28 days. 1 each 12 Taking     HOMEOPATHIC PRODUCTS EX Drainage tone for swollen glands, sinus congestion and skin eruptions.   on hold     HOMEOPATHIC PRODUCTS EX Neuro-chord for numbness, irritability, and tingling.   on hold     order for DME Equipment being ordered: [facility] Phone [phone] Fax [phone]    Primary Dressing 8X8 Mesalt   Qty 15 sheets  Secondary Dressing nonsterile 4X4 gauze Qty 200 count loaf  Secondary Dressing 2 in medipore tape Qty 2 rolls  Length of Need: 1 month  Frequency of dressing change: daily 30 days 0 Taking     order for DME Equipment being ordered: Anchoring Device Flexi-Track LG 2 Units 12 Taking     order for DME Equipment being ordered: Insertion Tray Wheeler w/BZK Swab Catheter 10CC, Sterile 1 Units 12 Taking     order for DME Equipment being ordered: Drain Bag, Kenguard 2000ml. Urinary Bag with Anti-Refulx 2 Units 12 Taking     order for DME Equipment being ordered: Catheter Wheeler 2Way 30cc 18FR, Silicone Coated Latex 1 Units 12 Taking     order for DME Equipment being ordered: walking boot left 1 Units 0 Taking            Medications:        Current Facility-Administered Medications   Medication Last Rate     D5W 75 mL/hr at 11/12/18 0912     Current Facility-Administered  Medications   Medication Dose Route Frequency     ascorbic acid  1,000 mg Oral Daily     dicyclomine  20 mg Oral TID w/meals     FLUoxetine  20 mg Oral Daily     lidocaine (PF)  30 mL Subcutaneous Once     loperamide  6 mg Oral TID w/meals     medroxyPROGESTERone  10 mg Oral Daily     oxybutynin  5 mg Oral BID     piperacillin-tazobactam  3.375 g Intravenous Q6H     pregabalin (LYRICA) capsule 100 mg  100 mg Oral TID     psyllium  1 packet Oral Daily     sodium chloride (PF)  3 mL Intracatheter Q8H     Current Facility-Administered Medications   Medication Dose Route Frequency     acetaminophen  650 mg Oral Q4H PRN     bisacodyl  10 mg Rectal Daily PRN     fentaNYL  25-50 mcg Intravenous Q5 Min PRN     flumazenil  0.2 mg Intravenous q1 min prn     HYDROmorphone  0.5 mg Intravenous Q2H PRN     magnesium citrate  148 mL Oral Once PRN     melatonin  1 mg Oral At Bedtime PRN     midazolam  0.5-1 mg Intravenous Q4 Min PRN     morphine  15 mg Oral Q4H PRN     naloxone  0.1-0.4 mg Intravenous Q2 Min PRN     ondansetron  4 mg Oral Q6H PRN    Or     ondansetron  4 mg Intravenous Q6H PRN     polyethylene glycol  17 g Oral Daily PRN     potassium chloride  20-40 mEq Oral or Feeding Tube Q2H PRN     potassium chloride with lidocaine  10 mEq Intravenous Q1H PRN     potassium chloride  10 mEq Intravenous Q1H PRN     potassium chloride  20 mEq Intravenous Q1H PRN     potassium chloride  20-40 mEq Oral Q2H PRN     senna-docusate  1 tablet Oral BID PRN    Or     senna-docusate  2 tablet Oral BID PRN     sodium chloride (PF)  3 mL Intracatheter Q1H PRN     sodium hypochlorite   Topical BID PRN            Data:      Lab data reviewed.     Recent Labs  Lab 11/13/18  0758 11/12/18  1240 11/12/18  0802 11/11/18  0818   HGB 8.5*  --  8.3* 8.6*   MCV 86  --  84 85     --  364 353   INR  --   --  1.31*  --     145* 147* 144   POTASSIUM 3.7  --  3.7 3.6   CHLORIDE 112*  --  116* 112*   CO2 28  --  24 26   BUN 4*  --  4* 4*   CR  0.51*  --  0.60 0.60   ANIONGAP 3  --  7 6   AC 7.6*  --  7.7* 7.7*   GLC 89  --  94 88           Imaging:      Imaging data reviewed.     Dr. John Robertson D.O.  [facility] Hospitalist  Pager [phone]

## 2018-11-13 NOTE — TELEPHONE ENCOUNTER
Ok to use same day or Peds slot.  Looks like her hospital team wanted her to be seen within 7 days. That would be next week. Not the week of 11/26. Please double check with the patient.

## 2018-11-13 NOTE — PROGRESS NOTES
Discharge    Patient discharged to home via own transportation with mother   Care plan note: vss, alert x4, denied pain. Dressing to right IT changed again due to inc of bowel. PICC patent, intact. Ax1 to W/c. Mother at bedside. Pt verbalized understanding to d.c instruction. Wound care supplies given to pt.     Listed belongings gathered and returned to patient. Yes  Care Plan and Patient education resolved: Yes  Prescriptions if needed, hard copies sent with patient  NA  Home and hospital acquired medications returned to patient: Yes  Medication Bin checked and emptied on discharge Yes  Follow up appointment made for patient: Yes

## 2018-11-13 NOTE — TELEPHONE ENCOUNTER
[facility] Call Center    Phone Message    May a detailed message be left on voicemail: yes    Reason for Call: Other: patient is wondering if she can be worked in the week of 11/26 for a follow up on Cepsis.  Please advise.     Action Taken: Message routed to:  Primary Care p [phone]

## 2018-11-13 NOTE — PROGRESS NOTES
COLON & RECTAL SURGERY  PROGRESS NOTE    November 13, 2018    SUBJECTIVE:  The patient is doing okay this morning. She is currently having issues with the PICC line and working with nurses to get this fixed.     OBJECTIVE:  Temp:  [98.2  F (36.8  C)-98.7  F (37.1  C)] 98.2  F (36.8  C)  Heart Rate:  [] 84  Resp:  [16-20] 17  BP: ()/(55-90) 109/72  SpO2:  [94 %-99 %] 98 %    Intake/Output Summary (Last 24 hours) at 11/13/18 1219  Last data filed at 11/13/18 0900   Gross per 24 hour   Intake              240 ml   Output             2100 ml   Net            -1860 ml       GENERAL:  Awake, alert, no acute distress,   HEAD - Nomocephalic atraumatic  SCLERA anicteric  EXTREMITIES warm and well perfused      LABS:  Lab Results   Component Value Date    WBC 9.6 11/13/2018     Lab Results   Component Value Date    HGB 8.5 11/13/2018     Lab Results   Component Value Date    HCT 27.1 11/13/2018     Lab Results   Component Value Date     11/13/2018     Last Basic Metabolic Panel:  Lab Results   Component Value Date     11/13/2018      Lab Results   Component Value Date    POTASSIUM 3.7 11/13/2018     Lab Results   Component Value Date    CHLORIDE 112 11/13/2018     Lab Results   Component Value Date    AC 7.6 11/13/2018     Lab Results   Component Value Date    CO2 28 11/13/2018     Lab Results   Component Value Date    BUN 4 11/13/2018     Lab Results   Component Value Date    CR 0.51 11/13/2018     Lab Results   Component Value Date    GLC 89 11/13/2018       ASSESSMENT/PLAN:47 year old woman with sepsis secondary to osteomyelitis and UTI. WBC normal. Afebrile and vitally stable. Had bone biopsy done yesterday.     I reviewed the MRI with Dr. Hay today. MRI was done for bone and not specifically body but on review Dr. Hay did not see a clear fistula and no abscess. The patient does have inflammation leading up to her rectum. I spoke with Dr. Aviles who did see the patient yesterday and  examined her but just has yet to get the note in. On exam at the bedside, no fistula was seen on external exam or palpated on ELISE although slightly limited at bedside. He briefly discussed any need for a colostomy and the patient is hesitant to proceed with any such intervention at this time. The patient is not currently symptomatic from a fistula and there is little to be gain by an EUA if there was one as there is no associated abscess. Dr. Aviles stated that if a diverting colostomy is ultimately needed for a decubitus ulcer healing standpoint that at that same time an EUA could be done. I spoke with Dr. Robertson about this plan who is in agreement with continued wound cares, antibiotics and waiting bone biopsy results. We will sign off but be available if any further questions arise.     Anna Verde PA-C  [facility]  [phone]

## 2018-11-13 NOTE — PLAN OF CARE
Problem: Patient Care Overview  Goal: Plan of Care/Patient Progress Review  Outcome: No Change  Pt is A&O x 4, up wit assist of one to the wheel chair. VSS on RA, except soft BP. Denies pain. Paraplegic, left buttocks wound and left foot dressings changed per plan of care. Colorectal Surgery/ID are following. Started on IV Invanz. PICC line left arm placed. IVF stopped. Suprapubic catheter in place, good urine output. Discharge pending, pathology report pending from bone biopsy that was done yesterday. Nursing continue to monitor.

## 2018-11-14 ENCOUNTER — HOME INFUSION (PRE-WILLOW HOME INFUSION) (OUTPATIENT)
Dept: PHARMACY | Facility: CLINIC | Age: 48
End: 2018-11-14

## 2018-11-14 ENCOUNTER — PATIENT OUTREACH (OUTPATIENT)
Dept: CARE COORDINATION | Facility: CLINIC | Age: 48
End: 2018-11-14

## 2018-11-14 LAB — COPATH REPORT: NORMAL

## 2018-11-14 NOTE — TELEPHONE ENCOUNTER
RNCC called and spoke with patient. She is scheduled for follow up for next week.     Rani Sy RN   [facility]

## 2018-11-14 NOTE — PROGRESS NOTES
"[facility]: Post-Discharge Note  SITUATION                                                      Admission Date: 11/9/18  Discharge:    Date: 11/13/18    Diagnosis:  Hypokalemia [E87.6]  Lower limb ulcer, ankle, left, with unspecified severity (H) [L97.329]  Urinary tract infection associated with catheterization of urinary tract, unspecified indwelling urinary catheter type, initial encounter (H) [T83.511A, N39.0]  Osteomyelitis, unspecified site, unspecified type (H) [M86.9]  Pressure injury of left buttock, stage 4 (H) [L89.324]   Service: [facility]   Discharge Plan: to home with home care. Follow up with PCP in one week.     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  Pt has been a paraplegic for 15 years.  There was concern that she possibly had a fistula.  [place]-rectal consulted along with ID.  Pt has a biopsy of her left ischial tuberosity on 11/12/18.  Pathology is pending, no culture was done.  Pt is going home on IV Ertapenem.  She will follow with the [facility] on 11/21/18.    ASSESSMENT      Patient reports symptoms are: improved, per patient she is \"feeling pretty good.\"  Does patient have all of their medications? Yes  Does patient know what their medications are for? Yes  Does patient have follow-up appointment(s) scheduled?  No: assisted with scheduling follow up appointment.  Does patient have any other questions or concerns?  No, patient declines any questions, concerns, or needs at this time.    PLAN                                                      Outpatient Plan:  Home care RN will be out to see patient today. Patient will follow up with us next week. She will continue to monitor sx and call us with any questions or concerns. RNCC will touch base with patient next week for further follow up.    Next 5 " appointments (look out 90 days)     Nov 21, 2018  9:00 AM CST   Return Visit with Za Carter PA-C   [facility] [place] ([facility])    [address] [place] [address]   [phone]            Nov 23, 2018  4:30 PM CST   Return Visit with Ayala Limon MD PhD   Formerly named [facility])    [address] [place] [address]   [phone]            Dec 05, 2018  7:00 AM CST   Return Visit with Yobany Rojas DPM   Formerly named [facility])    [address] [place] [address]   [phone]                Rani Sy RN

## 2018-11-15 ENCOUNTER — HOME INFUSION (PRE-WILLOW HOME INFUSION) (OUTPATIENT)
Dept: PHARMACY | Facility: CLINIC | Age: 48
End: 2018-11-15

## 2018-11-15 ENCOUNTER — TELEPHONE (OUTPATIENT)
Dept: PEDIATRICS | Facility: CLINIC | Age: 48
End: 2018-11-15

## 2018-11-15 DIAGNOSIS — R19.7 ACUTE DIARRHEA: Primary | ICD-10-CM

## 2018-11-15 DIAGNOSIS — L89.324 PRESSURE INJURY OF LEFT ISCHIUM, STAGE 4 (H): ICD-10-CM

## 2018-11-15 LAB
BACTERIA SPEC CULT: NO GROWTH
BACTERIA SPEC CULT: NO GROWTH
Lab: NORMAL
Lab: NORMAL
SPECIMEN SOURCE: NORMAL
SPECIMEN SOURCE: NORMAL

## 2018-11-15 NOTE — TELEPHONE ENCOUNTER
[facility] Call Center    Phone Message    May a detailed message be left on voicemail: yes    Reason for Call: Symptoms or Concerns     If patient has red-flag symptoms, warm transfer to triage line    Current symptom or concern: Diarrhea    Symptoms have been present for:  2 day(s)    Has patient previously been seen for this? No  Patient was recently discharged from the hospital and states that she has had really bad diarrhea since she was placed on the Antibiotic infusion. She would like something to help control that. She also needs an Rx for more wound supplies. Patient states the all the records should be in [facility] and all the supplies she needs should be noted. She is not able to wait until 11/23 to get new supplies. Patient would like the supplies sent to [facility] phone number [phone]. Patient does not have a fax number for them. Please call patient to discuss.     Action Taken: Message routed to:  Primary Care p [phone]

## 2018-11-15 NOTE — TELEPHONE ENCOUNTER
Wound care orders RX faxed to  [facility] fax # [phone]    Patient informed wound supplies faxed.    Patient given Dr. Limon's note below.  Patient states she cannot be sitting in shit all day, this is not ok.  Informed patient that once she gets the stool sample we can recommend something for her.  Dr. Limon said your home health nurse left a toilet hat for you to collect a stool sample.  Patient states that no, the home health nurse did not leave any collection for stool sample.      Called home infusion nurse ph# is [phone].   Home infusion states that the stool container and toilet hat are not set to be delivered until tomorrow for the patient.      Called patient and she will have someone stop into the lab today to  stool kit supplies.  Informed her that the lab is open until 5:50pm abby.    Rosemary Majano RN,   [facility]

## 2018-11-15 NOTE — PROGRESS NOTES
This is a recent snapshot of the patient's [facility] medical record.  For current drug dose and complete information and questions, call [phone] or In Basket pool, fv home infusion ([id number])  CSN Number:  [id number]

## 2018-11-15 NOTE — TELEPHONE ENCOUNTER
We'll first need to test the stool if this is c diff. Home care RN/infusion RN sent me a note that she left a toilet hat for patient to collect stool sample. I ordered the test for C diff. Until we can confirm if this is infectious, I do not recommend taking anything to slow it down other than probiotic to protect her stomach.

## 2018-11-15 NOTE — TELEPHONE ENCOUNTER
1.  Patient was recently discharged from the hospital and states that she has had really bad diarrhea since she was placed on the Antibiotic infusion. She would like something to help control that. She currently takes 3-500mg capsules of psyllium husks and immodium.  This morning already she has had 3 liquid stools.  She is a paraplegic and has not had an accident in over a year but she is unable to hold her stool in on her own at this time.  She is off of work this week but can't go back to work next week and keep cleaning herself 6 times a day.  The infusion nurse told her a side effect of the antibiotic is diarrhea.  She states there is an odor to the stool and is very dark in color.   She doesn't know if she should increase her psyllium husks or if there is another option to control the diarrhea.        2.  She also needs an Rx for more wound supplies. She is not able to wait until 11/23 to get new supplies. Patient would like the supplies sent to [facility] phone number [phone]. Patient does not have a fax number for them.      Routed to  to advise.    Rosemary Majano RN,   [facility]

## 2018-11-16 ENCOUNTER — HOME INFUSION (PRE-WILLOW HOME INFUSION) (OUTPATIENT)
Dept: PHARMACY | Facility: CLINIC | Age: 48
End: 2018-11-16

## 2018-11-16 NOTE — PROGRESS NOTES
This is a recent snapshot of the patient's [facility] medical record.  For current drug dose and complete information and questions, call [phone] or In Basket pool, fv home infusion ([id number])  CSN Number:  [id number]

## 2018-11-17 ENCOUNTER — HOME INFUSION (PRE-WILLOW HOME INFUSION) (OUTPATIENT)
Dept: PHARMACY | Facility: CLINIC | Age: 48
End: 2018-11-17

## 2018-11-18 ENCOUNTER — HOME INFUSION (PRE-WILLOW HOME INFUSION) (OUTPATIENT)
Dept: PHARMACY | Facility: CLINIC | Age: 48
End: 2018-11-18

## 2018-11-19 ENCOUNTER — HOSPITAL ENCOUNTER (OUTPATIENT)
Facility: CLINIC | Age: 48
Setting detail: SPECIMEN
Discharge: HOME | End: 2018-11-19
Admitting: INTERNAL MEDICINE
Payer: COMMERCIAL

## 2018-11-19 ENCOUNTER — HOME INFUSION (PRE-WILLOW HOME INFUSION) (OUTPATIENT)
Dept: PHARMACY | Facility: CLINIC | Age: 48
End: 2018-11-19

## 2018-11-19 LAB
AST SERPL W P-5'-P-CCNC: 29 U/L (ref 0–45)
BASOPHILS # BLD AUTO: 0 10E9/L (ref 0–0.2)
BASOPHILS NFR BLD AUTO: 0.3 %
BUN SERPL-MCNC: 12 MG/DL (ref 7–30)
CREAT SERPL-MCNC: 0.47 MG/DL (ref 0.52–1.04)
CRP SERPL-MCNC: 34 MG/L (ref 0–8)
DIFFERENTIAL METHOD BLD: ABNORMAL
EOSINOPHIL # BLD AUTO: 0.1 10E9/L (ref 0–0.7)
EOSINOPHIL NFR BLD AUTO: 1.1 %
ERYTHROCYTE [DISTWIDTH] IN BLOOD BY AUTOMATED COUNT: 14.8 % (ref 10–15)
ERYTHROCYTE [SEDIMENTATION RATE] IN BLOOD BY WESTERGREN METHOD: 82 MM/H (ref 0–20)
GFR SERPL CREATININE-BSD FRML MDRD: >90 ML/MIN/1.7M2
HCT VFR BLD AUTO: 33.3 % (ref 35–47)
HGB BLD-MCNC: 10 G/DL (ref 11.7–15.7)
IMM GRANULOCYTES # BLD: 0 10E9/L (ref 0–0.4)
IMM GRANULOCYTES NFR BLD: 0.2 %
LYMPHOCYTES # BLD AUTO: 2.5 10E9/L (ref 0.8–5.3)
LYMPHOCYTES NFR BLD AUTO: 20.5 %
MCH RBC QN AUTO: 26.1 PG (ref 26.5–33)
MCHC RBC AUTO-ENTMCNC: 30 G/DL (ref 31.5–36.5)
MCV RBC AUTO: 87 FL (ref 78–100)
MONOCYTES # BLD AUTO: 0.7 10E9/L (ref 0–1.3)
MONOCYTES NFR BLD AUTO: 5.9 %
NEUTROPHILS # BLD AUTO: 8.6 10E9/L (ref 1.6–8.3)
NEUTROPHILS NFR BLD AUTO: 72 %
NRBC # BLD AUTO: 0 10*3/UL
NRBC BLD AUTO-RTO: 0 /100
PLATELET # BLD AUTO: 491 10E9/L (ref 150–450)
RBC # BLD AUTO: 3.83 10E12/L (ref 3.8–5.2)
WBC # BLD AUTO: 11.9 10E9/L (ref 4–11)

## 2018-11-19 PROCEDURE — 82565 ASSAY OF CREATININE: CPT | Performed by: INTERNAL MEDICINE

## 2018-11-19 PROCEDURE — 85025 COMPLETE CBC W/AUTO DIFF WBC: CPT | Performed by: INTERNAL MEDICINE

## 2018-11-19 PROCEDURE — 86140 C-REACTIVE PROTEIN: CPT | Performed by: INTERNAL MEDICINE

## 2018-11-19 PROCEDURE — 84520 ASSAY OF UREA NITROGEN: CPT | Performed by: INTERNAL MEDICINE

## 2018-11-19 PROCEDURE — 85652 RBC SED RATE AUTOMATED: CPT | Performed by: INTERNAL MEDICINE

## 2018-11-19 PROCEDURE — 84450 TRANSFERASE (AST) (SGOT): CPT | Performed by: INTERNAL MEDICINE

## 2018-11-19 NOTE — PROGRESS NOTES
This is a recent snapshot of the patient's [facility] medical record.  For current drug dose and complete information and questions, call [phone] or In [facility] pool, fv home infusion ([id number])  CSN Number:  [id number]

## 2018-11-19 NOTE — TELEPHONE ENCOUNTER
"Patient states that the orders in the chart are not what she is currently using.  She states the orders from the hospital have changed.    Patient states that she packs her wound that is crescent shaped and about 6\"x4\".   She cleanses the wound with Vashe wound wash (not the spray) and soaks the gauze in sodium choloride and then packs the wound.  Covers the wound in tegaderm.  This is done about twice a day.    Patient has a wound care doctor appt on Wednesday and will get the other supplies on Wednesday but is out of the 2\" gauze rolls.    Patient is requesting us to send order for 2\" gauze rolls, uses about 1 roll every 3 days for a 1 month supply.    Will need to send new RX to [facility] fax # [phone]      Rosemary Majano RN,   [facility]        "

## 2018-11-19 NOTE — PROGRESS NOTES
This is a recent snapshot of the patient's [facility] medical record.  For current drug dose and complete information and questions, call [phone] or In Basket pool, fv home infusion ([id number])  CSN Number:  [id number]

## 2018-11-19 NOTE — PROGRESS NOTES
This is a recent snapshot of the patient's [facility] medical record.  For current drug dose and complete information and questions, call [phone] or In Basket pool, fv home infusion ([id number])  CSN Number:  [id number]

## 2018-11-19 NOTE — TELEPHONE ENCOUNTER
Called wound care supply at [phone].    Mesalt 8x8 is discontinued.   Last time they sent out Mesalt 6x6 as alternative and it was returned by the nurse.      Mesalt is only available in 4x4 or 6x6.        Last RX:  Primary Dressing 8X8 Mesalt   Qty 15 sheets  Secondary Dressing nonsterile 4X4 gauze Qty 200 count loaf  Secondary Dressing 2 in medipore tape Qty 2 rolls  Length of Need: 1 month  Frequency of dressing change: daily             Rosemary Majano RN,   [facility]

## 2018-11-19 NOTE — TELEPHONE ENCOUNTER
RX faxed to [facility] fax # [phone]    Patient informed RX faxed.    Rosemary Majano RN,   [facility]

## 2018-11-20 ENCOUNTER — HOME INFUSION (PRE-WILLOW HOME INFUSION) (OUTPATIENT)
Dept: PHARMACY | Facility: CLINIC | Age: 48
End: 2018-11-20

## 2018-11-21 ENCOUNTER — HOSPITAL ENCOUNTER (OUTPATIENT)
Dept: WOUND CARE | Facility: CLINIC | Age: 48
Discharge: HOME | End: 2018-11-21
Attending: PHYSICIAN ASSISTANT | Admitting: PHYSICIAN ASSISTANT
Payer: COMMERCIAL

## 2018-11-21 VITALS — SYSTOLIC BLOOD PRESSURE: 128 MMHG | HEART RATE: 90 BPM | TEMPERATURE: 97.4 F | DIASTOLIC BLOOD PRESSURE: 70 MMHG

## 2018-11-21 DIAGNOSIS — L89.324 PRESSURE INJURY OF LEFT ISCHIUM, STAGE 4 (H): ICD-10-CM

## 2018-11-21 DIAGNOSIS — L89.324 PRESSURE ULCER OF ISCHIUM, LEFT, STAGE IV (H): ICD-10-CM

## 2018-11-21 PROCEDURE — 99213 OFFICE O/P EST LOW 20 MIN: CPT | Performed by: PHYSICIAN ASSISTANT

## 2018-11-21 PROCEDURE — A6260 WOUND CLEANSER ANY TYPE/SIZE: HCPCS

## 2018-11-21 PROCEDURE — A6196 ALGINATE DRESSING <=16 SQ IN: HCPCS

## 2018-11-21 PROCEDURE — A6212 FOAM DRG <=16 SQ IN W/BORDER: HCPCS

## 2018-11-21 PROCEDURE — 97602 WOUND(S) CARE NON-SELECTIVE: CPT

## 2018-11-21 NOTE — PROGRESS NOTES
This is a recent snapshot of the patient's [facility] medical record.  For current drug dose and complete information and questions, call [phone] or In [facility] pool, fv home infusion ([id number])  CSN Number:  [id number]

## 2018-11-21 NOTE — DISCHARGE INSTRUCTIONS
[facility]  [address] [facility] [address], [place] [address]  Appointment Phone [phone] Nurse Advisors [phone]    Priyanka Martinez      1970    Wound Dressing Change:Left Ischial Tuberosity   Cleanse wound and surrounding skin with: dakins or vashe dampen gauze   Pack wound with Silvercel cut to fit the size of the wound   Cover wound with tegaderm adhesive foam  Change dressing daily or every other day.  Repositioning:    Bed:  Reposition pt MINIMALLY every 1-2 hours in bed to relieve pressure and promote perfusion to tissue on group 2 mattress (we will try to order from [facility].     Chair:  When up to chair pt should not sit for longer than one hour total before either standing or returning to bed for at least 10 minutes, again to relieve pressure and promote perfusion to tissue.     o Pt should also sit on a chair cushion when up to the chair.     A diet high in protein is important for wound healing, we recommend getting 90 grams of protein per day. Taking protein shakes or bars are a good way to get extra protein in your diet. Take 1-2 Protein Shakes per day     Za Carter PA-C. November 21, 2018  Call us at [phone] if you have any questions about your wounds, have redness or swelling around your wound, have a fever of 101 or greater or if you have any other problems or concerns. We answer the phone Monday through Friday 8 am to 4 pm, please leave a message as we check the voicemail frequently throughout the day.     Follow up with Provider - 3 weeks

## 2018-11-21 NOTE — PROGRESS NOTES
"[facility]    HISTORY OF PRESENT ILLNESS: Priyanka Martinez is a 47 year old female with history of paraplegia and TBI who returns today for a left IT pressure ulcer. The initial injury was a burn from a heating pad in July but it has significantly worsened due to pressure. Since our last visit she developed fevers and MRI revealed osteomyelitis. She was instructed to go into the ED where she was admitted and underwent bone biopsy. Currently undergoing IV antibiotic therapy.      WOUND CARE: Dakins damp to try    OFFLOADING: sitting on a Roho cushion has not been pressure mapped recently, on standard mattress    DATE WOUND ACQUIRED: July 2018    REVIEW OF SYSTEMS:  CONSTITUTIONAL: Denies fevers or acute illness  ENDOCRINE: Denies diabetes    PAST MEDICAL HISTORY:  has a past medical history of Abdominal pain; Diarrhea; Paraplegic immobility syndrome (2003); and Tobacco use disorder (1/30/2012).    SOCIAL HISTORY: currently works from home    MEDICATIONS:   Current Outpatient Prescriptions   Medication     order for DME     ascorbic acid (VITAMIN C) 1000 MG TABS     Catheters MISC     dicyclomine (BENTYL) 20 MG tablet     ertapenem (INVANZ) 1 GM injection     FLUoxetine (PROZAC) 20 MG capsule     Gauze Pads & Dressings (BIATAIN ADHESIVE FOAM DRESSING) 4\"X4\" PADS     loperamide (IMODIUM) 2 MG capsule     medroxyPROGESTERone (PROVERA) 10 MG tablet     order for DME     order for DME     order for DME     order for DME     order for DME     oxybutynin (DITROPAN) 5 MG tablet     Pregabalin (LYRICA PO)     Psyllium 500 MG CAPS     [DISCONTINUED] oxybutynin (DITROPAN) 5 MG tablet     No current facility-administered medications for this encounter.        VITALS: /70 (BP Location: Left arm)  Pulse 90  Temp 97.4  F (36.3  C)    PHYSICAL EXAM:  GENERAL: Patient is alert and oriented and in no acute distress  INTEGUMENTARY:   WOUND ASSESSMENT #1:     Location: left IT     Size: 2.5 cm x 2.7 cm with " a depth of 6.7 cm    Drainage: copious amount of serosanguinous drainage    Wound description: necrotic subQ      PROCEDURE: Topical 4% lidocaine was applied to the wound. The wound was mechanically debrided.     ASSESSMENT: stage 4 pressure ulcer of left IT    PLAN:   1. Primary dressing: SilverCel; Secondary dressing: Tegaderm foam  2. Get cushion mapped  3. Patient will need group 2 mattress due to stage 3 ulceration that has failed to improve on a normal mattress despite regular wound cares and repositioning. Patient has impaired sensation and is unable to reposition independently.  4. The patient needs a semi electric hospital bed for the following reasons:The patient requires positioning of the body in ways not feasible with an ordinary bed due to the pressure ulcers which are expected to last at least 1 month. The patient requires a bed height different than a fixed height hospital bed to permit transfers to a wheelchair. The patient requires changes in body position every 2 hours to offload areas of pressure. The patient is repositioned by caregivers as they are unable to make their own body changes due to paraplegia.   5. Discussed prognosis of osteomyelitis, possibility of recurrence without bone debridement    FOLLOW-UP: 2 weeks    LANDEN ORTIZ PA-C ([facility])

## 2018-11-21 NOTE — MR AVS SNAPSHOT
MRN:[id number]                      After Visit Summary   11/21/2018    Priyanka CAT Martinez    MRN: [id number]           Visit Information        Provider Department      11/21/2018  9:00 AM Za Carter PA-C [facility]        Your next 10 appointments already scheduled     Nov 23, 2018  4:30 PM CST   [facility]    [address] [facility] [id number] [phone]            Dec 05, 2018  7:00 AM CST   [facility] with [facility]jas D[facility]   [facility]    [address] [facility] [id number] [phone]                Further instructions from your care team             [facility]  [address], [facility] [address]  Appointment Phone [phone] [facility] [phone]    Priyanka Martinez      1970    Wound Dressing Change:Left Ischial Tuberosity   Cleanse wound and surrounding skin with: dakins or vashe dampen gauze   Pack wound with Silvercel cut to fit the size of the wound   Cover wound with tegaderm adhesive foam  Change dressing daily or every other day.  Repositioning:    Bed:  Reposition pt MINIMALLY every 1-2 hours in bed to relieve pressure and promote perfusion to tissue on group 2 mattress (we will try to order from [facility].     Chair:  When up to chair pt should not sit for longer than one hour total before either standing or returning to bed for at least 10 minutes, again to relieve pressure and promote perfusion to tissue.     o Pt should also sit on a chair cushion when up to the chair.     A diet high in protein is important for wound healing, we recommend getting 90 grams of protein per day. Taking protein shakes or bars are a good way to get extra protein in your diet. Take 1-2 Protein Shakes per day     Za  JUAN ALBERTO Carter. November 21, 2018  Call us at [phone] if you have any questions about your wounds, have redness or swelling around your wound, have a fever of 101 or greater or if you have any other problems or concerns. We answer the phone Monday through Friday 8 am to 4 pm, please leave a message as we check the voicemail frequently throughout the day.     Follow up with Provider - 3 weeks               [employer] Information     [employer] gives you secure access to your electronic health record. If you see a primary care provider, you can also send messages to your care team and make appointments. If you have questions, please call your primary care clinic.  If you do not have a primary care provider, please call [phone] and they will assist you.        Care EveryWhere ID     This is your Care EveryWhere ID. This could be used by other organizations to access your [facility] medical records  [id number]        Equal Access to Services     MELY GAMBLE : Boni Cason, reginald zuñiga, ronnie modi . So [facility] [phone].    ATENCIÓN: Si habla español, tiene a luu disposición servicios gratuitos de asistencia lingüística. Llame al [phone].    We comply with applicable federal civil rights laws and [place] laws. We do not discriminate on the basis of race, color, national origin, age, disability, sex, sexual orientation, or gender identity.

## 2018-11-23 ENCOUNTER — OFFICE VISIT (OUTPATIENT)
Dept: PEDIATRICS | Facility: CLINIC | Age: 48
End: 2018-11-23
Payer: COMMERCIAL

## 2018-11-23 VITALS
SYSTOLIC BLOOD PRESSURE: 124 MMHG | DIASTOLIC BLOOD PRESSURE: 81 MMHG | HEART RATE: 78 BPM | OXYGEN SATURATION: 97 % | TEMPERATURE: 96.6 F

## 2018-11-23 DIAGNOSIS — M86.08 ACUTE HEMATOGENOUS OSTEOMYELITIS OF OTHER SITE (H): ICD-10-CM

## 2018-11-23 DIAGNOSIS — Z09 HOSPITAL DISCHARGE FOLLOW-UP: Primary | ICD-10-CM

## 2018-11-23 PROCEDURE — 99213 OFFICE O/P EST LOW 20 MIN: CPT | Performed by: INTERNAL MEDICINE

## 2018-11-23 NOTE — MR AVS SNAPSHOT
After Visit Summary   11/23/2018    Priyanka Martinez    MRN: [id number]           Patient Information     Date Of Birth          1970        Visit Information        Provider Department      11/23/2018 4:30 PM Ayala Limon MD PhD [facility]     Hospital discharge follow-up    -  1    Acute hematogenous osteomyelitis of other site (H)          Care Instructions    Make appointment(s) for:   -- follow up if needed.                 Follow-ups after your visit        Your next 10 appointments already scheduled     Dec 05, 2018  7:00 AM CST   Return Visit with Yobany Rojas DPM   [facility]    [address] [place] [id number] [phone]            Dec 12, 2018  8:45 AM CST   Return Visit with Za Carter PA-C   [facility] [place] ([facility])    [address] [address] [phone]              Who to contact     If you have questions or need follow up information about today's clinic visit or your schedule please contact [facility] directly at [phone].  Normal or non-critical lab and imaging results will be communicated to you by MyChart, letter or phone within 4 business days after the clinic has received the results. If you do not hear from us within 7 days, please contact the clinic through MyChart or phone. If you have a critical or abnormal lab result, we will notify you by phone as soon as possible.  Submit refill requests through [email or web address] or call your pharmacy and they will forward the refill request to us. Please allow 3 business days for your refill to be completed.          Additional Information About Your Visit        [employer] Information     [email or web address] gives you secure access to your electronic health record. If you see a primary care provider, you can also send messages to your  care team and make appointments. If you have questions, please call your primary care clinic.  If you do not have a primary care provider, please call [phone] and they will assist you.      [employer] is an electronic gateway that provides easy, online access to your medical records. With [employer], you can request a clinic appointment, read your test results, renew a prescription or communicate with your care team.     To access your existing account, please contact your [facility] or call [phone] for assistance.        Care EveryWhere ID     This is your Care EveryWhere ID. This could be used by other organizations to access your [place] medical records  [id number]        Your Vitals Were     Pulse Temperature Pulse Oximetry             78 96.6  F (35.9  C) (Temporal) 97%          Blood Pressure from Last 3 Encounters:   11/23/18 124/81   11/21/18 128/70   11/13/18 106/70    Weight from Last 3 Encounters:   11/09/18 160 lb (72.6 kg)   12/17/16 155 lb (70.3 kg)   12/15/16 150 lb (68 kg)              Today, you had the following     No orders found for display       Primary Care Provider Office Phone # Fax #    Ayala Limon MD PhD [phone]       [address]  [facility] [address]        Equal Access to Services     MELY GAMBLE : Hadii aad ku hadasho Soomaali, waaxda luqadaha, qaybta kaalmada adeegyada, waxay idiin hayaan hermila kheleno mccoy . So [facility] [phone].    ATENCIÓN: Si habla español, tiene a luu disposición servicios gratuitos de asistencia lingüística. Llame al [phone].    We comply with applicable federal civil rights laws and [place] laws. We do not discriminate on the basis of race, color, national origin, age, disability, sex, sexual orientation, or gender identity.            Thank you!     Thank you for choosing [facility]  for your care. Our goal is always to provide you with excellent care. Hearing back from our  "patients is one way we can continue to improve our services. Please take a few minutes to complete the written survey that you may receive in the mail after your visit with us. Thank you!             Your Updated Medication List - Protect others around you: Learn how to safely use, store and throw away your medicines at www.disposemymeds.org.          This list is accurate as of 11/23/18  4:44 PM.  Always use your most recent med list.                   Brand Name Dispense Instructions for use Diagnosis    ascorbic acid 1000 MG Tabs    vitamin C     Take 1,000 mg by mouth daily        BIATAIN ADHESIVE FOAM DRESSING 4\"X4\" Pads      Apply topically daily        dicyclomine 20 MG tablet    BENTYL     Take 20 mg by mouth 3 times daily (with meals) Takes every time she eats        ertapenem 1 GM injection    INVanz    100 mL    Inject 1 g into the vein every 24 hours for 10 days CBC with differential, creatinine, SGOT weekly while on this medication to be faxed to Dr. Barlow office.    Pressure injury of left buttock, stage 4 (H)       FLUoxetine 20 MG capsule    PROzac    90 capsule    TAKE 1 CAPSULE EVERY DAY    Anxiety       loperamide 2 MG capsule    IMODIUM     Take 6 mg by mouth 3 times daily (with meals)        LYRICA PO      Take 100 mg by mouth 3 times daily        medroxyPROGESTERone 10 MG tablet    PROVERA    135 tablet    TAKE 1 AND 1/2 TABLETS EVERY DAY    Dysmenorrhea       * order for DME     2 Units    Equipment being ordered: Anchoring Device Flexi-Track LG    Neurogenic bladder       * order for DME     1 Units    Equipment being ordered: Insertion Tray Wheeler w/BZK Swab Catheter 10CC, Sterile    Neurogenic bladder       * order for DME     2 Units    Equipment being ordered: Drain Bag, Kenguard 2000ml. Urinary Bag with Anti-Refulx    Neurogenic bladder       * order for DME     1 Units    Equipment being ordered: Catheter Wheeler 2Way 30cc 18FR, Silicone Coated Latex    Neurogenic bladder       order for " DME     30 days    [facility] Fax [phone] Reference Number# [id number] Primary Dressing Silvercel Rope   Qty 30 Secondary Dressing Tegaderm Adhesive Foam Ref (51371) Qty 30 Secondary Dressing 4x4 guaze Qty 1 loaf Length of Need: 1 month Frequency of dressing change: daily    Pressure injury of left ischium, stage 4 (H)       order for DME     1 Device    [facility]  p: [phone] f: [phone] EMAIL to [email or web address]  Request for group 2 air mattress & semi-electric hospital bed Ht:177.8 cm Wt:72.5 kg Length of need: lifetime  Pt. is wheelchair bound & has been in a comprehensive ulcer treatment program for >2 months. We will follow monthly until wound closure  To obtain medical records: p: [phone] f: [phone]    Pressure ulcer of ischium, left, stage IV (H)       oxybutynin 5 MG tablet    DITROPAN    180 tablet    Take 1 tablet (5 mg) by mouth 2 times daily    Neurogenic bladder       Psyllium 500 MG Caps      Take 1,500 mg by mouth every morning        * Notice:  This list has 4 medication(s) that are the same as other medications prescribed for you. Read the directions carefully, and ask your doctor or other care provider to review them with you.

## 2018-11-23 NOTE — PROGRESS NOTES
"  SUBJECTIVE:                                                      Patient presents for Hospital Followup Visit:    Hospital:  [facility]      Date of Admission: 11-9-18  Date of Discharge: 11-  Reason(s) for Admission: Bone infection    She was hospitalized for sepsis and hematogenous spread of osteomyelitis involving the inferior pubic ramus from a chronic buttock wound.  She was treated with IV antibiotic.  She had a bone biopsy.  She now has a wound VAC and outpatient IV antibiotics and follow-up with infectious disease.            Problems taking medications regularly:  None       Medication changes since discharge: None       Problems adhering to non-medication therapy:  None    Summary of hospitalization:  [facility] discharge summary reviewed  Diagnostic Tests/Treatments reviewed.  Follow up needed: none  Other Healthcare Providers Involved in Patient s Care:         Specialist appointment - wound care specialist and orthopedics    ==========  Post-discharge update:  Patient reports she is doing much better.  She continues to have some pain over the wound area.  She has wound care visits.  She does her own IV and dressing change at home.  It is a challenge with dressing change given her paraplegic status.  She is managing.  She does not have any specific concern at this time.    Current Outpatient Prescriptions   Medication Sig     ascorbic acid (VITAMIN C) 1000 MG TABS Take 1,000 mg by mouth daily     dicyclomine (BENTYL) 20 MG tablet Take 20 mg by mouth 3 times daily (with meals) Takes every time she eats     FLUoxetine (PROZAC) 20 MG capsule TAKE 1 CAPSULE EVERY DAY     Gauze Pads & Dressings (BIATAIN ADHESIVE FOAM DRESSING) 4\"X4\" PADS Apply topically daily     loperamide (IMODIUM) 2 MG capsule Take 6 mg by mouth 3 times daily (with meals)     medroxyPROGESTERone (PROVERA) 10 MG tablet TAKE 1 AND 1/2 TABLETS EVERY DAY     order for [facility] [place] Fax [phone]  Reference " Number# [id number]  Primary Dressing Silvercel Rope   Qty 30  Secondary Dressing Tegaderm Adhesive Foam Ref (14382) Qty 30  Secondary Dressing 4x4 guaze Qty 1 loaf  Length of Need: 1 month  Frequency of dressing change: daily     order for DME [facility]   p: [phone] f: [phone]  EMAIL to [email or web address]  [email or web address]    Request for group 2 air mattress & semi-electric hospital bed  Ht:177.8 cm  Wt:72.5 kg  Length of need: lifetime    Pt. is wheelchair bound & has been in a comprehensive ulcer treatment program for >2 months. We will follow monthly until wound closure    To obtain medical records:  p: [phone] f: [phone]     order for DME Equipment being ordered: Anchoring Device Flexi-Track LG     order for DME Equipment being ordered: Insertion Tray Wheeler w/BZK Swab Catheter 10CC, Sterile     order for DME Equipment being ordered: Drain Bag, Kenguard 2000ml. Urinary Bag with Anti-Refulx     order for DME Equipment being ordered: Catheter Wheeler 2Way 30cc 18FR, Silicone Coated Latex     oxybutynin (DITROPAN) 5 MG tablet Take 1 tablet (5 mg) by mouth 2 times daily     Pregabalin (LYRICA PO) Take 100 mg by mouth 3 times daily      Psyllium 500 MG CAPS Take 1,500 mg by mouth every morning     ertapenem 1 g Inject 1 g into the vein every 24 hours     [DISCONTINUED] oxybutynin (DITROPAN) 5 MG tablet Take 1 tablet by mouth 3 times daily.     No current facility-administered medications for this visit.         Patient Active Problem List   Diagnosis     Paraplegia (H)     Suprapubic catheter (H)     Gastrointestinal problem     CARDIOVASCULAR SCREENING; LDL GOAL LESS THAN 160     Dysmenorrhea     Neuropathic pain of flank     Neurogenic bladder     Endometriosis     IBS (irritable bowel syndrome)     Generalized anxiety disorder     Pressure ulcer of ischium, left, stage IV (H)     Sepsis (H)     Infected wound     Osteomyelitis of pelvic region (H)     Polymicrobial  bacterial infection     Infection due to Staphylococcus aureus     Past Surgical History:   Procedure Laterality Date     C SPINE FUSION,POSTER,7-12 SGMTS  2004    From T10 to L5     CHOLECYSTECTOMY  1990's     COLONOSCOPY       COLONOSCOPY Left 11/25/2014    Procedure: COMBINED COLONOSCOPY, SINGLE OR MULTIPLE BIOPSY/POLYPECTOMY BY BIOPSY;  Surgeon: Junior Galeano MD;  Location:  GI     ESOPHAGOSCOPY, GASTROSCOPY, DUODENOSCOPY (EGD), COMBINED N/A 11/25/2014    Procedure: COMBINED ESOPHAGOSCOPY, GASTROSCOPY, DUODENOSCOPY (EGD), BIOPSY SINGLE OR MULTIPLE;  Surgeon: Junior Galeano MD;  Location:  GI       Social History   Substance Use Topics     Smoking status: Former Smoker     Quit date: 3/18/2012     Smokeless tobacco: Never Used      Comment: 7 cig a day off and on socially     Alcohol use No     Family History   Problem Relation Age of Onset     Hypertension Father      Heart Failure Maternal Grandmother             Problem list, Medication list, Allergies, and Medical/Social/Surgical histories reviewed in [facility] and updated as appropriate.    OBJECTIVE:                                                    /81  Pulse 78  Temp 96.6  F (35.9  C) (Temporal)  SpO2 97%    GEN: healthy, alert and no distress; sitting in wheelchair.     ASSESSMENT/PLAN:                                                      48 year old female with the following diagnoses and treatment plan:      ICD-10-CM    1. Hospital discharge follow-up Z09    2. Acute hematogenous osteomyelitis of other site (H) M86.08        --Patient was recently also hospitalized for hematogenous osteomyelitis, has an open deep wound.  She has appropriate follow-up with wound care and infectious disease.  No specific primary care needs at this time.  No follow-up appointment scheduled.    Post Discharge Medication Reconciliation: discharge medications reconciled, continue medications without change.  Plan of care communicated with  patient    Will call or return to clinic if worsening or symptoms not improving as discussed.  See Patient Instructions.      Ayala Limon MD-PhD  [facility]    (Note: Chart documentation was done in part with Dragon Voice Recognition software. Although reviewed after completion, some word and grammatical errors may remain.)

## 2018-11-26 ENCOUNTER — APPOINTMENT (OUTPATIENT)
Dept: LAB | Facility: CLINIC | Age: 48
End: 2018-11-26
Attending: INTERNAL MEDICINE
Payer: COMMERCIAL

## 2018-11-26 ENCOUNTER — HOME INFUSION (PRE-WILLOW HOME INFUSION) (OUTPATIENT)
Dept: PHARMACY | Facility: CLINIC | Age: 48
End: 2018-11-26

## 2018-11-26 LAB
AST SERPL W P-5'-P-CCNC: 25 U/L (ref 0–45)
BASOPHILS # BLD AUTO: 0.1 10E9/L (ref 0–0.2)
BASOPHILS NFR BLD AUTO: 0.5 %
BUN SERPL-MCNC: 20 MG/DL (ref 7–30)
CREAT SERPL-MCNC: 0.41 MG/DL (ref 0.52–1.04)
CRP SERPL-MCNC: 11.3 MG/L (ref 0–8)
DIFFERENTIAL METHOD BLD: ABNORMAL
EOSINOPHIL # BLD AUTO: 0.2 10E9/L (ref 0–0.7)
EOSINOPHIL NFR BLD AUTO: 1.8 %
ERYTHROCYTE [DISTWIDTH] IN BLOOD BY AUTOMATED COUNT: 14.7 % (ref 10–15)
ERYTHROCYTE [SEDIMENTATION RATE] IN BLOOD BY WESTERGREN METHOD: 60 MM/H (ref 0–20)
GFR SERPL CREATININE-BSD FRML MDRD: >90 ML/MIN/1.7M2
HCT VFR BLD AUTO: 34.4 % (ref 35–47)
HGB BLD-MCNC: 10.8 G/DL (ref 11.7–15.7)
IMM GRANULOCYTES # BLD: 0 10E9/L (ref 0–0.4)
IMM GRANULOCYTES NFR BLD: 0.2 %
LYMPHOCYTES # BLD AUTO: 2.7 10E9/L (ref 0.8–5.3)
LYMPHOCYTES NFR BLD AUTO: 24.6 %
MCH RBC QN AUTO: 26.9 PG (ref 26.5–33)
MCHC RBC AUTO-ENTMCNC: 31.4 G/DL (ref 31.5–36.5)
MCV RBC AUTO: 86 FL (ref 78–100)
MONOCYTES # BLD AUTO: 0.9 10E9/L (ref 0–1.3)
MONOCYTES NFR BLD AUTO: 7.9 %
NEUTROPHILS # BLD AUTO: 7.2 10E9/L (ref 1.6–8.3)
NEUTROPHILS NFR BLD AUTO: 65 %
NRBC # BLD AUTO: 0 10*3/UL
NRBC BLD AUTO-RTO: 0 /100
PLATELET # BLD AUTO: 386 10E9/L (ref 150–450)
RBC # BLD AUTO: 4.01 10E12/L (ref 3.8–5.2)
WBC # BLD AUTO: 11.1 10E9/L (ref 4–11)

## 2018-11-26 PROCEDURE — 86140 C-REACTIVE PROTEIN: CPT | Performed by: INTERNAL MEDICINE

## 2018-11-26 PROCEDURE — 85652 RBC SED RATE AUTOMATED: CPT | Performed by: INTERNAL MEDICINE

## 2018-11-26 PROCEDURE — 82565 ASSAY OF CREATININE: CPT | Performed by: INTERNAL MEDICINE

## 2018-11-26 PROCEDURE — 85025 COMPLETE CBC W/AUTO DIFF WBC: CPT | Performed by: INTERNAL MEDICINE

## 2018-11-26 PROCEDURE — 84450 TRANSFERASE (AST) (SGOT): CPT | Performed by: INTERNAL MEDICINE

## 2018-11-26 PROCEDURE — 84520 ASSAY OF UREA NITROGEN: CPT | Performed by: INTERNAL MEDICINE

## 2018-11-27 ENCOUNTER — HOME INFUSION (PRE-WILLOW HOME INFUSION) (OUTPATIENT)
Dept: PHARMACY | Facility: CLINIC | Age: 48
End: 2018-11-27

## 2018-11-27 NOTE — PROGRESS NOTES
This is a recent snapshot of the patient's [facility] medical record.  For current drug dose and complete information and questions, call [phone] or In Basket pool, fv home infusion ([id number])  CSN Number:  [id number]

## 2018-11-28 NOTE — PROGRESS NOTES
This is a recent snapshot of the patient's [facility] medical record.  For current drug dose and complete information and questions, call [phone] or In [facility] pool, fv home infusion ([id number])  CSN Number:  [id number]

## 2018-11-30 ENCOUNTER — PATIENT OUTREACH (OUTPATIENT)
Dept: CARE COORDINATION | Facility: CLINIC | Age: 48
End: 2018-11-30

## 2018-11-30 NOTE — PROGRESS NOTES
Clinic Care Coordination Contact    Situation: Patient chart reviewed by care coordinator.    Background: Follow up from 11/14/18    Assessment: Per chart review, patient followed up with Dr. Limon on 11/23/18. Called patient to see how she is doing, patient reports she is doing well. She does not have any questions, concerns or needs at this time.     Plan/Recommendations: No care coordination needs identified at this time. Patient will continue to follow treatment plan as directed and follow up with PCP with concerns ongoing. RNCC will discontinue outreach at this time but remain available for future needs.       Rani Sy RN  RN Care Coordinator  [facility]- Primary Care  [phone]

## 2018-12-03 ENCOUNTER — HOME INFUSION (PRE-WILLOW HOME INFUSION) (OUTPATIENT)
Dept: PHARMACY | Facility: CLINIC | Age: 48
End: 2018-12-03

## 2018-12-03 ENCOUNTER — HOSPITAL ENCOUNTER (OUTPATIENT)
Facility: CLINIC | Age: 48
Setting detail: SPECIMEN
Discharge: HOME | End: 2018-12-03
Admitting: INTERNAL MEDICINE
Payer: COMMERCIAL

## 2018-12-03 LAB
AST SERPL W P-5'-P-CCNC: 23 U/L (ref 0–45)
BASOPHILS # BLD AUTO: 0 10E9/L (ref 0–0.2)
BASOPHILS NFR BLD AUTO: 0.4 %
BUN SERPL-MCNC: 18 MG/DL (ref 7–30)
CREAT SERPL-MCNC: 0.42 MG/DL (ref 0.52–1.04)
CRP SERPL-MCNC: 45.6 MG/L (ref 0–8)
DIFFERENTIAL METHOD BLD: ABNORMAL
EOSINOPHIL # BLD AUTO: 0.3 10E9/L (ref 0–0.7)
EOSINOPHIL NFR BLD AUTO: 3.8 %
ERYTHROCYTE [DISTWIDTH] IN BLOOD BY AUTOMATED COUNT: 14.7 % (ref 10–15)
ERYTHROCYTE [SEDIMENTATION RATE] IN BLOOD BY WESTERGREN METHOD: 55 MM/H (ref 0–20)
GFR SERPL CREATININE-BSD FRML MDRD: >90 ML/MIN/1.7M2
HCT VFR BLD AUTO: 34.1 % (ref 35–47)
HGB BLD-MCNC: 10.4 G/DL (ref 11.7–15.7)
IMM GRANULOCYTES # BLD: 0 10E9/L (ref 0–0.4)
IMM GRANULOCYTES NFR BLD: 0.2 %
LYMPHOCYTES # BLD AUTO: 2.7 10E9/L (ref 0.8–5.3)
LYMPHOCYTES NFR BLD AUTO: 32.8 %
MCH RBC QN AUTO: 25.7 PG (ref 26.5–33)
MCHC RBC AUTO-ENTMCNC: 30.5 G/DL (ref 31.5–36.5)
MCV RBC AUTO: 84 FL (ref 78–100)
MONOCYTES # BLD AUTO: 0.6 10E9/L (ref 0–1.3)
MONOCYTES NFR BLD AUTO: 7.6 %
NEUTROPHILS # BLD AUTO: 4.5 10E9/L (ref 1.6–8.3)
NEUTROPHILS NFR BLD AUTO: 55.2 %
NRBC # BLD AUTO: 0 10*3/UL
NRBC BLD AUTO-RTO: 0 /100
PLATELET # BLD AUTO: 363 10E9/L (ref 150–450)
RBC # BLD AUTO: 4.04 10E12/L (ref 3.8–5.2)
WBC # BLD AUTO: 8.2 10E9/L (ref 4–11)

## 2018-12-03 PROCEDURE — 84450 TRANSFERASE (AST) (SGOT): CPT | Performed by: INTERNAL MEDICINE

## 2018-12-03 PROCEDURE — 86140 C-REACTIVE PROTEIN: CPT | Performed by: INTERNAL MEDICINE

## 2018-12-03 PROCEDURE — 84520 ASSAY OF UREA NITROGEN: CPT | Performed by: INTERNAL MEDICINE

## 2018-12-03 PROCEDURE — 85025 COMPLETE CBC W/AUTO DIFF WBC: CPT | Performed by: INTERNAL MEDICINE

## 2018-12-03 PROCEDURE — 82565 ASSAY OF CREATININE: CPT | Performed by: INTERNAL MEDICINE

## 2018-12-03 PROCEDURE — 85652 RBC SED RATE AUTOMATED: CPT | Performed by: INTERNAL MEDICINE

## 2018-12-04 ENCOUNTER — HOME INFUSION (PRE-WILLOW HOME INFUSION) (OUTPATIENT)
Dept: PHARMACY | Facility: CLINIC | Age: 48
End: 2018-12-04

## 2018-12-05 ENCOUNTER — OFFICE VISIT (OUTPATIENT)
Dept: PODIATRY | Facility: CLINIC | Age: 48
End: 2018-12-05
Payer: COMMERCIAL

## 2018-12-05 ENCOUNTER — HOME INFUSION (PRE-WILLOW HOME INFUSION) (OUTPATIENT)
Dept: PHARMACY | Facility: CLINIC | Age: 48
End: 2018-12-05

## 2018-12-05 VITALS — HEART RATE: 96 BPM | DIASTOLIC BLOOD PRESSURE: 84 MMHG | OXYGEN SATURATION: 99 % | SYSTOLIC BLOOD PRESSURE: 130 MMHG

## 2018-12-05 DIAGNOSIS — L97.422 SKIN ULCER OF LEFT HEEL WITH FAT LAYER EXPOSED (H): Primary | ICD-10-CM

## 2018-12-05 PROCEDURE — 99213 OFFICE O/P EST LOW 20 MIN: CPT | Performed by: PODIATRIST
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
## 2018-12-05 NOTE — PATIENT INSTRUCTIONS
Thanks for coming today.  Ortho/Sports Medicine Clinic  [address] [place] [address]    To schedule future appointments in Ortho Clinic, you may call [phone].    To schedule ordered imaging by your Provider: Call [facility] at [phone]    Gema available online at:   [email or web address]    Please call if any further questions or concerns [phone] and ask for the Orthopedic Department. Clinic hours 8 am to 5 pm.    Return to clinic if symptoms worsen.

## 2018-12-05 NOTE — MR AVS SNAPSHOT
After Visit Summary   12/5/2018    Priyanka Martinez    MRN: [id number]           Patient Information     Date Of Birth          1970        Visit Information        Provider Department      12/5/2018 7:00 AM Yobany Rojas DPM [facility]        Today's Diagnoses     Skin ulcer of left heel with fat layer exposed (H)    -  1      Care Instructions    Thanks for coming today.  Ortho[facility]Sports Medicine Clinic  [address] [place] [address]    To schedule future appointments in Ortho Clinic, you may call [phone].    To schedule ordered imaging by your Provider: Call [facility] at [phone]    Data Craft [employer] Magic available online at:   [email or web address]    Please call if any further questions or concerns [phone] and ask for the Orthopedic Department. Clinic hours 8 am to 5 pm.    Return to clinic if symptoms worsen.            Follow-ups after your visit        Your next 10 appointments already scheduled     Dec 12, 2018  8:45 AM CST   Return Visit with Za Carter PA-C   [facility] [place] ([facility])    [address] [place] [address] [phone]            Dec 19, 2018  8:30 AM CST   Return Visit with Yobany Rojas DPM   [facility] ([facility])    [id number] [place] [address] [phone]              Who to contact     If you have questions or need follow up information about today's clinic visit or your schedule please contact [facility] directly at [phone].  Normal or non-critical lab and imaging results will be communicated to you by MyChart, letter or phone within 4 business days after the clinic has received the results. If you do not hear from us within 7 days, please contact the clinic through MyChart or phone. If you have a critical or abnormal lab result, we will notify you by phone as  soon as possible.  Submit refill requests through RNDOMN or call your pharmacy and they will forward the refill request to us. Please allow 3 business days for your refill to be completed.          Additional Information About Your Visit        [employer] Information     RNDOMN gives you secure access to your electronic health record. If you see a primary care provider, you can also send messages to your care team and make appointments. If you have questions, please call your primary care clinic.  If you do not have a primary care provider, please call [phone] and they will assist you.      RNDOMN is an electronic gateway that provides easy, online access to your medical records. With RNDOMN, you can request a clinic appointment, read your test results, renew a prescription or communicate with your care team.     To access your existing account, please contact your [facility] or call [phone] for assistance.        Care EveryWhere ID     This is your Care EveryWhere ID. This could be used by other organizations to access your [place] medical records  [id number]        Your Vitals Were     Pulse Pulse Oximetry                96 99%           Blood Pressure from Last 3 Encounters:   12/05/18 130/84   11/23/18 124/81   11/21/18 128/70    Weight from Last 3 Encounters:   11/09/18 72.6 kg (160 lb)   12/17/16 70.3 kg (155 lb)   12/15/16 68 kg (150 lb)              Today, you had the following     No orders found for display       Primary Care Provider Office Phone # Fax #    Ayala Limon MD PhD [phone]       [address]  [facility] [address]        Equal Access to Services     EDYTA [facility] : Hadii aad ku hadasho Soomaali, waaxda luqadaha, qaybta kaalmada adeegyacristal, ronnie mccoy . So [facility] [phone].    ATENCIÓN: Si habla español, tiene a luu disposición servicios gratuitos de asistencia lingüística. Llame al [phone].    We  "comply with applicable federal civil rights laws and [place] laws. We do not discriminate on the basis of race, color, national origin, age, disability, sex, sexual orientation, or gender identity.            Thank you!     Thank you for choosing [facility]  for your care. Our goal is always to provide you with excellent care. Hearing back from our patients is one way we can continue to improve our services. Please take a few minutes to complete the written survey that you may receive in the mail after your visit with us. Thank you!             Your Updated Medication List - Protect others around you: Learn how to safely use, store and throw away your medicines at www.disposemymeds.org.          This list is accurate as of 12/5/18 12:03 PM.  Always use your most recent med list.                   Brand Name Dispense Instructions for use Diagnosis    BIATAIN ADHESIVE FOAM DRESSING 4\"X4\" Pads      Apply topically daily        dicyclomine 20 MG tablet    BENTYL     Take 20 mg by mouth 3 times daily (with meals) Takes every time she eats        ertapenem 1 g      Inject 1 g into the vein every 24 hours        FLUoxetine 20 MG capsule    PROzac    90 capsule    TAKE 1 CAPSULE EVERY DAY    Anxiety       loperamide 2 MG capsule    IMODIUM     Take 6 mg by mouth 3 times daily (with meals)        LYRICA PO      Take 100 mg by mouth 3 times daily        medroxyPROGESTERone 10 MG tablet    PROVERA    135 tablet    TAKE 1 AND 1/2 TABLETS EVERY DAY    Dysmenorrhea       * order for DME     2 Units    Equipment being ordered: Anchoring Device Flexi-Track LG    Neurogenic bladder       * order for DME     1 Units    Equipment being ordered: Insertion Tray Wheeler w/BZK Swab Catheter 10CC, Sterile    Neurogenic bladder       * order for DME     2 Units    Equipment being ordered: Drain Bag, Kenguard 2000ml. Urinary Bag with Anti-Refulx    Neurogenic bladder       * order for DME     1 Units    Equipment being " ordered: Catheter Wheeler 2Way 30cc 18FR, Silicone Coated Latex    Neurogenic bladder       order for DME     30 days    [place] Fax [phone] Reference Number# [id number] Primary Dressing Silvercel Rope   Qty 30 Secondary Dressing Tegaderm Adhesive Foam Ref (53116) Qty 30 Secondary Dressing 4x4 guaze Qty 1 loaf Length of Need: 1 month Frequency of dressing change: daily    Pressure injury of left ischium, stage 4 (H)       order for DME     1 Device    [facility]  p: [phone] f: [phone] EMAIL to [email or web address]  Request for group 2 air mattress & semi-electric hospital bed Ht:177.8 cm Wt:72.5 kg Length of need: lifetime  Pt. is wheelchair bound & has been in a comprehensive ulcer treatment program for >2 months. We will follow monthly until wound closure  To obtain medical records: p: [phone] f: [phone]    Pressure ulcer of ischium, left, stage IV (H)       oxybutynin 5 MG tablet    DITROPAN    180 tablet    Take 1 tablet (5 mg) by mouth 2 times daily    Neurogenic bladder       Psyllium 500 MG Caps      Take 1,500 mg by mouth every morning        vitamin C 1000 MG Tabs    ASCORBIC ACID     Take 1,000 mg by mouth daily        * Notice:  This list has 4 medication(s) that are the same as other medications prescribed for you. Read the directions carefully, and ask your doctor or other care provider to review them with you.

## 2018-12-05 NOTE — NURSING NOTE
Priyanka Martinez's chief complaint for this visit includes:  Chief Complaint   Patient presents with     RECHECK     1 month check      PCP: Ayala Limon    Referring Provider:  No referring provider defined for this encounter.    /84  Pulse 96  SpO2 99%  Data Unavailable     Do you need any medication refills at today's visit? no
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
## 2018-12-05 NOTE — PROGRESS NOTES
This is a recent snapshot of the patient's [facility] medical record.  For current drug dose and complete information and questions, call [phone] or In Basket pool, fv home infusion ([id number])  CSN Number:  [id number]

## 2018-12-05 NOTE — PROGRESS NOTES
This is a recent snapshot of the patient's [facility] medical record.  For current drug dose and complete information and questions, call [phone] or In Basket pool, fv home infusion ([id number])  CSN Number:  [id number]

## 2018-12-06 NOTE — PROGRESS NOTES
This is a recent snapshot of the patient's [facility] medical record.  For current drug dose and complete information and questions, call [phone] or In Basket pool, fv home infusion ([id number])  CSN Number:  [id number]

## 2018-12-10 ENCOUNTER — HOME INFUSION (PRE-WILLOW HOME INFUSION) (OUTPATIENT)
Dept: PHARMACY | Facility: CLINIC | Age: 48
End: 2018-12-10

## 2018-12-10 ENCOUNTER — HOSPITAL ENCOUNTER (OUTPATIENT)
Facility: CLINIC | Age: 48
Setting detail: SPECIMEN
Discharge: HOME | End: 2018-12-10
Admitting: INTERNAL MEDICINE
Payer: COMMERCIAL

## 2018-12-10 LAB
AST SERPL W P-5'-P-CCNC: 20 U/L (ref 0–45)
BASOPHILS # BLD AUTO: 0 10E9/L (ref 0–0.2)
BASOPHILS NFR BLD AUTO: 0.4 %
BUN SERPL-MCNC: 19 MG/DL (ref 7–30)
CREAT SERPL-MCNC: 0.39 MG/DL (ref 0.52–1.04)
CRP SERPL-MCNC: 46.1 MG/L (ref 0–8)
DIFFERENTIAL METHOD BLD: ABNORMAL
EOSINOPHIL # BLD AUTO: 0.3 10E9/L (ref 0–0.7)
EOSINOPHIL NFR BLD AUTO: 3.1 %
ERYTHROCYTE [DISTWIDTH] IN BLOOD BY AUTOMATED COUNT: 14.7 % (ref 10–15)
ERYTHROCYTE [SEDIMENTATION RATE] IN BLOOD BY WESTERGREN METHOD: 44 MM/H (ref 0–20)
GFR SERPL CREATININE-BSD FRML MDRD: >90 ML/MIN/1.7M2
HCT VFR BLD AUTO: 33.4 % (ref 35–47)
HGB BLD-MCNC: 10.3 G/DL (ref 11.7–15.7)
IMM GRANULOCYTES # BLD: 0 10E9/L (ref 0–0.4)
IMM GRANULOCYTES NFR BLD: 0.2 %
LYMPHOCYTES # BLD AUTO: 3.2 10E9/L (ref 0.8–5.3)
LYMPHOCYTES NFR BLD AUTO: 34.1 %
MCH RBC QN AUTO: 25.9 PG (ref 26.5–33)
MCHC RBC AUTO-ENTMCNC: 30.8 G/DL (ref 31.5–36.5)
MCV RBC AUTO: 84 FL (ref 78–100)
MONOCYTES # BLD AUTO: 0.8 10E9/L (ref 0–1.3)
MONOCYTES NFR BLD AUTO: 8.7 %
NEUTROPHILS # BLD AUTO: 4.9 10E9/L (ref 1.6–8.3)
NEUTROPHILS NFR BLD AUTO: 53.5 %
NRBC # BLD AUTO: 0 10*3/UL
NRBC BLD AUTO-RTO: 0 /100
PLATELET # BLD AUTO: 321 10E9/L (ref 150–450)
RBC # BLD AUTO: 3.98 10E12/L (ref 3.8–5.2)
WBC # BLD AUTO: 9.2 10E9/L (ref 4–11)

## 2018-12-10 PROCEDURE — 82565 ASSAY OF CREATININE: CPT | Performed by: INTERNAL MEDICINE

## 2018-12-10 PROCEDURE — 85652 RBC SED RATE AUTOMATED: CPT | Performed by: INTERNAL MEDICINE

## 2018-12-10 PROCEDURE — 86140 C-REACTIVE PROTEIN: CPT | Performed by: INTERNAL MEDICINE

## 2018-12-10 PROCEDURE — 84520 ASSAY OF UREA NITROGEN: CPT | Performed by: INTERNAL MEDICINE

## 2018-12-10 PROCEDURE — 85025 COMPLETE CBC W/AUTO DIFF WBC: CPT | Performed by: INTERNAL MEDICINE

## 2018-12-10 PROCEDURE — 84450 TRANSFERASE (AST) (SGOT): CPT | Performed by: INTERNAL MEDICINE

## 2018-12-11 ENCOUNTER — HOME INFUSION (PRE-WILLOW HOME INFUSION) (OUTPATIENT)
Dept: PHARMACY | Facility: CLINIC | Age: 48
End: 2018-12-11

## 2018-12-11 NOTE — PROGRESS NOTES
This is a recent snapshot of the patient's [facility] medical record.  For current drug dose and complete information and questions, call [phone] or In Basket pool, fv home infusion ([id number])  CSN Number:  [id number]

## 2018-12-12 ENCOUNTER — HOSPITAL ENCOUNTER (OUTPATIENT)
Dept: WOUND CARE | Facility: CLINIC | Age: 48
Discharge: HOME | End: 2018-12-12
Attending: PHYSICIAN ASSISTANT | Admitting: PHYSICIAN ASSISTANT
Payer: COMMERCIAL

## 2018-12-12 VITALS
TEMPERATURE: 98.1 F | SYSTOLIC BLOOD PRESSURE: 133 MMHG | RESPIRATION RATE: 18 BRPM | DIASTOLIC BLOOD PRESSURE: 78 MMHG | HEART RATE: 83 BPM

## 2018-12-12 DIAGNOSIS — L89.324 PRESSURE ULCER OF ISCHIUM, LEFT, STAGE IV (H): ICD-10-CM

## 2018-12-12 PROCEDURE — A6212 FOAM DRG <=16 SQ IN W/BORDER: HCPCS

## 2018-12-12 PROCEDURE — 11042 DBRDMT SUBQ TIS 1ST 20SQCM/<: CPT

## 2018-12-12 PROCEDURE — 11042 DBRDMT SUBQ TIS 1ST 20SQCM/<: CPT | Performed by: SURGERY

## 2018-12-12 PROCEDURE — A6196 ALGINATE DRESSING <=16 SQ IN: HCPCS

## 2018-12-12 NOTE — PROGRESS NOTES
This is a recent snapshot of the patient's [facility] medical record.  For current drug dose and complete information and questions, call [phone] or In [facility] pool, fv home infusion ([id number])  CSN Number:  [id number]

## 2018-12-12 NOTE — DISCHARGE INSTRUCTIONS
[facility]  [address] [facility] [address], [place] [address]  Appointment Phone [phone] Nurse Advisors [phone]     Priyanka Martinez      1970    Wound Dressing Change:Left Ischial Tuberosity   Cleanse wound and surrounding skin with: dakins or vashe dampen gauze   Pack wound with Silvercel cut to fit the size of the wound   Cover wound with tegaderm adhesive foam  Change dressing daily or every other day.  Repositioning:    Bed:  Reposition pt MINIMALLY every 1-2 hours in bed to relieve pressure and promote perfusion to tissue on group 2 mattress (we will try to order from [facility].     Chair:  When up to chair pt should not sit for longer than one hour total before either standing or returning to bed for at least 10 minutes, again to relieve pressure and promote perfusion to tissue.     ? Pt should also sit on a chair cushion when up to the chair.      A diet high in protein is important for wound healing, we recommend getting 90 grams of protein per day. Taking protein shakes or bars are a good way to get extra protein in your diet. Take 1-2 Protein Shakes per day      Za Carter PA-C. December 12, 2018  Call us at [phone] if you have any questions about your wounds, have redness or swelling around your wound, have a fever of 101 or greater or if you have any other problems or concerns. We answer the phone Monday through Friday 8 am to 4 pm, please leave a message as we check the voicemail frequently throughout the day.      Follow up with Provider - 3 weeks
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
## 2018-12-17 ENCOUNTER — HOME INFUSION (PRE-WILLOW HOME INFUSION) (OUTPATIENT)
Dept: PHARMACY | Facility: CLINIC | Age: 48
End: 2018-12-17

## 2018-12-17 LAB
AST SERPL W P-5'-P-CCNC: 19 U/L (ref 0–45)
BASOPHILS # BLD AUTO: 0 10E9/L (ref 0–0.2)
BASOPHILS NFR BLD AUTO: 0.3 %
BUN SERPL-MCNC: 14 MG/DL (ref 7–30)
CREAT SERPL-MCNC: 0.38 MG/DL (ref 0.52–1.04)
CRP SERPL-MCNC: 52.8 MG/L (ref 0–8)
DIFFERENTIAL METHOD BLD: ABNORMAL
EOSINOPHIL # BLD AUTO: 0.2 10E9/L (ref 0–0.7)
EOSINOPHIL NFR BLD AUTO: 2.5 %
ERYTHROCYTE [DISTWIDTH] IN BLOOD BY AUTOMATED COUNT: 15.3 % (ref 10–15)
ERYTHROCYTE [SEDIMENTATION RATE] IN BLOOD BY WESTERGREN METHOD: 36 MM/H (ref 0–20)
GFR SERPL CREATININE-BSD FRML MDRD: >90 ML/MIN/1.7M2
HCT VFR BLD AUTO: 36.2 % (ref 35–47)
HGB BLD-MCNC: 11.1 G/DL (ref 11.7–15.7)
IMM GRANULOCYTES # BLD: 0 10E9/L (ref 0–0.4)
IMM GRANULOCYTES NFR BLD: 0.2 %
LYMPHOCYTES # BLD AUTO: 2.5 10E9/L (ref 0.8–5.3)
LYMPHOCYTES NFR BLD AUTO: 26.2 %
MCH RBC QN AUTO: 25.8 PG (ref 26.5–33)
MCHC RBC AUTO-ENTMCNC: 30.7 G/DL (ref 31.5–36.5)
MCV RBC AUTO: 84 FL (ref 78–100)
MONOCYTES # BLD AUTO: 0.9 10E9/L (ref 0–1.3)
MONOCYTES NFR BLD AUTO: 9.6 %
NEUTROPHILS # BLD AUTO: 5.9 10E9/L (ref 1.6–8.3)
NEUTROPHILS NFR BLD AUTO: 61.2 %
NRBC # BLD AUTO: 0 10*3/UL
NRBC BLD AUTO-RTO: 0 /100
PLATELET # BLD AUTO: 324 10E9/L (ref 150–450)
RBC # BLD AUTO: 4.31 10E12/L (ref 3.8–5.2)
WBC # BLD AUTO: 9.6 10E9/L (ref 4–11)

## 2018-12-17 PROCEDURE — 84520 ASSAY OF UREA NITROGEN: CPT | Performed by: INTERNAL MEDICINE

## 2018-12-17 PROCEDURE — 85652 RBC SED RATE AUTOMATED: CPT | Performed by: INTERNAL MEDICINE

## 2018-12-17 PROCEDURE — 84450 TRANSFERASE (AST) (SGOT): CPT | Performed by: INTERNAL MEDICINE

## 2018-12-17 PROCEDURE — 85025 COMPLETE CBC W/AUTO DIFF WBC: CPT | Performed by: INTERNAL MEDICINE

## 2018-12-17 PROCEDURE — 86140 C-REACTIVE PROTEIN: CPT | Performed by: INTERNAL MEDICINE

## 2018-12-17 PROCEDURE — 82565 ASSAY OF CREATININE: CPT | Performed by: INTERNAL MEDICINE

## 2018-12-18 ENCOUNTER — HOME INFUSION (PRE-WILLOW HOME INFUSION) (OUTPATIENT)
Dept: PHARMACY | Facility: CLINIC | Age: 48
End: 2018-12-18

## 2018-12-19 ENCOUNTER — OFFICE VISIT (OUTPATIENT)
Dept: PODIATRY | Facility: CLINIC | Age: 48
End: 2018-12-19
Payer: COMMERCIAL

## 2018-12-19 VITALS — DIASTOLIC BLOOD PRESSURE: 84 MMHG | HEART RATE: 96 BPM | SYSTOLIC BLOOD PRESSURE: 130 MMHG | OXYGEN SATURATION: 98 %

## 2018-12-19 DIAGNOSIS — L97.422 SKIN ULCER OF LEFT HEEL WITH FAT LAYER EXPOSED (H): Primary | ICD-10-CM

## 2018-12-19 PROCEDURE — 99212 OFFICE O/P EST SF 10 MIN: CPT | Performed by: PODIATRIST

## 2018-12-19 NOTE — PROGRESS NOTES
This is a recent snapshot of the patient's [facility] medical record.  For current drug dose and complete information and questions, call [phone] or In Basket pool, fv home infusion ([id number])  CSN Number:  [id number]
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
## 2018-12-19 NOTE — PROGRESS NOTES
This is a recent snapshot of the patient's [facility] medical record.  For current drug dose and complete information and questions, call [phone] or In Basket pool, fv home infusion ([id number])  CSN Number:  [id number]

## 2018-12-19 NOTE — PATIENT INSTRUCTIONS
Thanks for coming today.  Ortho[facility] Medicine Clinic  [address] [place] [address]    To schedule future appointments in Ortho Clinic, you may call [phone].    To schedule ordered imaging by your Provider: Call [facility] at [phone]    [employer] available online at:   [email or web address]    Please call if any further questions or concerns [phone] and ask for the Orthopedic Department. Clinic hours 8 am to 5 pm.    Return to clinic if symptoms worsen.

## 2018-12-19 NOTE — NURSING NOTE
Priyanka Martinez's chief complaint for this visit includes:  Chief Complaint   Patient presents with     RECHECK     apligraf check      PCP: Ayala Limon    Referring Provider:  No referring provider defined for this encounter.    /84   Pulse 96   SpO2 98%   Data Unavailable     Do you need any medication refills at today's visit? no
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
## 2018-12-21 ENCOUNTER — HOME INFUSION (PRE-WILLOW HOME INFUSION) (OUTPATIENT)
Dept: PHARMACY | Facility: CLINIC | Age: 48
End: 2018-12-21

## 2018-12-26 NOTE — PROGRESS NOTES
This is a recent snapshot of the patient's [facility] medical record.  For current drug dose and complete information and questions, call [phone] or In Basket pool, fv home infusion ([id number])  CSN Number:  [id number]

## 2019-01-03 ENCOUNTER — HOSPITAL ENCOUNTER (OUTPATIENT)
Dept: WOUND CARE | Facility: CLINIC | Age: 49
Discharge: HOME | End: 2019-01-03
Attending: PHYSICIAN ASSISTANT | Admitting: PHYSICIAN ASSISTANT
Payer: COMMERCIAL

## 2019-01-03 VITALS — HEART RATE: 90 BPM | RESPIRATION RATE: 18 BRPM | SYSTOLIC BLOOD PRESSURE: 157 MMHG | DIASTOLIC BLOOD PRESSURE: 83 MMHG

## 2019-01-03 DIAGNOSIS — L89.324 PRESSURE INJURY OF LEFT ISCHIUM, STAGE 4 (H): ICD-10-CM

## 2019-01-03 DIAGNOSIS — L89.324 PRESSURE ULCER OF ISCHIUM, LEFT, STAGE IV (H): ICD-10-CM

## 2019-01-03 PROCEDURE — 11042 DBRDMT SUBQ TIS 1ST 20SQCM/<: CPT

## 2019-01-03 PROCEDURE — A6213 FOAM DRG >16<=48 SQ IN W/BDR: HCPCS

## 2019-01-03 PROCEDURE — 11042 DBRDMT SUBQ TIS 1ST 20SQCM/<: CPT | Performed by: PHYSICIAN ASSISTANT

## 2019-01-03 NOTE — DISCHARGE INSTRUCTIONS
[facility]  [address] [facility] [address], [place] [address]  Appointment Phone [phone] Nurse Advisors [phone]    Priyanka Martinez      1970    Wound Dressing Change:Left Ischial tuberosity  Cleanse wound with saline or wound cleanser  Skin Care: Apply moisturizing cream to skin surrounding the wound (but not in the wound):  Lightly pack wound with betadine damp gauze to fill cavity.  Cover wound with tegaderm adhesive foam dressing  Change dressing daily.  Repositioning:    Bed:  Reposition pt MINIMALLY every 1-2 hours in bed to relieve pressure and promote perfusion to tissue.     Chair:  When up to chair pt should not sit for longer than one hour total before either standing or returning to bed for at least 10 minutes, again to relieve pressure and promote perfusion to tissue.     o Pt should also sit on a chair cushion when up to the chair.   A diet high in protein is important for wound healing, we recommend getting 90 grams of protein per day. Taking protein shakes or bars are a good way to get extra protein in your diet.     Good sources of protein:  Pork 26g per 3 oz  Whey protein powder - 24g per scoop (on average)  Greek yogurt - 23g per 8oz   Chicken or Turkey - 23g per 3oz  Fish - 20-25g per 3oz  Beef - 18-23g per 3oz  Navy beans - 20g per cup  Cottage cheese - 14g per 1/2 cup   Lentils - 13g per 1/4 cup  Beef jerky 13g per 1oz  2% milk - 8g per cup  Peanut butter - 8g per 2 tablespoons  Eggs - 6g per egg  Mixed nuts - 6g per 2oz         Za Carter PA-C. January 3, 2019    Call us at [phone] if you have any questions about your wounds, have redness or swelling around your wound, have a fever of 101 or greater or if you have any other problems or concerns. We answer the phone Monday through Friday 8 am to 4 pm, please leave a message as we check the voicemail frequently throughout the day.     Follow up with Provider - 4 weeks
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
## 2019-01-04 ENCOUNTER — TELEPHONE (OUTPATIENT)
Dept: WOUND CARE | Facility: CLINIC | Age: 49
End: 2019-01-04

## 2019-01-04 ENCOUNTER — MYC MEDICAL ADVICE (OUTPATIENT)
Dept: WOUND CARE | Facility: CLINIC | Age: 49
End: 2019-01-04

## 2019-01-04 DIAGNOSIS — L08.9 INFECTED WOUND: Primary | ICD-10-CM

## 2019-01-04 DIAGNOSIS — T14.8XXA INFECTED WOUND: Primary | ICD-10-CM

## 2019-01-04 RX ORDER — SODIUM HYPOCHLORITE 1.25 MG/ML
SOLUTION TOPICAL 2 TIMES DAILY
Qty: 3 BOTTLE | Refills: 3 | Status: SHIPPED | OUTPATIENT
Start: 2019-01-04 | End: 2019-01-31 | Stop reason: ALTCHOICE

## 2019-01-04 NOTE — TELEPHONE ENCOUNTER
Patient called back and will try dakins solution when going out to work and using iodine when at home.

## 2019-01-04 NOTE — TELEPHONE ENCOUNTER
Attempted to reach patient to discuss her concern with iodine and it staining things. Left patient message that we could try alternative like bleach solution. Wanting to discuss with patient further. Left message for patient to call back.

## 2019-01-16 ENCOUNTER — OFFICE VISIT (OUTPATIENT)
Dept: PODIATRY | Facility: CLINIC | Age: 49
End: 2019-01-16
Payer: COMMERCIAL

## 2019-01-16 VITALS — HEART RATE: 93 BPM | OXYGEN SATURATION: 97 % | SYSTOLIC BLOOD PRESSURE: 126 MMHG | DIASTOLIC BLOOD PRESSURE: 82 MMHG

## 2019-01-16 DIAGNOSIS — L97.422 SKIN ULCER OF LEFT HEEL WITH FAT LAYER EXPOSED (H): Primary | ICD-10-CM

## 2019-01-16 PROCEDURE — 99212 OFFICE O/P EST SF 10 MIN: CPT | Performed by: PODIATRIST
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
## 2019-01-16 NOTE — NURSING NOTE
Priyanka Martinez's chief complaint for this visit includes:  Chief Complaint   Patient presents with     RECHECK     recheck left heel     PCP: Ayala Limon    Referring Provider:  No referring provider defined for this encounter.    /82   Pulse 93   SpO2 97%   Data Unavailable     Do you need any medication refills at today's visit? no
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
## 2019-01-16 NOTE — PATIENT INSTRUCTIONS
Thanks for coming today.  Ortho[facility] Medicine Clinic  [address] [place] [address]    To schedule future appointments in Ortho Clinic, you may call [phone].    To schedule ordered imaging by your Provider: Call [facility] at [phone]    [employer] available online at:   [email or web address]    Please call if any further questions or concerns [phone] and ask for the Orthopedic Department. Clinic hours 8 am to 5 pm.    Return to clinic if symptoms worsen.

## 2019-01-31 ENCOUNTER — HOSPITAL ENCOUNTER (OUTPATIENT)
Dept: WOUND CARE | Facility: CLINIC | Age: 49
Discharge: HOME | End: 2019-01-31
Attending: PHYSICIAN ASSISTANT | Admitting: PHYSICIAN ASSISTANT
Payer: COMMERCIAL

## 2019-01-31 VITALS — DIASTOLIC BLOOD PRESSURE: 98 MMHG | TEMPERATURE: 97.2 F | SYSTOLIC BLOOD PRESSURE: 157 MMHG | HEART RATE: 97 BPM

## 2019-01-31 DIAGNOSIS — L89.324 PRESSURE ULCER OF ISCHIUM, LEFT, STAGE IV (H): ICD-10-CM

## 2019-01-31 DIAGNOSIS — L89.324 PRESSURE INJURY OF LEFT ISCHIUM, STAGE 4 (H): ICD-10-CM

## 2019-01-31 LAB
ALBUMIN SERPL-MCNC: 2.8 G/DL (ref 3.4–5)
CRP SERPL-MCNC: 44.5 MG/L (ref 0–8)
ERYTHROCYTE [SEDIMENTATION RATE] IN BLOOD BY WESTERGREN METHOD: 58 MM/H (ref 0–20)
PREALB SERPL IA-MCNC: 19 MG/DL (ref 15–45)

## 2019-01-31 PROCEDURE — 82040 ASSAY OF SERUM ALBUMIN: CPT | Performed by: PHYSICIAN ASSISTANT

## 2019-01-31 PROCEDURE — 85652 RBC SED RATE AUTOMATED: CPT | Performed by: PHYSICIAN ASSISTANT

## 2019-01-31 PROCEDURE — 97602 WOUND(S) CARE NON-SELECTIVE: CPT

## 2019-01-31 PROCEDURE — 86140 C-REACTIVE PROTEIN: CPT | Performed by: PHYSICIAN ASSISTANT

## 2019-01-31 PROCEDURE — 99214 OFFICE O/P EST MOD 30 MIN: CPT | Performed by: PHYSICIAN ASSISTANT

## 2019-01-31 PROCEDURE — 84134 ASSAY OF PREALBUMIN: CPT | Performed by: PHYSICIAN ASSISTANT

## 2019-01-31 PROCEDURE — A6260 WOUND CLEANSER ANY TYPE/SIZE: HCPCS

## 2019-01-31 RX ORDER — PREGABALIN 50 MG
CAPSULE ORAL
Refills: 0 | COMMUNITY
Start: 2018-05-26 | End: 2019-02-26

## 2019-01-31 RX ORDER — PREGABALIN 100 MG
CAPSULE ORAL
Refills: 2 | COMMUNITY
Start: 2018-11-26 | End: 2019-02-26

## 2019-01-31 RX ORDER — BACLOFEN 10 MG/1
TABLET ORAL
Refills: 0 | COMMUNITY
Start: 2019-01-07 | End: 2019-07-11

## 2019-01-31 RX ORDER — CELECOXIB 100 MG/1
CAPSULE ORAL
COMMUNITY
Start: 2019-01-22 | End: 2019-07-11

## 2019-01-31 NOTE — DISCHARGE INSTRUCTIONS
[facility]  [address] [facility] [address], [place] [address]  Appointment Phone [phone] Nurse Advisors [phone]    Priyanka Martinez      1970    Wound Dressing Change:Left Ischial Tuberosity  After cleansing with saline or wound cleanser, apply small amount of VASHE on gauze, lay into wound bed, let sit for 10 minutes, remove gauze (do not rinse) then apply dressing.  Lightly pack wound with dry AMD gauze (antimicrobial dressing) to fill cavity.  Cover wound with ABD pad and or Tegaderm Adhesive Foam Dressing  Change dressing daily.  Repositioning:    Bed:  Reposition pt MINIMALLY every 1-2 hours in bed to relieve pressure and promote perfusion to tissue.     Chair:  When up to chair pt should not sit for longer than one hour total before either standing or returning to bed for at least 10 minutes, again to relieve pressure and promote perfusion to tissue.     o Pt should also sit on a chair cushion when up to the chair.        Za Carter PA-C. January 31, 2019    Call us at [phone] if you have any questions about your wounds, have redness or swelling around your wound, have a fever of 101 or greater or if you have any other problems or concerns. We answer the phone Monday through Friday 8 am to 4 pm, please leave a message as we check the voicemail frequently throughout the day.     Follow up with Provider - 4 weeks
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
## 2019-02-01 ENCOUNTER — TELEPHONE (OUTPATIENT)
Dept: WOUND CARE | Facility: CLINIC | Age: 49
End: 2019-02-01

## 2019-02-26 ENCOUNTER — HOSPITAL ENCOUNTER (OUTPATIENT)
Dept: WOUND CARE | Facility: CLINIC | Age: 49
Discharge: HOME | End: 2019-02-26
Attending: PHYSICIAN ASSISTANT | Admitting: PHYSICIAN ASSISTANT
Payer: COMMERCIAL

## 2019-02-26 VITALS — HEART RATE: 113 BPM | SYSTOLIC BLOOD PRESSURE: 160 MMHG | DIASTOLIC BLOOD PRESSURE: 90 MMHG | TEMPERATURE: 97.8 F

## 2019-02-26 DIAGNOSIS — L89.324 PRESSURE INJURY OF LEFT ISCHIUM, STAGE 4 (H): ICD-10-CM

## 2019-02-26 DIAGNOSIS — L89.324 PRESSURE ULCER OF ISCHIUM, LEFT, STAGE IV (H): ICD-10-CM

## 2019-02-26 PROCEDURE — 97602 WOUND(S) CARE NON-SELECTIVE: CPT

## 2019-02-26 PROCEDURE — A6196 ALGINATE DRESSING <=16 SQ IN: HCPCS

## 2019-02-26 PROCEDURE — 99213 OFFICE O/P EST LOW 20 MIN: CPT | Performed by: PHYSICIAN ASSISTANT
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
## 2019-03-01 ENCOUNTER — TELEPHONE (OUTPATIENT)
Dept: WOUND CARE | Facility: CLINIC | Age: 49
End: 2019-03-01

## 2019-03-01 DIAGNOSIS — L89.324 PRESSURE ULCER OF ISCHIUM, LEFT, STAGE IV (H): ICD-10-CM

## 2019-03-01 DIAGNOSIS — M86.9 OSTEOMYELITIS OF PELVIC REGION (H): Primary | ICD-10-CM

## 2019-03-01 NOTE — TELEPHONE ENCOUNTER
Patient was not able to get labs done when in office last week. Orders put in for lab work to be done at any [facility] clinic. She also reports clots in dressings, advised to use silvercel dressings to see if that decreases bloody clots when they arrive from mail order. Patient denies any traumatic event to cause increase in bleeding.

## 2019-03-16 DIAGNOSIS — M86.9 OSTEOMYELITIS OF PELVIC REGION (H): ICD-10-CM

## 2019-03-16 DIAGNOSIS — L89.324 PRESSURE ULCER OF ISCHIUM, LEFT, STAGE IV (H): ICD-10-CM

## 2019-03-16 LAB — ERYTHROCYTE [SEDIMENTATION RATE] IN BLOOD BY WESTERGREN METHOD: 69 MM/H (ref 0–20)

## 2019-03-16 PROCEDURE — 85652 RBC SED RATE AUTOMATED: CPT | Performed by: PHYSICIAN ASSISTANT

## 2019-03-16 PROCEDURE — 82040 ASSAY OF SERUM ALBUMIN: CPT | Performed by: PHYSICIAN ASSISTANT

## 2019-03-16 PROCEDURE — 84134 ASSAY OF PREALBUMIN: CPT | Performed by: PHYSICIAN ASSISTANT

## 2019-03-16 PROCEDURE — 36415 COLL VENOUS BLD VENIPUNCTURE: CPT | Performed by: PHYSICIAN ASSISTANT

## 2019-03-16 PROCEDURE — 86140 C-REACTIVE PROTEIN: CPT | Performed by: PHYSICIAN ASSISTANT

## 2019-03-18 LAB
ALBUMIN SERPL-MCNC: 2.7 G/DL (ref 3.4–5)
CRP SERPL-MCNC: 56.1 MG/L (ref 0–8)

## 2019-03-19 LAB — PREALB SERPL IA-MCNC: 15 MG/DL (ref 15–45)

## 2019-03-20 ENCOUNTER — TELEPHONE (OUTPATIENT)
Dept: WOUND CARE | Facility: CLINIC | Age: 49
End: 2019-03-20

## 2019-03-20 NOTE — TELEPHONE ENCOUNTER
"Patient calling to get her ESR/CRP results. Per Za Carter PA-C patient should follow up with Infectious Disease for continued elevated markers and patient \"not feeling right\". Denies fever. [facility] (patient saw Dr. Albert while inpatient at [facility]) to make an appointment. Their office is booked out for one month unless Christine calls to speak to a provider describing why the patient needs to be seen sooner. Will route to Christine.   "

## 2019-03-26 ENCOUNTER — HOSPITAL ENCOUNTER (OUTPATIENT)
Dept: WOUND CARE | Facility: CLINIC | Age: 49
Discharge: HOME | End: 2019-03-26
Attending: PHYSICIAN ASSISTANT | Admitting: PHYSICIAN ASSISTANT
Payer: COMMERCIAL

## 2019-03-26 VITALS — DIASTOLIC BLOOD PRESSURE: 69 MMHG | SYSTOLIC BLOOD PRESSURE: 128 MMHG | RESPIRATION RATE: 16 BRPM | TEMPERATURE: 97.5 F

## 2019-03-26 DIAGNOSIS — L89.324 PRESSURE ULCER OF ISCHIUM, LEFT, STAGE IV (H): ICD-10-CM

## 2019-03-26 PROCEDURE — A6196 ALGINATE DRESSING <=16 SQ IN: HCPCS

## 2019-03-26 PROCEDURE — 97602 WOUND(S) CARE NON-SELECTIVE: CPT

## 2019-03-26 PROCEDURE — 99213 OFFICE O/P EST LOW 20 MIN: CPT | Performed by: PHYSICIAN ASSISTANT

## 2019-03-26 RX ORDER — GABAPENTIN 600 MG/1
TABLET ORAL
Refills: 3 | COMMUNITY
Start: 2019-03-15 | End: 2019-07-12

## 2019-03-26 NOTE — DISCHARGE INSTRUCTIONS
[facility]  [address] [facility] [address], [place] [address]  Appointment Phone [phone] Nurse Advisors [phone]      Priyanka Martinez 1970    Wound Dressing Change:Left Ischial Tuberosity  After cleansing with saline or wound cleanser, apply small amount of VASHE on gauze, lay into wound bed, let sit for 10 minutes, remove gauze (do not rinse) then apply dressing.  Lightly pack wound with dry AMD gauze in PM   Lightly pack with silvercel rope in AM  Cover wound with ABD pad and or Tegaderm Adhesive Foam Dressing  Change dressing daily    Repositioning:  Bed: Reposition pt MINIMALLY every 1-2 hours in bed to relieve pressure and promote perfusion to tissue.  Chair: When up to chair pt should not sit for longer than one hour total before either standing or returning to bed for at least 10 minutes, again to relieve pressure and promote perfusion to tissue. Pt should also sit on a chair cushion when up to the chair.     Za Carter PA-C. March 26, 2019     Call us at [phone] if you have any questions about your wounds, have redness or swelling around your wound, have a fever of 101 or greater or if you have any other problems or concerns. We answer the phone Monday through Friday 8 am to 4 pm, please leave a message as we check the voicemail frequently throughout the day.     Follow up with Infectious Disease when able, see Christine in 4 weeks
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
## 2019-04-09 ENCOUNTER — MYC MEDICAL ADVICE (OUTPATIENT)
Dept: WOUND CARE | Facility: CLINIC | Age: 49
End: 2019-04-09

## 2019-04-09 DIAGNOSIS — L89.324 PRESSURE INJURY OF LEFT ISCHIUM, STAGE 4 (H): ICD-10-CM

## 2019-04-09 NOTE — TELEPHONE ENCOUNTER
From: Priyanka Martinez  To: Za Carter PA-C  Sent: 4/9/2019 11:20 AM CDT  Subject: Question about medications    Carlton Manzano,    Can you please order additional Tegaderm wound covers? I thought I had more, but ran out. I think I have enough other supplies. Thank you!

## 2019-04-29 ENCOUNTER — TELEPHONE (OUTPATIENT)
Dept: PEDIATRICS | Facility: CLINIC | Age: 49
End: 2019-04-29

## 2019-04-29 ENCOUNTER — HOSPITAL ENCOUNTER (OUTPATIENT)
Dept: WOUND CARE | Facility: CLINIC | Age: 49
Discharge: HOME | End: 2019-04-29
Attending: PHYSICIAN ASSISTANT | Admitting: PHYSICIAN ASSISTANT
Payer: COMMERCIAL

## 2019-04-29 VITALS
DIASTOLIC BLOOD PRESSURE: 64 MMHG | TEMPERATURE: 97.8 F | SYSTOLIC BLOOD PRESSURE: 131 MMHG | RESPIRATION RATE: 16 BRPM | HEART RATE: 98 BPM

## 2019-04-29 DIAGNOSIS — N31.9 NEUROGENIC BLADDER: ICD-10-CM

## 2019-04-29 DIAGNOSIS — L89.324 PRESSURE INJURY OF LEFT ISCHIUM, STAGE 4 (H): ICD-10-CM

## 2019-04-29 DIAGNOSIS — L89.324 PRESSURE ULCER OF ISCHIUM, LEFT, STAGE IV (H): ICD-10-CM

## 2019-04-29 PROCEDURE — 99214 OFFICE O/P EST MOD 30 MIN: CPT | Performed by: PHYSICIAN ASSISTANT

## 2019-04-29 PROCEDURE — 97602 WOUND(S) CARE NON-SELECTIVE: CPT

## 2019-04-29 PROCEDURE — A6022 COLLAGEN DRSG>16<=48 SQ IN: HCPCS

## 2019-04-29 NOTE — DISCHARGE INSTRUCTIONS
[facility]   [address] [facility] [address], [place] [address]   Appointment Phone [phone] Nurse Advisors [phone]     Priyanka Martinez 1970     Wound Dressing Change:Left Ischial Tuberosity   After cleansing with saline or wound cleanser, apply small amount of VASHE on gauze, lay into wound bed, let sit for 10 minutes, remove gauze (do not rinse) then apply dressing.   Cover wound with Fibracol.  Lightly pack wound with dry gauze. Cover with ABD pad or Tegaderm adhesive foam dressing in PM   Lightly pack with silvercel rope in AM   Cover wound with ABD pad and or Tegaderm Adhesive Foam Dressing   Change dressing daily     Repositioning:   Bed: Reposition pt MINIMALLY every 1-2 hours in bed to relieve pressure and promote perfusion to tissue.   Chair: When up to chair pt should not sit for longer than one hour total before either standing or returning to bed for at least 10 minutes, again to relieve pressure and promote perfusion to tissue. Pt should also sit on a chair cushion when up to the chair.    Za Carter PA-C. April 29, 2019     Call us at [phone] if you have any questions about your wounds, have redness or swelling around your wound, have a fever of 101 or greater or if you have any other problems or concerns. We answer the phone Monday through Friday 8 am to 4 pm, please leave a message as we check the voicemail frequently throughout the day.     Follow up with Provider: 4 weeks

## 2019-04-29 NOTE — PROGRESS NOTES
[facility]    HISTORY OF PRESENT ILLNESS: Priyanka Martinez is a 48 year old female with history of paraplegia and TBI who returns today for a left IT pressure ulcer. The initial injury was a burn from a heating pad in July but it has significantly worsened due to pressure. She had presumed acute osteomyelitis that prompted a hospital admission on 11/9/18 and has now finished IV antibiotics. She had recent follow-up with ID who informed her that they are unsure if she truly had osteomyelitis as bone cultures were never obtained, only surgical pathology.     We've had some issues working with her insurance to get her the group 2 mattress and her wound supplies. She bought a gel mattress overlay when her group 2 was first denied by insurance. It has now been approved but Priyanka is concerned that the hospital bed would be hard for her in regards to her daily activities and would like to try utilizing her gel overlay for now.    She is dressing her own wound and wants to preserve her independence as much as possible. We have discussed flap surgery and she would like to proceed with more conservative management at this point.  Continues to feel that surgical options would be too devastating for her current life, she is concerned about deconditioning and lack of independence    INTERVAL HISTORY:    Wound about the same    Visited with ID due to upward trending inflammatory markers, recommended flap but were not concerned for worsening infection, furthermore they were questioning presence of chronic osteomyelitis as bone cultures were never taken    OFFLOADING: Roho cushion mapped well 12/2018, on gel overlay on standard mattress, is skeptical that she will be able to function on group 2 mattress     DATE WOUND ACQUIRED: July 2018    REVIEW OF SYSTEMS:   CONSTITUTIONAL: Denies fever or chills, admits to fatigue  GI: denies nausea or vomiting    PAST MEDICAL HISTORY:  has a past medical history of  "Abdominal pain, Diarrhea, Paraplegic immobility syndrome (2003), and Tobacco use disorder (1/30/2012).    SOCIAL HISTORY: currently works from home    MEDICATIONS:   Current Outpatient Medications   Medication     ascorbic acid (VITAMIN C) 1000 MG TABS     baclofen (LIORESAL) 10 MG tablet     celecoxib (CELEBREX) 100 MG capsule     dicyclomine (BENTYL) 20 MG tablet     FLUoxetine (PROZAC) 20 MG capsule     Gabapentin (NEURONTIN PO)     gabapentin (NEURONTIN) 600 MG tablet     Gauze Pads & Dressings (BIATAIN ADHESIVE FOAM DRESSING) 4\"X4\" PADS     medroxyPROGESTERone (PROVERA) 10 MG tablet     order for DME     order for DME     order for DME     order for DME     order for DME     order for DME     oxybutynin (DITROPAN) 5 MG tablet     No current facility-administered medications for this encounter.        VITALS: /64   Pulse 98   Temp 97.8  F (36.6  C) (Temporal)   Resp 16     PHYSICAL EXAM:  GENERAL: Patient is alert and oriented and in no acute distress  INTEGUMENTARY:   Wound (used by OP [facility] only) 01/31/19 1453 Left pressure injury (Active)   Length (cm) 2 4/29/2019  3:01 PM   Width (cm) 3 4/29/2019  3:01 PM   Depth (cm) 5 4/29/2019  3:01 PM   Wound (cm^2) 6 cm^2 4/29/2019  3:01 PM   Wound Volume (cm^3) 30 cm^3 4/29/2019  3:01 PM   Wound healing % 42.86 4/29/2019  3:01 PM   Tunneling [Depth (cm)/Location] 10/9.0 4/29/2019  3:01 PM   Dressing Appearance moist drainage 4/29/2019  3:01 PM   Drainage Characteristics/Odor sanguineous 4/29/2019  3:01 PM   Drainage Amount large 4/29/2019  3:01 PM   Thickness/Stage Stage 4 4/29/2019  3:01 PM   Base red/granulating 4/29/2019  3:01 PM   Red (%), Wound Tissue Color 100 4/29/2019  3:01 PM   Periwound intact;pale white 4/29/2019  3:01 PM   Periwound Temperature warm 4/29/2019  3:01 PM   Periwound Skin Turgor soft 4/29/2019  3:01 PM   Edges jagged 4/29/2019  3:01 PM   Care, Wound non-select wound debridement performed 4/29/2019  3:01 PM         PROCEDURE: Topical 4% " lidocaine was applied to the wound. The wound was mechanically debrided by nursing staff.     ASSESSMENT: stage 4 pressure ulcer of left IT    PLAN:   1. Switch bioguard to FibraCol with dry gauze packing once daily, can continue silvercel packing if needs additional dressing change  2. Continue optimizing offloading    FOLLOW-UP: 4 weeks    LANDEN ORTIZ PA-C ([facility])

## 2019-04-29 NOTE — TELEPHONE ENCOUNTER
[facility] Call Center    Phone Message    May a detailed message be left on voicemail: yes    Reason for Call: Order(s): Other:   Reason for requested: Patient needs a new order for a catheter   Date needed: asap  Provider name: Dr. Limon      Action Taken: Message routed to:  Primary Care p [phone]

## 2019-05-28 ENCOUNTER — HOSPITAL ENCOUNTER (OUTPATIENT)
Dept: WOUND CARE | Facility: CLINIC | Age: 49
Discharge: HOME | End: 2019-05-28
Attending: PHYSICIAN ASSISTANT | Admitting: PHYSICIAN ASSISTANT
Payer: COMMERCIAL

## 2019-05-28 VITALS
SYSTOLIC BLOOD PRESSURE: 126 MMHG | HEART RATE: 80 BPM | TEMPERATURE: 96.6 F | DIASTOLIC BLOOD PRESSURE: 73 MMHG | RESPIRATION RATE: 16 BRPM

## 2019-05-28 DIAGNOSIS — L89.324 PRESSURE ULCER OF ISCHIUM, LEFT, STAGE IV (H): ICD-10-CM

## 2019-05-28 DIAGNOSIS — L89.324 PRESSURE INJURY OF LEFT ISCHIUM, STAGE 4 (H): ICD-10-CM

## 2019-05-28 PROCEDURE — 97602 WOUND(S) CARE NON-SELECTIVE: CPT

## 2019-05-28 PROCEDURE — A6022 COLLAGEN DRSG>16<=48 SQ IN: HCPCS

## 2019-05-28 PROCEDURE — 99213 OFFICE O/P EST LOW 20 MIN: CPT | Performed by: PHYSICIAN ASSISTANT

## 2019-05-28 PROCEDURE — A6196 ALGINATE DRESSING <=16 SQ IN: HCPCS

## 2019-05-28 NOTE — PROGRESS NOTES
[facility]    HISTORY OF PRESENT ILLNESS: Priyanka Martinez is a 48 year old female with history of paraplegia and TBI who returns today for a left IT pressure ulcer. The initial injury was a burn from a heating pad in July but it has significantly worsened due to pressure. She had presumed acute osteomyelitis that prompted a hospital admission on 11/9/18 and has now finished IV antibiotics. She had recent follow-up with ID who informed her that they are unsure if she truly had osteomyelitis as bone cultures were never obtained, only surgical pathology.     We've had some issues working with her insurance to get her the group 2 mattress and her wound supplies. She bought a gel mattress overlay when her group 2 was first denied by insurance. It has now been approved but Priyanka is concerned that the hospital bed would be hard for her in regards to her daily activities and would like to continue utilizing her gel overlay for now.    She is dressing her own wound and wants to preserve her independence as much as possible. We have discussed flap surgery and she would like to proceed with more conservative management at this point.  Continues to feel that surgical options would be too devastating for her current life, she is concerned about deconditioning and lack of independence    INTERVAL HISTORY:    Wound about the same, still minimal granulation over the bone but sides of wound appear to be floresita    OFFLOADING: Roho cushion mapped well 12/2018, on gel overlay on standard mattress, is skeptical that she will be able to function on group 2 mattress     DATE WOUND ACQUIRED: July 2018    REVIEW OF SYSTEMS:   CONSTITUTIONAL: Denies fever or chills  GI: denies nausea or vomiting    PAST MEDICAL HISTORY:  has a past medical history of Abdominal pain, Diarrhea, Paraplegic immobility syndrome (2003), and Tobacco use disorder (1/30/2012).    SOCIAL HISTORY: currently works from home    MEDICATIONS:  "  Current Outpatient Medications   Medication     ascorbic acid (VITAMIN C) 1000 MG TABS     baclofen (LIORESAL) 10 MG tablet     celecoxib (CELEBREX) 100 MG capsule     dicyclomine (BENTYL) 20 MG tablet     FLUoxetine (PROZAC) 20 MG capsule     Gabapentin (NEURONTIN PO)     gabapentin (NEURONTIN) 600 MG tablet     Gauze Pads & Dressings (BIATAIN ADHESIVE FOAM DRESSING) 4\"X4\" PADS     medroxyPROGESTERone (PROVERA) 10 MG tablet     order for DME     order for DME     order for DME     order for DME     order for DME     order for DME     oxybutynin (DITROPAN) 5 MG tablet     No current facility-administered medications for this encounter.        VITALS: /73   Pulse 80   Temp 96.6  F (35.9  C) (Temporal)   Resp 16     PHYSICAL EXAM:  GENERAL: Patient is alert and oriented and in no acute distress  INTEGUMENTARY:   Wound (used by [facility] only) 01/31/19 1453 Left pressure injury (Active)   Length (cm) 3 5/28/2019  3:00 PM   Width (cm) 2 5/28/2019  3:00 PM   Depth (cm) 4.5 5/28/2019  3:00 PM   Wound (cm^2) 6 cm^2 5/28/2019  3:00 PM   Wound Volume (cm^3) 27 cm^3 5/28/2019  3:00 PM   Wound healing % 42.86 5/28/2019  3:00 PM   Tunneling [Depth (cm)/Location] 11 oclock/depth 6.3 5/28/2019  3:00 PM   Undermining [Depth (cm)/Location] 4-6 oclock/depth 1.9  5/28/2019  3:00 PM   Dressing Appearance moist drainage 5/28/2019  3:00 PM   Drainage Characteristics/Odor serous 5/28/2019  3:00 PM   Drainage Amount large 5/28/2019  3:00 PM          PROCEDURE: Topical 4% lidocaine was applied to the wound. The wound was mechanically debrided by nursing staff.     ASSESSMENT: stage 4 pressure ulcer of left IT    PLAN:   1. Alternate SilverCel and Nayeli, continue using tegaderm foam  2. Continue optimizing offloading    FOLLOW-UP: 4 weeks    LANDEN ORTIZ PA-C ([facility])    "

## 2019-05-28 NOTE — DISCHARGE INSTRUCTIONS
[facility]  [address] [facility] [address], [place] [address]  Appointment Phone [phone] [facility] [phone]    Priyanka Martinez      1970  Wound Dressing Change:Left Ischial Tuberosity  After cleansing with saline or wound cleanser, apply small amount of VASHE on gauze, lay into wound bed, let sit for 10  minutes, remove gauze (do not rinse) then apply dressing.  Cover wound with Nayeli. Lightly pack wound with silvercel rope. Cover with ABD pad or Tegaderm adhesive foam dressing  in PM  Lightly pack with silvercel rope in AM  Cover wound with ABD pad and or Tegaderm Adhesive Foam Dressing  Change dressing daily  Repositioning:  Bed: Reposition pt MINIMALLY every 1-2 hours in bed to relieve pressure and promote perfusion to tissue.  Chair: When up to chair pt should not sit for longer than one hour total before either standing or returning to bed for at  least 10 minutes, again to relieve pressure and promote perfusion to tissue. Pt should also sit on a chair cushion when up  to the chair.     Za Carter PA-C. May 28, 2019    Call us at [phone] if you have any questions about your wounds, have redness or swelling around your wound, have a fever of 101 or greater or if you have any other problems or concerns. We answer the phone Monday through Friday 8 am to 4 pm, please leave a message as we check the voicemail frequently throughout the day.     Follow up with Provider -  4 weeks

## 2019-06-18 ENCOUNTER — OFFICE VISIT (OUTPATIENT)
Dept: PEDIATRICS | Facility: CLINIC | Age: 49
End: 2019-06-18
Payer: COMMERCIAL

## 2019-06-18 VITALS
OXYGEN SATURATION: 98 % | SYSTOLIC BLOOD PRESSURE: 106 MMHG | DIASTOLIC BLOOD PRESSURE: 72 MMHG | TEMPERATURE: 97.9 F | HEART RATE: 85 BPM

## 2019-06-18 DIAGNOSIS — R10.84 ABDOMINAL PAIN, GENERALIZED: Primary | ICD-10-CM

## 2019-06-18 DIAGNOSIS — T83.511A URINARY TRACT INFECTION ASSOCIATED WITH INDWELLING URETHRAL CATHETER, INITIAL ENCOUNTER (H): ICD-10-CM

## 2019-06-18 DIAGNOSIS — R82.90 NONSPECIFIC FINDING ON EXAMINATION OF URINE: ICD-10-CM

## 2019-06-18 DIAGNOSIS — N39.0 URINARY TRACT INFECTION ASSOCIATED WITH INDWELLING URETHRAL CATHETER, INITIAL ENCOUNTER (H): ICD-10-CM

## 2019-06-18 DIAGNOSIS — R10.9 LEFT FLANK PAIN: ICD-10-CM

## 2019-06-18 LAB
ALBUMIN UR-MCNC: 30 MG/DL
AMORPH CRY #/AREA URNS HPF: ABNORMAL /HPF
APPEARANCE UR: ABNORMAL
BACTERIA #/AREA URNS HPF: ABNORMAL /HPF
BILIRUB UR QL STRIP: NEGATIVE
CA PHOS CRY #/AREA URNS HPF: ABNORMAL /HPF
CAOX CRY #/AREA URNS HPF: ABNORMAL /HPF
COLOR UR AUTO: YELLOW
GLUCOSE UR STRIP-MCNC: NEGATIVE MG/DL
HGB UR QL STRIP: ABNORMAL
KETONES UR STRIP-MCNC: NEGATIVE MG/DL
LEUKOCYTE ESTERASE UR QL STRIP: ABNORMAL
NITRATE UR QL: NEGATIVE
PH UR STRIP: 7 PH (ref 5–7)
RBC #/AREA URNS AUTO: ABNORMAL /HPF
SOURCE: ABNORMAL
SP GR UR STRIP: 1.01 (ref 1–1.03)
UROBILINOGEN UR STRIP-MCNC: NORMAL MG/DL (ref 0–2)
WBC #/AREA URNS AUTO: ABNORMAL /HPF

## 2019-06-18 PROCEDURE — 99213 OFFICE O/P EST LOW 20 MIN: CPT | Performed by: NURSE PRACTITIONER

## 2019-06-18 PROCEDURE — 81001 URINALYSIS AUTO W/SCOPE: CPT | Performed by: NURSE PRACTITIONER

## 2019-06-18 PROCEDURE — 87086 URINE CULTURE/COLONY COUNT: CPT | Performed by: NURSE PRACTITIONER

## 2019-06-18 RX ORDER — CIPROFLOXACIN 500 MG/1
500 TABLET, FILM COATED ORAL 2 TIMES DAILY
Qty: 14 TABLET | Refills: 0 | Status: SHIPPED | OUTPATIENT
Start: 2019-06-18 | End: 2019-06-25

## 2019-06-18 NOTE — PATIENT INSTRUCTIONS
PLAN:   1.   Symptomatic therapy suggested: rest, increase fluids and call prn if symptoms persist or worsen.  2.  Orders Placed This Encounter   Medications     ciprofloxacin (CIPRO) 500 MG tablet     Sig: Take 1 tablet (500 mg) by mouth 2 times daily for 7 days     Dispense:  14 tablet     Refill:  0     Orders Placed This Encounter   Procedures     UA with Microscopic reflex to Culture ([place]; [facility])       3. Patient needs to follow up in if no improvement,or sooner if worsening of symptoms or other symptoms develop.  Drink plenty of fluids, urinate frequently and to contact the office promptly should she notice fever greater than 102, increase in discomfort, skin rash, lack of improvement after 2 days of treatment, or the appearance of new symptoms.

## 2019-06-18 NOTE — PROGRESS NOTES
UTI    Priyanka Martinez is a 48 year old female who presents to clinic today for the following health issues:    HPI   URINARY TRACT SYMPTOMS  Onset: 06/14/19    Description:   Painful urination (Dysuria): no   Blood in urine (Hematuria): no   Delay in urine (Hesitency): no     Intensity: severe    Progression of Symptoms:  worsening    Accompanying Signs & Symptoms:  Fever/chills: no   Flank pain no   Nausea and vomiting: no   Any vaginal symptoms: none/urine smells foul  Abdominal/Pelvic Pain: YES    History:   History of frequent UTI's: YES  History of kidney stones: no   Sexually Active: no   Possibility of pregnancy: No    Precipitating factors:   none    Therapies Tried and outcome: None    Has indwelling catheter and states urine very strong odor   No fever or nausea and vomiting   Is tired and just does not feel right   Last UTI has been several months ago     Patient Active Problem List   Diagnosis     Paraplegia (H)     Suprapubic catheter (H)     Gastrointestinal problem     CARDIOVASCULAR SCREENING; LDL GOAL LESS THAN 160     Dysmenorrhea     Neuropathic pain of flank     Neurogenic bladder     Endometriosis     IBS (irritable bowel syndrome)     Generalized anxiety disorder     Pressure ulcer of ischium, left, stage IV (H)     Sepsis (H)     Infected wound     Osteomyelitis of pelvic region (H)     Polymicrobial bacterial infection     Infection due to Staphylococcus aureus     Past Surgical History:   Procedure Laterality Date     C SPINE FUSION,POSTER,7-12 SGMTS  2004    From T10 to L5     CHOLECYSTECTOMY  1990's     COLONOSCOPY       COLONOSCOPY Left 11/25/2014    Procedure: COMBINED COLONOSCOPY, SINGLE OR MULTIPLE BIOPSY/POLYPECTOMY BY BIOPSY;  Surgeon: Junior Galeano MD;  Location:  GI     ESOPHAGOSCOPY, GASTROSCOPY, DUODENOSCOPY (EGD), COMBINED N/A 11/25/2014    Procedure: COMBINED ESOPHAGOSCOPY, GASTROSCOPY, DUODENOSCOPY (EGD), BIOPSY SINGLE OR MULTIPLE;  Surgeon: Junior Galeano,  "MD;  Location: [facility]       Social History     Tobacco Use     Smoking status: Former Smoker     Last attempt to quit: 3/18/2012     Years since quittin.2     Smokeless tobacco: Never Used     Tobacco comment: 7 cig a day off and on socially   Substance Use Topics     Alcohol use: No     Family History   Problem Relation Age of Onset     Hypertension Father      Heart Failure Maternal Grandmother          Current Outpatient Medications   Medication Sig Dispense Refill     ascorbic acid (VITAMIN C) 1000 MG TABS Take 1,000 mg by mouth daily       baclofen (LIORESAL) 10 MG tablet TAKE 2 TABS BY MOUTH AT BEDTIME AND INCREASE BY 1 TAB PER WEEK UNTIL 1 TAB THREE TIMES A DAY  0     celecoxib (CELEBREX) 100 MG capsule        dicyclomine (BENTYL) 20 MG tablet TAKE 1 TABLET TWICE A  tablet 1     FLUoxetine (PROZAC) 20 MG capsule TAKE 1 CAPSULE DAILY 90 capsule 1     Gabapentin (NEURONTIN PO) Take 600 mg by mouth 3 times daily        gabapentin (NEURONTIN) 600 MG tablet TAKE 1 TABLET BY MOUTH THREE TIMES A DAY  3     Gauze Pads & Dressings (BIATAIN ADHESIVE FOAM DRESSING) 4\"X4\" PADS Apply topically daily       medroxyPROGESTERone (PROVERA) 10 MG tablet TAKE ONE AND ONE-HALF TABLETS DAILY 135 tablet 0     order for DME [facility] Fax [phone]  Reference Number# [id number]  Primary Dressing Nayeli Qty 30  Secondary Dressing Silvercel Rope Qty60  Secondary Dressing Tegaderm Foam Adhesive 80313  Length of Need: 1 month  Frequency of dressing change: BID 30 days 0     order for DME Equipment being ordered: Catheter Wheeler 2Way 30cc 18FR, Silicone Coated Latex 1 Units 12     order for DME [facility]   p: [phone] f: [phone]  EMAIL to phill@"[employer]  glo@"[employer]    Request for group 2 air mattress & semi-electric hospital bed  Ht:177.8 cm  Wt:72.5 kg  Length of need: lifetime    Pt. is wheelchair bound & has been in a comprehensive ulcer treatment program for >2 months. We will " follow monthly until wound closure    To obtain medical records:  p: [phone] f: [phone] 1 Device 0     order for DME Equipment being ordered: Anchoring Device Flexi-Track LG 2 Units 12     order for DME Equipment being ordered: Insertion Tray Wheeler w/BZK Swab Catheter 10CC, Sterile 1 Units 12     order for DME Equipment being ordered: Drain Bag, Kenguard 2000ml. Urinary Bag with Anti-Refulx 2 Units 12     oxybutynin (DITROPAN) 5 MG tablet TAKE 1 TABLET TWICE A  tablet 0     Allergies   Allergen Reactions     Hydrocodone Itching     BP Readings from Last 3 Encounters:   06/18/19 106/72   05/28/19 126/73   04/29/19 131/64    Wt Readings from Last 3 Encounters:   11/09/18 72.6 kg (160 lb)   12/17/16 70.3 kg (155 lb)   12/15/16 68 kg (150 lb)           Reviewed and updated as needed this visit by Provider         Review of Systems   ROS COMP: CONSTITUTIONAL:NEGATIVE for fever, chills, change in weight  RESP:NEGATIVE for significant cough or SOB  CV: NEGATIVE for chest pain, palpitations or peripheral edema  GI: POSITIVE for abdominal pain right side  and NEGATIVE for jaundice, melena, nausea, poor appetite, vomiting and weight loss  : has indwelling catheter   MUSCULOSKELETAL: wheelchair dependent   NEURO: NEGATIVE for dizziness/lightheadedness      Objective    /72   Pulse 85   Temp 97.9  F (36.6  C) (Temporal)   SpO2 98%   There is no height or weight on file to calculate BMI.  Physical Exam   GENERAL APPEARANCE: alert, active and no distress  NECK: normal and supple  RESP: lungs clear to auscultation - no rales, rhonchi or wheezes  CV: regular rates and rhythm, no murmur, click or rub and no irregular beats  ABDOMEN: mild tenderness in the left flank  area, no rebound tenderness, no guarding or rigidity, no masses noted  MS: wheelchair confined   SKIN: Skin color, texture, turgor normal.   NEURO: mentation intact and speech normal  PSYCH: mentation appears normal and affect  normal/bright    Diagnostic Test Results:  Results for orders placed or performed in visit on 06/18/19   UA with Microscopic reflex to Culture ([facility])   Result Value Ref Range    Color Urine Yellow     Appearance Urine Slightly Cloudy     Glucose Urine Negative NEG^Negative mg/dL    Bilirubin Urine Negative NEG^Negative    Ketones Urine Negative NEG^Negative mg/dL    Specific Gravity Urine 1.014 1.003 - 1.035    Blood Urine Trace (A) NEG^Negative    pH Urine 7.0 5.0 - 7.0 pH    Protein Albumin Urine 30 (A) NEG^Negative mg/dL    Urobilinogen mg/dL Normal 0.0 - 2.0 mg/dL    Nitrite Urine Negative NEG^Negative    Leukocyte Esterase Urine Large (A) NEG^Negative    Source Midstream Urine     WBC Urine 25-50 (A) OTO5^0 - 5 /HPF    RBC Urine 5-10 (A) OTO2^O - 2 /HPF    Bacteria Urine Many (A) NEG^Negative /HPF    Calcium Oxalate Few (A) NEG^Negative /HPF    Calcium Phosphate Crystals Moderate (A) NEG^Negative /HPF    Amorphous Crystals Many (A) NEG^Negative /HPF           Assessment & Plan     Priyanka was seen today for uti.    Diagnoses and all orders for this visit:    Abdominal pain, generalized  -     UA with Microscopic reflex to Culture ([facility])    Nonspecific finding on examination of urine  -     Urine Culture Aerobic Bacterial    Urinary tract infection associated with indwelling urethral catheter, initial encounter (H)  -     ciprofloxacin (CIPRO) 500 MG tablet; Take 1 tablet (500 mg) by mouth 2 times daily for 7 days    Left flank pain        See Patient Instructions  Patient Instructions     PLAN:   1.   Symptomatic therapy suggested: rest, increase fluids and call prn if symptoms persist or worsen.  2.  Orders Placed This Encounter   Medications     ciprofloxacin (CIPRO) 500 MG tablet     Sig: Take 1 tablet (500 mg) by mouth 2 times daily for 7 days     Dispense:  14 tablet     Refill:  0     Orders Placed This Encounter   Procedures     UA with Microscopic reflex  to Culture ([facility])       3. Patient needs to follow up in if no improvement,or sooner if worsening of symptoms or other symptoms develop.  Drink plenty of fluids, urinate frequently and to contact the office promptly should she notice fever greater than 102, increase in discomfort, skin rash, lack of improvement after 2 days of treatment, or the appearance of new symptoms.    No follow-ups on file.    AMY Fierro CNP  [facility]

## 2019-06-18 NOTE — ADDENDUM NOTE
Encounter addended by: Malou Vasquez RN on: 6/18/2019 8:27 AM   Actions taken: LDA properties accepted, Flowsheet accepted

## 2019-06-19 LAB
BACTERIA SPEC CULT: NORMAL
SPECIMEN SOURCE: NORMAL

## 2019-06-25 ENCOUNTER — HOSPITAL ENCOUNTER (OUTPATIENT)
Dept: WOUND CARE | Facility: CLINIC | Age: 49
Discharge: HOME | End: 2019-06-25
Attending: PHYSICIAN ASSISTANT | Admitting: PHYSICIAN ASSISTANT
Payer: COMMERCIAL

## 2019-06-25 VITALS
RESPIRATION RATE: 16 BRPM | TEMPERATURE: 97.7 F | SYSTOLIC BLOOD PRESSURE: 138 MMHG | HEART RATE: 101 BPM | DIASTOLIC BLOOD PRESSURE: 79 MMHG

## 2019-06-25 DIAGNOSIS — L89.324 PRESSURE INJURY OF LEFT ISCHIUM, STAGE 4 (H): ICD-10-CM

## 2019-06-25 DIAGNOSIS — L89.324 PRESSURE ULCER OF ISCHIUM, LEFT, STAGE IV (H): ICD-10-CM

## 2019-06-25 LAB
CRP SERPL-MCNC: 99.9 MG/L (ref 0–8)
ERYTHROCYTE [SEDIMENTATION RATE] IN BLOOD BY WESTERGREN METHOD: 63 MM/H (ref 0–20)

## 2019-06-25 PROCEDURE — A6213 FOAM DRG >16<=48 SQ IN W/BDR: HCPCS

## 2019-06-25 PROCEDURE — 86140 C-REACTIVE PROTEIN: CPT | Performed by: PHYSICIAN ASSISTANT

## 2019-06-25 PROCEDURE — 97602 WOUND(S) CARE NON-SELECTIVE: CPT

## 2019-06-25 PROCEDURE — 99214 OFFICE O/P EST MOD 30 MIN: CPT | Performed by: PHYSICIAN ASSISTANT

## 2019-06-25 PROCEDURE — A6022 COLLAGEN DRSG>16<=48 SQ IN: HCPCS

## 2019-06-25 PROCEDURE — 85652 RBC SED RATE AUTOMATED: CPT | Performed by: PHYSICIAN ASSISTANT

## 2019-06-25 NOTE — DISCHARGE INSTRUCTIONS
[facility]  [address] [facility] [address], [place] [address]  Appointment Phone [phone] [facility] [phone]    Priyanka Martinez      1970    Wound Dressing Change:Left Ischial Tuberosity  After cleansing with saline or wound cleanser, apply small amount of VASHE on gauze, lay into wound bed, let sit for 10  minutes, remove gauze (do not rinse) then apply dressing.  Cover wound with Fibracol. Lightly pack wound with silvercel rope. Cover with ABD pad or Tegaderm adhesive foam dressing  in PM  Lightly pack with silvercel rope in AM  Cover wound with ABD pad and or Tegaderm Adhesive Foam Dressing  Change dressing daily    Repositioning:  Bed: Reposition pt MINIMALLY every 1-2 hours in bed to relieve pressure and promote perfusion to tissue.  Chair: When up to chair pt should not sit for longer than one hour total before either standing or returning to bed for at least 10 minutes, again to relieve pressure and promote perfusion to tissue. Pt should also sit on a chair cushion when up to the chair.     Za Carter PA-C. June 25, 2019    Call us at [phone] if you have any questions about your wounds, have redness or swelling around your wound, have a fever of 101 or greater or if you have any other problems or concerns. We answer the phone Monday through Friday 8 am to 4 pm, please leave a message as we check the voicemail frequently throughout the day.     Follow up with Provider - 4 weeks

## 2019-06-26 ENCOUNTER — MYC MEDICAL ADVICE (OUTPATIENT)
Dept: PEDIATRICS | Facility: CLINIC | Age: 49
End: 2019-06-26

## 2019-06-26 DIAGNOSIS — Z87.39 HISTORY OF OSTEOMYELITIS: ICD-10-CM

## 2019-06-26 DIAGNOSIS — R10.9 FLANK PAIN: ICD-10-CM

## 2019-06-26 DIAGNOSIS — R79.82 ELEVATED C-REACTIVE PROTEIN (CRP): Primary | ICD-10-CM

## 2019-06-26 NOTE — TELEPHONE ENCOUNTER
Routing to provider to review and advise.    Rani Sy, RN, BSN, PHN  [facility]

## 2019-06-30 NOTE — PROGRESS NOTES
"[facility]    HISTORY OF PRESENT ILLNESS: Priyanka Martinez is a 48 year old female with history of paraplegia and TBI who returns today for a left IT pressure ulcer. The initial injury was a burn from a heating pad in July but significantly worsened due to pressure. She had presumed acute osteomyelitis that prompted a hospital admission on 11/9/18 and has now finished IV antibiotics. She had recent follow-up with ID who informed her that they are unsure if she truly had osteomyelitis as bone cultures were never obtained, the diagnosis was based only on surgical pathology.     We've had some issues working with her insurance to get her the group 2 mattress and her wound supplies. She bought a gel mattress overlay when her group 2 was first denied by insurance. It has now been approved but Priyanka is concerned that the hospital bed would be hard for her in regards to her daily activities and would like to continue utilizing her gel overlay for now.    She is dressing her own wound and wants to preserve her independence as much as possible. We have discussed flap surgery and she would like to proceed with more conservative management at this point.  Continues to feel that surgical options would be too devastating for her current life, she is concerned about deconditioning and lack of independence    INTERVAL HISTORY:    Wound about the same, still minimal granulation over the bone    Reports she feels she has an infection somewhere, recent UA was negative but has some flank pain    OFFLOADING: Roho cushion mapped well 12/2018, on gel overlay on standard mattress, is skeptical that she will be able to function on group 2 mattress     DATE WOUND ACQUIRED: July 2018    REVIEW OF SYSTEMS:   CONSTITUTIONAL: Feels \"off\" like she has in the past with UTIs, denies fevers  GI: denies nausea or vomiting    PAST MEDICAL HISTORY:  has a past medical history of Abdominal pain, Diarrhea, Paraplegic immobility " "syndrome (2003), and Tobacco use disorder (1/30/2012).    SOCIAL HISTORY: currently works from home    MEDICATIONS:   Current Outpatient Medications   Medication     ascorbic acid (VITAMIN C) 1000 MG TABS     baclofen (LIORESAL) 10 MG tablet     celecoxib (CELEBREX) 100 MG capsule     dicyclomine (BENTYL) 20 MG tablet     FLUoxetine (PROZAC) 20 MG capsule     Gabapentin (NEURONTIN PO)     gabapentin (NEURONTIN) 600 MG tablet     Gauze Pads & Dressings (BIATAIN ADHESIVE FOAM DRESSING) 4\"X4\" PADS     medroxyPROGESTERone (PROVERA) 10 MG tablet     order for DME     order for DME     order for DME     order for DME     order for DME     order for DME     oxybutynin (DITROPAN) 5 MG tablet     No current facility-administered medications for this encounter.        VITALS: /79   Pulse 101   Temp 97.7  F (36.5  C) (Temporal)   Resp 16     PHYSICAL EXAM:  GENERAL: Patient is alert and oriented and in no acute distress  INTEGUMENTARY:          PROCEDURE: Topical 4% lidocaine was applied to the wound. The wound was mechanically debrided by nursing staff.     ASSESSMENT: stage 4 pressure ulcer of left IT    PLAN:   1. Alternate SilverCel and Nayeli, continue using tegaderm foam  2. Continue optimizing offloading  3. Will draw infection markers, pt understands this is non-specific. If elevated we can order MRI to assess for osteo. Recommend she follow-up with her PCP to evaluate for urinary or other source.     FOLLOW-UP: 4 weeks    LANDEN ORTIZ PA-C ([facility])    "

## 2019-07-01 ENCOUNTER — TELEPHONE (OUTPATIENT)
Dept: PEDIATRICS | Facility: CLINIC | Age: 49
End: 2019-07-01

## 2019-07-01 NOTE — TELEPHONE ENCOUNTER
Per MyChart encounter dated 06/26/19, provider stated that order was placed for CT of patient's kidneys. Unable to locate order in chart. Routing encounter to PCP for review.  Lona Baker, CMA

## 2019-07-01 NOTE — TELEPHONE ENCOUNTER
[facility] Call Center    Phone Message    May a detailed message be left on voicemail: yes    Reason for Call: Order(s): Other: Fax number- [phone]  Reason for requested: Kidney imaging orders need to be faxed to [facility]  Date needed: asap  Provider name: Dr. Limon      Action Taken: Message routed to:  Primary Care p [id number]

## 2019-07-01 NOTE — TELEPHONE ENCOUNTER
CT abdomen pelvis order faxed to [facility] at fax #: [phone]. Received confirmation from [employer] that fax was sent successfully.  Lona Baker CMA

## 2019-07-08 ENCOUNTER — MYC REFILL (OUTPATIENT)
Dept: PEDIATRICS | Facility: CLINIC | Age: 49
End: 2019-07-08

## 2019-07-08 ENCOUNTER — MYC MEDICAL ADVICE (OUTPATIENT)
Dept: PEDIATRICS | Facility: CLINIC | Age: 49
End: 2019-07-08

## 2019-07-08 DIAGNOSIS — K58.0 IRRITABLE BOWEL SYNDROME WITH DIARRHEA: ICD-10-CM

## 2019-07-08 DIAGNOSIS — R10.9 FLANK PAIN: Primary | ICD-10-CM

## 2019-07-08 RX ORDER — DICYCLOMINE HCL 20 MG
1 TABLET ORAL 2 TIMES DAILY
Qty: 180 TABLET | Refills: 1 | Status: SHIPPED | OUTPATIENT
Start: 2019-07-08 | End: 2019-11-26

## 2019-07-08 NOTE — TELEPHONE ENCOUNTER
Message routed to provider, please advise. Pt notified MD out of office until Tuesday. sotero ann lpn

## 2019-07-11 ENCOUNTER — ANCILLARY PROCEDURE (OUTPATIENT)
Dept: CT IMAGING | Facility: CLINIC | Age: 49
End: 2019-07-11
Attending: INTERNAL MEDICINE
Payer: COMMERCIAL

## 2019-07-11 DIAGNOSIS — Z87.39 HISTORY OF OSTEOMYELITIS: ICD-10-CM

## 2019-07-11 DIAGNOSIS — R79.82 ELEVATED C-REACTIVE PROTEIN (CRP): ICD-10-CM

## 2019-07-11 DIAGNOSIS — R10.9 FLANK PAIN: ICD-10-CM

## 2019-07-11 PROCEDURE — 74177 CT ABD & PELVIS W/CONTRAST: CPT | Performed by: RADIOLOGY

## 2019-07-11 RX ORDER — CELECOXIB 100 MG/1
100 CAPSULE ORAL 2 TIMES DAILY
Qty: 180 CAPSULE | Refills: 1 | Status: ON HOLD | OUTPATIENT
Start: 2019-07-11 | End: 2019-11-28

## 2019-07-11 RX ORDER — BACLOFEN 10 MG/1
10 TABLET ORAL 3 TIMES DAILY
Qty: 270 TABLET | Refills: 1 | Status: SHIPPED | OUTPATIENT
Start: 2019-07-11 | End: 2020-02-21

## 2019-07-11 RX ORDER — IOPAMIDOL 755 MG/ML
99 INJECTION, SOLUTION INTRAVASCULAR ONCE
Status: COMPLETED | OUTPATIENT
Start: 2019-07-11 | End: 2019-07-11

## 2019-07-11 RX ADMIN — IOPAMIDOL 99 ML: 755 INJECTION, SOLUTION INTRAVASCULAR at 12:52

## 2019-07-11 NOTE — RESULT ENCOUNTER NOTE
Za, can you take a look at the CT scan I ordered for her? I'm not sure what to make of the chronic osteomyelitis changes. She tells me that she was told by infectious disease that she doesn't have osteomyelitis. You have been checking her ulcers. So I will defer the next step to you regarding this finding. Dr. Limon

## 2019-07-18 ENCOUNTER — MYC MEDICAL ADVICE (OUTPATIENT)
Dept: WOUND CARE | Facility: CLINIC | Age: 49
End: 2019-07-18

## 2019-07-18 ENCOUNTER — TELEPHONE (OUTPATIENT)
Dept: PEDIATRICS | Facility: CLINIC | Age: 49
End: 2019-07-18

## 2019-07-18 ENCOUNTER — TELEPHONE (OUTPATIENT)
Dept: WOUND CARE | Facility: CLINIC | Age: 49
End: 2019-07-18

## 2019-07-18 ENCOUNTER — NURSE TRIAGE (OUTPATIENT)
Dept: NURSING | Facility: CLINIC | Age: 49
End: 2019-07-18

## 2019-07-18 NOTE — TELEPHONE ENCOUNTER
Dr Albert,  please call Rishi Aragon about patient's wound care today.  [phone]. Please route this to the appropriate specialty pool, if necessary.  Lyn Owens RN-[facility]

## 2019-07-18 NOTE — TELEPHONE ENCOUNTER
Dr Albert,  please call Rishi Aragon about patient's wound care today.  [phone]. Sent encounter to primary care pool with request to forward to specialty if necessary.  Lyn Owens RN-[facility]

## 2019-07-18 NOTE — TELEPHONE ENCOUNTER
Routed to [facility]. PCP is out of the office but says to call [phone]. Informed [facility].   Za Lyons RN

## 2019-07-18 NOTE — TELEPHONE ENCOUNTER
I didn't speak with them, was to advice them to contact wound clinic. I have not seen her since last December. Za has been doing the wound care. Can the wound care team return call to the ED?

## 2019-07-18 NOTE — TELEPHONE ENCOUNTER
This needs to be addressed by [facility] wound care team. See separate encounters.

## 2019-07-18 NOTE — TELEPHONE ENCOUNTER
Called [phone], call was intended for ID and our office was not needed. CHINMAY TorrezN, RN-BC, CWCN

## 2019-07-18 NOTE — TELEPHONE ENCOUNTER
MATT Blackwell calls from [facility] wondering how to proceed with her wounds. Her number is [phone].  Za Lyons RN

## 2019-07-23 ENCOUNTER — HOSPITAL ENCOUNTER (OUTPATIENT)
Dept: WOUND CARE | Facility: CLINIC | Age: 49
Discharge: HOME | End: 2019-07-23
Attending: PHYSICIAN ASSISTANT | Admitting: PHYSICIAN ASSISTANT
Payer: COMMERCIAL

## 2019-07-23 VITALS
RESPIRATION RATE: 16 BRPM | HEART RATE: 93 BPM | DIASTOLIC BLOOD PRESSURE: 66 MMHG | TEMPERATURE: 97.2 F | SYSTOLIC BLOOD PRESSURE: 110 MMHG

## 2019-07-23 DIAGNOSIS — L89.324 PRESSURE INJURY OF LEFT ISCHIUM, STAGE 4 (H): ICD-10-CM

## 2019-07-23 DIAGNOSIS — L89.324 PRESSURE ULCER OF ISCHIUM, LEFT, STAGE IV (H): ICD-10-CM

## 2019-07-23 PROCEDURE — 97602 WOUND(S) CARE NON-SELECTIVE: CPT

## 2019-07-23 PROCEDURE — 99214 OFFICE O/P EST MOD 30 MIN: CPT | Performed by: PHYSICIAN ASSISTANT

## 2019-07-23 PROCEDURE — A6022 COLLAGEN DRSG>16<=48 SQ IN: HCPCS

## 2019-07-23 PROCEDURE — A6196 ALGINATE DRESSING <=16 SQ IN: HCPCS

## 2019-07-23 RX ORDER — VITAMIN K2 40 MCG
TABLET ORAL
COMMUNITY
End: 2019-11-07

## 2019-07-23 RX ORDER — LOPERAMIDE HYDROCHLORIDE 2 MG/1
TABLET ORAL
COMMUNITY
End: 2019-11-07

## 2019-07-23 RX ORDER — SULFAMETHOXAZOLE/TRIMETHOPRIM 800-160 MG
1 TABLET ORAL
Status: ON HOLD | COMMUNITY
Start: 2019-07-18 | End: 2019-10-24

## 2019-07-23 NOTE — PROGRESS NOTES
"[facility]    HISTORY OF PRESENT ILLNESS: Priyanka Martinez is a 48 year old female with history of paraplegia and TBI who returns today for a left IT pressure ulcer. The initial injury was a burn from a heating pad in July but significantly worsened due to pressure. She had presumed acute osteomyelitis that prompted a hospital admission on 11/9/18 and completed course of IV antibiotics. She had recent follow-up with ID who informed her that they are unsure if she truly had osteomyelitis as bone cultures were never obtained, the diagnosis was based only on surgical pathology.     We've had some issues working with her insurance to get her the group 2 mattress and her wound supplies. She bought a gel mattress overlay when her group 2 was first denied by insurance. It has now been approved but Priyanka is concerned that the hospital bed would be hard for her in regards to her daily activities and would like to continue utilizing her gel overlay for now.    She is dressing her own wound and wants to preserve her independence as much as possible. We have discussed flap surgery and she would like to proceed with more conservative management at this point.  Continues to feel that surgical options would be too devastating for her current life, she is concerned about deconditioning and lack of independence    INTERVAL HISTORY:    Wound about the same    Continues to feel like she has an infection, went to ED and had CT that showed probable osteo, urinalysis was also suspicious for infection    Has been feeling somewhat better after antibiotics but still very fatigued     Requests referral to [facility] to establish with ID doctor as she is concerned about ongoing malaise    OFFLOADING: Roho cushion mapped well 12/2018, on gel overlay on standard mattress, is skeptical that she will be able to function on group 2 mattress     DATE WOUND ACQUIRED: July 2018    REVIEW OF SYSTEMS:   CONSTITUTIONAL: Feels \"off\" like " "she has in the past with UTIs, denies fevers  GI: denies nausea or vomiting    PAST MEDICAL HISTORY:  has a past medical history of Abdominal pain, Diarrhea, Paraplegic immobility syndrome (2003), and Tobacco use disorder (1/30/2012).    SOCIAL HISTORY: currently works from home    MEDICATIONS:   Current Outpatient Medications   Medication     ascorbic acid (VITAMIN C) 1000 MG TABS     baclofen (LIORESAL) 10 MG tablet     celecoxib (CELEBREX) 100 MG capsule     dicyclomine (BENTYL) 20 MG tablet     FLUoxetine (PROZAC) 20 MG capsule     Gabapentin (NEURONTIN PO)     gabapentin (NEURONTIN) 600 MG tablet     Gauze Pads & Dressings (BIATAIN ADHESIVE FOAM DRESSING) 4\"X4\" PADS     GUARANA, PAULLINIA CUPANA, PO     loperamide (IMODIUM A-D) 2 MG tablet     medroxyPROGESTERone (PROVERA) 10 MG tablet     melatonin 5 MG CAPS     Menaquinone-7 (VITAMIN K2) 40 MCG TABS     order for DME     order for DME     order for DME     order for DME     order for DME     order for DME     sulfamethoxazole-trimethoprim (BACTRIM DS/SEPTRA DS) 800-160 MG tablet     No current facility-administered medications for this encounter.        VITALS: /66   Pulse 93   Temp 97.2  F (36.2  C) (Tympanic)   Resp 16     PHYSICAL EXAM:  GENERAL: Patient is alert and oriented and in no acute distress  INTEGUMENTARY:   Wound (used by OP [facility] only) 01/31/19 0800 Left ischial tuberosity pressure injury (Active)   Length (cm) 2.5 7/23/2019  3:00 PM   Width (cm) 1.5 7/23/2019  3:00 PM   Depth (cm) 4.3 7/23/2019  3:00 PM   Wound (cm^2) 3.75 cm^2 7/23/2019  3:00 PM   Wound Volume (cm^3) 16.12 cm^3 7/23/2019  3:00 PM   Wound healing % 37.5 7/23/2019  3:00 PM   Dressing Appearance moist drainage 7/23/2019  3:53 PM   Drainage Characteristics/Odor serosanguineous 7/23/2019  3:53 PM   Drainage Amount copious 7/23/2019  3:53 PM   Thickness/Stage Stage 4 7/23/2019  3:53 PM   Base red/granulating 7/23/2019  3:53 PM   Periwound intact 7/23/2019  3:53 PM "   Periwound Temperature warm 7/23/2019  3:36 PM   Care, Wound non-select wound debridement performed 7/23/2019  3:53 PM         PROCEDURE: Topical 4% lidocaine was applied to the wound. The wound was mechanically debrided by nursing staff.     ASSESSMENT: stage 4 pressure ulcer of left IT - likely chronic osteo    PLAN:   1. Alternate SilverCel and Nayeli, continue using tegaderm foam  2. Continue optimizing offloading  3. Recommended she speak with Dr. Bob now that chronic osteo probable to get better idea of expectations with flap surgery  4. Referral made to [facility] to better iron out source of fatigue/chills/malaise, unclear if related to urinary source or osteo  5. Gave expectations that wound could improve but will likely have chronic sinus tract without bone debridement    FOLLOW-UP: with Dr. Paulino LAGOS) ANGEL AVENDANO

## 2019-07-23 NOTE — DISCHARGE INSTRUCTIONS
[facility]  [address] [facility] [address], [place] [address]  Appointment Phone [phone] Nurse Advisors [phone]    Priyanka Martinez 1970    Wound Dressing Change:Left Ischial Tuberosity  After cleansing with saline or wound cleanser, apply small amount of VASHE on gauze,  lay into wound bed, let sit for 10 minutes, remove gauze (do not rinse) then apply dressing.  Cover wound with Fibracol then lightly pack wound with silvercel rope. Cover with ABD pad  or Tegaderm adhesive foam dressing   Change dressing daily     A referral was sent to [facility] per your request to evaluate your bone infection.    Repositioning:  Bed: Reposition pt MINIMALLY every 1-2 hours in bed to relieve pressure and promote  perfusion to tissue.  Chair: When up to chair pt should not sit for longer than one hour total before returning to bed for at least 10 minutes, again to relieve pressure and  promote perfusion to tissue. Pt should also sit on a chair cushion when up to the chair.    Za Carter PA-C. July 23, 2019    Call us at [phone] if you have any questions about your wounds, have redness or  swelling around your wound, have a fever of 101 or greater or if you have any other  problems or concerns. We answer the phone Monday through Friday 8 am to 4 pm,  please leave a message as we check the voicemail frequently throughout the day.    Follow up with Dr. Bob to discuss flap surgery

## 2019-08-01 ENCOUNTER — HOSPITAL ENCOUNTER (OUTPATIENT)
Dept: WOUND CARE | Facility: CLINIC | Age: 49
Discharge: HOME | End: 2019-08-01
Attending: SURGERY | Admitting: SURGERY
Payer: COMMERCIAL

## 2019-08-01 VITALS
RESPIRATION RATE: 16 BRPM | DIASTOLIC BLOOD PRESSURE: 73 MMHG | SYSTOLIC BLOOD PRESSURE: 155 MMHG | HEART RATE: 102 BPM | TEMPERATURE: 97.9 F

## 2019-08-01 DIAGNOSIS — L89.324 PRESSURE INJURY OF LEFT ISCHIUM, STAGE 4 (H): ICD-10-CM

## 2019-08-01 DIAGNOSIS — L89.324 PRESSURE ULCER OF ISCHIUM, LEFT, STAGE IV (H): ICD-10-CM

## 2019-08-01 PROCEDURE — A6260 WOUND CLEANSER ANY TYPE/SIZE: HCPCS

## 2019-08-01 PROCEDURE — 97602 WOUND(S) CARE NON-SELECTIVE: CPT

## 2019-08-01 NOTE — DISCHARGE INSTRUCTIONS
[facility]  [address] [facility] [address], [place] [address]  Appointment Phone [phone] Nurse Advisors [phone]    Priyanka Martinez      1970    Wound Dressing Change:Left Ischial Tuberosity  After cleansing with saline or wound cleanser, apply small amount of VASHE on gauze, lay into wound bed, let sit for 10 minutes, remove gauze (do not rinse) then apply  dressing.  Lightly pack wound with AMD. Cover with ABD pad or Tegaderm adhesive foam dressing  Change dressing daily    Repositioning:  Bed: Reposition pt MINIMALLY every 1-2 hours in bed to relieve pressure and promote  perfusion to tissue.  Chair: When up to chair pt should not sit for longer than one hour total before  returning to bed for at least 10 minutes, again to relieve pressure and  promote perfusion to tissue. Pt should also sit on a chair cushion when up to the chair.       Soraya Bob M.D.. August 1, 2019    Call us at [phone] if you have any questions about your wounds, have redness or swelling around your wound, have a fever of 101 or greater or if you have any other problems or concerns. We answer the phone Monday through Friday 8 am to 4 pm, please leave a message as we check the voicemail frequently throughout the day.     Follow up with Provider - 2 weeks

## 2019-08-05 ENCOUNTER — MYC MEDICAL ADVICE (OUTPATIENT)
Dept: PEDIATRICS | Facility: CLINIC | Age: 49
End: 2019-08-05

## 2019-08-05 NOTE — TELEPHONE ENCOUNTER
Message routed to provider for further review. Please advise. Forms placed on provider desk for completion. Nichole ann lpn

## 2019-08-06 NOTE — TELEPHONE ENCOUNTER
Forms faxed to [facility] at fax# [phone]. Placed to be scanned into chart. Sent mychart to inform patient.  Raina BAINS CMA

## 2019-08-12 DIAGNOSIS — N94.6 DYSMENORRHEA: ICD-10-CM

## 2019-08-13 RX ORDER — MEDROXYPROGESTERONE ACETATE 10 MG
TABLET ORAL
Qty: 135 TABLET | Refills: 0 | Status: SHIPPED | OUTPATIENT
Start: 2019-08-13 | End: 2019-11-10

## 2019-08-13 NOTE — TELEPHONE ENCOUNTER
Routing refill request to provider for review/approval because:  Drug not on the [facility] refill protocol   LOV- 6/18.   Za Lyons RN

## 2019-08-14 ENCOUNTER — HOSPITAL ENCOUNTER (OUTPATIENT)
Dept: WOUND CARE | Facility: CLINIC | Age: 49
Discharge: HOME | End: 2019-08-14
Attending: PHYSICIAN ASSISTANT | Admitting: PHYSICIAN ASSISTANT
Payer: COMMERCIAL

## 2019-08-14 VITALS
SYSTOLIC BLOOD PRESSURE: 139 MMHG | HEART RATE: 84 BPM | TEMPERATURE: 97.8 F | DIASTOLIC BLOOD PRESSURE: 86 MMHG | RESPIRATION RATE: 16 BRPM

## 2019-08-14 DIAGNOSIS — L89.324 PRESSURE ULCER OF ISCHIUM, LEFT, STAGE IV (H): ICD-10-CM

## 2019-08-14 DIAGNOSIS — L89.324 PRESSURE INJURY OF LEFT ISCHIUM, STAGE 4 (H): ICD-10-CM

## 2019-08-14 PROCEDURE — 99214 OFFICE O/P EST MOD 30 MIN: CPT | Performed by: PHYSICIAN ASSISTANT

## 2019-08-14 PROCEDURE — 97602 WOUND(S) CARE NON-SELECTIVE: CPT

## 2019-08-14 RX ORDER — TURMERIC 400 MG
1 CAPSULE ORAL DAILY
COMMUNITY
End: 2019-11-26

## 2019-08-14 RX ORDER — FLUOXETINE 10 MG/1
CAPSULE ORAL
COMMUNITY
End: 2019-08-29

## 2019-08-14 RX ORDER — SULFAMETHOXAZOLE/TRIMETHOPRIM 800-160 MG
1 TABLET ORAL 2 TIMES DAILY
Qty: 20 TABLET | Refills: 0 | Status: SHIPPED | OUTPATIENT
Start: 2019-08-14 | End: 2019-08-29

## 2019-08-14 NOTE — DISCHARGE INSTRUCTIONS
[facility]  [address] [facility] [address], [place] [address]  Appointment Phone [phone] Nurse Advisors [phone]    Priyanka Martinez 1970    Wound Dressing Change:Left Ischial Tuberosity  After cleansing with saline or wound cleanser, apply small amount of VASHE on gauze,  lay into wound bed, let sit for 10 minutes, remove gauze (do not rinse) then apply  dressing.  Lightly pack wound with AMD. Cover with ABD pad or Tegaderm adhesive foam  dressing  Change dressing daily    Repositioning:  Bed: Reposition pt MINIMALLY every 1-2 hours in bed to relieve pressure and promote  perfusion to tissue.  Chair: When up to chair pt should not sit for longer than one hour total before  returning to bed for at least 10 minutes, again to relieve pressure and  promote perfusion to tissue. Pt should also sit on a chair cushion when up to the chair.    aZ Carter PA-C,  August 14, 2019    Call us at [phone] if you have any questions about your wounds, have redness or  swelling around your wound, have a fever of 101 or greater or if you have any other  problems or concerns. We answer the phone Monday through Friday 8 am to 4 pm,  please leave a message as we check the voicemail frequently throughout the day.    Follow up with Dr. Bob in September

## 2019-08-15 NOTE — PROGRESS NOTES
08/01/19 1400   Wound (used by OP [facility] only) 01/31/19 0800 Left ischial tuberosity pressure injury   Placement Date/Time: 01/31/19 0800   Side: Left  Location: ischial tuberosity  Type: pressure injury   Length (cm) 3   Width (cm) 2   Depth (cm) 4.5   Wound (cm^2) 6 cm^2   Wound Volume (cm^3) 27 cm^3   Wound healing % 0   Undermining [Depth (cm)/Location] 7.4  (9-12)   Dressing Appearance copious drainage   Drainage Characteristics/Odor serosanguineous   Drainage Amount copious   Thickness/Stage Stage 4   Base red/granulating   Red (%), Wound Tissue Color 100   Periwound intact   Periwound Temperature warm   Periwound Skin Turgor soft   Edges rolled/closed   Care, Wound non-select wound debridement performed     Labs:   Recent Labs   Lab Test 06/25/19  1552 03/16/19  1029  12/17/18  1545  11/12/18  0802   ALBUMIN  --  2.7*   < >  --   --   --    HGB  --   --   --  11.1*   < > 8.3*   INR  --   --   --   --   --  1.31*   WBC  --   --   --  9.6   < > 9.9   CRP 99.9* 56.1*   < > 52.8*   < >  --     < > = values in this interval not displayed.     Nutrition requirements were discussed with patient today.  Vitals:  BP (!) 155/73   Pulse 102   Temp 97.9  F (36.6  C) (Temporal)   Resp 16   Wound:   Wound (used by OP [facility] only) 01/31/19 0800 Left ischial tuberosity pressure injury (Active)   Length (cm) 2.5 8/14/2019  2:00 PM   Width (cm) 2 8/14/2019  2:00 PM   Depth (cm) 4.5 8/14/2019  2:00 PM   Wound (cm^2) 5 cm^2 8/14/2019  2:00 PM   Wound Volume (cm^3) 22.5 cm^3 8/14/2019  2:00 PM   Wound healing % 16.67 8/14/2019  2:00 PM   Undermining [Depth (cm)/Location] 12-12/7.0 8/14/2019  2:00 PM   Dressing Appearance moist drainage 8/14/2019  2:00 PM   Drainage Characteristics/Odor serosanguineous 8/14/2019  2:00 PM   Drainage Amount moderate 8/14/2019  2:00 PM   Thickness/Stage Stage 4 8/14/2019  2:53 PM   Base red/granulating;slough 8/14/2019  2:53 PM   Periwound intact;swelling 8/14/2019  2:53 PM   Periwound Temperature  warm 8/14/2019  2:53 PM   Care, Wound non-select wound debridement performed 8/14/2019  2:53 PM      Photo:

## 2019-08-15 NOTE — PROGRESS NOTES
"[facility]    HISTORY OF PRESENT ILLNESS: Priyanka Martinez is a 48 year old female with history of paraplegia and TBI who returns today for a left IT pressure ulcer. The initial injury was a burn from a heating pad in July but significantly worsened due to pressure. She had presumed acute osteomyelitis that prompted a hospital admission on 11/9/18 and completed course of IV antibiotics. She had recent follow-up with ID who informed her that they are unsure if she truly had osteomyelitis as bone cultures were never obtained, the diagnosis was based only on surgical pathology.     We've had some issues working with her insurance to get her the group 2 mattress and her wound supplies. She bought a gel mattress overlay when her group 2 was first denied by insurance. It has now been approved but Priyanka is concerned that the hospital bed would be hard for her in regards to her daily activities and would like to continue utilizing her gel overlay for now.    She is dressing her own wound and wants to preserve her independence as much as possible. We have discussed flap surgery and she would like to proceed with more conservative management at this point.  Continues to feel that surgical options would be too devastating for her current life, she is concerned about deconditioning and lack of independence    INTERVAL HISTORY:    Wound about the same    Continues to feel like sick, felt better while on Bactrim but is now off of this, has appt with PCP in 2 weeks, requests antibiotics    Met with Dr. Bob a couple of weeks ago and has decided to move forward with flap surgery    Has not been able to get into [facility] - was hoping to discuss her malaise and treatment options for osteo    OFFLOADING: Roho cushion mapped well 12/2018, on gel overlay on standard mattress    DATE WOUND ACQUIRED: July 2018    REVIEW OF SYSTEMS:   CONSTITUTIONAL: Feels \"off\" like she has in the past with UTIs, denies fevers  GI: " "denies nausea or vomiting    PAST MEDICAL HISTORY:  has a past medical history of Abdominal pain, Diarrhea, Paraplegic immobility syndrome (2003), and Tobacco use disorder (1/30/2012).    SOCIAL HISTORY: currently works from home    MEDICATIONS:   Current Outpatient Medications   Medication     ACIDOPHILUS LACTOBACILLUS PO     ascorbic acid (VITAMIN C) 1000 MG TABS     baclofen (LIORESAL) 10 MG tablet     celecoxib (CELEBREX) 100 MG capsule     Cranberry, Vacc oxycoccus, (CRANBERRY EXTRACT) 200 MG CAPS     dicyclomine (BENTYL) 20 MG tablet     FLUoxetine (PROZAC) 10 MG capsule     FLUoxetine (PROZAC) 20 MG capsule     Gabapentin (NEURONTIN PO)     gabapentin (NEURONTIN) 600 MG tablet     Gauze Pads & Dressings (BIATAIN ADHESIVE FOAM DRESSING) 4\"X4\" PADS     Guarana, Paullinia cupana, (GUARANA PO)     GUARANA, PAULLINIA CUPANA, PO     loperamide (IMODIUM A-D) 2 MG tablet     medroxyPROGESTERone (PROVERA) 10 MG tablet     melatonin 5 MG CAPS     Menaquinone-7 (VITAMIN K2) 40 MCG TABS     Multiple Vitamins-Minerals (MULTIVITAMIN ADULT EXTRA C PO)     order for DME     order for DME     order for DME     order for DME     order for DME     order for DME     sulfamethoxazole-trimethoprim (BACTRIM DS/SEPTRA DS) 800-160 MG tablet     Turmeric 400 MG CAPS     vitamin A 50,000 units/mL (27,500 mcg/mL) solution     No current facility-administered medications for this encounter.        VITALS: /86   Pulse 84   Temp 97.8  F (36.6  C) (Temporal)   Resp 16     PHYSICAL EXAM:  GENERAL: Patient is alert and oriented and in no acute distress  INTEGUMENTARY:   Wound (used by OP [facility] only) 01/31/19 0800 Left ischial tuberosity pressure injury (Active)   Length (cm) 2.5 8/14/2019  2:00 PM   Width (cm) 2 8/14/2019  2:00 PM   Depth (cm) 4.5 8/14/2019  2:00 PM   Wound (cm^2) 5 cm^2 8/14/2019  2:00 PM   Wound Volume (cm^3) 22.5 cm^3 8/14/2019  2:00 PM   Wound healing % 16.67 8/14/2019  2:00 PM   Undermining [Depth (cm)/Location] " 12-12/7.0 8/14/2019  2:00 PM   Dressing Appearance moist drainage 8/14/2019  2:00 PM   Drainage Characteristics/Odor serosanguineous 8/14/2019  2:00 PM   Drainage Amount moderate 8/14/2019  2:00 PM   Thickness/Stage Stage 4 8/14/2019  2:53 PM   Base red/granulating;slough 8/14/2019  2:53 PM   Periwound intact;swelling 8/14/2019  2:53 PM   Periwound Temperature warm 8/14/2019  2:53 PM   Care, Wound non-select wound debridement performed 8/14/2019  2:53 PM         PROCEDURE: Topical 4% lidocaine was applied to the wound. The wound was mechanically debrided by nursing staff.     ASSESSMENT: stage 4 pressure ulcer of left IT - likely chronic osteo    PLAN:   1. Continue Bioguard + tegaderm foam  2. Discussed her general malaise is not consistent with chronic osteo, possible low grade acute on chronic, however I urged her to discuss this with Dr. Limon to make sure we are not missing another cause for her malaise, I agreed to 10 days of Bactrim DS to get her to her appointment with Dr. Limon but I am not comfortable with longer term antibiotics to get her to surgery. Furthermore I do not want to interfere with bone cultures that will take place intraoperatively. She will follow-up with Dr. Bob next visit to discuss further preparations for surgery.   3. The patient needs an electric hospital bed for the following reasons:The patient requires positioning of the body in ways not feasible with an ordinary bed due to the pressure ulcers which are expected to last at least 1 month. The patient requires a bed height different than a fixed height hospital bed to permit transfers to a wheelchair. The patient requires changes in body position every 2 hours to offload areas of pressure. The patient is repositioned by caregivers as they are unable to make their own body changes due to paraplegia.   4. Patient will need group 2 mattress due to stage 3 ulceration that has failed to improve on a normal mattress despite regular wound  cares and repositioning. Patient has impaired sensation and is unable to reposition independently.    FOLLOW-UP: with Dr. Paulino LAGOS) ANGEL AVENDANO

## 2019-08-19 ENCOUNTER — TELEPHONE (OUTPATIENT)
Dept: WOUND CARE | Facility: CLINIC | Age: 49
End: 2019-08-19

## 2019-08-22 DIAGNOSIS — L89.324 PRESSURE INJURY OF LEFT ISCHIUM, STAGE 4 (H): Primary | ICD-10-CM

## 2019-08-22 RX ORDER — CEFAZOLIN SODIUM 2 G/50ML
2 SOLUTION INTRAVENOUS
Status: CANCELLED | OUTPATIENT
Start: 2019-08-22

## 2019-08-22 RX ORDER — CEFAZOLIN SODIUM 1 G/50ML
1 INJECTION, SOLUTION INTRAVENOUS SEE ADMIN INSTRUCTIONS
Status: CANCELLED | OUTPATIENT
Start: 2019-08-22

## 2019-08-23 ENCOUNTER — HOSPITAL ENCOUNTER (OUTPATIENT)
Facility: CLINIC | Age: 49
End: 2019-08-23
Attending: SURGERY | Admitting: SURGERY
Payer: COMMERCIAL

## 2019-08-29 ENCOUNTER — TELEPHONE (OUTPATIENT)
Dept: SURGERY | Facility: CLINIC | Age: 49
End: 2019-08-29

## 2019-08-29 ENCOUNTER — OFFICE VISIT (OUTPATIENT)
Dept: PEDIATRICS | Facility: CLINIC | Age: 49
End: 2019-08-29
Payer: COMMERCIAL

## 2019-08-29 VITALS
OXYGEN SATURATION: 98 % | DIASTOLIC BLOOD PRESSURE: 80 MMHG | SYSTOLIC BLOOD PRESSURE: 117 MMHG | WEIGHT: 153 LBS | TEMPERATURE: 97.7 F | HEIGHT: 70 IN | BODY MASS INDEX: 21.9 KG/M2 | HEART RATE: 87 BPM

## 2019-08-29 DIAGNOSIS — D64.9 ANEMIA, UNSPECIFIED TYPE: ICD-10-CM

## 2019-08-29 DIAGNOSIS — Z00.00 ENCOUNTER FOR MEDICARE ANNUAL WELLNESS EXAM: Primary | ICD-10-CM

## 2019-08-29 DIAGNOSIS — N31.9 NEUROGENIC BLADDER: ICD-10-CM

## 2019-08-29 DIAGNOSIS — M86.60 CHRONIC OSTEOMYELITIS (H): ICD-10-CM

## 2019-08-29 DIAGNOSIS — L89.324 PRESSURE ULCER OF ISCHIUM, LEFT, STAGE IV (H): ICD-10-CM

## 2019-08-29 LAB
CRP SERPL-MCNC: 18.1 MG/L (ref 0–8)
ERYTHROCYTE [DISTWIDTH] IN BLOOD BY AUTOMATED COUNT: 16.2 % (ref 10–15)
ERYTHROCYTE [SEDIMENTATION RATE] IN BLOOD BY WESTERGREN METHOD: 48 MM/H (ref 0–20)
HCT VFR BLD AUTO: 38 % (ref 35–47)
HGB BLD-MCNC: 11.4 G/DL (ref 11.7–15.7)
IRON SATN MFR SERPL: 15 % (ref 15–46)
IRON SERPL-MCNC: 45 UG/DL (ref 35–180)
MCH RBC QN AUTO: 24.9 PG (ref 26.5–33)
MCHC RBC AUTO-ENTMCNC: 30 G/DL (ref 31.5–36.5)
MCV RBC AUTO: 83 FL (ref 78–100)
PLATELET # BLD AUTO: 441 10E9/L (ref 150–450)
RBC # BLD AUTO: 4.57 10E12/L (ref 3.8–5.2)
TIBC SERPL-MCNC: 293 UG/DL (ref 240–430)
WBC # BLD AUTO: 9.3 10E9/L (ref 4–11)

## 2019-08-29 PROCEDURE — G0439 PPPS, SUBSEQ VISIT: HCPCS | Performed by: INTERNAL MEDICINE

## 2019-08-29 PROCEDURE — 99213 OFFICE O/P EST LOW 20 MIN: CPT | Mod: 25 | Performed by: INTERNAL MEDICINE

## 2019-08-29 PROCEDURE — 85027 COMPLETE CBC AUTOMATED: CPT | Performed by: INTERNAL MEDICINE

## 2019-08-29 PROCEDURE — 85652 RBC SED RATE AUTOMATED: CPT | Performed by: INTERNAL MEDICINE

## 2019-08-29 PROCEDURE — 83540 ASSAY OF IRON: CPT | Performed by: INTERNAL MEDICINE

## 2019-08-29 PROCEDURE — 83550 IRON BINDING TEST: CPT | Performed by: INTERNAL MEDICINE

## 2019-08-29 PROCEDURE — 36415 COLL VENOUS BLD VENIPUNCTURE: CPT | Performed by: INTERNAL MEDICINE

## 2019-08-29 PROCEDURE — 86140 C-REACTIVE PROTEIN: CPT | Performed by: INTERNAL MEDICINE

## 2019-08-29 RX ORDER — SULFAMETHOXAZOLE/TRIMETHOPRIM 800-160 MG
1 TABLET ORAL 2 TIMES DAILY
Qty: 20 TABLET | Refills: 0 | Status: SHIPPED | OUTPATIENT
Start: 2019-08-29 | End: 2019-09-12

## 2019-08-29 RX ORDER — PHENAZOPYRIDINE HYDROCHLORIDE 200 MG/1
200 TABLET, FILM COATED ORAL 3 TIMES DAILY PRN
Qty: 30 TABLET | Refills: 0 | Status: SHIPPED | OUTPATIENT
Start: 2019-08-29 | End: 2019-11-26

## 2019-08-29 ASSESSMENT — ANXIETY QUESTIONNAIRES
2. NOT BEING ABLE TO STOP OR CONTROL WORRYING: NOT AT ALL
GAD7 TOTAL SCORE: 2
1. FEELING NERVOUS, ANXIOUS, OR ON EDGE: NOT AT ALL
6. BECOMING EASILY ANNOYED OR IRRITABLE: SEVERAL DAYS
7. FEELING AFRAID AS IF SOMETHING AWFUL MIGHT HAPPEN: NOT AT ALL
3. WORRYING TOO MUCH ABOUT DIFFERENT THINGS: NOT AT ALL
5. BEING SO RESTLESS THAT IT IS HARD TO SIT STILL: SEVERAL DAYS

## 2019-08-29 ASSESSMENT — MIFFLIN-ST. JEOR: SCORE: 1414.25

## 2019-08-29 ASSESSMENT — PATIENT HEALTH QUESTIONNAIRE - PHQ9: 5. POOR APPETITE OR OVEREATING: NOT AT ALL

## 2019-08-29 NOTE — PATIENT INSTRUCTIONS
Make appointment(s) for:   -- get labs today.   -- Mychart update on [facility]      Medication(s) prescribed today:    Orders Placed This Encounter   Medications     sulfamethoxazole-trimethoprim (BACTRIM DS/SEPTRA DS) 800-160 MG tablet     Sig: Take 1 tablet by mouth 2 times daily for 10 days     Dispense:  20 tablet     Refill:  0     phenazopyridine (PYRIDIUM) 200 MG tablet     Sig: Take 1 tablet (200 mg) by mouth 3 times daily as needed for irritation     Dispense:  30 tablet     Refill:  0           Patient Education   Personalized Prevention Plan  You are due for the preventive services outlined below.  Your care team is available to assist you in scheduling these services.  If you have already completed any of these items, please share that information with your care team to update in your medical record.  Health Maintenance Due   Topic Date Due     Annual Wellness Visit  02/13/2018     Anxiety Assessment  12/28/2018

## 2019-08-29 NOTE — PROGRESS NOTES
"  SUBJECTIVE:   Priyanka Martinez is a 48 year old female who presents for Preventive Visit.    HPI:  Diagnosed with chronic osteomyelitis of the ischium. She is scheduled to see general surgeon for evaluation for debridement and flap surgery.   She was seen by her wound care MD, treated her for UTI with bactrim. This is her last day. Pt was advised to have primary care renew the bactrim to get her to her appointment with the surgeon.   Pt reported sweats/chills and fatigue.   Has neurogenic bladder. Stopped oxybutynin due to concern of potentiall causing dementia. This runs in her family. Since stopping, she has had more bladder spasm. Her pain clinic doctor has helped with other pain but not the bladder spasm. She doesn't want any medication that could potentially cause dementia.     Are you in the first 12 months of your Medicare Part B coverage?  No    Physical Health:    In general, how would you rate your overall physical health? fair    Outside of work, how many days during the week do you exercise? none    Outside of work, approximately how many minutes a day do you exercise?not applicable    If you drink alcohol do you typically have >3 drinks per day or >7 drinks per week? No    Do you usually eat at least 4 servings of fruit and vegetables a day, include whole grains & fiber and avoid regularly eating high fat or \"junk\" foods? Yes    Do you have any problems taking medications regularly?  No    Do you have any side effects from medications? none    Needs assistance for the following daily activities: transportation    Which of the following safety concerns are present in your home?  lack of grab bars in the bathroom     Hearing impairment: No    In the past 6 months, have you been bothered by leaking of urine? no    Mental Health:    In general, how would you rate your overall mental or emotional health? good  PHQ-2 Score:      Do you feel safe in your environment? Yes    Do you have a Health Care " Directive? Yes: Advance Directive has been received and scanned.    Additional concerns to address?  YES Refill antibiotic, bladder med stopped taking.    Fall risk:       Cognitive Screenin) Repeat 3 items (Leader, Season, Table)    2) Clock draw: NORMAL  3) 3 item recall: Recalls 2 objects   Results: NORMAL clock, 1-2 items recalled: COGNITIVE IMPAIRMENT LESS LIKELY    Mini-CogTM Copyright KELIN Moore. Licensed by the author for use in [facility]; reprinted with permission (cielo@[facility]). All rights reserved.      Do you have sleep apnea, excessive snoring or daytime drowsiness?: yes            Reviewed and updated as needed this visit by clinical staff  Tobacco  Allergies  Meds  Med Hx  Surg Hx  Fam Hx  Soc Hx        Reviewed and updated as needed this visit by Provider        Social History     Tobacco Use     Smoking status: Former Smoker     Last attempt to quit: 3/18/2012     Years since quittin.4     Smokeless tobacco: Never Used     Tobacco comment: 7 cig a day off and on socially   Substance Use Topics     Alcohol use: No                           Current providers sharing in care for this patient include:   Patient Care Team:  Ayala Limon MD PhD as PCP - General (Internal Medicine)  Shannan Nation, RN as Nurse Coordinator (Physical Medicine and Rehab)  Urvashi Strickland APRN CNP as Assigned PCP    The following health maintenance items are reviewed in Epic and correct as of today:  Health Maintenance   Topic Date Due     MEDICARE ANNUAL WELLNESS VISIT  2018     KAVITHA ASSESSMENT  2018     INFLUENZA VACCINE (1) 2019     LIPID  2020     DTAP/TDAP/TD IMMUNIZATION (3 - Td) 2025     HIV SCREENING  Completed     DEPRESSION ACTION PLAN  Completed     IPV IMMUNIZATION  Aged Out     MENINGITIS IMMUNIZATION  Aged Out     Lab work is in process      ROS:  Constitutional, HEENT, cardiovascular, pulmonary, gi and gu systems are negative, except as otherwise  "noted.    OBJECTIVE:   /80   Pulse 87   Temp 97.7  F (36.5  C) (Temporal)   Ht 1.778 m (5' 10\")   Wt 69.4 kg (153 lb)   SpO2 98%   BMI 21.95 kg/m   Estimated body mass index is 21.95 kg/m  as calculated from the following:    Height as of this encounter: 1.778 m (5' 10\").    Weight as of this encounter: 69.4 kg (153 lb).  EXAM:   GENERAL: healthy, alert and no distress; sitting in wheel chair  HEENT: unremarkable.   RESP: lungs clear to auscultation - no rales, rhonchi or wheezes  CV: regular rate and rhythm, normal S1 S2, no S3 or S4, no murmur, click or rub, no peripheral edema and peripheral pulses strong  ABDOMEN: soft, nontender, no hepatosplenomegaly, no masses and bowel sounds normal  MS: no gross musculoskeletal defects noted, no edema  NEURO: Normal strength and tone, mentation intact and speech normal  PSYCH: mentation appears normal, affect normal/bright    Diagnostic Test Results:  Labs pending    ASSESSMENT / PLAN:       ICD-10-CM    1. Encounter for Medicare annual wellness exam Z00.00    2. Pressure ulcer of ischium, left, stage IV (H) L89.324 sulfamethoxazole-trimethoprim (BACTRIM DS/SEPTRA DS) 800-160 MG tablet   3. Chronic osteomyelitis (H) M86.60 CBC with platelets     CRP, inflammation     ESR: Erythrocyte sedimentation rate   4. Anemia, unspecified type D64.9 CBC with platelets     Iron and iron binding capacity   5. Neurogenic bladder N31.9 phenazopyridine (PYRIDIUM) 200 MG tablet     -- I renewed Bactrim.   -- check CBC/iron panel for anemia. Pt does report blood mixed with discharge from wound.   -- bladder spasm/neurogenic bladder. Trial of pyridium. If not helpful, consider Mirbetriq.     End of Life Planning:  Patient currently has an advanced directive: Yes.  Practitioner is supportive of decision.    COUNSELING:  Reviewed preventive health counseling, as reflected in patient instructions    Estimated body mass index is 21.95 kg/m  as calculated from the following:    Height " "as of this encounter: 1.778 m (5' 10\").    Weight as of this encounter: 69.4 kg (153 lb).         reports that she quit smoking about 7 years ago. She has never used smokeless tobacco.      Appropriate preventive services were discussed with this patient, including applicable screening as appropriate for cardiovascular disease, diabetes, osteopenia/osteoporosis, and glaucoma.  As appropriate for age/gender, discussed screening for colorectal cancer, prostate cancer, breast cancer, and cervical cancer. Checklist reviewing preventive services available has been given to the patient.    Reviewed patients plan of care and provided an AVS. The Intermediate Care Plan ( asthma action plan, low back pain action plan, and migraine action plan) for Priyanka meets the Care Plan requirement. This Care Plan has been established and reviewed with the Patient.    Counseling Resources:  ATP IV Guidelines  Pooled Cohorts Equation Calculator  Breast Cancer Risk Calculator  FRAX Risk Assessment  ICSI Preventive Guidelines  Dietary Guidelines for Americans, 2010  USDA's MyPlate  ASA Prophylaxis  Lung CA Screening    Ayala Limon MD PhD  [facility]  "
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
## 2019-08-29 NOTE — RESULT ENCOUNTER NOTE
Dear Priyanka,   Your recent lab results showed the following:  -- hemoglobin is stable but slightly low. Iron panel is normal. The anemia is likely from the chronic inflammation.  -- the inflammation markers are down some. This may be because you are on antibiotic. Continue Bactrim until you see the surgeon as planned.     Please call or Mychart to our office if you have further questions.     Ayala Limon MD-PhD
positive S2/positive S1

## 2019-08-29 NOTE — TELEPHONE ENCOUNTER
i talked to Naresh RENEE at [facility], no PA needed for codes supplied, call ref#[id number]

## 2019-08-29 NOTE — TELEPHONE ENCOUNTER
[facility] Call Center    Phone Message    May a detailed message be left on voicemail: yes    Reason for Call: Other: Mirtha calling to request the clinical documents for the PA they received.  They can be faxed to her at [phone]     Action Taken: Message routed to:  [facility]

## 2019-08-30 ASSESSMENT — ANXIETY QUESTIONNAIRES: GAD7 TOTAL SCORE: 2

## 2019-09-03 ENCOUNTER — TELEPHONE (OUTPATIENT)
Dept: SURGERY | Facility: CLINIC | Age: 49
End: 2019-09-03

## 2019-09-03 NOTE — TELEPHONE ENCOUNTER
Patient calling reporting she is not ready for surgery yet. She needs to arrange a few items including getting the group 2 mattress at her house.   Gave her the number to Dr. Bob surgery scheduler.  Action Taken: Message routed to:  [facility]

## 2019-09-03 NOTE — TELEPHONE ENCOUNTER
received message to fax in clinical documentation for PA for Dr Bob, I was told no PA is necessary?? I did fax in documentation

## 2019-09-03 NOTE — TELEPHONE ENCOUNTER
This writer called the patient and confirmed that her surgery scheduled on 09/04/2019 with Dr. Lucille Bob. She did request a reschedule.    Patient is scheduled for surgery with Dr. Lucille Bob      Spoke with: Priyanka    Date of Surgery: 10/28/2019    Location: [address] [address]      [phone]      [email or web address]    Informed patient they will need an adult  YES    Pre-op with surgeon (if applicable): 09/12/2019    H&P: Scheduled with Ayala Limon    Surgery packet: Sent via [employer]     Additional comments: Post op- to be scheduled at [facility].

## 2019-09-03 NOTE — OR NURSING
Pt was called on Friday by RN and stated she was not having surgery as she had not met the surgeon. A message was left with the  regarding this on Friday by HIMA Barrera. Follow up message was left today by this writer regarding contacting patient to determine if patient was continuing with surgery or wishing to reschedule. Awaiting callback or inbasket message response. Message sent to Nichole  for Dr. Bob and QUETA BarreraCC for Dr. Bob.

## 2019-09-12 ENCOUNTER — HOSPITAL ENCOUNTER (OUTPATIENT)
Dept: WOUND CARE | Facility: CLINIC | Age: 49
Discharge: HOME | End: 2019-09-12
Attending: SURGERY | Admitting: SURGERY
Payer: COMMERCIAL

## 2019-09-12 VITALS
SYSTOLIC BLOOD PRESSURE: 105 MMHG | DIASTOLIC BLOOD PRESSURE: 66 MMHG | HEART RATE: 98 BPM | TEMPERATURE: 98.6 F | RESPIRATION RATE: 16 BRPM

## 2019-09-12 DIAGNOSIS — L89.324 PRESSURE INJURY OF LEFT ISCHIUM, STAGE 4 (H): ICD-10-CM

## 2019-09-12 DIAGNOSIS — L89.324 PRESSURE ULCER OF ISCHIUM, LEFT, STAGE IV (H): ICD-10-CM

## 2019-09-12 PROCEDURE — 97602 WOUND(S) CARE NON-SELECTIVE: CPT

## 2019-09-12 RX ORDER — SULFAMETHOXAZOLE/TRIMETHOPRIM 800-160 MG
1 TABLET ORAL ONCE
Qty: 30 TABLET | Refills: 1 | Status: ON HOLD | OUTPATIENT
Start: 2019-09-12 | End: 2019-10-24

## 2019-09-12 RX ORDER — GABAPENTIN 600 MG/1
TABLET ORAL
Refills: 3 | COMMUNITY
Start: 2019-07-12 | End: 2020-10-12

## 2019-09-12 NOTE — PROGRESS NOTES
Visit Date:   09/12/2019      [facility]       This is a 48-year-old paraplegic female who is here today with her sister for essentially a preoperative visit.  She had originally been scheduled a week or two ago, but had not received appropriate information about that and had some things going on with a family vacation and also waiting for a hospital bed and mattress to arrive at her house for postop recovery.  She has, therefore, been rescheduled for the last Monday in October, after she returns from her [place] trip.  There is a possibility that she could possibly be done a week before that, but then I am out of town.  Currently, she has been doing AMD and Tegaderm.  Her wound appears to be quite clean.  The tissues are very soft.  There is, however, significant undermining and she still has systemic signs and symptoms of chronic osteo.  When she was following with our physician's assistant, Christine Carter, they decided to do a trial of Bactrim-DS, and the patient states that just taking 1 pill at bedtime seems to help her with her chronic pain.  She feels feverish all the time and has soreness in her back, but at least she is able to get rest with pain relief.  This would be considered suppressive therapy at this point, until we can do definitive bone debridement with cultures and then flap her wound closed.        My plan is most likely to use posterior thigh, laterally based, as long as it is sufficient to fill in the upper undermined aspect.  She has some old scar skin lateral to her current wound and while we could use that, that would be my second choice with a gluteal rotation flap.  She understands she will be in the hospital for at least 3-5 days while we are waiting for cultures to return, getting a PICC line and arranging for home nursing services.  She has experience giving herself antibiotics through a PICC at home previously, and will probably require that given her diagnosis of  osteomyelitis.  She also understands that while the stretcher transport home from the hospital would probably be covered, I am not sure about the trip from her home to our outpatient clinic.  She has spoken with her insurance company and hopefully will not incur too much of an out-of-pocket expense for that.        At this point, we will continue with her current dressings with AMD and Tegaderm.  She knows that she needs to get an H and P before whichever OR date  she chooses.  She will continue with Bactrim-DS, and we have it prescribed so that it can cover her as a suppressive dose while she is waiting for surgery.  Please see nursing notes for dimensions of the wound.  Her vital signs were within normal limits today.  Please see their notes for photos as well.   Labs:   Recent Labs   Lab Test 19  1723  19  1029  18  0802   ALBUMIN  --   --  2.7*   < >  --    HGB 11.4*  --   --    < > 8.3*   INR  --   --   --   --  1.31*   WBC 9.3  --   --    < > 9.9   CRP 18.1*   < > 56.1*   < >  --     < > = values in this interval not displayed.     Nutrition requirements were discussed with patient today.  Vitals:  /66 (BP Location: Right arm)   Pulse 98   Temp 98.6  F (37  C) (Oral)   Resp 16   Wound:     Photo:         VIRGIL PHELPS MD             D: 2019   T: 2019   MT: NANCY      Name:     CEDRIC MENDOZA   MRN:      [id number]        Account:      [id number]   :      1970           Visit Date:   2019      Document: [id number]

## 2019-09-12 NOTE — DISCHARGE INSTRUCTIONS
[facility]  [address] [facility] [address], [place] [address]  Appointment Phone [phone] Nurse Advisors [phone]    Priyanka Martinez      1970    Wound Dressing Change:Left Ischial Tuberosity  After cleansing with saline or wound cleanser, apply small amount of VASHE on gauze, lay into wound bed, let sit for 10 minutes, remove gauze (do not rinse) then apply  dressing.  Lightly pack wound with AMD. Cover with ABD pad or Tegaderm adhesive foam dressing  Change dressing daily    Repositioning:  Bed: Reposition pt MINIMALLY every 1-2 hours in bed to relieve pressure and promote perfusion to tissue.  Chair: When up to chair pt should not sit for longer than one hour total before returning to bed for at least 10 minutes, again to relieve pressure and promote perfusion to tissue. Pt should also sit on a chair cushion when up to the chair.       Soraya Bob M.D.. September 12, 2019    Call us at [phone] if you have any questions about your wounds, have redness or swelling around your wound, have a fever of 101 or greater or if you have any other problems or concerns. We answer the phone Monday through Friday 8 am to 4 pm, please leave a message as we check the voicemail frequently throughout the day.     Follow up with Provider - after surgery

## 2019-09-16 ENCOUNTER — TELEPHONE (OUTPATIENT)
Dept: WOUND CARE | Facility: CLINIC | Age: 49
End: 2019-09-16

## 2019-09-16 ENCOUNTER — MYC MEDICAL ADVICE (OUTPATIENT)
Dept: PEDIATRICS | Facility: CLINIC | Age: 49
End: 2019-09-16

## 2019-09-16 DIAGNOSIS — L89.324 PRESSURE INJURY OF LEFT ISCHIUM, STAGE 4 (H): ICD-10-CM

## 2019-09-17 ENCOUNTER — TELEPHONE (OUTPATIENT)
Dept: SURGERY | Facility: CLINIC | Age: 49
End: 2019-09-17

## 2019-09-17 NOTE — TELEPHONE ENCOUNTER
Patient called to see if she can have surgery 10/21. This was offered last week but now appears that date is not available.    Advised will call back if there is a way to move date.

## 2019-09-18 ENCOUNTER — TELEPHONE (OUTPATIENT)
Dept: PLASTIC SURGERY | Facility: CLINIC | Age: 49
End: 2019-09-18

## 2019-09-18 NOTE — TELEPHONE ENCOUNTER
This writer called the patient to discuss 10/28/2019 surgery date versus 10/21/2019. The patient let this writer know she spoke to Nichole about it and expects a call from Nichole today to confirm surgery date. This writer let the patient know that this writer will no longer be involved in this specific scheduling issue. Patient expressed understanding.

## 2019-10-03 ENCOUNTER — TELEPHONE (OUTPATIENT)
Dept: SURGERY | Facility: CLINIC | Age: 49
End: 2019-10-03

## 2019-10-09 ENCOUNTER — TELEPHONE (OUTPATIENT)
Dept: WOUND CARE | Facility: CLINIC | Age: 49
End: 2019-10-09

## 2019-10-09 ENCOUNTER — OFFICE VISIT (OUTPATIENT)
Dept: PEDIATRICS | Facility: CLINIC | Age: 49
End: 2019-10-09
Payer: COMMERCIAL

## 2019-10-09 VITALS
BODY MASS INDEX: 22.33 KG/M2 | TEMPERATURE: 97.9 F | SYSTOLIC BLOOD PRESSURE: 112 MMHG | DIASTOLIC BLOOD PRESSURE: 76 MMHG | HEIGHT: 70 IN | WEIGHT: 156 LBS | HEART RATE: 88 BPM | OXYGEN SATURATION: 92 %

## 2019-10-09 DIAGNOSIS — M86.9 OSTEOMYELITIS OF PELVIC REGION (H): ICD-10-CM

## 2019-10-09 DIAGNOSIS — G82.20 PARAPLEGIA (H): ICD-10-CM

## 2019-10-09 DIAGNOSIS — F41.1 GENERALIZED ANXIETY DISORDER: ICD-10-CM

## 2019-10-09 DIAGNOSIS — Z01.818 PREOP GENERAL PHYSICAL EXAM: Primary | ICD-10-CM

## 2019-10-09 DIAGNOSIS — Z23 FLU VACCINE NEED: ICD-10-CM

## 2019-10-09 DIAGNOSIS — L89.324 PRESSURE ULCER OF ISCHIUM, LEFT, STAGE IV (H): ICD-10-CM

## 2019-10-09 LAB
BASOPHILS # BLD AUTO: 0.1 10E9/L (ref 0–0.2)
BASOPHILS NFR BLD AUTO: 0.6 %
CRP SERPL-MCNC: 51.7 MG/L (ref 0–8)
DIFFERENTIAL METHOD BLD: ABNORMAL
EOSINOPHIL # BLD AUTO: 0.2 10E9/L (ref 0–0.7)
EOSINOPHIL NFR BLD AUTO: 2.1 %
ERYTHROCYTE [DISTWIDTH] IN BLOOD BY AUTOMATED COUNT: 16 % (ref 10–15)
HCT VFR BLD AUTO: 32.5 % (ref 35–47)
HGB BLD-MCNC: 9.5 G/DL (ref 11.7–15.7)
IMM GRANULOCYTES # BLD: 0 10E9/L (ref 0–0.4)
IMM GRANULOCYTES NFR BLD: 0.2 %
LYMPHOCYTES # BLD AUTO: 2 10E9/L (ref 0.8–5.3)
LYMPHOCYTES NFR BLD AUTO: 23.2 %
MCH RBC QN AUTO: 28.4 PG (ref 26.5–33)
MCHC RBC AUTO-ENTMCNC: 29.2 G/DL (ref 31.5–36.5)
MCV RBC AUTO: 97 FL (ref 78–100)
MONOCYTES # BLD AUTO: 0.6 10E9/L (ref 0–1.3)
MONOCYTES NFR BLD AUTO: 6.7 %
NEUTROPHILS # BLD AUTO: 5.8 10E9/L (ref 1.6–8.3)
NEUTROPHILS NFR BLD AUTO: 67.2 %
PLATELET # BLD AUTO: 351 10E9/L (ref 150–450)
RBC # BLD AUTO: 3.34 10E12/L (ref 3.8–5.2)
WBC # BLD AUTO: 8.7 10E9/L (ref 4–11)

## 2019-10-09 PROCEDURE — 90686 IIV4 VACC NO PRSV 0.5 ML IM: CPT | Performed by: INTERNAL MEDICINE

## 2019-10-09 PROCEDURE — 86140 C-REACTIVE PROTEIN: CPT | Performed by: INTERNAL MEDICINE

## 2019-10-09 PROCEDURE — G0008 ADMIN INFLUENZA VIRUS VAC: HCPCS | Performed by: INTERNAL MEDICINE

## 2019-10-09 PROCEDURE — 99214 OFFICE O/P EST MOD 30 MIN: CPT | Mod: 25 | Performed by: INTERNAL MEDICINE

## 2019-10-09 PROCEDURE — 36415 COLL VENOUS BLD VENIPUNCTURE: CPT | Performed by: INTERNAL MEDICINE

## 2019-10-09 PROCEDURE — 85025 COMPLETE CBC W/AUTO DIFF WBC: CPT | Performed by: INTERNAL MEDICINE

## 2019-10-09 ASSESSMENT — MIFFLIN-ST. JEOR: SCORE: 1427.86
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
## 2019-10-09 NOTE — PATIENT INSTRUCTIONS
Get labs today.       Before your surgery:  10 days before: stop all over the counter supplements  1 week before: stop aspirin if you are taking aspirin.  3 days before: stop ibuprofen, naproxen or similar antiinflammatory medications. You may use Tylenol for pain or headache.     On the day of your surgery:  You may take scheduled medication with the smallest amount of water possible.     After surgery:   You may resume all your medications after the surgery unless your surgeon instructs you otherwise.       Ask your surgeon if you can continue with Celebrex before or on the day of surgery.         Before Your Surgery      Call your surgeon if there is any change in your health. This includes signs of a cold or flu (such as a sore throat, runny nose, cough, rash or fever).    Do not smoke, drink alcohol or take over the counter medicine (unless your surgeon or primary care doctor tells you to) for the 24 hours before and after surgery.    If you take prescribed drugs: Follow your doctor s orders about which medicines to take and which to stop until after surgery.    Eating and drinking prior to surgery: follow the instructions from your surgeon    Take a shower or bath the night before surgery. Use the soap your surgeon gave you to gently clean your skin. If you do not have soap from your surgeon, use your regular soap. Do not shave or scrub the surgery site.  Wear clean pajamas and have clean sheets on your bed.

## 2019-10-10 NOTE — RESULT ENCOUNTER NOTE
Dear Priyanka,   Your recent lab results showed the following:  -- CRP went up. Hemoglobin is down slightly from last month. You can still proceed with surgery but I recommend rechecking hemoglobin before you get discharged from the hospital.     Please call or Mychart to our office if you have further questions.     Ayala Limon MD-PhD

## 2019-10-14 NOTE — OR NURSING
"Pt states [facility] is the best way to contact her. Phone goes straight to . Surgery information sent in \"preparing for surgery\" document via [facility].   "

## 2019-10-16 ENCOUNTER — ANESTHESIA (OUTPATIENT)
Dept: SURGERY | Facility: CLINIC | Age: 49
DRG: 574 | End: 2019-10-16
Payer: COMMERCIAL

## 2019-10-16 ENCOUNTER — HOSPITAL ENCOUNTER (INPATIENT)
Facility: CLINIC | Age: 49
LOS: 9 days | Discharge: HOME | DRG: 574 | End: 2019-10-25
Attending: SURGERY | Admitting: SURGERY
Payer: COMMERCIAL

## 2019-10-16 ENCOUNTER — ANESTHESIA EVENT (OUTPATIENT)
Dept: SURGERY | Facility: CLINIC | Age: 49
DRG: 574 | End: 2019-10-16
Payer: COMMERCIAL

## 2019-10-16 DIAGNOSIS — Z98.890 S/P FLAP GRAFT: Primary | ICD-10-CM

## 2019-10-16 DIAGNOSIS — G82.20 PARAPLEGIA (H): ICD-10-CM

## 2019-10-16 DIAGNOSIS — R19.8 GASTROINTESTINAL PROBLEM: ICD-10-CM

## 2019-10-16 DIAGNOSIS — Z93.59 SUPRAPUBIC CATHETER (H): ICD-10-CM

## 2019-10-16 LAB
CREAT SERPL-MCNC: 0.37 MG/DL (ref 0.52–1.04)
GFR SERPL CREATININE-BSD FRML MDRD: >90 ML/MIN/{1.73_M2}
GLUCOSE BLDC GLUCOMTR-MCNC: 96 MG/DL (ref 70–99)
HCG UR QL: NEGATIVE
PLATELET # BLD AUTO: 285 10E9/L (ref 150–450)

## 2019-10-16 PROCEDURE — 37000009 ZZH ANESTHESIA TECHNICAL FEE, EACH ADDTL 15 MIN: Performed by: SURGERY

## 2019-10-16 PROCEDURE — 88307 TISSUE EXAM BY PATHOLOGIST: CPT | Performed by: SURGERY

## 2019-10-16 PROCEDURE — 36000051 ZZH SURGERY LEVEL 2 1ST 30 MIN - UMMC: Performed by: SURGERY

## 2019-10-16 PROCEDURE — 25000128 H RX IP 250 OP 636: Performed by: NURSE ANESTHETIST, CERTIFIED REGISTERED

## 2019-10-16 PROCEDURE — 25000128 H RX IP 250 OP 636: Performed by: SURGERY

## 2019-10-16 PROCEDURE — 36415 COLL VENOUS BLD VENIPUNCTURE: CPT | Performed by: STUDENT IN AN ORGANIZED HEALTH CARE EDUCATION/TRAINING PROGRAM

## 2019-10-16 PROCEDURE — 99207 ZZC CONSULT E&M CHANGED TO INITIAL LEVEL: CPT | Performed by: HOSPITALIST

## 2019-10-16 PROCEDURE — 25800030 ZZH RX IP 258 OP 636: Performed by: NURSE ANESTHETIST, CERTIFIED REGISTERED

## 2019-10-16 PROCEDURE — 87102 FUNGUS ISOLATION CULTURE: CPT | Performed by: SURGERY

## 2019-10-16 PROCEDURE — 87070 CULTURE OTHR SPECIMN AEROBIC: CPT | Performed by: SURGERY

## 2019-10-16 PROCEDURE — 00000146 ZZHCL STATISTIC GLUCOSE BY METER IP

## 2019-10-16 PROCEDURE — 71000014 ZZH RECOVERY PHASE 1 LEVEL 2 FIRST HR: Performed by: SURGERY

## 2019-10-16 PROCEDURE — 40000171 ZZH STATISTIC PRE-PROCEDURE ASSESSMENT III: Performed by: SURGERY

## 2019-10-16 PROCEDURE — 27210794 ZZH OR GENERAL SUPPLY STERILE: Performed by: SURGERY

## 2019-10-16 PROCEDURE — 37000008 ZZH ANESTHESIA TECHNICAL FEE, 1ST 30 MIN: Performed by: SURGERY

## 2019-10-16 PROCEDURE — 25000128 H RX IP 250 OP 636: Performed by: STUDENT IN AN ORGANIZED HEALTH CARE EDUCATION/TRAINING PROGRAM

## 2019-10-16 PROCEDURE — 0QB30ZX EXCISION OF LEFT PELVIC BONE, OPEN APPROACH, DIAGNOSTIC: ICD-10-PCS | Performed by: SURGERY

## 2019-10-16 PROCEDURE — 0JXM0ZC TRANSFER LEFT UPPER LEG SUBCUTANEOUS TISSUE AND FASCIA WITH SKIN, SUBCUTANEOUS TISSUE AND FASCIA, OPEN APPROACH: ICD-10-PCS | Performed by: SURGERY

## 2019-10-16 PROCEDURE — 87076 CULTURE ANAEROBE IDENT EACH: CPT | Performed by: SURGERY

## 2019-10-16 PROCEDURE — 25000125 ZZHC RX 250: Performed by: NURSE ANESTHETIST, CERTIFIED REGISTERED

## 2019-10-16 PROCEDURE — 81025 URINE PREGNANCY TEST: CPT | Performed by: ANESTHESIOLOGY

## 2019-10-16 PROCEDURE — 25000132 ZZH RX MED GY IP 250 OP 250 PS 637: Performed by: STUDENT IN AN ORGANIZED HEALTH CARE EDUCATION/TRAINING PROGRAM

## 2019-10-16 PROCEDURE — 87075 CULTR BACTERIA EXCEPT BLOOD: CPT | Performed by: SURGERY

## 2019-10-16 PROCEDURE — 99222 1ST HOSP IP/OBS MODERATE 55: CPT | Performed by: HOSPITALIST

## 2019-10-16 PROCEDURE — 25800030 ZZH RX IP 258 OP 636: Performed by: ANESTHESIOLOGY

## 2019-10-16 PROCEDURE — 88307 TISSUE EXAM BY PATHOLOGIST: CPT | Mod: 26 | Performed by: SURGERY

## 2019-10-16 PROCEDURE — 85049 AUTOMATED PLATELET COUNT: CPT | Performed by: STUDENT IN AN ORGANIZED HEALTH CARE EDUCATION/TRAINING PROGRAM

## 2019-10-16 PROCEDURE — 25000566 ZZH SEVOFLURANE, EA 15 MIN: Performed by: SURGERY

## 2019-10-16 PROCEDURE — 12000001 ZZH R&B MED SURG/OB UMMC

## 2019-10-16 PROCEDURE — 36000053 ZZH SURGERY LEVEL 2 EA 15 ADDTL MIN - UMMC: Performed by: SURGERY

## 2019-10-16 PROCEDURE — 25000125 ZZHC RX 250: Performed by: HOSPITALIST

## 2019-10-16 PROCEDURE — 25800025 ZZH RX 258: Performed by: SURGERY

## 2019-10-16 PROCEDURE — 25000125 ZZHC RX 250: Performed by: SURGERY

## 2019-10-16 PROCEDURE — 0QB30ZZ EXCISION OF LEFT PELVIC BONE, OPEN APPROACH: ICD-10-PCS | Performed by: SURGERY

## 2019-10-16 PROCEDURE — 82565 ASSAY OF CREATININE: CPT | Performed by: STUDENT IN AN ORGANIZED HEALTH CARE EDUCATION/TRAINING PROGRAM

## 2019-10-16 RX ORDER — ERYTHROMYCIN 5 MG/G
OINTMENT OPHTHALMIC 3 TIMES DAILY
Status: DISCONTINUED | OUTPATIENT
Start: 2019-10-16 | End: 2019-10-24

## 2019-10-16 RX ORDER — ONDANSETRON 4 MG/1
4 TABLET, ORALLY DISINTEGRATING ORAL EVERY 30 MIN PRN
Status: DISCONTINUED | OUTPATIENT
Start: 2019-10-16 | End: 2019-10-16 | Stop reason: HOSPADM

## 2019-10-16 RX ORDER — NALOXONE HYDROCHLORIDE 0.4 MG/ML
.1-.4 INJECTION, SOLUTION INTRAMUSCULAR; INTRAVENOUS; SUBCUTANEOUS
Status: DISCONTINUED | OUTPATIENT
Start: 2019-10-16 | End: 2019-10-25 | Stop reason: HOSPADM

## 2019-10-16 RX ORDER — SODIUM CHLORIDE, SODIUM LACTATE, POTASSIUM CHLORIDE, CALCIUM CHLORIDE 600; 310; 30; 20 MG/100ML; MG/100ML; MG/100ML; MG/100ML
INJECTION, SOLUTION INTRAVENOUS CONTINUOUS
Status: DISCONTINUED | OUTPATIENT
Start: 2019-10-16 | End: 2019-10-16 | Stop reason: HOSPADM

## 2019-10-16 RX ORDER — NALOXONE HYDROCHLORIDE 0.4 MG/ML
.1-.4 INJECTION, SOLUTION INTRAMUSCULAR; INTRAVENOUS; SUBCUTANEOUS
Status: DISCONTINUED | OUTPATIENT
Start: 2019-10-16 | End: 2019-10-16 | Stop reason: HOSPADM

## 2019-10-16 RX ORDER — ACETAMINOPHEN 325 MG/1
975 TABLET ORAL EVERY 8 HOURS
Status: COMPLETED | OUTPATIENT
Start: 2019-10-16 | End: 2019-10-19

## 2019-10-16 RX ORDER — LIDOCAINE 40 MG/G
CREAM TOPICAL
Status: DISCONTINUED | OUTPATIENT
Start: 2019-10-16 | End: 2019-10-25 | Stop reason: HOSPADM

## 2019-10-16 RX ORDER — BACLOFEN 10 MG/1
10 TABLET ORAL 3 TIMES DAILY
Status: DISCONTINUED | OUTPATIENT
Start: 2019-10-16 | End: 2019-10-25 | Stop reason: HOSPADM

## 2019-10-16 RX ORDER — ONDANSETRON 2 MG/ML
INJECTION INTRAMUSCULAR; INTRAVENOUS PRN
Status: DISCONTINUED | OUTPATIENT
Start: 2019-10-16 | End: 2019-10-16

## 2019-10-16 RX ORDER — FENTANYL CITRATE 50 UG/ML
INJECTION, SOLUTION INTRAMUSCULAR; INTRAVENOUS PRN
Status: DISCONTINUED | OUTPATIENT
Start: 2019-10-16 | End: 2019-10-16

## 2019-10-16 RX ORDER — HYDROXYZINE HYDROCHLORIDE 25 MG/1
25 TABLET, FILM COATED ORAL EVERY 6 HOURS PRN
Status: DISCONTINUED | OUTPATIENT
Start: 2019-10-16 | End: 2019-10-25 | Stop reason: HOSPADM

## 2019-10-16 RX ORDER — DICYCLOMINE HCL 20 MG
20 TABLET ORAL 2 TIMES DAILY
Status: DISCONTINUED | OUTPATIENT
Start: 2019-10-16 | End: 2019-10-16

## 2019-10-16 RX ORDER — ONDANSETRON 2 MG/ML
4 INJECTION INTRAMUSCULAR; INTRAVENOUS EVERY 6 HOURS PRN
Status: DISCONTINUED | OUTPATIENT
Start: 2019-10-16 | End: 2019-10-25 | Stop reason: HOSPADM

## 2019-10-16 RX ORDER — LORAZEPAM 2 MG/ML
0.5 INJECTION INTRAMUSCULAR EVERY 6 HOURS PRN
Status: DISCONTINUED | OUTPATIENT
Start: 2019-10-16 | End: 2019-10-25 | Stop reason: HOSPADM

## 2019-10-16 RX ORDER — CEFAZOLIN SODIUM 1 G/3ML
1 INJECTION, POWDER, FOR SOLUTION INTRAMUSCULAR; INTRAVENOUS SEE ADMIN INSTRUCTIONS
Status: DISCONTINUED | OUTPATIENT
Start: 2019-10-16 | End: 2019-10-16 | Stop reason: HOSPADM

## 2019-10-16 RX ORDER — ONDANSETRON 2 MG/ML
4 INJECTION INTRAMUSCULAR; INTRAVENOUS EVERY 30 MIN PRN
Status: DISCONTINUED | OUTPATIENT
Start: 2019-10-16 | End: 2019-10-16 | Stop reason: HOSPADM

## 2019-10-16 RX ORDER — ONDANSETRON 4 MG/1
4 TABLET, ORALLY DISINTEGRATING ORAL EVERY 6 HOURS PRN
Status: DISCONTINUED | OUTPATIENT
Start: 2019-10-16 | End: 2019-10-25 | Stop reason: HOSPADM

## 2019-10-16 RX ORDER — CEFAZOLIN SODIUM 2 G/100ML
2 INJECTION, SOLUTION INTRAVENOUS
Status: DISCONTINUED | OUTPATIENT
Start: 2019-10-16 | End: 2019-10-16 | Stop reason: HOSPADM

## 2019-10-16 RX ORDER — HYDROMORPHONE HYDROCHLORIDE 2 MG/1
2-4 TABLET ORAL
Status: DISCONTINUED | OUTPATIENT
Start: 2019-10-16 | End: 2019-10-25 | Stop reason: HOSPADM

## 2019-10-16 RX ORDER — SODIUM CHLORIDE 9 MG/ML
INJECTION, SOLUTION INTRAVENOUS CONTINUOUS
Status: DISCONTINUED | OUTPATIENT
Start: 2019-10-16 | End: 2019-10-18

## 2019-10-16 RX ORDER — DEXAMETHASONE SODIUM PHOSPHATE 4 MG/ML
INJECTION, SOLUTION INTRA-ARTICULAR; INTRALESIONAL; INTRAMUSCULAR; INTRAVENOUS; SOFT TISSUE PRN
Status: DISCONTINUED | OUTPATIENT
Start: 2019-10-16 | End: 2019-10-16

## 2019-10-16 RX ORDER — LIDOCAINE 40 MG/G
CREAM TOPICAL
Status: DISCONTINUED | OUTPATIENT
Start: 2019-10-16 | End: 2019-10-16 | Stop reason: HOSPADM

## 2019-10-16 RX ORDER — OXYCODONE HYDROCHLORIDE 5 MG/1
5-10 TABLET ORAL
Status: DISCONTINUED | OUTPATIENT
Start: 2019-10-16 | End: 2019-10-16

## 2019-10-16 RX ORDER — LIDOCAINE HYDROCHLORIDE 20 MG/ML
INJECTION, SOLUTION INFILTRATION; PERINEURAL PRN
Status: DISCONTINUED | OUTPATIENT
Start: 2019-10-16 | End: 2019-10-16

## 2019-10-16 RX ORDER — DICYCLOMINE HCL 20 MG
20 TABLET ORAL 3 TIMES DAILY
Status: DISCONTINUED | OUTPATIENT
Start: 2019-10-16 | End: 2019-10-25 | Stop reason: HOSPADM

## 2019-10-16 RX ORDER — GABAPENTIN 600 MG/1
600 TABLET ORAL 3 TIMES DAILY
Status: DISCONTINUED | OUTPATIENT
Start: 2019-10-16 | End: 2019-10-25 | Stop reason: HOSPADM

## 2019-10-16 RX ORDER — MEPERIDINE HYDROCHLORIDE 25 MG/ML
12.5 INJECTION INTRAMUSCULAR; INTRAVENOUS; SUBCUTANEOUS
Status: DISCONTINUED | OUTPATIENT
Start: 2019-10-16 | End: 2019-10-16 | Stop reason: HOSPADM

## 2019-10-16 RX ORDER — CELECOXIB 100 MG/1
100 CAPSULE ORAL 2 TIMES DAILY
Status: DISCONTINUED | OUTPATIENT
Start: 2019-10-16 | End: 2019-10-25 | Stop reason: HOSPADM

## 2019-10-16 RX ORDER — CEFAZOLIN SODIUM 2 G/100ML
INJECTION, SOLUTION INTRAVENOUS PRN
Status: DISCONTINUED | OUTPATIENT
Start: 2019-10-16 | End: 2019-10-16

## 2019-10-16 RX ORDER — FENTANYL CITRATE 50 UG/ML
25-50 INJECTION, SOLUTION INTRAMUSCULAR; INTRAVENOUS
Status: DISCONTINUED | OUTPATIENT
Start: 2019-10-16 | End: 2019-10-16 | Stop reason: HOSPADM

## 2019-10-16 RX ORDER — LOPERAMIDE HYDROCHLORIDE 2 MG/1
4 TABLET ORAL 4 TIMES DAILY PRN
Status: DISCONTINUED | OUTPATIENT
Start: 2019-10-16 | End: 2019-10-25 | Stop reason: HOSPADM

## 2019-10-16 RX ORDER — ACETAMINOPHEN 325 MG/1
650 TABLET ORAL EVERY 4 HOURS PRN
Status: DISCONTINUED | OUTPATIENT
Start: 2019-10-19 | End: 2019-10-25 | Stop reason: HOSPADM

## 2019-10-16 RX ORDER — HYDROMORPHONE HYDROCHLORIDE 1 MG/ML
.3-.5 INJECTION, SOLUTION INTRAMUSCULAR; INTRAVENOUS; SUBCUTANEOUS EVERY 10 MIN PRN
Status: DISCONTINUED | OUTPATIENT
Start: 2019-10-16 | End: 2019-10-16 | Stop reason: HOSPADM

## 2019-10-16 RX ORDER — PROPOFOL 10 MG/ML
INJECTION, EMULSION INTRAVENOUS PRN
Status: DISCONTINUED | OUTPATIENT
Start: 2019-10-16 | End: 2019-10-16

## 2019-10-16 RX ORDER — TETRACAINE HYDROCHLORIDE 5 MG/ML
2 SOLUTION OPHTHALMIC ONCE
Status: DISCONTINUED | OUTPATIENT
Start: 2019-10-16 | End: 2019-10-16

## 2019-10-16 RX ORDER — HYDROMORPHONE HYDROCHLORIDE 1 MG/ML
.3-.5 INJECTION, SOLUTION INTRAMUSCULAR; INTRAVENOUS; SUBCUTANEOUS
Status: DISCONTINUED | OUTPATIENT
Start: 2019-10-16 | End: 2019-10-23

## 2019-10-16 RX ORDER — PHENAZOPYRIDINE HYDROCHLORIDE 200 MG/1
200 TABLET, FILM COATED ORAL 3 TIMES DAILY PRN
Status: DISCONTINUED | OUTPATIENT
Start: 2019-10-16 | End: 2019-10-25 | Stop reason: HOSPADM

## 2019-10-16 RX ORDER — ASCORBIC ACID 500 MG
1000 TABLET ORAL DAILY
Status: DISCONTINUED | OUTPATIENT
Start: 2019-10-17 | End: 2019-10-25 | Stop reason: HOSPADM

## 2019-10-16 RX ADMIN — ACETAMINOPHEN 975 MG: 325 TABLET, FILM COATED ORAL at 21:24

## 2019-10-16 RX ADMIN — HYDROMORPHONE HYDROCHLORIDE 0.5 MG: 1 INJECTION, SOLUTION INTRAMUSCULAR; INTRAVENOUS; SUBCUTANEOUS at 21:29

## 2019-10-16 RX ADMIN — ONDANSETRON 4 MG: 2 INJECTION INTRAMUSCULAR; INTRAVENOUS at 17:20

## 2019-10-16 RX ADMIN — PROPOFOL 200 MG: 10 INJECTION, EMULSION INTRAVENOUS at 13:44

## 2019-10-16 RX ADMIN — SODIUM CHLORIDE, POTASSIUM CHLORIDE, SODIUM LACTATE AND CALCIUM CHLORIDE: 600; 310; 30; 20 INJECTION, SOLUTION INTRAVENOUS at 12:00

## 2019-10-16 RX ADMIN — ROCURONIUM BROMIDE 50 MG: 10 INJECTION INTRAVENOUS at 13:44

## 2019-10-16 RX ADMIN — Medication 5 MG: at 22:22

## 2019-10-16 RX ADMIN — CEFAZOLIN SODIUM 2 G: 2 INJECTION, SOLUTION INTRAVENOUS at 15:10

## 2019-10-16 RX ADMIN — CEFAZOLIN SODIUM 1 G: 2 INJECTION, SOLUTION INTRAVENOUS at 17:11

## 2019-10-16 RX ADMIN — BACLOFEN 10 MG: 10 TABLET ORAL at 21:24

## 2019-10-16 RX ADMIN — LIDOCAINE HYDROCHLORIDE 40 MG: 20 INJECTION, SOLUTION INFILTRATION; PERINEURAL at 13:44

## 2019-10-16 RX ADMIN — CELECOXIB 100 MG: 100 CAPSULE ORAL at 21:24

## 2019-10-16 RX ADMIN — PHENYLEPHRINE HYDROCHLORIDE 100 MCG: 10 INJECTION INTRAVENOUS at 14:17

## 2019-10-16 RX ADMIN — FENTANYL CITRATE 50 MCG: 50 INJECTION, SOLUTION INTRAMUSCULAR; INTRAVENOUS at 14:13

## 2019-10-16 RX ADMIN — LORAZEPAM 0.5 MG: 2 INJECTION, SOLUTION INTRAMUSCULAR; INTRAVENOUS at 21:24

## 2019-10-16 RX ADMIN — PHENYLEPHRINE HYDROCHLORIDE 100 MCG: 10 INJECTION INTRAVENOUS at 14:29

## 2019-10-16 RX ADMIN — SODIUM CHLORIDE, POTASSIUM CHLORIDE, SODIUM LACTATE AND CALCIUM CHLORIDE: 600; 310; 30; 20 INJECTION, SOLUTION INTRAVENOUS at 14:19

## 2019-10-16 RX ADMIN — GABAPENTIN 600 MG: 600 TABLET, FILM COATED ORAL at 21:24

## 2019-10-16 RX ADMIN — FLUOXETINE 20 MG: 20 CAPSULE ORAL at 21:24

## 2019-10-16 RX ADMIN — DEXAMETHASONE SODIUM PHOSPHATE 6 MG: 4 INJECTION, SOLUTION INTRAMUSCULAR; INTRAVENOUS at 14:13

## 2019-10-16 RX ADMIN — Medication 2 DROP: at 21:50

## 2019-10-16 RX ADMIN — MIDAZOLAM 2 MG: 1 INJECTION INTRAMUSCULAR; INTRAVENOUS at 13:33

## 2019-10-16 ASSESSMENT — MIFFLIN-ST. JEOR: SCORE: 1430.25

## 2019-10-16 ASSESSMENT — LIFESTYLE VARIABLES: TOBACCO_USE: 1

## 2019-10-16 ASSESSMENT — ACTIVITIES OF DAILY LIVING (ADL): ADLS_ACUITY_SCORE: 15

## 2019-10-16 NOTE — OR NURSING
Pt states she thinks somethin is in her left eye.. gibson ross and nicole informed  Have tried cold compress warm compress and saline flush

## 2019-10-16 NOTE — ANESTHESIA POSTPROCEDURE EVALUATION
Anesthesia POST Procedure Evaluation    Patient: Priyanka Martinez   MRN:     [id number] Gender:   female   Age:    48 year old :      1970        Preoperative Diagnosis: Left Ischial Decubitus, Stage 4, Possible Osteomyelitis   Procedure(s):  Debridement Left Ischial Decubitus, Flap,SPY-PHI   Postop Comments: No value filed.       Anesthesia Type:  Not documented  General    Reportable Event: NO     PAIN: Uncomplicated   Sign Out status: Comfortable, Well controlled pain     PONV: No PONV   Sign Out status:  No Nausea or Vomiting     Neuro/Psych: Uneventful perioperative course   Sign Out Status: Preoperative baseline; Age appropriate mentation     Airway/Resp.: Uneventful perioperative course   Sign Out Status: Non labored breathing, age appropriate RR; Resp. Status within EXPECTED Parameters     CV: Uneventful perioperative course   Sign Out status: Appropriate BP and perfusion indices; Appropriate HR/Rhythm     Disposition:   Sign Out in:  PACU  Disposition:  Phase II; Home  Recovery Course: Uneventful  Follow-Up: Not required           Last Anesthesia Record Vitals:  CRNA VITALS  10/16/2019 1702 - 10/16/2019 1802      10/16/2019             Resp Rate (observed):  (!) 1          Last PACU Vitals:  Vitals Value Taken Time   /75 10/16/2019  6:15 PM   Temp 37  C (98.6  F) 10/16/2019  5:45 PM   Pulse 89 10/16/2019  6:15 PM   Resp 10 10/16/2019  6:26 PM   SpO2 95 % 10/16/2019  6:26 PM   Temp src     NIBP     Pulse     SpO2     Resp     Temp     Ht Rate     Temp 2     Vitals shown include unvalidated device data.      Electronically Signed By: Dylan Owens MD, 2019, 6:27 PM   LR: 4/16/18  LV: 10/11/17  NV: 6/6/18

## 2019-10-16 NOTE — OR NURSING
PACU to Inpatient Nursing Handoff    Patient Priyanka Martinez is a 48 year old female who speaks English.   Procedure Procedure(s):  Debridement Left Ischial Decubitus, Flap,SPY-PHI   Surgeon(s) Primary: Cintia Bob MD  Resident - Assisting: Ronny Han MD     Allergies   Allergen Reactions     Hydrocodone Itching       Isolation  [unfilled]     Past Medical History   has a past medical history of Abdominal pain, Diarrhea, Paraplegic immobility syndrome (2003), and Tobacco use disorder (1/30/2012).    Anesthesia General   Dermatome Level     Preop Meds Not applicable   Nerve block Not applicable   Intraop Meds dexamethasone (Decadron)  fentanyl (Sublimaze): 50 mcg total  ondansetron (Zofran): last given at 1720   Local Meds No   Antibiotics cefazolin (Ancef) - last given at 1711     Pain Patient Currently in Pain: denies  Comfort: tolerable with discomfort  Pain Control: partially effective   PACU meds  Not applicable   PCA / epidural No   Capnography     Telemetry ECG Rhythm: Sinus rhythm   Inpatient Telemetry Monitor Ordered? No        Labs Glucose Lab Results   Component Value Date    GLC 89 11/13/2018       Hgb Lab Results   Component Value Date    HGB 9.5 10/09/2019       INR Lab Results   Component Value Date    INR 1.31 11/12/2018      PACU Imaging Not applicable     Wound/Incision Wound (used by OP [facility] only) 01/31/19 0800 Left ischial tuberosity pressure injury (Active)   Number of days: 258       Incision/Surgical Site 10/16/19 Left Buttocks (Active)   Closure Sutures;Staples 10/16/2019  5:46 PM   Dressing Intervention New dressing applied 10/16/2019  5:46 PM   Number of days: 0       Incision/Surgical Site 10/16/19 Posterior;Left Thigh (Active)   Closure Sutures;Staples 10/16/2019  5:46 PM   Dressing Intervention New dressing applied 10/16/2019  5:46 PM   Number of days: 0       Incision/Surgical Site 10/16/19 Left Buttocks (Active)   Dressing Intervention Clean, dry, intact 10/16/2019   5:45 PM   Number of days: 0      CMS        Equipment Not applicable   Other LDA       IV Access Peripheral IV 10/16/19 Left Lower forearm (Active)   Site Assessment WDL 10/16/2019 12:24 PM   Line Status Saline locked 10/16/2019 12:24 PM   Phlebitis Scale 0-->no symptoms 10/16/2019 12:24 PM   Infiltration Scale 0 10/16/2019 12:24 PM   Infiltration Site Treatment Method  None 10/16/2019 12:24 PM   Extravasation? No 10/16/2019 12:24 PM   Dressing Intervention New dressing  10/16/2019 12:24 PM   Number of days: 0      Blood Products Not applicable EBL 10 mL   Intake/Output Date 10/16/19 0700 - 10/17/19 0659   Shift 1538-2497 9152-9044 1792-7422 24 Hour Total   INTAKE   I.V. 1000 550  1550   Shift Total(mL/kg) 1000(14.08) 550(7.75)  1550(21.83)   OUTPUT   Shift Total(mL/kg)       Weight (kg) 71 71 71 71      Drains / Wheeler Closed/Suction Drain 1 Left;Posterior Thigh Bulb 15 [place] 15Fr channel drain (Active)   Site Description UTV 10/16/2019  5:45 PM   Dressing Status Normal: Clean, Dry & Intact 10/16/2019  5:45 PM   Drainage Appearance Bloody/Bright Red 10/16/2019  5:45 PM   Status To bulb suction 10/16/2019  5:45 PM   Number of days: 0       Urethral Catheter (Active)   Tube Description Other (Comment) 10/16/2019 11:42 AM   Collection Container Standard 10/16/2019  5:45 PM   Securement Method Tape 10/16/2019 11:42 AM   Number of days: 340      Time of void PreOp Void Prior to Procedure: 1130(suprapubic catheter) (10/16/19 1142)    PostOp      Diapered? No   Bladder Scan     PO    ice chips     Vitals    B/P: 115/75  T: 98.6  F (37  C)    Temp src: Axillary  P:  Pulse: 89 (10/16/19 1815)    Heart Rate: 87 (10/16/19 1815)     R: 16  O2:  SpO2: 94 %    O2 Device: None (Room air) (10/16/19 1815)    Oxygen Delivery: 8 LPM (10/16/19 1745)         Family/support present mother   Patient belongings     Patient transported on bed   DC meds/scripts (obs/outpt) Not applicable   Inpatient Pain Meds Released? Yes       Special  needs/considerations may only have hob no higher than 30 degree  minimal time on left side   Tasks needing completion None       Lupe Ford, QUETA  [facility]COM [id number]

## 2019-10-16 NOTE — OR NURSING
"C/o left eye stings  Like something is in it   Cool compress ineffective   Sterile saline rinse  With \"I think that helped\"  "

## 2019-10-16 NOTE — PROVIDER NOTIFICATION
Wound left ischial tuberosity,  Dressing intact.  Suprapubic catheter site CDI - open to air.  Healing scab left medial heel with dry and flaky skin.  Tape irritation right thigh from taping suprapubic catheter.
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
## 2019-10-16 NOTE — ANESTHESIA CARE TRANSFER NOTE
Patient: Priyanka Martinez    Procedure(s):  Debridement Left Ischial Decubitus, Possible Flap, Possible VAC, Possible SPY-PHI    Diagnosis: Left Ischial Decubitus, Stage 4, Possible Osteomyelitis  Diagnosis Additional Information: No value filed.    Anesthesia Type:   General     Note:  Airway :Face Mask  Patient transferred to:PACU  Handoff Report: Identifed the Patient, Identified the Reponsible Provider, Reviewed the pertinent medical history, Discussed the surgical course, Reviewed Intra-OP anesthesia mangement and issues during anesthesia, Set expectations for post-procedure period and Allowed opportunity for questions and acknowledgement of understanding      Vitals: (Last set prior to Anesthesia Care Transfer)    CRNA VITALS  10/16/2019 1702 - 10/16/2019 1742      10/16/2019             Resp Rate (observed):  (!) 1                Electronically Signed By: AMY Kenny CRNA  October 16, 2019  5:42 PM

## 2019-10-16 NOTE — BRIEF OP NOTE
[facility] [place]    Brief Operative Note    Pre-operative diagnosis: Left Ischial Decubitus, Stage 4, Possible Osteomyelitis  Post-operative diagnosis Same as pre-operative diagnosis    Procedure: Procedure(s):  Debridement Left Ischial Stage IV Decubitus Ulcer  Bone Culture and Path  Left Posterior Thigh Fasciocutaneous Advancement Flap     Surgeon: Surgeon(s) and Role:     * Cintia Bob MD - Primary     * Ronny Han MD - Resident - Assisting  Anesthesia: General   Estimated blood loss: 75 ml  Drains: Sylvester-Carrasco  Specimens:   ID Type Source Tests Collected by Time Destination   1 : Left ischial bone biopsy. Bone Bone ANAEROBIC BACTERIAL CULTURE, BONE CULTURE AEROBIC BACTERIAL, FUNGUS CULTURE Cintia Bob MD 10/16/2019  3:01 PM    A : left ischial bone biopsy Bone Bone SURGICAL PATHOLOGY EXAM Cintia Bob MD 10/16/2019  3:08 PM      Findings:   clean wound base.  Complications: None.  Implants: * No implants in log *    Plan:  - Inpatient until cultures result.   - Bedrest and flap precautions as ordered.

## 2019-10-17 ENCOUNTER — APPOINTMENT (OUTPATIENT)
Dept: OCCUPATIONAL THERAPY | Facility: CLINIC | Age: 49
DRG: 574 | End: 2019-10-17
Attending: SURGERY
Payer: COMMERCIAL

## 2019-10-17 LAB
ANION GAP SERPL CALCULATED.3IONS-SCNC: 5 MMOL/L (ref 3–14)
BASOPHILS # BLD AUTO: 0 10E9/L (ref 0–0.2)
BASOPHILS NFR BLD AUTO: 0.2 %
BUN SERPL-MCNC: 8 MG/DL (ref 7–30)
CALCIUM SERPL-MCNC: 7.3 MG/DL (ref 8.5–10.1)
CHLORIDE SERPL-SCNC: 109 MMOL/L (ref 94–109)
CO2 SERPL-SCNC: 27 MMOL/L (ref 20–32)
CREAT SERPL-MCNC: 0.3 MG/DL (ref 0.52–1.04)
DIFFERENTIAL METHOD BLD: ABNORMAL
EOSINOPHIL # BLD AUTO: 0 10E9/L (ref 0–0.7)
EOSINOPHIL NFR BLD AUTO: 0 %
ERYTHROCYTE [DISTWIDTH] IN BLOOD BY AUTOMATED COUNT: 16.1 % (ref 10–15)
GFR SERPL CREATININE-BSD FRML MDRD: >90 ML/MIN/{1.73_M2}
GLUCOSE SERPL-MCNC: 100 MG/DL (ref 70–99)
HCT VFR BLD AUTO: 26.3 % (ref 35–47)
HGB BLD-MCNC: 7.7 G/DL (ref 11.7–15.7)
IMM GRANULOCYTES # BLD: 0 10E9/L (ref 0–0.4)
IMM GRANULOCYTES NFR BLD: 0.3 %
LYMPHOCYTES # BLD AUTO: 1.4 10E9/L (ref 0.8–5.3)
LYMPHOCYTES NFR BLD AUTO: 12.6 %
MCH RBC QN AUTO: 27.8 PG (ref 26.5–33)
MCHC RBC AUTO-ENTMCNC: 29.3 G/DL (ref 31.5–36.5)
MCV RBC AUTO: 95 FL (ref 78–100)
MONOCYTES # BLD AUTO: 0.6 10E9/L (ref 0–1.3)
MONOCYTES NFR BLD AUTO: 5 %
NEUTROPHILS # BLD AUTO: 9.3 10E9/L (ref 1.6–8.3)
NEUTROPHILS NFR BLD AUTO: 81.9 %
NRBC # BLD AUTO: 0 10*3/UL
NRBC BLD AUTO-RTO: 0 /100
PLATELET # BLD AUTO: 265 10E9/L (ref 150–450)
POTASSIUM SERPL-SCNC: 4 MMOL/L (ref 3.4–5.3)
RBC # BLD AUTO: 2.77 10E12/L (ref 3.8–5.2)
SODIUM SERPL-SCNC: 141 MMOL/L (ref 133–144)
WBC # BLD AUTO: 11.4 10E9/L (ref 4–11)

## 2019-10-17 PROCEDURE — 25000125 ZZHC RX 250: Performed by: HOSPITALIST

## 2019-10-17 PROCEDURE — 93010 ELECTROCARDIOGRAM REPORT: CPT | Performed by: INTERNAL MEDICINE

## 2019-10-17 PROCEDURE — 80048 BASIC METABOLIC PNL TOTAL CA: CPT | Performed by: STUDENT IN AN ORGANIZED HEALTH CARE EDUCATION/TRAINING PROGRAM

## 2019-10-17 PROCEDURE — 25800030 ZZH RX IP 258 OP 636: Performed by: STUDENT IN AN ORGANIZED HEALTH CARE EDUCATION/TRAINING PROGRAM

## 2019-10-17 PROCEDURE — 25000132 ZZH RX MED GY IP 250 OP 250 PS 637: Performed by: STUDENT IN AN ORGANIZED HEALTH CARE EDUCATION/TRAINING PROGRAM

## 2019-10-17 PROCEDURE — 93005 ELECTROCARDIOGRAM TRACING: CPT

## 2019-10-17 PROCEDURE — 25000132 ZZH RX MED GY IP 250 OP 250 PS 637: Performed by: HOSPITALIST

## 2019-10-17 PROCEDURE — 85025 COMPLETE CBC W/AUTO DIFF WBC: CPT | Performed by: STUDENT IN AN ORGANIZED HEALTH CARE EDUCATION/TRAINING PROGRAM

## 2019-10-17 PROCEDURE — 97110 THERAPEUTIC EXERCISES: CPT | Mod: GO

## 2019-10-17 PROCEDURE — 36415 COLL VENOUS BLD VENIPUNCTURE: CPT | Performed by: STUDENT IN AN ORGANIZED HEALTH CARE EDUCATION/TRAINING PROGRAM

## 2019-10-17 PROCEDURE — 12000001 ZZH R&B MED SURG/OB UMMC

## 2019-10-17 PROCEDURE — 99232 SBSQ HOSP IP/OBS MODERATE 35: CPT | Performed by: HOSPITALIST

## 2019-10-17 PROCEDURE — 25000128 H RX IP 250 OP 636: Performed by: STUDENT IN AN ORGANIZED HEALTH CARE EDUCATION/TRAINING PROGRAM

## 2019-10-17 PROCEDURE — 97165 OT EVAL LOW COMPLEX 30 MIN: CPT | Mod: GO

## 2019-10-17 RX ORDER — ALUMINA, MAGNESIA, AND SIMETHICONE 2400; 2400; 240 MG/30ML; MG/30ML; MG/30ML
15 SUSPENSION ORAL EVERY 4 HOURS PRN
Status: DISCONTINUED | OUTPATIENT
Start: 2019-10-17 | End: 2019-10-25 | Stop reason: HOSPADM

## 2019-10-17 RX ADMIN — BACLOFEN 10 MG: 10 TABLET ORAL at 06:39

## 2019-10-17 RX ADMIN — ERYTHROMYCIN: 5 OINTMENT OPHTHALMIC at 14:10

## 2019-10-17 RX ADMIN — FLUOXETINE 20 MG: 20 CAPSULE ORAL at 19:03

## 2019-10-17 RX ADMIN — Medication 1 CAPSULE: at 16:42

## 2019-10-17 RX ADMIN — DICYCLOMINE HYDROCHLORIDE 20 MG: 20 TABLET ORAL at 12:53

## 2019-10-17 RX ADMIN — HYDROMORPHONE HYDROCHLORIDE 0.5 MG: 1 INJECTION, SOLUTION INTRAMUSCULAR; INTRAVENOUS; SUBCUTANEOUS at 02:15

## 2019-10-17 RX ADMIN — GABAPENTIN 600 MG: 600 TABLET, FILM COATED ORAL at 06:38

## 2019-10-17 RX ADMIN — GABAPENTIN 600 MG: 600 TABLET, FILM COATED ORAL at 19:03

## 2019-10-17 RX ADMIN — ACETAMINOPHEN 975 MG: 325 TABLET, FILM COATED ORAL at 03:41

## 2019-10-17 RX ADMIN — ALUMINUM HYDROXIDE, MAGNESIUM HYDROXIDE, AND DIMETHICONE 15 ML: 400; 400; 40 SUSPENSION ORAL at 16:43

## 2019-10-17 RX ADMIN — GABAPENTIN 600 MG: 600 TABLET, FILM COATED ORAL at 14:10

## 2019-10-17 RX ADMIN — HYDROMORPHONE HYDROCHLORIDE 2 MG: 2 TABLET ORAL at 15:35

## 2019-10-17 RX ADMIN — CELECOXIB 100 MG: 100 CAPSULE ORAL at 06:39

## 2019-10-17 RX ADMIN — ERYTHROMYCIN: 5 OINTMENT OPHTHALMIC at 08:01

## 2019-10-17 RX ADMIN — CELECOXIB 100 MG: 100 CAPSULE ORAL at 19:03

## 2019-10-17 RX ADMIN — SODIUM CHLORIDE: 9 INJECTION, SOLUTION INTRAVENOUS at 16:15

## 2019-10-17 RX ADMIN — Medication 5 MG: at 22:05

## 2019-10-17 RX ADMIN — BACLOFEN 10 MG: 10 TABLET ORAL at 14:10

## 2019-10-17 RX ADMIN — OXYCODONE HYDROCHLORIDE AND ACETAMINOPHEN 1000 MG: 500 TABLET ORAL at 07:57

## 2019-10-17 RX ADMIN — SODIUM CHLORIDE: 9 INJECTION, SOLUTION INTRAVENOUS at 05:49

## 2019-10-17 RX ADMIN — ACETAMINOPHEN 975 MG: 325 TABLET, FILM COATED ORAL at 11:19

## 2019-10-17 RX ADMIN — MEDROXYPROGESTERONE ACETATE 15 MG: 5 TABLET ORAL at 07:57

## 2019-10-17 RX ADMIN — HYDROMORPHONE HYDROCHLORIDE 0.3 MG: 1 INJECTION, SOLUTION INTRAMUSCULAR; INTRAVENOUS; SUBCUTANEOUS at 06:02

## 2019-10-17 RX ADMIN — DICYCLOMINE HYDROCHLORIDE 20 MG: 20 TABLET ORAL at 16:42

## 2019-10-17 RX ADMIN — ACETAMINOPHEN 975 MG: 325 TABLET, FILM COATED ORAL at 19:03

## 2019-10-17 RX ADMIN — BACLOFEN 10 MG: 10 TABLET ORAL at 19:03

## 2019-10-17 RX ADMIN — DICYCLOMINE HYDROCHLORIDE 20 MG: 20 TABLET ORAL at 07:57

## 2019-10-17 RX ADMIN — ENOXAPARIN SODIUM 40 MG: 40 INJECTION SUBCUTANEOUS at 11:19

## 2019-10-17 RX ADMIN — OMEPRAZOLE 20 MG: 20 CAPSULE, DELAYED RELEASE ORAL at 16:42

## 2019-10-17 ASSESSMENT — ACTIVITIES OF DAILY LIVING (ADL)
COGNITION: 0 - NO COGNITION ISSUES REPORTED
ADLS_ACUITY_SCORE: 19
ADLS_ACUITY_SCORE: 19
FALL_HISTORY_WITHIN_LAST_SIX_MONTHS: NO
SWALLOWING: 0-->SWALLOWS FOODS/LIQUIDS WITHOUT DIFFICULTY
RETIRED_COMMUNICATION: 0-->UNDERSTANDS/COMMUNICATES WITHOUT DIFFICULTY
ADLS_ACUITY_SCORE: 14
ADLS_ACUITY_SCORE: 19
BATHING: 0-->INDEPENDENT
TRANSFERRING: 1-->ASSISTIVE EQUIPMENT
DRESS: 0-->INDEPENDENT
RETIRED_EATING: 0-->INDEPENDENT
TOILETING: 0-->INDEPENDENT
ADLS_ACUITY_SCORE: 14
AMBULATION: 1-->ASSISTIVE EQUIPMENT
ADLS_ACUITY_SCORE: 17
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
## 2019-10-17 NOTE — PROGRESS NOTES
Pt arrived to 8A around 1900. A/Ox's 4. Oriented to call light and Room. VSS on room air. Pt rated pain as tolerable. IV dilaudid given for pain. Dressing CDI. CMS to baseline. Denied any nausea, CP, SOB, lightheadedness or dizziness. Pt has a johnson. Pt is bedfast on a WAVE mattress. HOB less then 30 degrees. Turned Q2 HR awake and Q4 overnight. Eye irration better after Eye drops. Resting in bed at this time with call light in reach. Able to make needs known. Continue to monitor.

## 2019-10-17 NOTE — PROVIDER NOTIFICATION
Plastics On call resident paged regarding pt's pain meds. Pt said she does not tolerate Oxycodone d/t severe itching side effect. Pt said she has taken Dilaudid and that has helped in the past with out causing the same reactions. MD ok with change and will place orders.
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
## 2019-10-17 NOTE — PHARMACY-ADMISSION MEDICATION HISTORY
"Admission medication history for the October 16, 2019 admission is complete.     Interview Sources:  patient, Surescripts    Reliability of Source: Reliable    Medication Adherence: Adherent      Changes made to PTA medication list (reason)  Added: psyllium 500 mg po tid before meals  Deleted: none  Changed: Dicyclomine 20 mg po bid>>tid when having meals (per pt), melatonin 5 mg >> 5 mg po HS    Additional medication history information:   Patient is not able to tell me the doses of all the supplements except melatonin 5 mg po HS. However, she stated that she takes them once a day. She has a bottle of Psyllium 500 mg with her. She takes vitamin A capsules not solution.     Prior to Admission Medication List:  Prior to Admission medications    Medication Sig Last Dose Taking? Auth Provider   ACIDOPHILUS LACTOBACILLUS PO Take 1 tablet by mouth daily  Past Week at Unknown time Yes Reported, Patient   ascorbic acid (VITAMIN C) 1000 MG TABS Take 1,000 mg by mouth daily Past Week at Unknown time Yes Unknown, Entered By History   baclofen (LIORESAL) 10 MG tablet Take 1 tablet (10 mg) by mouth 3 times daily 10/16/2019 at 0430 Yes Ayala Limon MD PhD   celecoxib (CELEBREX) 100 MG capsule Take 1 capsule (100 mg) by mouth 2 times daily 10/12/2019 at Unknown time Yes Ayala Limon MD PhD   Cranberry, Vacc oxycoccus, (CRANBERRY EXTRACT) 200 MG CAPS Take 1 capsule by mouth daily  Past Week at Unknown time Yes Reported, Patient   dicyclomine (BENTYL) 20 MG tablet Take 1 tablet (20 mg) by mouth 2 times daily  Patient taking differently: Take 1 tablet by mouth 3 times daily  10/16/2019 at 0430 Yes Ayala Limon MD PhD   FLUoxetine (PROZAC) 20 MG capsule TAKE 1 CAPSULE DAILY 10/15/2019 at 2100 Yes Ayala Limon MD PhD   gabapentin (NEURONTIN) 600 MG tablet TAKE 1 TABLET BY MOUTH THREE TIMES A DAY 10/16/2019 at 0430 Yes Reported, Patient   Gauze Pads & Dressings (BIATAIN ADHESIVE FOAM DRESSING) 4\"X4\" PADS Apply topically daily 10/15/2019 at " Unknown time Yes Unknown, Entered By History   Gary Godwinmomo tad, (GUARANA PO) Take 1 tablet by mouth daily  Past Week at Unknown time Yes Reported, Patient   loperamide (IMODIUM A-D) 2 MG tablet 4 mg daily if needed for Diarrhea.  10/16/2019 at 0430 Yes Reported, Patient   medroxyPROGESTERone (PROVERA) 10 MG tablet TAKE ONE AND ONE-HALF TABLETS by mouth DAILY 10/16/2019 at 0430 Yes Ayala Limon MD PhD   melatonin 5 MG CAPS Take 5 mg by mouth At Bedtime  10/14/2019 at Unknown time Yes Reported, Patient   Menaquinone-7 (VITAMIN K2) 40 MCG TABS Take 1 tablet by mouth daily  Past Week at Unknown time Yes Reported, Patient   Multiple Vitamins-Minerals (MULTIVITAMIN ADULT EXTRA C PO) Take 1 tablet by mouth daily  Past Week at Unknown time Yes Reported, Patient   order for DME [facility] Fax [phone]    Secondary Dressing tegaderm foam adhesive (ref #[id number]) Qty 30    Length of Need: 1 month  Frequency of dressing change: daily 10/15/2019 at Unknown time Yes Cintia Bob MD   order for DME [facility]   p: [phone] f: [phone]  EMAIL to [email or web address]  [email or web address]    Request for group 2 air mattress & semi-electric hospital bed  Ht:177.8 cm  Wt:72.5 kg  Length of need: lifetime    Pt. is wheelchair bound & has been in a comprehensive ulcer treatment program for >2 months. We will follow monthly until wound closure    To obtain medical records:  p: [phone] f: [phone] 10/15/2019 at Unknown time Yes Za Carter PA-C   order for DME Equipment being ordered: Catheter Wheeler 2Way 30cc 18FR, Silicone Coated Latex 10/15/2019 at Unknown time Yes Ayala Limon MD PhD   order for DME Equipment being ordered: Anchoring Device Flexi-Track LG 10/15/2019 at Unknown time Yes Ayala Limon MD PhD   order for DME Equipment being ordered: Insertion Tray Wheeler w/BZK Swab Catheter 10CC, Sterile 10/15/2019 at Unknown time Yes Ayala Limon MD PhD   order for DME  Equipment being ordered: Drain Bag, Kenguard 2000ml. Urinary Bag with Anti-Refulx 10/15/2019 at Unknown time Yes Ayala Limon MD PhD   phenazopyridine (PYRIDIUM) 200 MG tablet Take 1 tablet (200 mg) by mouth 3 times daily as needed for irritation 10/15/2019 at 2100 Yes Ayala Limon MD PhD   psyllium (METAMUCIL/KONSYL) capsule Take 1 capsule by mouth 3 times daily (before meals) Past Week at Unknown time Yes Unknown, Entered By History   Turmeric 400 MG CAPS Take 1 capsule by mouth daily  Past Week at Unknown time Yes Reported, Patient   vitamin A 50,000 units/mL (27,500 mcg/mL) solution  Past Week at Unknown time Yes Reported, Patient       Time spent: 30 minutes    Medication history completed by:   Krissy Saab PharmD, BCPS October 16, 2019

## 2019-10-18 LAB — INTERPRETATION ECG - MUSE: NORMAL

## 2019-10-18 PROCEDURE — 25000132 ZZH RX MED GY IP 250 OP 250 PS 637: Performed by: HOSPITALIST

## 2019-10-18 PROCEDURE — 99232 SBSQ HOSP IP/OBS MODERATE 35: CPT | Performed by: INTERNAL MEDICINE

## 2019-10-18 PROCEDURE — 82947 ASSAY GLUCOSE BLOOD QUANT: CPT | Performed by: SURGERY

## 2019-10-18 PROCEDURE — 25000128 H RX IP 250 OP 636: Performed by: STUDENT IN AN ORGANIZED HEALTH CARE EDUCATION/TRAINING PROGRAM

## 2019-10-18 PROCEDURE — 25800030 ZZH RX IP 258 OP 636: Performed by: STUDENT IN AN ORGANIZED HEALTH CARE EDUCATION/TRAINING PROGRAM

## 2019-10-18 PROCEDURE — 25000132 ZZH RX MED GY IP 250 OP 250 PS 637: Performed by: STUDENT IN AN ORGANIZED HEALTH CARE EDUCATION/TRAINING PROGRAM

## 2019-10-18 PROCEDURE — 25000132 ZZH RX MED GY IP 250 OP 250 PS 637: Performed by: INTERNAL MEDICINE

## 2019-10-18 PROCEDURE — 12000001 ZZH R&B MED SURG/OB UMMC

## 2019-10-18 RX ORDER — TOLTERODINE TARTRATE 2 MG/1
2 TABLET, EXTENDED RELEASE ORAL
Status: DISCONTINUED | OUTPATIENT
Start: 2019-10-18 | End: 2019-10-18

## 2019-10-18 RX ORDER — BISACODYL 10 MG
10 SUPPOSITORY, RECTAL RECTAL DAILY PRN
Status: DISCONTINUED | OUTPATIENT
Start: 2019-10-18 | End: 2019-10-25 | Stop reason: HOSPADM

## 2019-10-18 RX ORDER — TROSPIUM CHLORIDE 20 MG/1
20 TABLET, FILM COATED ORAL
Status: DISCONTINUED | OUTPATIENT
Start: 2019-10-18 | End: 2019-10-25 | Stop reason: HOSPADM

## 2019-10-18 RX ADMIN — OMEPRAZOLE 20 MG: 20 CAPSULE, DELAYED RELEASE ORAL at 07:06

## 2019-10-18 RX ADMIN — GABAPENTIN 600 MG: 600 TABLET, FILM COATED ORAL at 20:49

## 2019-10-18 RX ADMIN — HYDROMORPHONE HYDROCHLORIDE 2 MG: 2 TABLET ORAL at 08:52

## 2019-10-18 RX ADMIN — CELECOXIB 100 MG: 100 CAPSULE ORAL at 07:33

## 2019-10-18 RX ADMIN — DICYCLOMINE HYDROCHLORIDE 20 MG: 20 TABLET ORAL at 08:57

## 2019-10-18 RX ADMIN — MEDROXYPROGESTERONE ACETATE 15 MG: 5 TABLET ORAL at 07:32

## 2019-10-18 RX ADMIN — ACETAMINOPHEN 975 MG: 325 TABLET, FILM COATED ORAL at 03:28

## 2019-10-18 RX ADMIN — CELECOXIB 100 MG: 100 CAPSULE ORAL at 20:49

## 2019-10-18 RX ADMIN — Medication 1 CAPSULE: at 08:57

## 2019-10-18 RX ADMIN — ACETAMINOPHEN 975 MG: 325 TABLET, FILM COATED ORAL at 08:52

## 2019-10-18 RX ADMIN — GABAPENTIN 600 MG: 600 TABLET, FILM COATED ORAL at 07:33

## 2019-10-18 RX ADMIN — GABAPENTIN 600 MG: 600 TABLET, FILM COATED ORAL at 14:34

## 2019-10-18 RX ADMIN — FLUOXETINE 20 MG: 20 CAPSULE ORAL at 20:49

## 2019-10-18 RX ADMIN — BISACODYL 10 MG: 10 SUPPOSITORY RECTAL at 17:20

## 2019-10-18 RX ADMIN — Medication 1 CAPSULE: at 11:32

## 2019-10-18 RX ADMIN — BACLOFEN 10 MG: 10 TABLET ORAL at 14:34

## 2019-10-18 RX ADMIN — DICYCLOMINE HYDROCHLORIDE 20 MG: 20 TABLET ORAL at 11:32

## 2019-10-18 RX ADMIN — OXYCODONE HYDROCHLORIDE AND ACETAMINOPHEN 1000 MG: 500 TABLET ORAL at 07:33

## 2019-10-18 RX ADMIN — ACETAMINOPHEN 975 MG: 325 TABLET, FILM COATED ORAL at 17:19

## 2019-10-18 RX ADMIN — Medication 5 MG: at 20:49

## 2019-10-18 RX ADMIN — BACLOFEN 10 MG: 10 TABLET ORAL at 07:33

## 2019-10-18 RX ADMIN — BACLOFEN 10 MG: 10 TABLET ORAL at 20:49

## 2019-10-18 RX ADMIN — ENOXAPARIN SODIUM 40 MG: 40 INJECTION SUBCUTANEOUS at 11:32

## 2019-10-18 RX ADMIN — SODIUM CHLORIDE: 9 INJECTION, SOLUTION INTRAVENOUS at 02:07

## 2019-10-18 ASSESSMENT — ACTIVITIES OF DAILY LIVING (ADL)
ADLS_ACUITY_SCORE: 19
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
## 2019-10-18 NOTE — PROGRESS NOTES
Plastic Surgery Progress Note    Subjective/Interval History:  No acute events.    Objective:  Temp:  [97.7  F (36.5  C)-98.7  F (37.1  C)] 98.7  F (37.1  C)  Pulse:  [84] 84  Heart Rate:  [83-90] 90  Resp:  [11-14] 11  BP: ()/(47-59) 91/56  SpO2:  [91 %-97 %] 92 %    General appearance: in NAD  Pulm: Non-labored breathing  CV: Hemodynamically stable  Left buttock: Flap pink and viable with 2 sec cap refill. Incisions intact, POLINA drain with serosanguinous output.     Hgb 7.7    Intra-op cultures without any growth so far.     Assessment/Plan:   Priyanka Martinez is a 48 year old female with a history of paraplegia and a stage IV left ischial tuberosity pressure ulcer now s/p posterior thigh flap on 10/16.     - No antibiotic unless cultures become positive.   - Activity restrictions for flap protection with bedrest and sitting restrictions. HOB no greater than 30 degrees.  - Rotate positions Q2 hours while awake, Q4 hours during PMs. AVOID left lateral position on flap. OK to lay supine or right lateral.   - Wave mattress.  - Appreciate Internal Medicine co-management   - Planning discharge home once home mattress is delivered.     Ronny Han, PGY5  [phone]

## 2019-10-19 ENCOUNTER — APPOINTMENT (OUTPATIENT)
Dept: OCCUPATIONAL THERAPY | Facility: CLINIC | Age: 49
DRG: 574 | End: 2019-10-19
Attending: SURGERY
Payer: COMMERCIAL

## 2019-10-19 LAB
ANION GAP SERPL CALCULATED.3IONS-SCNC: 6 MMOL/L (ref 3–14)
BUN SERPL-MCNC: 7 MG/DL (ref 7–30)
CALCIUM SERPL-MCNC: 7.6 MG/DL (ref 8.5–10.1)
CHLORIDE SERPL-SCNC: 110 MMOL/L (ref 94–109)
CO2 SERPL-SCNC: 26 MMOL/L (ref 20–32)
CREAT SERPL-MCNC: 0.34 MG/DL (ref 0.52–1.04)
ERYTHROCYTE [DISTWIDTH] IN BLOOD BY AUTOMATED COUNT: 16.3 % (ref 10–15)
GFR SERPL CREATININE-BSD FRML MDRD: >90 ML/MIN/{1.73_M2}
GLUCOSE SERPL-MCNC: 86 MG/DL (ref 70–99)
HCT VFR BLD AUTO: 28.4 % (ref 35–47)
HGB BLD-MCNC: 8.2 G/DL (ref 11.7–15.7)
MCH RBC QN AUTO: 27.5 PG (ref 26.5–33)
MCHC RBC AUTO-ENTMCNC: 28.9 G/DL (ref 31.5–36.5)
MCV RBC AUTO: 95 FL (ref 78–100)
PLATELET # BLD AUTO: 303 10E9/L (ref 150–450)
POTASSIUM SERPL-SCNC: 4 MMOL/L (ref 3.4–5.3)
RBC # BLD AUTO: 2.98 10E12/L (ref 3.8–5.2)
SODIUM SERPL-SCNC: 142 MMOL/L (ref 133–144)
WBC # BLD AUTO: 7.1 10E9/L (ref 4–11)

## 2019-10-19 PROCEDURE — 80048 BASIC METABOLIC PNL TOTAL CA: CPT | Performed by: INTERNAL MEDICINE

## 2019-10-19 PROCEDURE — 25000128 H RX IP 250 OP 636: Performed by: STUDENT IN AN ORGANIZED HEALTH CARE EDUCATION/TRAINING PROGRAM

## 2019-10-19 PROCEDURE — 25000132 ZZH RX MED GY IP 250 OP 250 PS 637: Performed by: INTERNAL MEDICINE

## 2019-10-19 PROCEDURE — 25000132 ZZH RX MED GY IP 250 OP 250 PS 637: Performed by: STUDENT IN AN ORGANIZED HEALTH CARE EDUCATION/TRAINING PROGRAM

## 2019-10-19 PROCEDURE — 36415 COLL VENOUS BLD VENIPUNCTURE: CPT | Performed by: INTERNAL MEDICINE

## 2019-10-19 PROCEDURE — 85027 COMPLETE CBC AUTOMATED: CPT | Performed by: INTERNAL MEDICINE

## 2019-10-19 PROCEDURE — 25000132 ZZH RX MED GY IP 250 OP 250 PS 637: Performed by: HOSPITALIST

## 2019-10-19 PROCEDURE — 97110 THERAPEUTIC EXERCISES: CPT | Mod: GO

## 2019-10-19 PROCEDURE — 99232 SBSQ HOSP IP/OBS MODERATE 35: CPT | Performed by: INTERNAL MEDICINE

## 2019-10-19 PROCEDURE — 12000001 ZZH R&B MED SURG/OB UMMC

## 2019-10-19 RX ORDER — AMOXICILLIN 250 MG
2 CAPSULE ORAL 2 TIMES DAILY PRN
Status: DISCONTINUED | OUTPATIENT
Start: 2019-10-19 | End: 2019-10-25 | Stop reason: HOSPADM

## 2019-10-19 RX ORDER — BISACODYL 10 MG
10 SUPPOSITORY, RECTAL RECTAL ONCE
Status: COMPLETED | OUTPATIENT
Start: 2019-10-19 | End: 2019-10-19

## 2019-10-19 RX ORDER — POLYETHYLENE GLYCOL 3350 17 G/17G
17 POWDER, FOR SOLUTION ORAL 2 TIMES DAILY
Status: DISCONTINUED | OUTPATIENT
Start: 2019-10-19 | End: 2019-10-25 | Stop reason: HOSPADM

## 2019-10-19 RX ORDER — POLYETHYLENE GLYCOL 3350 17 G/17G
17 POWDER, FOR SOLUTION ORAL DAILY
Status: DISCONTINUED | OUTPATIENT
Start: 2019-10-19 | End: 2019-10-19

## 2019-10-19 RX ADMIN — OXYCODONE HYDROCHLORIDE AND ACETAMINOPHEN 1000 MG: 500 TABLET ORAL at 05:20

## 2019-10-19 RX ADMIN — ENOXAPARIN SODIUM 40 MG: 40 INJECTION SUBCUTANEOUS at 12:06

## 2019-10-19 RX ADMIN — Medication 5 MG: at 22:16

## 2019-10-19 RX ADMIN — Medication 1 CAPSULE: at 05:20

## 2019-10-19 RX ADMIN — POLYETHYLENE GLYCOL 3350 17 G: 17 POWDER, FOR SOLUTION ORAL at 20:23

## 2019-10-19 RX ADMIN — BISACODYL 10 MG: 10 SUPPOSITORY RECTAL at 14:47

## 2019-10-19 RX ADMIN — DICYCLOMINE HYDROCHLORIDE 20 MG: 20 TABLET ORAL at 05:18

## 2019-10-19 RX ADMIN — MEDROXYPROGESTERONE ACETATE 15 MG: 5 TABLET ORAL at 05:18

## 2019-10-19 RX ADMIN — FLUOXETINE 20 MG: 20 CAPSULE ORAL at 20:22

## 2019-10-19 RX ADMIN — DICYCLOMINE HYDROCHLORIDE 20 MG: 20 TABLET ORAL at 12:06

## 2019-10-19 RX ADMIN — BACLOFEN 10 MG: 10 TABLET ORAL at 20:22

## 2019-10-19 RX ADMIN — CELECOXIB 100 MG: 100 CAPSULE ORAL at 20:22

## 2019-10-19 RX ADMIN — BACLOFEN 10 MG: 10 TABLET ORAL at 14:47

## 2019-10-19 RX ADMIN — OMEPRAZOLE 20 MG: 20 CAPSULE, DELAYED RELEASE ORAL at 05:18

## 2019-10-19 RX ADMIN — ACETAMINOPHEN 975 MG: 325 TABLET, FILM COATED ORAL at 08:59

## 2019-10-19 RX ADMIN — GABAPENTIN 600 MG: 600 TABLET, FILM COATED ORAL at 05:20

## 2019-10-19 RX ADMIN — ACETAMINOPHEN 650 MG: 325 TABLET, FILM COATED ORAL at 22:16

## 2019-10-19 RX ADMIN — GABAPENTIN 600 MG: 600 TABLET, FILM COATED ORAL at 20:22

## 2019-10-19 RX ADMIN — CELECOXIB 100 MG: 100 CAPSULE ORAL at 05:20

## 2019-10-19 RX ADMIN — ACETAMINOPHEN 650 MG: 325 TABLET, FILM COATED ORAL at 17:13

## 2019-10-19 RX ADMIN — DICYCLOMINE HYDROCHLORIDE 20 MG: 20 TABLET ORAL at 18:46

## 2019-10-19 RX ADMIN — POLYETHYLENE GLYCOL 3350 17 G: 17 POWDER, FOR SOLUTION ORAL at 08:59

## 2019-10-19 RX ADMIN — ACETAMINOPHEN 975 MG: 325 TABLET, FILM COATED ORAL at 01:41

## 2019-10-19 RX ADMIN — BACLOFEN 10 MG: 10 TABLET ORAL at 05:20

## 2019-10-19 RX ADMIN — GABAPENTIN 600 MG: 600 TABLET, FILM COATED ORAL at 14:47

## 2019-10-19 RX ADMIN — TROSPIUM CHLORIDE 20 MG: 20 TABLET, FILM COATED ORAL at 05:35

## 2019-10-19 RX ADMIN — TROSPIUM CHLORIDE 20 MG: 20 TABLET, FILM COATED ORAL at 17:13

## 2019-10-19 ASSESSMENT — ACTIVITIES OF DAILY LIVING (ADL)
ADLS_ACUITY_SCORE: 19
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
## 2019-10-19 NOTE — PROGRESS NOTES
"Plastic Surgery Progress Note    Subjective/Interval History:  No acute events.    Objective:  /74 (BP Location: Right arm)   Pulse 95   Temp 99.1  F (37.3  C) (Oral)   Resp 16   Ht 1.778 m (5' 10\")   Wt 71 kg (156 lb 8.4 oz)   SpO2 96%   BMI 22.46 kg/m      General appearance: in NAD, resting in bed  Pulm: Non-labored breathing  CV: Hemodynamically stable  Left buttock: Flap pink and viable with 2 sec cap refill. Incisions intact, POLINA drain with minimal serosanguinous output.     Intra-op cultures NGTD    Assessment/Plan:   Priyanka Martinez is a 48 year old female with a history of paraplegia and a stage IV left ischial tuberosity pressure ulcer now s/p posterior thigh flap on 10/16.     - No antibiotic unless cultures become positive.   - Activity restrictions for flap protection: Bedrest and no sitting. HOB no greater than 30 degrees.  - Rotate positions Q2 hours while awake, Q4 hours during PMs. AVOID left lateral position on flap. OK to lay supine or right lateral.   - Wave mattress.  - Appreciate Internal Medicine co-management   - Planning discharge home once home mattress is delivered and cultures are finalized.     Ulises Gomez MD   Surgery PGY-4    "

## 2019-10-19 NOTE — PROGRESS NOTES
Plastic Surgery Progress Note    Subjective/Interval History:  Supra-pubic catheter appears to be clogged.     Objective:  Temp:  [97.3  F (36.3  C)-98.3  F (36.8  C)] 97.3  F (36.3  C)  Pulse:  [87] 87  Heart Rate:  [] 101  Resp:  [16] 16  BP: ()/(46-64) 96/62  SpO2:  [94 %-95 %] 94 %    General appearance: in NAD  Pulm: Non-labored breathing  CV: Hemodynamically stable  Left buttock: Flap pink and viable with 2 sec cap refill. Incisions intact, POLINA drain with serosanguinous output.     Intra-op cultures without any growth so far.     Assessment/Plan:   Priyanka Martinez is a 48 year old female with a history of paraplegia and a stage IV left ischial tuberosity pressure ulcer now s/p posterior thigh flap on 10/16.     - Supra-pubic catheter to be replaced tonight.  - No antibiotic unless cultures become positive.   - Activity restrictions for flap protection: Bedrest and no sitting. HOB no greater than 30 degrees.  - Rotate positions Q2 hours while awake, Q4 hours during PMs. AVOID left lateral position on flap. OK to lay supine or right lateral.   - Wave mattress.  - Appreciate Internal Medicine co-management   - Planning discharge home once home mattress is delivered and cultures are finalized.     Ronny Han, PGY5  [phone]

## 2019-10-20 PROCEDURE — 99207 ZZC CDG-MDM COMPONENT: MEETS MODERATE - UP CODED: CPT | Performed by: INTERNAL MEDICINE

## 2019-10-20 PROCEDURE — 25000132 ZZH RX MED GY IP 250 OP 250 PS 637: Performed by: STUDENT IN AN ORGANIZED HEALTH CARE EDUCATION/TRAINING PROGRAM

## 2019-10-20 PROCEDURE — 25000132 ZZH RX MED GY IP 250 OP 250 PS 637: Performed by: HOSPITALIST

## 2019-10-20 PROCEDURE — 25000132 ZZH RX MED GY IP 250 OP 250 PS 637: Performed by: INTERNAL MEDICINE

## 2019-10-20 PROCEDURE — 25000128 H RX IP 250 OP 636: Performed by: STUDENT IN AN ORGANIZED HEALTH CARE EDUCATION/TRAINING PROGRAM

## 2019-10-20 PROCEDURE — 12000001 ZZH R&B MED SURG/OB UMMC

## 2019-10-20 PROCEDURE — 99232 SBSQ HOSP IP/OBS MODERATE 35: CPT | Performed by: INTERNAL MEDICINE

## 2019-10-20 RX ADMIN — MEDROXYPROGESTERONE ACETATE 15 MG: 5 TABLET ORAL at 05:46

## 2019-10-20 RX ADMIN — FLUOXETINE 20 MG: 20 CAPSULE ORAL at 19:58

## 2019-10-20 RX ADMIN — OXYCODONE HYDROCHLORIDE AND ACETAMINOPHEN 1000 MG: 500 TABLET ORAL at 05:46

## 2019-10-20 RX ADMIN — TROSPIUM CHLORIDE 20 MG: 20 TABLET, FILM COATED ORAL at 16:30

## 2019-10-20 RX ADMIN — TROSPIUM CHLORIDE 20 MG: 20 TABLET, FILM COATED ORAL at 05:46

## 2019-10-20 RX ADMIN — ACETAMINOPHEN 650 MG: 325 TABLET, FILM COATED ORAL at 19:58

## 2019-10-20 RX ADMIN — GABAPENTIN 600 MG: 600 TABLET, FILM COATED ORAL at 19:58

## 2019-10-20 RX ADMIN — Medication 1 CAPSULE: at 05:46

## 2019-10-20 RX ADMIN — BACLOFEN 10 MG: 10 TABLET ORAL at 05:46

## 2019-10-20 RX ADMIN — CELECOXIB 100 MG: 100 CAPSULE ORAL at 19:58

## 2019-10-20 RX ADMIN — OMEPRAZOLE 20 MG: 20 CAPSULE, DELAYED RELEASE ORAL at 05:46

## 2019-10-20 RX ADMIN — POLYETHYLENE GLYCOL 3350 17 G: 17 POWDER, FOR SOLUTION ORAL at 08:31

## 2019-10-20 RX ADMIN — BACLOFEN 10 MG: 10 TABLET ORAL at 19:58

## 2019-10-20 RX ADMIN — HYDROMORPHONE HYDROCHLORIDE 2 MG: 2 TABLET ORAL at 01:17

## 2019-10-20 RX ADMIN — ACETAMINOPHEN 650 MG: 325 TABLET, FILM COATED ORAL at 16:30

## 2019-10-20 RX ADMIN — CELECOXIB 100 MG: 100 CAPSULE ORAL at 05:46

## 2019-10-20 RX ADMIN — ENOXAPARIN SODIUM 40 MG: 40 INJECTION SUBCUTANEOUS at 11:36

## 2019-10-20 RX ADMIN — DICYCLOMINE HYDROCHLORIDE 20 MG: 20 TABLET ORAL at 05:46

## 2019-10-20 RX ADMIN — GABAPENTIN 600 MG: 600 TABLET, FILM COATED ORAL at 05:46

## 2019-10-20 RX ADMIN — GABAPENTIN 600 MG: 600 TABLET, FILM COATED ORAL at 14:01

## 2019-10-20 RX ADMIN — DICYCLOMINE HYDROCHLORIDE 20 MG: 20 TABLET ORAL at 11:36

## 2019-10-20 RX ADMIN — BACLOFEN 10 MG: 10 TABLET ORAL at 14:01

## 2019-10-20 RX ADMIN — DICYCLOMINE HYDROCHLORIDE 20 MG: 20 TABLET ORAL at 16:30

## 2019-10-20 ASSESSMENT — ACTIVITIES OF DAILY LIVING (ADL)
ADLS_ACUITY_SCORE: 19

## 2019-10-20 NOTE — PROGRESS NOTES
"Plastic Surgery Progress Note    Subjective/Interval History:  No acute events. Feels a bit tired.    Objective:  /69 (BP Location: Right arm)   Pulse 95   Temp 98.1  F (36.7  C) (Oral)   Resp 16   Ht 1.778 m (5' 10\")   Wt 71 kg (156 lb 8.4 oz)   SpO2 96%   BMI 22.46 kg/m      General appearance: in NAD, resting in bed  Pulm: Non-labored breathing  CV: Hemodynamically stable  Left buttock: Flap pink and viable with 2 sec cap refill. Incisions intact, POLINA drain with minimal serosanguinous output.     Intra-op cultures NGTD    Assessment/Plan:   Priyanka Martinez is a 48 year old female with a history of paraplegia and a stage IV left ischial tuberosity pressure ulcer now s/p posterior thigh flap on 10/16.     - No antibiotic unless cultures become positive.   - Activity restrictions for flap protection: Bedrest and no sitting. HOB no greater than 30 degrees.  - Rotate positions Q2 hours while awake, Q4 hours during PMs. AVOID left lateral position on flap. OK to lay supine or right lateral.   - Wave mattress.  - Appreciate Internal Medicine co-management   - Planning discharge home once home mattress is delivered and cultures are finalized.     Ulises Gomez MD   Surgery PGY-4    "
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
## 2019-10-21 LAB
ALBUMIN UR-MCNC: 30 MG/DL
AMORPH CRY #/AREA URNS HPF: ABNORMAL /HPF
APPEARANCE UR: ABNORMAL
BACTERIA #/AREA URNS HPF: ABNORMAL /HPF
BILIRUB UR QL STRIP: NEGATIVE
COLOR UR AUTO: YELLOW
COPATH REPORT: NORMAL
GLUCOSE UR STRIP-MCNC: NEGATIVE MG/DL
HGB UR QL STRIP: ABNORMAL
KETONES UR STRIP-MCNC: NEGATIVE MG/DL
LEUKOCYTE ESTERASE UR QL STRIP: ABNORMAL
MUCOUS THREADS #/AREA URNS LPF: PRESENT /LPF
NITRATE UR QL: NEGATIVE
PH UR STRIP: 6.5 PH (ref 5–7)
RBC #/AREA URNS AUTO: 71 /HPF (ref 0–2)
SOURCE: ABNORMAL
SP GR UR STRIP: 1.02 (ref 1–1.03)
UROBILINOGEN UR STRIP-MCNC: 2 MG/DL (ref 0–2)
WBC #/AREA URNS AUTO: >182 /HPF (ref 0–5)
WBC CLUMPS #/AREA URNS HPF: PRESENT /HPF
YEAST #/AREA URNS HPF: ABNORMAL /HPF

## 2019-10-21 PROCEDURE — 87086 URINE CULTURE/COLONY COUNT: CPT | Performed by: SURGERY

## 2019-10-21 PROCEDURE — 25000132 ZZH RX MED GY IP 250 OP 250 PS 637: Performed by: STUDENT IN AN ORGANIZED HEALTH CARE EDUCATION/TRAINING PROGRAM

## 2019-10-21 PROCEDURE — 81001 URINALYSIS AUTO W/SCOPE: CPT | Performed by: INTERNAL MEDICINE

## 2019-10-21 PROCEDURE — 99232 SBSQ HOSP IP/OBS MODERATE 35: CPT | Performed by: INTERNAL MEDICINE

## 2019-10-21 PROCEDURE — 87106 FUNGI IDENTIFICATION YEAST: CPT | Performed by: SURGERY

## 2019-10-21 PROCEDURE — 25000132 ZZH RX MED GY IP 250 OP 250 PS 637: Performed by: HOSPITALIST

## 2019-10-21 PROCEDURE — 87186 SC STD MICRODIL/AGAR DIL: CPT | Performed by: SURGERY

## 2019-10-21 PROCEDURE — 25000128 H RX IP 250 OP 636: Performed by: INTERNAL MEDICINE

## 2019-10-21 PROCEDURE — 25000132 ZZH RX MED GY IP 250 OP 250 PS 637: Performed by: INTERNAL MEDICINE

## 2019-10-21 PROCEDURE — 25000128 H RX IP 250 OP 636: Performed by: STUDENT IN AN ORGANIZED HEALTH CARE EDUCATION/TRAINING PROGRAM

## 2019-10-21 PROCEDURE — 12000001 ZZH R&B MED SURG/OB UMMC

## 2019-10-21 PROCEDURE — 87088 URINE BACTERIA CULTURE: CPT | Performed by: SURGERY

## 2019-10-21 RX ORDER — ERTAPENEM 1 G/1
1 INJECTION, POWDER, LYOPHILIZED, FOR SOLUTION INTRAMUSCULAR; INTRAVENOUS EVERY 24 HOURS
Status: DISCONTINUED | OUTPATIENT
Start: 2019-10-21 | End: 2019-10-22

## 2019-10-21 RX ORDER — SODIUM CHLORIDE 9 MG/ML
INJECTION, SOLUTION INTRAVENOUS
Status: DISPENSED
Start: 2019-10-21 | End: 2019-10-22

## 2019-10-21 RX ADMIN — BISACODYL 10 MG: 10 SUPPOSITORY RECTAL at 12:13

## 2019-10-21 RX ADMIN — GABAPENTIN 600 MG: 600 TABLET, FILM COATED ORAL at 13:36

## 2019-10-21 RX ADMIN — DICYCLOMINE HYDROCHLORIDE 20 MG: 20 TABLET ORAL at 05:40

## 2019-10-21 RX ADMIN — MEDROXYPROGESTERONE ACETATE 15 MG: 5 TABLET ORAL at 05:40

## 2019-10-21 RX ADMIN — ACETAMINOPHEN 650 MG: 325 TABLET, FILM COATED ORAL at 13:36

## 2019-10-21 RX ADMIN — BACLOFEN 10 MG: 10 TABLET ORAL at 13:36

## 2019-10-21 RX ADMIN — TROSPIUM CHLORIDE 20 MG: 20 TABLET, FILM COATED ORAL at 16:15

## 2019-10-21 RX ADMIN — OMEPRAZOLE 20 MG: 20 CAPSULE, DELAYED RELEASE ORAL at 05:40

## 2019-10-21 RX ADMIN — GABAPENTIN 600 MG: 600 TABLET, FILM COATED ORAL at 05:40

## 2019-10-21 RX ADMIN — GABAPENTIN 600 MG: 600 TABLET, FILM COATED ORAL at 20:05

## 2019-10-21 RX ADMIN — ACETAMINOPHEN 650 MG: 325 TABLET, FILM COATED ORAL at 21:14

## 2019-10-21 RX ADMIN — ENOXAPARIN SODIUM 40 MG: 40 INJECTION SUBCUTANEOUS at 11:59

## 2019-10-21 RX ADMIN — ACETAMINOPHEN 650 MG: 325 TABLET, FILM COATED ORAL at 17:17

## 2019-10-21 RX ADMIN — OXYCODONE HYDROCHLORIDE AND ACETAMINOPHEN 1000 MG: 500 TABLET ORAL at 05:40

## 2019-10-21 RX ADMIN — CELECOXIB 100 MG: 100 CAPSULE ORAL at 05:40

## 2019-10-21 RX ADMIN — TROSPIUM CHLORIDE 20 MG: 20 TABLET, FILM COATED ORAL at 05:40

## 2019-10-21 RX ADMIN — BACLOFEN 10 MG: 10 TABLET ORAL at 05:40

## 2019-10-21 RX ADMIN — FLUOXETINE 20 MG: 20 CAPSULE ORAL at 20:05

## 2019-10-21 RX ADMIN — BACLOFEN 10 MG: 10 TABLET ORAL at 20:05

## 2019-10-21 RX ADMIN — DICYCLOMINE HYDROCHLORIDE 20 MG: 20 TABLET ORAL at 11:58

## 2019-10-21 RX ADMIN — CELECOXIB 100 MG: 100 CAPSULE ORAL at 20:05

## 2019-10-21 RX ADMIN — ERTAPENEM SODIUM 1 G: 1 INJECTION, POWDER, LYOPHILIZED, FOR SOLUTION INTRAMUSCULAR; INTRAVENOUS at 17:17

## 2019-10-21 ASSESSMENT — ACTIVITIES OF DAILY LIVING (ADL)
ADLS_ACUITY_SCORE: 19
ADLS_ACUITY_SCORE: 21
ADLS_ACUITY_SCORE: 19
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
## 2019-10-22 ENCOUNTER — APPOINTMENT (OUTPATIENT)
Dept: OCCUPATIONAL THERAPY | Facility: CLINIC | Age: 49
DRG: 574 | End: 2019-10-22
Attending: SURGERY
Payer: COMMERCIAL

## 2019-10-22 LAB
BACTERIA SPEC CULT: ABNORMAL
BACTERIA SPEC CULT: ABNORMAL
PLATELET # BLD AUTO: 330 10E9/L (ref 150–450)
SPECIMEN SOURCE: ABNORMAL

## 2019-10-22 PROCEDURE — 25000128 H RX IP 250 OP 636: Performed by: INTERNAL MEDICINE

## 2019-10-22 PROCEDURE — 36415 COLL VENOUS BLD VENIPUNCTURE: CPT | Performed by: STUDENT IN AN ORGANIZED HEALTH CARE EDUCATION/TRAINING PROGRAM

## 2019-10-22 PROCEDURE — 99232 SBSQ HOSP IP/OBS MODERATE 35: CPT | Performed by: INTERNAL MEDICINE

## 2019-10-22 PROCEDURE — 12000001 ZZH R&B MED SURG/OB UMMC

## 2019-10-22 PROCEDURE — 25000132 ZZH RX MED GY IP 250 OP 250 PS 637: Performed by: HOSPITALIST

## 2019-10-22 PROCEDURE — 99207 ZZC CDG-MDM COMPONENT: MEETS MODERATE - UP CODED: CPT | Performed by: INTERNAL MEDICINE

## 2019-10-22 PROCEDURE — 25000132 ZZH RX MED GY IP 250 OP 250 PS 637: Performed by: STUDENT IN AN ORGANIZED HEALTH CARE EDUCATION/TRAINING PROGRAM

## 2019-10-22 PROCEDURE — 25000128 H RX IP 250 OP 636: Performed by: STUDENT IN AN ORGANIZED HEALTH CARE EDUCATION/TRAINING PROGRAM

## 2019-10-22 PROCEDURE — 97110 THERAPEUTIC EXERCISES: CPT | Mod: GO | Performed by: OCCUPATIONAL THERAPIST

## 2019-10-22 PROCEDURE — 85049 AUTOMATED PLATELET COUNT: CPT | Performed by: STUDENT IN AN ORGANIZED HEALTH CARE EDUCATION/TRAINING PROGRAM

## 2019-10-22 PROCEDURE — 25000132 ZZH RX MED GY IP 250 OP 250 PS 637: Performed by: INTERNAL MEDICINE

## 2019-10-22 RX ORDER — SACCHAROMYCES BOULARDII 250 MG
250 CAPSULE ORAL 2 TIMES DAILY
Status: DISCONTINUED | OUTPATIENT
Start: 2019-10-22 | End: 2019-10-25 | Stop reason: HOSPADM

## 2019-10-22 RX ORDER — AMPICILLIN AND SULBACTAM 2; 1 G/1; G/1
3 INJECTION, POWDER, FOR SOLUTION INTRAMUSCULAR; INTRAVENOUS EVERY 6 HOURS
Status: DISCONTINUED | OUTPATIENT
Start: 2019-10-22 | End: 2019-10-23 | Stop reason: ALTCHOICE

## 2019-10-22 RX ORDER — LIDOCAINE 40 MG/G
CREAM TOPICAL
Status: DISCONTINUED | OUTPATIENT
Start: 2019-10-22 | End: 2019-10-25 | Stop reason: HOSPADM

## 2019-10-22 RX ORDER — HEPARIN SODIUM,PORCINE 10 UNIT/ML
2-5 VIAL (ML) INTRAVENOUS
Status: DISCONTINUED | OUTPATIENT
Start: 2019-10-22 | End: 2019-10-25 | Stop reason: HOSPADM

## 2019-10-22 RX ADMIN — ACETAMINOPHEN 650 MG: 325 TABLET, FILM COATED ORAL at 12:47

## 2019-10-22 RX ADMIN — DICYCLOMINE HYDROCHLORIDE 20 MG: 20 TABLET ORAL at 17:30

## 2019-10-22 RX ADMIN — BACLOFEN 10 MG: 10 TABLET ORAL at 14:11

## 2019-10-22 RX ADMIN — MEDROXYPROGESTERONE ACETATE 15 MG: 5 TABLET ORAL at 05:47

## 2019-10-22 RX ADMIN — DICYCLOMINE HYDROCHLORIDE 20 MG: 20 TABLET ORAL at 05:38

## 2019-10-22 RX ADMIN — TROSPIUM CHLORIDE 20 MG: 20 TABLET, FILM COATED ORAL at 05:46

## 2019-10-22 RX ADMIN — CELECOXIB 100 MG: 100 CAPSULE ORAL at 05:37

## 2019-10-22 RX ADMIN — Medication 5 MG: at 21:13

## 2019-10-22 RX ADMIN — AMPICILLIN SODIUM AND SULBACTAM SODIUM 3 G: 2; 1 INJECTION, POWDER, FOR SOLUTION INTRAMUSCULAR; INTRAVENOUS at 12:34

## 2019-10-22 RX ADMIN — TROSPIUM CHLORIDE 20 MG: 20 TABLET, FILM COATED ORAL at 17:30

## 2019-10-22 RX ADMIN — OMEPRAZOLE 20 MG: 20 CAPSULE, DELAYED RELEASE ORAL at 05:46

## 2019-10-22 RX ADMIN — BACLOFEN 10 MG: 10 TABLET ORAL at 21:13

## 2019-10-22 RX ADMIN — ENOXAPARIN SODIUM 40 MG: 40 INJECTION SUBCUTANEOUS at 12:40

## 2019-10-22 RX ADMIN — AMPICILLIN SODIUM AND SULBACTAM SODIUM 3 G: 2; 1 INJECTION, POWDER, FOR SOLUTION INTRAMUSCULAR; INTRAVENOUS at 23:33

## 2019-10-22 RX ADMIN — Medication 250 MG: at 21:13

## 2019-10-22 RX ADMIN — AMPICILLIN SODIUM AND SULBACTAM SODIUM 3 G: 2; 1 INJECTION, POWDER, FOR SOLUTION INTRAMUSCULAR; INTRAVENOUS at 17:35

## 2019-10-22 RX ADMIN — FLUOXETINE 20 MG: 20 CAPSULE ORAL at 21:13

## 2019-10-22 RX ADMIN — Medication 250 MG: at 14:55

## 2019-10-22 RX ADMIN — DICYCLOMINE HYDROCHLORIDE 20 MG: 20 TABLET ORAL at 12:40

## 2019-10-22 RX ADMIN — GABAPENTIN 600 MG: 600 TABLET, FILM COATED ORAL at 14:11

## 2019-10-22 RX ADMIN — Medication 1 CAPSULE: at 12:40

## 2019-10-22 RX ADMIN — GABAPENTIN 600 MG: 600 TABLET, FILM COATED ORAL at 05:37

## 2019-10-22 RX ADMIN — CELECOXIB 100 MG: 100 CAPSULE ORAL at 21:13

## 2019-10-22 RX ADMIN — BACLOFEN 10 MG: 10 TABLET ORAL at 05:37

## 2019-10-22 RX ADMIN — OXYCODONE HYDROCHLORIDE AND ACETAMINOPHEN 1000 MG: 500 TABLET ORAL at 05:46

## 2019-10-22 RX ADMIN — GABAPENTIN 600 MG: 600 TABLET, FILM COATED ORAL at 21:13

## 2019-10-22 ASSESSMENT — ACTIVITIES OF DAILY LIVING (ADL)
ADLS_ACUITY_SCORE: 20
ADLS_ACUITY_SCORE: 21
ADLS_ACUITY_SCORE: 21
ADLS_ACUITY_SCORE: 19
ADLS_ACUITY_SCORE: 19
ADLS_ACUITY_SCORE: 21
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
## 2019-10-22 NOTE — PROGRESS NOTES
Patient on room air with documented SATS and in no apparent distress. Will continue to monitor.    This is a recent snapshot of the patient's [facility] medical record.  For current drug dose and complete information and questions, call [phone] or In [facility] pool, fv home infusion ([id number])  CSN Number:  [id number]
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
## 2019-10-22 NOTE — PROGRESS NOTES
PLASTICS ATTENDING    Patient seen last evening.  Feeling a bit punk.  Does not think it's related to her first dose of antibiotics for intraop bone cultures.  Did finally manage to have a bowel movement after several days of trying.  Otherwise was doing pretty well.    Tmax 99.3.   Cultures - P.acnes day #4 broth only. May also have UTI.  Hb - 8.2  POLINA output = 13, 20 last couple days. Serosanguinous.    PE: flap looks great. Incisions intact. No signs of ischemia or dehiscence. Minimal edema. POLINA patent and secured, serosang.     A/P: continue current cares with bedrest on specialty mattress. Continue abx as per ID.   Still awaiting delivery of bariatric bed frame from [facility] for continuing bedrest limitations at home with family caregivers.  Will need [facility] transport to and from home and hospital and clinic. This may be an out of pocket expense.   Appreciate hospitalist and other consulting services assisting with day to day management and cares.

## 2019-10-22 NOTE — PROGRESS NOTES
"Plastic Surgery Progress Note    Subjective/Interval History:  Feeling well.     Objective:  /69   Pulse 91   Temp 98.6  F (37  C)   Resp 16   Ht 1.778 m (5' 10\")   Wt 71 kg (156 lb 8.4 oz)   SpO2 96%   BMI 22.46 kg/m      General appearance: in NAD, resting in bed  Pulm: Non-labored breathing  CV: Hemodynamically stable  Left buttock: Flap pink and viable with 2 sec cap refill. Incisions intact, POLINA drain with minimal serosanguinous output.     Assessment/Plan:   Priyanka Martinez is a 48 year old female with a history of paraplegia and a stage IV left ischial tuberosity pressure ulcer now s/p posterior thigh flap on 10/16. Bone cultures growing propionibacterium, and urine cultures are positive for E fecalis.     - Appreciate ID recommendations.   - Will need PICC line for long term antibiotics.   - Activity restrictions for flap protection: Bedrest and no sitting. HOB no greater than 30 degrees.  - Rotate positions Q2 hours while awake, Q4 hours during PMs. AVOID left lateral position on flap. OK to lay supine or right lateral.   - Wave mattress.  - Appreciate Internal Medicine co-management   - Planning discharge home once antibiotic plans are finalized.     Ronny Han  Plastic Surgery  [phone]      "

## 2019-10-23 ENCOUNTER — HOME INFUSION (PRE-WILLOW HOME INFUSION) (OUTPATIENT)
Dept: PHARMACY | Facility: CLINIC | Age: 49
End: 2019-10-23

## 2019-10-23 LAB
BACTERIA SPEC CULT: ABNORMAL
Lab: ABNORMAL
SPECIMEN SOURCE: ABNORMAL
SPECIMEN SOURCE: ABNORMAL

## 2019-10-23 PROCEDURE — 12000001 ZZH R&B MED SURG/OB UMMC

## 2019-10-23 PROCEDURE — 99232 SBSQ HOSP IP/OBS MODERATE 35: CPT | Performed by: INTERNAL MEDICINE

## 2019-10-23 PROCEDURE — 99207 ZZC CDG-MDM COMPONENT: MEETS MODERATE - UP CODED: CPT | Performed by: INTERNAL MEDICINE

## 2019-10-23 PROCEDURE — 25000132 ZZH RX MED GY IP 250 OP 250 PS 637: Performed by: INTERNAL MEDICINE

## 2019-10-23 PROCEDURE — 25000132 ZZH RX MED GY IP 250 OP 250 PS 637: Performed by: HOSPITALIST

## 2019-10-23 PROCEDURE — 36573 INSJ PICC RS&I 5 YR+: CPT

## 2019-10-23 PROCEDURE — 25000128 H RX IP 250 OP 636: Performed by: STUDENT IN AN ORGANIZED HEALTH CARE EDUCATION/TRAINING PROGRAM

## 2019-10-23 PROCEDURE — 25000132 ZZH RX MED GY IP 250 OP 250 PS 637: Performed by: STUDENT IN AN ORGANIZED HEALTH CARE EDUCATION/TRAINING PROGRAM

## 2019-10-23 PROCEDURE — 25000128 H RX IP 250 OP 636: Performed by: INTERNAL MEDICINE

## 2019-10-23 PROCEDURE — 25800030 ZZH RX IP 258 OP 636: Performed by: INTERNAL MEDICINE

## 2019-10-23 RX ORDER — SODIUM CHLORIDE 9 MG/ML
INJECTION, SOLUTION INTRAVENOUS
Status: DISPENSED
Start: 2019-10-23 | End: 2019-10-24

## 2019-10-23 RX ORDER — TROSPIUM CHLORIDE 20 MG/1
20 TABLET, FILM COATED ORAL
Qty: 180 TABLET | Refills: 0 | Status: CANCELLED | OUTPATIENT
Start: 2019-10-24 | End: 2020-01-22

## 2019-10-23 RX ADMIN — OMEPRAZOLE 20 MG: 20 CAPSULE, DELAYED RELEASE ORAL at 05:49

## 2019-10-23 RX ADMIN — SODIUM CHLORIDE 4 MILLION UNITS: 9 INJECTION, SOLUTION INTRAVENOUS at 21:34

## 2019-10-23 RX ADMIN — TROSPIUM CHLORIDE 20 MG: 20 TABLET, FILM COATED ORAL at 05:49

## 2019-10-23 RX ADMIN — AMPICILLIN SODIUM AND SULBACTAM SODIUM 3 G: 2; 1 INJECTION, POWDER, FOR SOLUTION INTRAMUSCULAR; INTRAVENOUS at 05:50

## 2019-10-23 RX ADMIN — TROSPIUM CHLORIDE 20 MG: 20 TABLET, FILM COATED ORAL at 16:11

## 2019-10-23 RX ADMIN — Medication 250 MG: at 09:14

## 2019-10-23 RX ADMIN — BACLOFEN 10 MG: 10 TABLET ORAL at 05:49

## 2019-10-23 RX ADMIN — MEDROXYPROGESTERONE ACETATE 15 MG: 5 TABLET ORAL at 05:49

## 2019-10-23 RX ADMIN — SODIUM CHLORIDE 4 MILLION UNITS: 9 INJECTION, SOLUTION INTRAVENOUS at 17:08

## 2019-10-23 RX ADMIN — GABAPENTIN 600 MG: 600 TABLET, FILM COATED ORAL at 16:10

## 2019-10-23 RX ADMIN — BACLOFEN 10 MG: 10 TABLET ORAL at 21:33

## 2019-10-23 RX ADMIN — POLYETHYLENE GLYCOL 3350 17 G: 17 POWDER, FOR SOLUTION ORAL at 09:14

## 2019-10-23 RX ADMIN — DICYCLOMINE HYDROCHLORIDE 20 MG: 20 TABLET ORAL at 11:48

## 2019-10-23 RX ADMIN — GABAPENTIN 600 MG: 600 TABLET, FILM COATED ORAL at 05:49

## 2019-10-23 RX ADMIN — ACETAMINOPHEN 650 MG: 325 TABLET, FILM COATED ORAL at 09:14

## 2019-10-23 RX ADMIN — OXYCODONE HYDROCHLORIDE AND ACETAMINOPHEN 1000 MG: 500 TABLET ORAL at 05:49

## 2019-10-23 RX ADMIN — BACLOFEN 10 MG: 10 TABLET ORAL at 16:10

## 2019-10-23 RX ADMIN — GABAPENTIN 600 MG: 600 TABLET, FILM COATED ORAL at 21:33

## 2019-10-23 RX ADMIN — DICYCLOMINE HYDROCHLORIDE 20 MG: 20 TABLET ORAL at 05:49

## 2019-10-23 RX ADMIN — AMPICILLIN SODIUM AND SULBACTAM SODIUM 3 G: 2; 1 INJECTION, POWDER, FOR SOLUTION INTRAMUSCULAR; INTRAVENOUS at 11:51

## 2019-10-23 RX ADMIN — Medication 250 MG: at 21:33

## 2019-10-23 RX ADMIN — ENOXAPARIN SODIUM 40 MG: 40 INJECTION SUBCUTANEOUS at 11:49

## 2019-10-23 RX ADMIN — FLUOXETINE 20 MG: 20 CAPSULE ORAL at 21:33

## 2019-10-23 RX ADMIN — CELECOXIB 100 MG: 100 CAPSULE ORAL at 21:33

## 2019-10-23 RX ADMIN — CELECOXIB 100 MG: 100 CAPSULE ORAL at 05:49

## 2019-10-23 RX ADMIN — Medication 5 MG: at 21:33

## 2019-10-23 RX ADMIN — DICYCLOMINE HYDROCHLORIDE 20 MG: 20 TABLET ORAL at 16:11

## 2019-10-23 RX ADMIN — BISACODYL 10 MG: 10 SUPPOSITORY RECTAL at 09:36

## 2019-10-23 ASSESSMENT — ACTIVITIES OF DAILY LIVING (ADL)
ADLS_ACUITY_SCORE: 20
ADLS_ACUITY_SCORE: 20
ADLS_ACUITY_SCORE: 19
ADLS_ACUITY_SCORE: 20

## 2019-10-23 NOTE — PROCEDURES
In pt room for PICC placement.  Procedure explained and consent signed.  Single lumen SOLO PICC placed in L brachial vein after unsuccessful attempt in basilic.  Threaded without difficulty to SVC/RA.  Placement confirmed via 3 CG.  PICC is ready for use.

## 2019-10-23 NOTE — PROGRESS NOTES
"VS: Blood pressure 100/68, pulse 99, temperature 98.5  F (36.9  C), temperature source Oral, resp. rate 17, height 1.778 m (5' 10\"), weight 71 kg (156 lb 8.4 oz), SpO2 94 %, not currently breastfeeding.     O2: Room air.    Output: Suprapubic catheter in place, using tape to secure to leg.   Last BM: 10/21/2019   Activity: Paraplegic, turning and repositioning q 2 hrs with some minimal assistance. Pillows in between bony prominences. On Bedrest   Skin: Intact. Incision on left buttock and left thigh DAVID CDI   Pain: No complaints of pain this shifts   CMS: AO x 4. Paraplegic at T10.    Dressing: Incisions CDI and open to air.   Diet: Regular.   LDA: PIV in right hand infusing.   Equipment: IV pump and pole. POLINA drain.   Plan:  Plan to discharge TBD with possible IV ABX.   Additional Info:         "
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
## 2019-10-23 NOTE — PROGRESS NOTES
Therapy:IV Abx  Insurance: [employer](Primary) and Medicare Part A&B (Secondary)    Benefit details listed as follows:    Where Was it Filmed  ------------------------------  Ded: $400  Met: $400    Co-Insurance:10%    Max Out of Pocket: $6600  Met: $2358.72    Once max out of pocket met pt will have coverage at 100% on all eligible expenses for the remainder of the calendar year.    MEDICARE  -----------------  Medicare does not provide coverage for IV Abx in the home.     Please contact Intake with any questions, [phone] or In Basket pool, [facility] ([phone]).    [facility]-In reference to admission on 10/16/2019 to check on IV Abx coverage.

## 2019-10-23 NOTE — CONSULTS
Social Work: Assessment with Discharge Plan    Patient Name:  Cedric Mendoza  :  1970  Age:  48 year old  MRN:  [id number]  Risk/Complexity Score:  Filed Complexity Screen Score: 4  Completed assessment with:  Pt, ZAC CARMENT, Mary Sanchez, RNCC    Presenting Information   Reason for Referral:  Discharge plan  Date of Intake:  2019  Referral Source:  Physician  Decision Maker:  pt  Alternate Decision Maker:  Per policy, pt's NOK: her mother Alina Schilling    Living Situation:  House  Previous Functional Status:  Independent  Patient and family understanding of hospitalization:  Seeking surgical repair for stage IV left ischial pressure ulcer  Cultural/Language/Spiritual Considerations:  . Parapalegic.   Adjustment to Illness:  Accepting of need for 3 weeks of bed rest    Physical Health  Reason for Admission:  No diagnosis found.  Services Needed/Recommended:  Home with homecare    Mental Health/Chemical Dependency  Diagnosis:  None noted  Support/Services in Place:  NA  Services Needed/Recommended:  NA    Support System  Significant relationship at present time:  Not at time of interview, pt states her mother will provide assistance with caregiving  Family of origin is available for support:  yes  Other support available:  friends  Gaps in support system:  None noted  Patient is caregiver to:  None     Provider Information   Primary Care Physician:  Ayala Limon   [phone]   Clinic:  [address] [place] [address] / [facility] [address]      :      Financial   Income Source:  employment  Financial Concerns:  None noted  Insurance:    Payor/Plan Subscriber Name Rel Member # Group #   [facility] - * CEDRIC MENDOZA [facility] [place] [id number]      [address] [address]   MEDICARE - MEDICARE CEDRIC MENDOZA  [id number]       ATTN CLAIMS, [address]       Discharge Plan   Patient and family discharge goal:  Return home w/home care  Provided education on discharge plan:  YES  Patient agreeable  to discharge plan:  YES    General information regarding anticipated insurance coverage and possible out of pocket cost was discussed. Patient and patient's family are aware patient may incur the cost of transportation to the facility, pending insurance payment: NO  Barriers to discharge:  Final ID plan    Discharge Recommendations   Anticipated Disposition:  Home with services  Transportation Needs:  Medical:  Stretcher  Name of Transportation Company and Phone:  [employer] [phone] or Kevin with [employer]    Additional comments   SW introduced role/reason for visit. Pt was receptive. She states plan for return home but did not know how to initiate home care or home infusion. She states she has specialty mattress at home and that her mother will stay with her while she is on bed rest.     SW provided education about role of RNCC and SW for discharge planning. Pt verbalized understanding. SW updated ZEE Hernandez with pt's stated preference for discharge. No additional SW needs identified at this time. SW to remain available per request/referral.
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
## 2019-10-23 NOTE — PROGRESS NOTES
"Plastic Surgery Progress Note    Subjective/Interval History:  No acute issues.     Objective:  /66 (BP Location: Right arm)   Pulse 99   Temp 97.7  F (36.5  C) (Oral)   Resp 17   Ht 1.778 m (5' 10\")   Wt 71 kg (156 lb 8.4 oz)   SpO2 93%   BMI 22.46 kg/m      General appearance: in NAD, resting in bed  Pulm: Non-labored breathing  CV: Hemodynamically stable  Left buttock: Flap pink and viable with 2 sec cap refill. Incisions intact, POLINA drain with minimal serosanguinous output.     Assessment/Plan:   Priyanka Martinez is a 48 year old female with a history of paraplegia and a stage IV left ischial tuberosity pressure ulcer now s/p posterior thigh flap on 10/16. Bone cultures growing propionibacterium, and urine cultures are positive for E fecalis.     - Appreciate ID recommendations.   - PICC line planned for today.    - Activity restrictions for flap protection: Bedrest and no sitting. HOB no greater than 30 degrees.  - Rotate positions Q2 hours while awake, Q4 hours during PMs. AVOID left lateral position on flap. OK to lay supine or right lateral.   - Wave mattress.  - Appreciate Internal Medicine co-management   - Planning discharge home once PICC line is placed and antibiotic plans are finalized. Will need stretcher ride home due to sitting restrictions.     Ronny Han  Plastic Surgery  [phone]      "

## 2019-10-24 PROCEDURE — 25000128 H RX IP 250 OP 636: Performed by: INTERNAL MEDICINE

## 2019-10-24 PROCEDURE — 25800030 ZZH RX IP 258 OP 636: Performed by: INTERNAL MEDICINE

## 2019-10-24 PROCEDURE — 25000128 H RX IP 250 OP 636: Performed by: STUDENT IN AN ORGANIZED HEALTH CARE EDUCATION/TRAINING PROGRAM

## 2019-10-24 PROCEDURE — 25000132 ZZH RX MED GY IP 250 OP 250 PS 637: Performed by: INTERNAL MEDICINE

## 2019-10-24 PROCEDURE — 12000001 ZZH R&B MED SURG/OB UMMC

## 2019-10-24 PROCEDURE — 25000132 ZZH RX MED GY IP 250 OP 250 PS 637: Performed by: HOSPITALIST

## 2019-10-24 PROCEDURE — 25000132 ZZH RX MED GY IP 250 OP 250 PS 637: Performed by: STUDENT IN AN ORGANIZED HEALTH CARE EDUCATION/TRAINING PROGRAM

## 2019-10-24 PROCEDURE — 99232 SBSQ HOSP IP/OBS MODERATE 35: CPT | Performed by: INTERNAL MEDICINE

## 2019-10-24 RX ORDER — SACCHAROMYCES BOULARDII 250 MG
250 CAPSULE ORAL 2 TIMES DAILY
Qty: 84 CAPSULE | Refills: 0 | Status: SHIPPED | OUTPATIENT
Start: 2019-10-24 | End: 2019-11-26

## 2019-10-24 RX ORDER — POLYETHYLENE GLYCOL 3350 17 G/17G
17 POWDER, FOR SOLUTION ORAL DAILY PRN
Qty: 15 PACKET | Refills: 1 | Status: SHIPPED | OUTPATIENT
Start: 2019-10-24 | End: 2019-11-26

## 2019-10-24 RX ORDER — ACETAMINOPHEN 325 MG/1
325-650 TABLET ORAL EVERY 4 HOURS PRN
Qty: 60 TABLET | Refills: 3 | Status: SHIPPED | OUTPATIENT
Start: 2019-10-24 | End: 2019-11-26

## 2019-10-24 RX ORDER — TROSPIUM CHLORIDE 20 MG/1
20 TABLET, FILM COATED ORAL 2 TIMES DAILY
Qty: 180 TABLET | Refills: 0 | Status: SHIPPED | OUTPATIENT
Start: 2019-10-24 | End: 2019-11-14

## 2019-10-24 RX ADMIN — SODIUM CHLORIDE 4 MILLION UNITS: 9 INJECTION, SOLUTION INTRAVENOUS at 00:58

## 2019-10-24 RX ADMIN — BISACODYL 10 MG: 10 SUPPOSITORY RECTAL at 09:37

## 2019-10-24 RX ADMIN — SODIUM CHLORIDE 4 MILLION UNITS: 9 INJECTION, SOLUTION INTRAVENOUS at 17:41

## 2019-10-24 RX ADMIN — ENOXAPARIN SODIUM 40 MG: 40 INJECTION SUBCUTANEOUS at 12:46

## 2019-10-24 RX ADMIN — GABAPENTIN 600 MG: 600 TABLET, FILM COATED ORAL at 21:17

## 2019-10-24 RX ADMIN — CELECOXIB 100 MG: 100 CAPSULE ORAL at 21:17

## 2019-10-24 RX ADMIN — CELECOXIB 100 MG: 100 CAPSULE ORAL at 05:24

## 2019-10-24 RX ADMIN — Medication 250 MG: at 21:17

## 2019-10-24 RX ADMIN — OMEPRAZOLE 20 MG: 20 CAPSULE, DELAYED RELEASE ORAL at 05:23

## 2019-10-24 RX ADMIN — BACLOFEN 10 MG: 10 TABLET ORAL at 21:17

## 2019-10-24 RX ADMIN — SODIUM CHLORIDE 4 MILLION UNITS: 9 INJECTION, SOLUTION INTRAVENOUS at 09:49

## 2019-10-24 RX ADMIN — DICYCLOMINE HYDROCHLORIDE 20 MG: 20 TABLET ORAL at 05:25

## 2019-10-24 RX ADMIN — Medication 1 CAPSULE: at 16:08

## 2019-10-24 RX ADMIN — FLUOXETINE 20 MG: 20 CAPSULE ORAL at 21:17

## 2019-10-24 RX ADMIN — SODIUM CHLORIDE 4 MILLION UNITS: 9 INJECTION, SOLUTION INTRAVENOUS at 14:04

## 2019-10-24 RX ADMIN — SODIUM CHLORIDE 4 MILLION UNITS: 9 INJECTION, SOLUTION INTRAVENOUS at 21:17

## 2019-10-24 RX ADMIN — DICYCLOMINE HYDROCHLORIDE 20 MG: 20 TABLET ORAL at 16:08

## 2019-10-24 RX ADMIN — GABAPENTIN 600 MG: 600 TABLET, FILM COATED ORAL at 14:04

## 2019-10-24 RX ADMIN — GABAPENTIN 600 MG: 600 TABLET, FILM COATED ORAL at 05:23

## 2019-10-24 RX ADMIN — BACLOFEN 10 MG: 10 TABLET ORAL at 14:04

## 2019-10-24 RX ADMIN — OXYCODONE HYDROCHLORIDE AND ACETAMINOPHEN 1000 MG: 500 TABLET ORAL at 05:23

## 2019-10-24 RX ADMIN — Medication 250 MG: at 09:36

## 2019-10-24 RX ADMIN — TROSPIUM CHLORIDE 20 MG: 20 TABLET, FILM COATED ORAL at 05:23

## 2019-10-24 RX ADMIN — TROSPIUM CHLORIDE 20 MG: 20 TABLET, FILM COATED ORAL at 16:08

## 2019-10-24 RX ADMIN — Medication 1 CAPSULE: at 12:46

## 2019-10-24 RX ADMIN — Medication 5 MG: at 21:17

## 2019-10-24 RX ADMIN — SODIUM CHLORIDE 4 MILLION UNITS: 9 INJECTION, SOLUTION INTRAVENOUS at 05:23

## 2019-10-24 RX ADMIN — BACLOFEN 10 MG: 10 TABLET ORAL at 05:23

## 2019-10-24 RX ADMIN — DICYCLOMINE HYDROCHLORIDE 20 MG: 20 TABLET ORAL at 12:45

## 2019-10-24 RX ADMIN — MEDROXYPROGESTERONE ACETATE 15 MG: 5 TABLET ORAL at 05:25

## 2019-10-24 ASSESSMENT — ACTIVITIES OF DAILY LIVING (ADL)
ADLS_ACUITY_SCORE: 19
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
## 2019-10-24 NOTE — PROGRESS NOTES
Care Coordinator Progress Note    Admission Date/Time:  10/16/2019  Attending MD:  Cintia Bob MD    Data  Chart reviewed, discussed with interdisciplinary team.   Patient was admitted for:    S/P flap graft  Paraplegia (H)  Suprapubic catheter (H)  Gastrointestinal problem.    Concerns with insurance coverage for discharge needs: None.  Current Living Situation: Patient lives with partner.  Support System: Supportive and Involved  Services Involved: Home Infusion  Transportation at Discharge: Agency transportation  Transportation to Medical Appointments:   - Not applicable  Barriers to Discharge: NA    Coordination of Care and Referrals: Provided patient/family with options for Home Infusion.        Assessment  Met with patient at bedside to discuss discharge planning. A referral was sent to [facility] for home IV abx infusions. Plan for patient to discharge to home via stretcher on 10/25/2019. Waiting for discharge orders to be signed. Rx needs to be faxed to [facility]. No further discharge concerns at this time. [facility] will continue to follow and set up [facility] stretcher ride at discharge.    [facility]  Phone # [phone]  Fax # [phone]     [facility]  [phone]     Plan  Anticipated Discharge Date:  10/25/2019  Anticipated Discharge Plan:  Home with home infusion services.    Mary Sanchez RN, BSN  Care Coordinator, 8A  Phone [phone]  Pager [phone]      Update: IV abx script faxed to [facility] at 4:30pm. Plan for patient to discharge home tomorrow morning. [facility] stretcher ride to be set up prior to discharge. Patient declined home PT at this time.

## 2019-10-24 NOTE — PROGRESS NOTES
"Plastic Surgery Progress Note    Subjective/Interval History:  PICC was placed yesterday.     Objective:  /65   Pulse 99   Temp 99.1  F (37.3  C) (Oral)   Resp 16   Ht 1.778 m (5' 10\")   Wt 71 kg (156 lb 8.4 oz)   SpO2 95%   BMI 22.46 kg/m      General appearance: in NAD, resting in bed  Pulm: Non-labored breathing  CV: Hemodynamically stable  Left buttock: Flap pink and viable with 2 sec cap refill. Incisions intact, POLINA drain with minimal serosanguinous output.     Assessment/Plan:   Priyanka Martinez is a 48 year old female with a history of paraplegia and a stage IV left ischial tuberosity pressure ulcer now s/p posterior thigh flap on 10/16. Bone cultures growing propionibacterium, and urine cultures are positive for E fecalis.     - Appreciate ID recommendations.    - Activity restrictions for flap protection: Bedrest and no sitting. HOB no greater than 30 degrees.  - Rotate positions Q2 hours while awake, Q4 hours during PMs. AVOID left lateral position on flap. OK to lay supine or right lateral.   - Wave mattress.  - Appreciate Internal Medicine co-management   - Planning discharge home tomorrow 10/25.     Ronny Han  Plastic Surgery  [phone]      "

## 2019-10-24 NOTE — PROGRESS NOTES
A/Ox's 4. VSS on room air. Pt denied pain. L hip incision open to air. CMS to baseline. Pt has a Picc line. Abx given q4 hours. Denied any nausea, CP, SOB, lightheadedness or dizziness. Pt has a johnson. Pt is bedfast on a WAVE mattress. HOB less then 30 degrees. Turned Q2 HR awake and Q4 overnight. Resting in bed at this time with call light in reach. Able to make needs known. Continue to monitor.
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
## 2019-10-25 ENCOUNTER — HOME INFUSION (PRE-WILLOW HOME INFUSION) (OUTPATIENT)
Dept: PHARMACY | Facility: CLINIC | Age: 49
End: 2019-10-25

## 2019-10-25 VITALS
WEIGHT: 156.53 LBS | OXYGEN SATURATION: 98 % | DIASTOLIC BLOOD PRESSURE: 66 MMHG | SYSTOLIC BLOOD PRESSURE: 90 MMHG | HEIGHT: 70 IN | RESPIRATION RATE: 16 BRPM | BODY MASS INDEX: 22.41 KG/M2 | TEMPERATURE: 98.2 F | HEART RATE: 88 BPM

## 2019-10-25 LAB — PLATELET # BLD AUTO: 319 10E9/L (ref 150–450)

## 2019-10-25 PROCEDURE — 25000128 H RX IP 250 OP 636: Performed by: STUDENT IN AN ORGANIZED HEALTH CARE EDUCATION/TRAINING PROGRAM

## 2019-10-25 PROCEDURE — 25000132 ZZH RX MED GY IP 250 OP 250 PS 637: Performed by: HOSPITALIST

## 2019-10-25 PROCEDURE — 36592 COLLECT BLOOD FROM PICC: CPT | Performed by: STUDENT IN AN ORGANIZED HEALTH CARE EDUCATION/TRAINING PROGRAM

## 2019-10-25 PROCEDURE — 25000128 H RX IP 250 OP 636: Performed by: INTERNAL MEDICINE

## 2019-10-25 PROCEDURE — 25800030 ZZH RX IP 258 OP 636: Performed by: INTERNAL MEDICINE

## 2019-10-25 PROCEDURE — 25000132 ZZH RX MED GY IP 250 OP 250 PS 637: Performed by: STUDENT IN AN ORGANIZED HEALTH CARE EDUCATION/TRAINING PROGRAM

## 2019-10-25 PROCEDURE — 85049 AUTOMATED PLATELET COUNT: CPT | Performed by: STUDENT IN AN ORGANIZED HEALTH CARE EDUCATION/TRAINING PROGRAM

## 2019-10-25 PROCEDURE — 25000132 ZZH RX MED GY IP 250 OP 250 PS 637: Performed by: INTERNAL MEDICINE

## 2019-10-25 RX ADMIN — ENOXAPARIN SODIUM 40 MG: 40 INJECTION SUBCUTANEOUS at 12:25

## 2019-10-25 RX ADMIN — SODIUM CHLORIDE 4 MILLION UNITS: 9 INJECTION, SOLUTION INTRAVENOUS at 06:07

## 2019-10-25 RX ADMIN — DICYCLOMINE HYDROCHLORIDE 20 MG: 20 TABLET ORAL at 05:11

## 2019-10-25 RX ADMIN — BACLOFEN 10 MG: 10 TABLET ORAL at 14:22

## 2019-10-25 RX ADMIN — GABAPENTIN 600 MG: 600 TABLET, FILM COATED ORAL at 14:22

## 2019-10-25 RX ADMIN — OXYCODONE HYDROCHLORIDE AND ACETAMINOPHEN 1000 MG: 500 TABLET ORAL at 05:11

## 2019-10-25 RX ADMIN — MEDROXYPROGESTERONE ACETATE 15 MG: 5 TABLET ORAL at 05:11

## 2019-10-25 RX ADMIN — SODIUM CHLORIDE 4 MILLION UNITS: 9 INJECTION, SOLUTION INTRAVENOUS at 01:56

## 2019-10-25 RX ADMIN — OMEPRAZOLE 20 MG: 20 CAPSULE, DELAYED RELEASE ORAL at 05:11

## 2019-10-25 RX ADMIN — TROSPIUM CHLORIDE 20 MG: 20 TABLET, FILM COATED ORAL at 05:11

## 2019-10-25 RX ADMIN — CELECOXIB 100 MG: 100 CAPSULE ORAL at 05:11

## 2019-10-25 RX ADMIN — GABAPENTIN 600 MG: 600 TABLET, FILM COATED ORAL at 05:12

## 2019-10-25 RX ADMIN — Medication 1 CAPSULE: at 05:11

## 2019-10-25 RX ADMIN — BACLOFEN 10 MG: 10 TABLET ORAL at 05:12

## 2019-10-25 RX ADMIN — SODIUM CHLORIDE 4 MILLION UNITS: 9 INJECTION, SOLUTION INTRAVENOUS at 10:35

## 2019-10-25 RX ADMIN — Medication 250 MG: at 09:36

## 2019-10-25 ASSESSMENT — ACTIVITIES OF DAILY LIVING (ADL)
ADLS_ACUITY_SCORE: 19

## 2019-10-25 NOTE — PROGRESS NOTES
Care Coordinator Progress Note    Admission Date/Time:  10/16/2019  Attending MD:  Cintia Bob MD    Data  Chart reviewed, discussed with interdisciplinary team.   Patient was admitted for:    S/P flap graft  Paraplegia (H)  Suprapubic catheter (H)  Gastrointestinal problem.    [facility] transportation at 2:30pm.     Plan  Anticipated Discharge Date:  10/25/2019  Anticipated Discharge Plan:  Home with home infusion.    Mary Sanchez RN, BSN  Care Coordinator,   Phone [phone]  Pager [phone]
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
## 2019-10-25 NOTE — PROGRESS NOTES
[facility]  NURSE LIAISON NOTE  Met with pt at bedside. Pt is expected to DC today. She has done Home infusions in the past. Educated on I role in Pt DC to home. She states she will be comfortable doing the IV ABX bag changes and will be able to infusion independently, with more eduction. Her mother will also assist her and be present at [facility] tomorrow.  CADD GUILLERMO PUMP/HOOKUP of ABX.  Taught Syringe-air removal to pt. She declined to perform, as she says she has done many times. She verbalizes the Steps of CADD ABX administration, flushing and proper sequence of CADD bag changing q 24 hrs OF ABX  steps for administration.  She has good understanding, and agrees to contact [facility] for any questions, clarification or further education needs.  Educated to keep dressing CDI, and to cover dressing during bathing.   Encouraged to call [facility] for any questions, clarifications or problems.    Educated on Deliveries, importance of refrigerating medications.  She was engaged during this RN Visit in hospital room.  She  understands to expect phone calls from [facility], to discuss delivery and SNV.  Education provided on RN/RPH on call 24/7, phone number provided  She verbalizes understanding of above.    MEDICATION/DELIVERY MODE:  PCN g q4 hrs, via CADD pump/24 hr bag change BAG changed at 12:30pm  NEXT DOSE DUE: 2pm  CVC: SL non valved.  CLC due 10.31  EXTENSION:  Added and flushed with 10mls NS  FLUSHING: before and after bag change/disconnect  REGIONAL AGENCY: [facility]    SNV: Not required today for education. Required Sat, approx 1pm  REGIONAL AGENCY: [facility]   NOTE: pt has a suprapubic catheter, which she is independent with, No wound care orders at this time      Isabella Stevenson, [facility]-Nurse Liaison  [email or web address]  My Cell:  [phone]  M-F  [facility] OFFICE  24/hrs  [phone]

## 2019-10-25 NOTE — PROGRESS NOTES
EMR reviewed.   No new concern reported.   Pt for discharge home today on iv abx.   Pt not seen by me today.   Page prn.     B/P: 90/66, T: 98.2, P: 88, R: 16    Ishmael Abbasi MD   Hospitalist (Internal Medicine)  Pager: [phone].

## 2019-10-25 NOTE — PROGRESS NOTES
[facility]  Met with pt to discuss plans for HC.  Pt to be discharged home 10 25 19  and has agreed to have [facility] follow with services of . Patient care support center processing referral.  Pt verbalized understanding that initial visit is scheduled for10 26 19.    Pt has 24 hour phone number for [facility] for any questions or concerns.

## 2019-10-26 ENCOUNTER — HOME INFUSION (PRE-WILLOW HOME INFUSION) (OUTPATIENT)
Dept: PHARMACY | Facility: CLINIC | Age: 49
End: 2019-10-26

## 2019-10-26 ENCOUNTER — TELEPHONE (OUTPATIENT)
Dept: PEDIATRICS | Facility: CLINIC | Age: 49
End: 2019-10-26

## 2019-10-26 NOTE — TELEPHONE ENCOUNTER
Ok to admit to Home care for Picc line and incisional cares assessment and education, labs, chronic disease management education and assessment    1 x week x 6 weeks   3 as needed for labs and incisional assess, med changes     No other disciplines needed     Hui Ventura RN  [facility] [place]   [phone]    [facility] utilizes an encounter to take the place of a direct phone call to your office. Please take a moment to review the below request. Please reply or route message to author of this encounter.  Message will act as a verbal OK of orders requested below. Thank you.  .

## 2019-10-28 ENCOUNTER — TELEPHONE (OUTPATIENT)
Dept: PEDIATRICS | Facility: CLINIC | Age: 49
End: 2019-10-28

## 2019-10-28 ENCOUNTER — PATIENT OUTREACH (OUTPATIENT)
Dept: CARE COORDINATION | Facility: CLINIC | Age: 49
End: 2019-10-28

## 2019-10-28 NOTE — TELEPHONE ENCOUNTER
RN calls back needing orders asap, gave verbal orders. She will await the other encounter with cath changes until PCP is back in the office tomorrow.   Za Lyons RN

## 2019-10-28 NOTE — PROGRESS NOTES
This is a recent snapshot of the patient's [facility] medical record.  For current drug dose and complete information and questions, call [phone] or In Basket pool, fv home infusion ([id number])  CSN Number:  [id number]

## 2019-10-28 NOTE — PROGRESS NOTES
This is a recent snapshot of the patient's [facility] medical record.  For current drug dose and complete information and questions, call [phone] or In Basket pool, fv home infusion ([id number])  CSN Number:  [id number]

## 2019-10-29 ENCOUNTER — HOME INFUSION (PRE-WILLOW HOME INFUSION) (OUTPATIENT)
Dept: PHARMACY | Facility: CLINIC | Age: 49
End: 2019-10-29

## 2019-10-29 ENCOUNTER — HOSPITAL ENCOUNTER (EMERGENCY)
Facility: CLINIC | Age: 49
Discharge: HOME | End: 2019-10-29
Attending: FAMILY MEDICINE | Admitting: FAMILY MEDICINE
Payer: COMMERCIAL

## 2019-10-29 ENCOUNTER — APPOINTMENT (OUTPATIENT)
Dept: ULTRASOUND IMAGING | Facility: CLINIC | Age: 49
End: 2019-10-29
Attending: FAMILY MEDICINE
Payer: COMMERCIAL

## 2019-10-29 VITALS
WEIGHT: 160.9 LBS | DIASTOLIC BLOOD PRESSURE: 81 MMHG | OXYGEN SATURATION: 96 % | SYSTOLIC BLOOD PRESSURE: 108 MMHG | BODY MASS INDEX: 23.09 KG/M2 | HEART RATE: 99 BPM | RESPIRATION RATE: 18 BRPM | TEMPERATURE: 98.2 F

## 2019-10-29 DIAGNOSIS — Z95.828 S/P PICC CENTRAL LINE PLACEMENT: ICD-10-CM

## 2019-10-29 LAB
ALBUMIN SERPL-MCNC: 2.7 G/DL (ref 3.4–5)
ALP SERPL-CCNC: 85 U/L (ref 40–150)
ALT SERPL W P-5'-P-CCNC: 24 U/L (ref 0–50)
ANION GAP SERPL CALCULATED.3IONS-SCNC: 5 MMOL/L (ref 3–14)
AST SERPL W P-5'-P-CCNC: 13 U/L (ref 0–45)
BASOPHILS # BLD AUTO: 0.1 10E9/L (ref 0–0.2)
BASOPHILS NFR BLD AUTO: 1 %
BILIRUB SERPL-MCNC: 0.1 MG/DL (ref 0.2–1.3)
BUN SERPL-MCNC: 11 MG/DL (ref 7–30)
CALCIUM SERPL-MCNC: 8.4 MG/DL (ref 8.5–10.1)
CHLORIDE SERPL-SCNC: 109 MMOL/L (ref 94–109)
CO2 SERPL-SCNC: 26 MMOL/L (ref 20–32)
CREAT SERPL-MCNC: 0.36 MG/DL (ref 0.52–1.04)
DIFFERENTIAL METHOD BLD: ABNORMAL
EOSINOPHIL # BLD AUTO: 0.5 10E9/L (ref 0–0.7)
EOSINOPHIL NFR BLD AUTO: 5.9 %
ERYTHROCYTE [DISTWIDTH] IN BLOOD BY AUTOMATED COUNT: 15.6 % (ref 10–15)
GFR SERPL CREATININE-BSD FRML MDRD: >90 ML/MIN/{1.73_M2}
GLUCOSE SERPL-MCNC: 87 MG/DL (ref 70–99)
HCT VFR BLD AUTO: 36 % (ref 35–47)
HGB BLD-MCNC: 10.6 G/DL (ref 11.7–15.7)
IMM GRANULOCYTES # BLD: 0 10E9/L (ref 0–0.4)
IMM GRANULOCYTES NFR BLD: 0.2 %
LYMPHOCYTES # BLD AUTO: 2 10E9/L (ref 0.8–5.3)
LYMPHOCYTES NFR BLD AUTO: 24.2 %
MCH RBC QN AUTO: 27.9 PG (ref 26.5–33)
MCHC RBC AUTO-ENTMCNC: 29.4 G/DL (ref 31.5–36.5)
MCV RBC AUTO: 95 FL (ref 78–100)
MONOCYTES # BLD AUTO: 0.4 10E9/L (ref 0–1.3)
MONOCYTES NFR BLD AUTO: 5.2 %
NEUTROPHILS # BLD AUTO: 5.1 10E9/L (ref 1.6–8.3)
NEUTROPHILS NFR BLD AUTO: 63.5 %
NRBC # BLD AUTO: 0 10*3/UL
NRBC BLD AUTO-RTO: 0 /100
PLATELET # BLD AUTO: 360 10E9/L (ref 150–450)
POTASSIUM SERPL-SCNC: 3.8 MMOL/L (ref 3.4–5.3)
PROT SERPL-MCNC: 6.7 G/DL (ref 6.8–8.8)
RBC # BLD AUTO: 3.8 10E12/L (ref 3.8–5.2)
SODIUM SERPL-SCNC: 140 MMOL/L (ref 133–144)
WBC # BLD AUTO: 8.1 10E9/L (ref 4–11)

## 2019-10-29 PROCEDURE — 80053 COMPREHEN METABOLIC PANEL: CPT | Performed by: FAMILY MEDICINE

## 2019-10-29 PROCEDURE — 93971 EXTREMITY STUDY: CPT | Mod: LT

## 2019-10-29 PROCEDURE — 99284 EMERGENCY DEPT VISIT MOD MDM: CPT | Mod: 25 | Performed by: FAMILY MEDICINE

## 2019-10-29 PROCEDURE — 25000128 H RX IP 250 OP 636: Performed by: FAMILY MEDICINE

## 2019-10-29 PROCEDURE — 25800030 ZZH RX IP 258 OP 636: Performed by: FAMILY MEDICINE

## 2019-10-29 PROCEDURE — 85025 COMPLETE CBC W/AUTO DIFF WBC: CPT | Performed by: FAMILY MEDICINE

## 2019-10-29 PROCEDURE — 96365 THER/PROPH/DIAG IV INF INIT: CPT | Performed by: FAMILY MEDICINE

## 2019-10-29 PROCEDURE — 99284 EMERGENCY DEPT VISIT MOD MDM: CPT | Mod: Z6 | Performed by: FAMILY MEDICINE

## 2019-10-29 PROCEDURE — 96366 THER/PROPH/DIAG IV INF ADDON: CPT | Performed by: FAMILY MEDICINE

## 2019-10-29 PROCEDURE — 40001072 ZZH STATISTICAL VASC ACCESS NURSE TIME, 16-31 MINUTES

## 2019-10-29 PROCEDURE — 87040 BLOOD CULTURE FOR BACTERIA: CPT | Performed by: FAMILY MEDICINE

## 2019-10-29 RX ORDER — LIDOCAINE 40 MG/G
CREAM TOPICAL
Status: DISCONTINUED | OUTPATIENT
Start: 2019-10-29 | End: 2019-10-29 | Stop reason: HOSPADM

## 2019-10-29 RX ADMIN — SODIUM CHLORIDE 4 MILLION UNITS: 9 INJECTION, SOLUTION INTRAVENOUS at 13:39

## 2019-10-29 ASSESSMENT — ENCOUNTER SYMPTOMS
BRUISES/BLEEDS EASILY: 0
ABDOMINAL PAIN: 0
DECREASED CONCENTRATION: 1
TROUBLE SWALLOWING: 0
BACK PAIN: 0
FLANK PAIN: 0
CONFUSION: 0
VOICE CHANGE: 0
SHORTNESS OF BREATH: 0
ACTIVITY CHANGE: 1
FEVER: 0
NAUSEA: 0
WOUND: 1
DYSPHORIC MOOD: 0

## 2019-10-29 NOTE — PROGRESS NOTES
"Called to assess patient with PICC that's hurting.  Assessment: LUE arm, SL, Power PICC, with dressing dry and intact, able to flush with good blood return , with arm circumference of 31cm.  Zero redness around the site.  Upper extremity ultrasound negative DVT.    Intervention: Dressing changed  and removed the securecath.   Patient said \" arm feels better\"  RN and MD notified PICC ok to use.   Spent 30 minutes with procedure.    "

## 2019-10-29 NOTE — ED NOTES
Pt requesting inpatient bed and air mattress d/t increased discomfort from limited choices on repositioning. ERT currently working to meet pt's request for comfort measures.

## 2019-10-29 NOTE — ED NOTES
Bed: ED31  Expected date:   Expected time:   Means of arrival:   Comments:  N727  48 f  Possible infected PICC

## 2019-10-29 NOTE — ED AVS SNAPSHOT
[facility], [place], Emergency Department  [address] [place] [id number]  Phone:  [phone]                                    Priyanka Martinez   MRN: [id number]    Department:  [facility], Emergency Department   Date of Visit:  10/29/2019           After Visit Summary Signature Page    I have received my discharge instructions, and my questions have been answered. I have discussed any challenges I see with this plan with the nurse or doctor.    ..........................................................................................................................................  Patient/Patient Representative Signature      ..........................................................................................................................................  Patient Representative Print Name and Relationship to Patient    ..................................................               ................................................  Date                                   Time    ..........................................................................................................................................  Reviewed by Signature/Title    ...................................................              ..............................................  Date                                               Time          22EPIC Rev 08/18

## 2019-10-29 NOTE — ED PROVIDER NOTES
[place] EMERGENCY DEPARTMENT ([facility])  10/29/19 ED 31  History     Chief Complaint   Patient presents with     Vascular Access Problem     HPI  Priyanka Martinez is a 48 year old female with history of paraplegia complicated by suprapubic catheter and pressure ulcer of the left ischium complicated by osteomyelitis of the pelvic region status post debridement of the left ischium decubitus with left posterior thigh flap on 10/16/2019. Patient had had this PICC line placed 5 days ago for antibiotic infusions for further management of this osteomyelitis.  Ever since yesterday she noticed this upper arm pain.  Home health nurse evaluated the PICC line and told patient to go to the ER for further evaluation.  The area has become more warm and swollen.  Patient denies any chest pain shortness of breath no fevers noted.  No numbness distally.  Patient denies any facial swelling etc.  No neck pain.  Patient's wounds otherwise are stable left buttocks area.  Patient been receiving IV penicillin every 4 hours.    I have reviewed the Medications, Allergies, Past Medical and Surgical History, and Social History in the Epic system.    Review of Systems   Constitutional: Positive for activity change. Negative for fever.        Patient is paraplegic   HENT: Negative for trouble swallowing and voice change.    Eyes: Negative for visual disturbance.   Respiratory: Negative for shortness of breath.    Cardiovascular: Negative for chest pain.   Gastrointestinal: Negative for abdominal pain and nausea.   Genitourinary: Negative for flank pain.   Musculoskeletal: Negative for back pain.   Skin: Positive for wound.        Left buttocks and posterior thigh wound healing staples noted Sylvester-Carrasco drain normal.  Left arm PICC line site tender without redness drainage   Allergic/Immunologic: Negative for immunocompromised state.   Neurological:        Paraplegia   Hematological: Does not bruise/bleed easily.    Psychiatric/Behavioral: Positive for decreased concentration. Negative for confusion and dysphoric mood.   All other systems reviewed and are negative.      Physical Exam   BP: 113/83  Pulse: 91  Temp: 98.2  F (36.8  C)  Resp: 16  Weight: 73 kg (160 lb 14.4 oz)  SpO2: 95 %      Physical Exam  Vitals signs and nursing note reviewed.   Constitutional:       General: She is not in acute distress.     Appearance: She is well-developed. She is not diaphoretic.      Comments: Patient appears nontoxic here with family.Is paraplegic as noted postoperative state.     HENT:      Head: Normocephalic and atraumatic.      Mouth/Throat:      Mouth: Mucous membranes are moist.   Eyes:      General: No scleral icterus.     Extraocular Movements: Extraocular movements intact.      Pupils: Pupils are equal, round, and reactive to light.   Neck:      Musculoskeletal: Normal range of motion and neck supple.   Cardiovascular:      Rate and Rhythm: Normal rate.   Pulmonary:      Effort: No respiratory distress.   Abdominal:      General: There is no distension.   Musculoskeletal:         General: Tenderness present. No swelling or deformity.   Skin:     General: Skin is warm and dry.      Capillary Refill: Capillary refill takes less than 2 seconds.      Coloration: Skin is not pale.      Findings: No erythema or rash.   Neurological:      Mental Status: She is alert and oriented to person, place, and time.      Comments: Paraplegic baseline   Psychiatric:         Mood and Affect: Mood normal.         Behavior: Behavior normal.         Thought Content: Thought content normal.         Judgement: Judgment normal.      Comments: Mildly flat affect otherwise appropriate.         ED Course        Procedures         In the ER patient was evaluated.  Discussed with family etc. our plan.  A left upper extremity Doppler ultrasound was done.  No sign of DVT swelling etc.  All vessels appear patent.  This was relayed to family and patient were  aware.  CBC chemistries otherwise done and are normal.  Consultation with the vascular access PICC line nurse evaluate the PICC line and noted that the incision site it was twisting causing her pain at locally the hub was then exchanged and appropriately secured here in the ER.  Patient felt much better with this.  This was all done sterilely per vascular access appropriate protocol followed.  Patient feels much better.  Patient did receive a dose of her penicillin in the ER as noted IV.  At this point patient otherwise stable I think more likely was a mechanical issue there is no sign of infection DVT etc. her incision in the buttocks area etc. staples appear well approximated and there is no sign of drainage erythema etc. continue her postoperative rehab plan and follow-up.  Patient discharged with family as noted feeling better.       Critical Care time:  none             Labs Ordered and Resulted from Time of ED Arrival Up to the Time of Departure from the ED   CBC WITH PLATELETS DIFFERENTIAL - Abnormal; Notable for the following components:       Result Value    Hemoglobin 10.6 (*)     MCHC 29.4 (*)     RDW 15.6 (*)     All other components within normal limits   COMPREHENSIVE METABOLIC PANEL - Abnormal; Notable for the following components:    Creatinine 0.36 (*)     Calcium 8.4 (*)     Bilirubin Total 0.1 (*)     Albumin 2.7 (*)     Protein Total 6.7 (*)     All other components within normal limits   PERIPHERAL IV CATHETER   BLOOD CULTURE   BLOOD CULTURE           Results for orders placed or performed during the hospital encounter of 10/29/19   US Upper Extremity Venous Duplex Left    Narrative    EXAMINATION: DOPPLER VENOUS ULTRASOUND OF THE LEFT UPPER EXTREMITY,  10/29/2019 1:11 PM.     INDICATION: PICC line with tenderness and swelling. Evaluate for DVT.    COMPARISON: None.    TECHNIQUE:  Gray-scale evaluation with compression, spectral flow and  color Doppler assessment of the deep venous system of the  left upper  extremity.    FINDINGS:  Left: Normal blood flow and waveforms are demonstrated in the internal  jugular, innominate, subclavian, and axillary veins. There is normal  compressibility of the brachial, basilic and cephalic veins.    PICC line is seen in one of the left brachial veins and the subclavian  vein.      Impression    IMPRESSION:  No evidence of left upper extremity deep venous thrombosis.    I have personally reviewed the examination and initial interpretation  and I agree with the findings.    AUBREY GARDINER MD   CBC with platelets differential   Result Value Ref Range    WBC 8.1 4.0 - 11.0 10e9/L    RBC Count 3.80 3.8 - 5.2 10e12/L    Hemoglobin 10.6 (L) 11.7 - 15.7 g/dL    Hematocrit 36.0 35.0 - 47.0 %    MCV 95 78 - 100 fl    MCH 27.9 26.5 - 33.0 pg    MCHC 29.4 (L) 31.5 - 36.5 g/dL    RDW 15.6 (H) 10.0 - 15.0 %    Platelet Count 360 150 - 450 10e9/L    Diff Method Automated Method     % Neutrophils 63.5 %    % Lymphocytes 24.2 %    % Monocytes 5.2 %    % Eosinophils 5.9 %    % Basophils 1.0 %    % Immature Granulocytes 0.2 %    Nucleated RBCs 0 0 /100    Absolute Neutrophil 5.1 1.6 - 8.3 10e9/L    Absolute Lymphocytes 2.0 0.8 - 5.3 10e9/L    Absolute Monocytes 0.4 0.0 - 1.3 10e9/L    Absolute Eosinophils 0.5 0.0 - 0.7 10e9/L    Absolute Basophils 0.1 0.0 - 0.2 10e9/L    Abs Immature Granulocytes 0.0 0 - 0.4 10e9/L    Absolute Nucleated RBC 0.0    Comprehensive metabolic panel   Result Value Ref Range    Sodium 140 133 - 144 mmol/L    Potassium 3.8 3.4 - 5.3 mmol/L    Chloride 109 94 - 109 mmol/L    Carbon Dioxide 26 20 - 32 mmol/L    Anion Gap 5 3 - 14 mmol/L    Glucose 87 70 - 99 mg/dL    Urea Nitrogen 11 7 - 30 mg/dL    Creatinine 0.36 (L) 0.52 - 1.04 mg/dL    GFR Estimate >90 >60 mL/min/[1.73_m2]    GFR Estimate If Black >90 >60 mL/min/[1.73_m2]    Calcium 8.4 (L) 8.5 - 10.1 mg/dL    Bilirubin Total 0.1 (L) 0.2 - 1.3 mg/dL    Albumin 2.7 (L) 3.4 - 5.0 g/dL    Protein Total 6.7 (L)  6.8 - 8.8 g/dL    Alkaline Phosphatase 85 40 - 150 U/L    ALT 24 0 - 50 U/L    AST 13 0 - 45 U/L   Blood culture   Result Value Ref Range    Specimen Description Blood PICC     Special Requests Aerobic and anaerobic bottles received     Culture Micro No growth after 1 hour    Blood culture   Result Value Ref Range    Specimen Description Blood Right Hand     Special Requests Aerobic and anaerobic bottles received     Culture Micro PENDING          Assessments & Plan (with Medical Decision Making)  48-year-old female who is paraplegic presents with left upper arm PICC line insertion tenderness.  Concerns of some swelling and warmth.  I do not see any erythema to represent cellulitis.  Ultrasound was negative for deep seen by PICC line they agree at this point the vascular access nurse noted that the catheter was twisting causing her pain pinching her skin therefore the security Hubb portion was exchanged and placed appropriate there is no more rotation or pinching patient feels much better at this point appears to be more mechanical she was given her penicillin IV here in the ER for 1 dose.  Patient be discharged to follow-up with plastic surgery etc.  At this point no signs of any infection etc.         I have reviewed the nursing notes.    I have reviewed the findings, diagnosis, plan and need for follow up with the patient.    Discharge Medication List as of 10/29/2019  3:35 PM          Final diagnoses:   S/P PICC central line placement - mechanical pain resolved w modification       10/29/2019   [facility], [place], EMERGENCY DEPARTMENT    This note was created at least in part by the use of dragon voice dictation system. Inadvertent typographical errors may still exist.  Julius Gill MD.         Julius Gill MD  10/29/19 [id number]

## 2019-10-29 NOTE — DISCHARGE INSTRUCTIONS
"Home.  Your ultrasound did not show swelling or blood clot.  Your labs look good.  Incision healing well.  PICC line modified to relieve the skin \"pinching\"  Follow up with Dr. Bob as planned.  Continue antibiotics.  Return if any concerns..    Results for orders placed or performed during the hospital encounter of 10/29/19   US Upper Extremity Venous Duplex Left    Narrative    EXAMINATION: DOPPLER VENOUS ULTRASOUND OF THE LEFT UPPER EXTREMITY,  10/29/2019 1:11 PM.     INDICATION: PICC line with tenderness and swelling. Evaluate for DVT.    COMPARISON: None.    TECHNIQUE:  Gray-scale evaluation with compression, spectral flow and  color Doppler assessment of the deep venous system of the left upper  extremity.    FINDINGS:  Left: Normal blood flow and waveforms are demonstrated in the internal  jugular, innominate, subclavian, and axillary veins. There is normal  compressibility of the brachial, basilic and cephalic veins.    PICC line is seen in one of the left brachial veins and the subclavian  vein.      Impression    IMPRESSION:  No evidence of left upper extremity deep venous thrombosis.    I have personally reviewed the examination and initial interpretation  and I agree with the findings.    AUBREY GARDINER MD   CBC with platelets differential   Result Value Ref Range    WBC 8.1 4.0 - 11.0 10e9/L    RBC Count 3.80 3.8 - 5.2 10e12/L    Hemoglobin 10.6 (L) 11.7 - 15.7 g/dL    Hematocrit 36.0 35.0 - 47.0 %    MCV 95 78 - 100 fl    MCH 27.9 26.5 - 33.0 pg    MCHC 29.4 (L) 31.5 - 36.5 g/dL    RDW 15.6 (H) 10.0 - 15.0 %    Platelet Count 360 150 - 450 10e9/L    Diff Method Automated Method     % Neutrophils 63.5 %    % Lymphocytes 24.2 %    % Monocytes 5.2 %    % Eosinophils 5.9 %    % Basophils 1.0 %    % Immature Granulocytes 0.2 %    Nucleated RBCs 0 0 /100    Absolute Neutrophil 5.1 1.6 - 8.3 10e9/L    Absolute Lymphocytes 2.0 0.8 - 5.3 10e9/L    Absolute Monocytes 0.4 0.0 - 1.3 10e9/L    Absolute " Eosinophils 0.5 0.0 - 0.7 10e9/L    Absolute Basophils 0.1 0.0 - 0.2 10e9/L    Abs Immature Granulocytes 0.0 0 - 0.4 10e9/L    Absolute Nucleated RBC 0.0    Comprehensive metabolic panel   Result Value Ref Range    Sodium 140 133 - 144 mmol/L    Potassium 3.8 3.4 - 5.3 mmol/L    Chloride 109 94 - 109 mmol/L    Carbon Dioxide 26 20 - 32 mmol/L    Anion Gap 5 3 - 14 mmol/L    Glucose 87 70 - 99 mg/dL    Urea Nitrogen 11 7 - 30 mg/dL    Creatinine 0.36 (L) 0.52 - 1.04 mg/dL    GFR Estimate >90 >60 mL/min/[1.73_m2]    GFR Estimate If Black >90 >60 mL/min/[1.73_m2]    Calcium 8.4 (L) 8.5 - 10.1 mg/dL    Bilirubin Total 0.1 (L) 0.2 - 1.3 mg/dL    Albumin 2.7 (L) 3.4 - 5.0 g/dL    Protein Total 6.7 (L) 6.8 - 8.8 g/dL    Alkaline Phosphatase 85 40 - 150 U/L    ALT 24 0 - 50 U/L    AST 13 0 - 45 U/L   Blood culture   Result Value Ref Range    Specimen Description Blood PICC     Special Requests Aerobic and anaerobic bottles received     Culture Micro No growth after 1 hour    Blood culture   Result Value Ref Range    Specimen Description Blood Right Hand     Special Requests Aerobic and anaerobic bottles received     Culture Micro PENDING

## 2019-10-29 NOTE — ED TRIAGE NOTES
AURY from home with c/o of swelling and pain at PICC site. Denies fever/chills. PICC was placed one week ago for continuous abx d/t bone infection. Pt reports upper arm pain since yesterday, and was assessed by home care nurse this morning who advised her to come for evaluation d/t to increased warmth and swelling.

## 2019-10-30 ENCOUNTER — HOME INFUSION (PRE-WILLOW HOME INFUSION) (OUTPATIENT)
Dept: PHARMACY | Facility: CLINIC | Age: 49
End: 2019-10-30

## 2019-10-30 NOTE — PROGRESS NOTES
This is a recent snapshot of the patient's [facility] medical record.  For current drug dose and complete information and questions, call [phone] or In Basket pool, fv home infusion ([id number])  CSN Number:  [id number]

## 2019-10-30 NOTE — OP NOTE
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      PROCEDURES:     1,  Sharp excisional debridement of left ischial decubitus with bone biopsies.   2.  Left posterior thigh fasciocutaneous V-Y advancement flap.      ANESTHESIA:  GET.      ESTIMATED BLOOD LOSS:  100 mL.      COUNTS:  Correct.      COMPLICATIONS:  None.      DRAINS:  POLINA x 1.      INDICATIONS:  This is a 48-year-old paraplegic female who presented with symptomatic osteomyelitis of the left ischial tuberosity.  She had failed conservative wound care and was requesting definitive debridement and flap closure.      DESCRIPTION OF PROCEDURE:  The patient was seen in the preoperative waiting area.  The operative site was marked on the anatomical diagram.  Informed consent was obtained after reviewing the possible risks and complications, including but not limited to the following:  Infection, bleeding, hematoma, seroma formation, poor healing, possible injury to the surrounding neurovascular and musculoskeletal structures, possible need for further surgery, and anesthetic risks such as DVT, PE and cardiopulmonary arrest.  The patient was then brought to the operating room and placed prone on the OR table after intubation on her stretcher.  Great care was taken to pad all areas against pressure sores.  She had chest pillows and a hip roll.  Her arms were in superman position.  She was secured in multiple areas with safety straps.  The patient's ostomy and Wheeler catheter were positioned to avoid any pressure injury.  Dressings were removed, and preoperative photos were taken.  The area was then prepped and draped in the usual sterile fashion using Techni-Care.      After a timeout was taken and the proper patient and  procedure were identified, methylene blue was used to stain the inner lining of the left ischial decubitus.  This facilitated sharp debridement with a scalpel and cautery.  Skin, subcutaneous and muscle were debrided sharply, and the base of the wound was debrided with a rongeur.  The wound was then irrigated with saline, and bone cultures were taken to be sent for microbiology and pathology.  Once we had debrided the bone adequately, the wound was then pulsavaced with a liter of triple antibiotic solution.  Hemostasis was achieved with cautery.  We then used the Doppler to sound out the perforators from the descending branch of the inferior gluteal artery.  We mapped out the posterior thigh flap and incised this.  We elevated it in the subfascial plane, taking care to preserve as many perforators as possible and still allow for advancement of the flap.  Once we had mobilized things well enough, this was advanced, and the donor site was closed in a V-Y fashion.  A portion of the flap was significant to bury, and so this was de-epithelialized sharply with a #10 blade.  We then anchored the flap with 2-0 Vicryl figure-of-eight suture followed by eliminating dead space with deep suture 2-0 and deep dermal buried sutures with 3-0 Vicryl.  Just prior to complete closure, a #15 round channel drain was introduced underneath the flap and brought out through a separate stab wound distally.  This was secured with 3-0 nylon suture and trimmed to fit.  The skin was closed with 3-0 Prolene vertical mattress sutures on the uppermost portion of the flap and with skin staples distally for the donor site.  POLINA was trimmed and put to bulb suction.  The wound was dressed with Adaptic and ABDs and secured with Medipore tape.  The patient was then returned to a supine position on a wave mattress and extubated in the OR.  She was taken to the recovery room in satisfactory condition, having tolerated the procedure without difficulty or  complication.         VIRGIL PHELPS MD             D: 10/30/2019   T: 10/30/2019   MT: DANK      Name:     CEDRIC MENDOZA   MRN:      [id number]        Account:        [id number]   :      1970           Procedure Date: 10/16/2019      Document: [id number]

## 2019-10-31 NOTE — PROGRESS NOTES
This is a recent snapshot of the patient's [facility] medical record.  For current drug dose and complete information and questions, call [phone] or In Basket pool, fv home infusion ([id number])  CSN Number:  [id number]

## 2019-11-05 ENCOUNTER — MEDICAL CORRESPONDENCE (OUTPATIENT)
Dept: HEALTH INFORMATION MANAGEMENT | Facility: CLINIC | Age: 49
End: 2019-11-05

## 2019-11-05 LAB
ALBUMIN SERPL-MCNC: 2.8 G/DL (ref 3.4–5)
ALP SERPL-CCNC: 101 U/L (ref 40–150)
ALT SERPL W P-5'-P-CCNC: 21 U/L (ref 0–50)
ANION GAP SERPL CALCULATED.3IONS-SCNC: 5 MMOL/L (ref 3–14)
AST SERPL W P-5'-P-CCNC: 15 U/L (ref 0–45)
BASOPHILS # BLD AUTO: 0.1 10E9/L (ref 0–0.2)
BASOPHILS NFR BLD AUTO: 1.1 %
BILIRUB SERPL-MCNC: 0.1 MG/DL (ref 0.2–1.3)
BUN SERPL-MCNC: 9 MG/DL (ref 7–30)
CALCIUM SERPL-MCNC: 8.6 MG/DL (ref 8.5–10.1)
CHLORIDE SERPL-SCNC: 111 MMOL/L (ref 94–109)
CO2 SERPL-SCNC: 27 MMOL/L (ref 20–32)
CREAT SERPL-MCNC: 0.31 MG/DL (ref 0.52–1.04)
CRP SERPL-MCNC: 3.5 MG/L (ref 0–8)
DIFFERENTIAL METHOD BLD: ABNORMAL
EOSINOPHIL # BLD AUTO: 0.6 10E9/L (ref 0–0.7)
EOSINOPHIL NFR BLD AUTO: 6.6 %
ERYTHROCYTE [DISTWIDTH] IN BLOOD BY AUTOMATED COUNT: 15.2 % (ref 10–15)
GFR SERPL CREATININE-BSD FRML MDRD: >90 ML/MIN/{1.73_M2}
GLUCOSE SERPL-MCNC: 122 MG/DL (ref 70–99)
HCT VFR BLD AUTO: 37.9 % (ref 35–47)
HGB BLD-MCNC: 11.8 G/DL (ref 11.7–15.7)
IMM GRANULOCYTES # BLD: 0 10E9/L (ref 0–0.4)
IMM GRANULOCYTES NFR BLD: 0.1 %
LYMPHOCYTES # BLD AUTO: 2.2 10E9/L (ref 0.8–5.3)
LYMPHOCYTES NFR BLD AUTO: 25.8 %
MCH RBC QN AUTO: 28.3 PG (ref 26.5–33)
MCHC RBC AUTO-ENTMCNC: 31.1 G/DL (ref 31.5–36.5)
MCV RBC AUTO: 91 FL (ref 78–100)
MONOCYTES # BLD AUTO: 0.5 10E9/L (ref 0–1.3)
MONOCYTES NFR BLD AUTO: 6 %
NEUTROPHILS # BLD AUTO: 5.1 10E9/L (ref 1.6–8.3)
NEUTROPHILS NFR BLD AUTO: 60.4 %
NRBC # BLD AUTO: 0 10*3/UL
NRBC BLD AUTO-RTO: 0 /100
PLATELET # BLD AUTO: 282 10E9/L (ref 150–450)
POTASSIUM SERPL-SCNC: 3.7 MMOL/L (ref 3.4–5.3)
PROT SERPL-MCNC: 6.8 G/DL (ref 6.8–8.8)
RBC # BLD AUTO: 4.17 10E12/L (ref 3.8–5.2)
SODIUM SERPL-SCNC: 143 MMOL/L (ref 133–144)
WBC # BLD AUTO: 8.4 10E9/L (ref 4–11)

## 2019-11-05 PROCEDURE — 80053 COMPREHEN METABOLIC PANEL: CPT | Performed by: INTERNAL MEDICINE

## 2019-11-05 PROCEDURE — 86140 C-REACTIVE PROTEIN: CPT | Performed by: INTERNAL MEDICINE

## 2019-11-05 PROCEDURE — 85025 COMPLETE CBC W/AUTO DIFF WBC: CPT | Performed by: INTERNAL MEDICINE

## 2019-11-06 ENCOUNTER — HOME INFUSION (PRE-WILLOW HOME INFUSION) (OUTPATIENT)
Dept: PHARMACY | Facility: CLINIC | Age: 49
End: 2019-11-06

## 2019-11-06 ENCOUNTER — APPOINTMENT (OUTPATIENT)
Dept: LAB | Facility: CLINIC | Age: 49
End: 2019-11-06
Attending: INTERNAL MEDICINE
Payer: COMMERCIAL

## 2019-11-07 ENCOUNTER — HOSPITAL ENCOUNTER (OUTPATIENT)
Dept: WOUND CARE | Facility: CLINIC | Age: 49
Discharge: HOME | End: 2019-11-07
Attending: SURGERY | Admitting: SURGERY
Payer: COMMERCIAL

## 2019-11-07 VITALS — DIASTOLIC BLOOD PRESSURE: 77 MMHG | TEMPERATURE: 97.7 F | SYSTOLIC BLOOD PRESSURE: 115 MMHG | RESPIRATION RATE: 16 BRPM

## 2019-11-07 DIAGNOSIS — L89.324 PRESSURE ULCER OF ISCHIUM, LEFT, STAGE IV (H): ICD-10-CM

## 2019-11-07 PROCEDURE — G0463 HOSPITAL OUTPT CLINIC VISIT: HCPCS

## 2019-11-07 NOTE — PROGRESS NOTES
This is a recent snapshot of the patient's [facility] medical record.  For current drug dose and complete information and questions, call [phone] or In Basket pool, fv home infusion ([id number])  CSN Number:  [id number]

## 2019-11-07 NOTE — PROGRESS NOTES
Patient arrived for wound care visit. Certified Wound Care Nurse time spent evaluating patient record, completed a full evaluation and documented wound(s) & becki-wound skin; provided recommendation based on treatment plan. Reviewed discharge instructions, sitting protocol, patient education, and discussed plan of care with appropriate medical team staff members and patient and mom.

## 2019-11-07 NOTE — DISCHARGE INSTRUCTIONS
[facility]  [address] [facility] [address] [address]  Appointment Phone [phone] Nurse Advisors [phone]    Priyanka Martinez      1970    Important!!!:  After each sitting period/session, the wound or suture site must be checked by the nurse.  Any sign of pressure or damage (e.g. dehiscence of suture line, area of new drainage, poor capillary refill of flap) pushes the protocol back to point of no sign of damage.    All sitting protocols start with the patient IN BED flat on buttocks.    Sitting Protocol    1.  Begin sitting up in bed for 15 minutes, 3-4 times per day, starting tomorrow.  2.  At 1-2 days move to 30 minutes of sitting in bed, 3-4 times per day.  If OK wound check, then advance sitting period by 15 minutes the next day or two.   a)   To 45 minutes sitting in bed  b)   To 60 minutes sitting in bed  3.  When patient can tolerate sitting in bed for sessions of 60 minutes, get wheelchair cushion mapped (in both the seating position and also in the tilted position if your chair tilts)  4. Move the patient to the chair to sit, starting with sitting for 60 minutes in the chair, 3 times per day, checking the flap and incisions after each sitting session.  If OK wound check, then advance sitting period in the chair by 15 minutes the next day or two.  a) To 1 hour, 15 minutes sitting in the chair  b) To 1 hour, 30 minutes sitting in the chair  c) To 1 hour, 45 minutes sitting in the chair  d) To 2 hours sitting in the chair  5.   When tolerating 2 hour sitting sessions, proceed with usual regimen, in wheelchair, with appropriate off-loading techniques.             Remember: If at any point during the sitting protocol, either in bed or in chair, the wound changes for the worse, return to the previous time period that was tolerated.      Call the [facility] [place] regarding any changes, questions, or concerns at  [phone].   Dr. Cintia Bob    Follow up with provider: 4-6 weeks

## 2019-11-08 NOTE — PROGRESS NOTES
Visit Date:   11/07/2019      This is a 48-year-old paraplegic female who is here today with her mom.  She underwent a left posterior thigh flap for a stage IV ischial decubitus with possible osteomyelitis, just about 3 weeks ago now.  She has been doing her recovery at home, specialty mattress, and she is still receiving IV antibiotics.  She did have one trip to the ER for some PICC line tenderness, but that was resolved easily.  Today, she is here for a flap check and both the POLINA as well as the sutures and staples were removed today without difficulty.        Overall, the flap is viable intact and the incisions look good with minimal drainage.  Since things are looking so good, we will give her a sitting protocol to start with.  She should not start until tomorrow and it should be a very conservative progression with every other day increase in sitting sessions.  She understands that when she gets to an hour sitting in bed without any evidence of breakdown, then she can get her cushion repressure mapped.  She is wondering when she can use a shower bench that is padded.  I have recommended that she at least get to the point where she is sitting well in her wheelchair first without damage to her flap.        Overall, I think she is doing quite nicely, but I know she is eager to get back to work.  We will see her back in a month or two and she can come in her chair.        Her vitals today were within normal limits and there were no wounds to measure.   Labs:   Recent Labs   Lab Test 11/05/19  1338  11/12/18  0802   ALBUMIN 2.8*   < >  --    HGB 11.8   < > 8.3*   INR  --   --  1.31*   WBC 8.4   < > 9.9   CRP 3.5   < >  --     < > = values in this interval not displayed.     Nutrition requirements were discussed with patient today.  Vitals:  /77 (BP Location: Right arm)   Temp 97.7  F (36.5  C) (Temporal)   Resp 16   Wound:     Photo:             VIRGIL PHELPS MD             D: 11/07/2019   T:  2019   MT: WT      Name:     CEDRIC MENDOZA   MRN:      [id number]        Account:      [id number]   :      1970           Visit Date:   2019      Document: [id number]

## 2019-11-10 DIAGNOSIS — N94.6 DYSMENORRHEA: ICD-10-CM

## 2019-11-10 DIAGNOSIS — F41.9 ANXIETY: ICD-10-CM

## 2019-11-11 RX ORDER — MEDROXYPROGESTERONE ACETATE 10 MG
TABLET ORAL
Qty: 135 TABLET | Refills: 4 | Status: SHIPPED | OUTPATIENT
Start: 2019-11-11 | End: 2021-02-04

## 2019-11-11 NOTE — TELEPHONE ENCOUNTER
medroxyPROGESTERone (PROVERA) 10 MG tablet  Last Written Prescription Date:  8/13/19  Last Fill Quantity: 135,  # refills: 0   Last office visit: 10/9/2019 with prescribing provider:     Future Office Visit:   Next 5 appointments (look out 90 days)    Dec 12, 2019  8:40 AM CST  (Arrive by 8:30 AM)  Return Visit with Cintia Bob MD  [facility] [place] ([facility]) [address] [address]  [phone]         Routing refill request to provider for review/approval because:  Drug not on the [facility] refill protocol     Rani Sy, RN, BSN, PHN  .[facility]

## 2019-11-12 ENCOUNTER — MEDICAL CORRESPONDENCE (OUTPATIENT)
Dept: HEALTH INFORMATION MANAGEMENT | Facility: CLINIC | Age: 49
End: 2019-11-12

## 2019-11-12 DIAGNOSIS — Z53.9 DIAGNOSIS NOT YET DEFINED: Primary | ICD-10-CM

## 2019-11-12 LAB
ALBUMIN SERPL-MCNC: 2.6 G/DL (ref 3.4–5)
ALP SERPL-CCNC: 100 U/L (ref 40–150)
ALT SERPL W P-5'-P-CCNC: 15 U/L (ref 0–50)
ANION GAP SERPL CALCULATED.3IONS-SCNC: 7 MMOL/L (ref 3–14)
AST SERPL W P-5'-P-CCNC: 9 U/L (ref 0–45)
BASOPHILS # BLD AUTO: 0 10E9/L (ref 0–0.2)
BASOPHILS NFR BLD AUTO: 0.5 %
BILIRUB SERPL-MCNC: 0.1 MG/DL (ref 0.2–1.3)
BUN SERPL-MCNC: 14 MG/DL (ref 7–30)
CALCIUM SERPL-MCNC: 8.7 MG/DL (ref 8.5–10.1)
CHLORIDE SERPL-SCNC: 108 MMOL/L (ref 94–109)
CO2 SERPL-SCNC: 26 MMOL/L (ref 20–32)
CREAT SERPL-MCNC: 0.44 MG/DL (ref 0.52–1.04)
CRP SERPL-MCNC: 10 MG/L (ref 0–8)
DIFFERENTIAL METHOD BLD: ABNORMAL
EOSINOPHIL # BLD AUTO: 0.4 10E9/L (ref 0–0.7)
EOSINOPHIL NFR BLD AUTO: 5.3 %
ERYTHROCYTE [DISTWIDTH] IN BLOOD BY AUTOMATED COUNT: 14.9 % (ref 10–15)
GFR SERPL CREATININE-BSD FRML MDRD: >90 ML/MIN/{1.73_M2}
GLUCOSE SERPL-MCNC: 140 MG/DL (ref 70–99)
HCT VFR BLD AUTO: 37.8 % (ref 35–47)
HGB BLD-MCNC: 11.5 G/DL (ref 11.7–15.7)
IMM GRANULOCYTES # BLD: 0 10E9/L (ref 0–0.4)
IMM GRANULOCYTES NFR BLD: 0.1 %
LYMPHOCYTES # BLD AUTO: 2 10E9/L (ref 0.8–5.3)
LYMPHOCYTES NFR BLD AUTO: 23.7 %
MCH RBC QN AUTO: 27.7 PG (ref 26.5–33)
MCHC RBC AUTO-ENTMCNC: 30.4 G/DL (ref 31.5–36.5)
MCV RBC AUTO: 91 FL (ref 78–100)
MONOCYTES # BLD AUTO: 0.5 10E9/L (ref 0–1.3)
MONOCYTES NFR BLD AUTO: 5.4 %
NEUTROPHILS # BLD AUTO: 5.5 10E9/L (ref 1.6–8.3)
NEUTROPHILS NFR BLD AUTO: 65 %
NRBC # BLD AUTO: 0 10*3/UL
NRBC BLD AUTO-RTO: 0 /100
PLATELET # BLD AUTO: 293 10E9/L (ref 150–450)
POTASSIUM SERPL-SCNC: 3.8 MMOL/L (ref 3.4–5.3)
PROT SERPL-MCNC: 6.5 G/DL (ref 6.8–8.8)
RBC # BLD AUTO: 4.15 10E12/L (ref 3.8–5.2)
SODIUM SERPL-SCNC: 141 MMOL/L (ref 133–144)
WBC # BLD AUTO: 8.4 10E9/L (ref 4–11)

## 2019-11-12 PROCEDURE — 80053 COMPREHEN METABOLIC PANEL: CPT | Performed by: INTERNAL MEDICINE

## 2019-11-12 PROCEDURE — G0180 MD CERTIFICATION HHA PATIENT: HCPCS | Performed by: INTERNAL MEDICINE

## 2019-11-12 PROCEDURE — 86140 C-REACTIVE PROTEIN: CPT | Performed by: INTERNAL MEDICINE

## 2019-11-12 PROCEDURE — 85025 COMPLETE CBC W/AUTO DIFF WBC: CPT | Performed by: INTERNAL MEDICINE

## 2019-11-13 ENCOUNTER — HOME INFUSION (PRE-WILLOW HOME INFUSION) (OUTPATIENT)
Dept: PHARMACY | Facility: CLINIC | Age: 49
End: 2019-11-13

## 2019-11-13 LAB
FUNGUS SPEC CULT: NORMAL
SPECIMEN SOURCE: NORMAL

## 2019-11-14 ENCOUNTER — MYC MEDICAL ADVICE (OUTPATIENT)
Dept: PEDIATRICS | Facility: CLINIC | Age: 49
End: 2019-11-14

## 2019-11-14 DIAGNOSIS — Z98.890 S/P FLAP GRAFT: ICD-10-CM

## 2019-11-14 RX ORDER — TROSPIUM CHLORIDE 20 MG/1
20 TABLET, FILM COATED ORAL 2 TIMES DAILY
Qty: 180 TABLET | Refills: 2 | Status: SHIPPED | OUTPATIENT
Start: 2019-11-14 | End: 2020-07-30

## 2019-11-14 NOTE — PROGRESS NOTES
This is a recent snapshot of the patient's [facility] medical record.  For current drug dose and complete information and questions, call [phone] or In Basket pool, fv home infusion ([id number])  CSN Number:  [id number]

## 2019-11-19 ENCOUNTER — MEDICAL CORRESPONDENCE (OUTPATIENT)
Dept: HEALTH INFORMATION MANAGEMENT | Facility: CLINIC | Age: 49
End: 2019-11-19

## 2019-11-19 LAB
ALBUMIN SERPL-MCNC: 2.7 G/DL (ref 3.4–5)
ALP SERPL-CCNC: 89 U/L (ref 40–150)
ALT SERPL W P-5'-P-CCNC: 17 U/L (ref 0–50)
ANION GAP SERPL CALCULATED.3IONS-SCNC: 5 MMOL/L (ref 3–14)
AST SERPL W P-5'-P-CCNC: 11 U/L (ref 0–45)
BASOPHILS # BLD AUTO: 0 10E9/L (ref 0–0.2)
BASOPHILS NFR BLD AUTO: 0.3 %
BILIRUB SERPL-MCNC: 0.1 MG/DL (ref 0.2–1.3)
BUN SERPL-MCNC: 10 MG/DL (ref 7–30)
CALCIUM SERPL-MCNC: 8.6 MG/DL (ref 8.5–10.1)
CHLORIDE SERPL-SCNC: 110 MMOL/L (ref 94–109)
CO2 SERPL-SCNC: 27 MMOL/L (ref 20–32)
CREAT SERPL-MCNC: 0.36 MG/DL (ref 0.52–1.04)
CRP SERPL-MCNC: <2.9 MG/L (ref 0–8)
DIFFERENTIAL METHOD BLD: ABNORMAL
EOSINOPHIL # BLD AUTO: 0.6 10E9/L (ref 0–0.7)
EOSINOPHIL NFR BLD AUTO: 8.4 %
ERYTHROCYTE [DISTWIDTH] IN BLOOD BY AUTOMATED COUNT: 14.9 % (ref 10–15)
GFR SERPL CREATININE-BSD FRML MDRD: >90 ML/MIN/{1.73_M2}
GLUCOSE SERPL-MCNC: 101 MG/DL (ref 70–99)
HCT VFR BLD AUTO: 38.3 % (ref 35–47)
HGB BLD-MCNC: 11.8 G/DL (ref 11.7–15.7)
IMM GRANULOCYTES # BLD: 0 10E9/L (ref 0–0.4)
IMM GRANULOCYTES NFR BLD: 0 %
LYMPHOCYTES # BLD AUTO: 1.9 10E9/L (ref 0.8–5.3)
LYMPHOCYTES NFR BLD AUTO: 28.5 %
MCH RBC QN AUTO: 27.8 PG (ref 26.5–33)
MCHC RBC AUTO-ENTMCNC: 30.8 G/DL (ref 31.5–36.5)
MCV RBC AUTO: 90 FL (ref 78–100)
MONOCYTES # BLD AUTO: 0.5 10E9/L (ref 0–1.3)
MONOCYTES NFR BLD AUTO: 6.9 %
NEUTROPHILS # BLD AUTO: 3.7 10E9/L (ref 1.6–8.3)
NEUTROPHILS NFR BLD AUTO: 55.9 %
NRBC # BLD AUTO: 0 10*3/UL
NRBC BLD AUTO-RTO: 0 /100
PLATELET # BLD AUTO: 257 10E9/L (ref 150–450)
POTASSIUM SERPL-SCNC: 3.8 MMOL/L (ref 3.4–5.3)
PROT SERPL-MCNC: 6.6 G/DL (ref 6.8–8.8)
RBC # BLD AUTO: 4.25 10E12/L (ref 3.8–5.2)
SODIUM SERPL-SCNC: 142 MMOL/L (ref 133–144)
WBC # BLD AUTO: 6.6 10E9/L (ref 4–11)

## 2019-11-19 PROCEDURE — 86140 C-REACTIVE PROTEIN: CPT | Performed by: INTERNAL MEDICINE

## 2019-11-19 PROCEDURE — 80053 COMPREHEN METABOLIC PANEL: CPT | Performed by: INTERNAL MEDICINE

## 2019-11-19 PROCEDURE — 85025 COMPLETE CBC W/AUTO DIFF WBC: CPT | Performed by: INTERNAL MEDICINE

## 2019-11-20 ENCOUNTER — HOME INFUSION (PRE-WILLOW HOME INFUSION) (OUTPATIENT)
Dept: PHARMACY | Facility: CLINIC | Age: 49
End: 2019-11-20

## 2019-11-21 DIAGNOSIS — Z53.9 DIAGNOSIS NOT YET DEFINED: Primary | ICD-10-CM

## 2019-11-21 NOTE — PROGRESS NOTES
This is a recent snapshot of the patient's [facility] medical record.  For current drug dose and complete information and questions, call [phone] or In Basket pool, fv home infusion ([id number])  CSN Number:  [id number]

## 2019-11-26 ENCOUNTER — TELEPHONE (OUTPATIENT)
Dept: PEDIATRICS | Facility: CLINIC | Age: 49
End: 2019-11-26

## 2019-11-26 ENCOUNTER — APPOINTMENT (OUTPATIENT)
Dept: GENERAL RADIOLOGY | Facility: CLINIC | Age: 49
DRG: 493 | End: 2019-11-26
Attending: EMERGENCY MEDICINE
Payer: COMMERCIAL

## 2019-11-26 ENCOUNTER — HOSPITAL ENCOUNTER (INPATIENT)
Facility: CLINIC | Age: 49
LOS: 2 days | Discharge: HOME HEALTH | DRG: 493 | End: 2019-11-28
Attending: EMERGENCY MEDICINE | Admitting: HOSPITALIST
Payer: COMMERCIAL

## 2019-11-26 DIAGNOSIS — S82.401A TIBIA/FIBULA FRACTURE, RIGHT, CLOSED, INITIAL ENCOUNTER: ICD-10-CM

## 2019-11-26 DIAGNOSIS — G82.20 PARAPLEGIA (H): ICD-10-CM

## 2019-11-26 DIAGNOSIS — Z98.890 S/P FLAP GRAFT: ICD-10-CM

## 2019-11-26 DIAGNOSIS — S82.201A TIBIA/FIBULA FRACTURE, RIGHT, CLOSED, INITIAL ENCOUNTER: ICD-10-CM

## 2019-11-26 DIAGNOSIS — S82.301D CLOSED FRACTURE OF DISTAL END OF RIGHT TIBIA WITH ROUTINE HEALING, UNSPECIFIED FRACTURE MORPHOLOGY, SUBSEQUENT ENCOUNTER: Primary | ICD-10-CM

## 2019-11-26 DIAGNOSIS — L89.324 PRESSURE ULCER OF ISCHIUM, LEFT, STAGE IV (H): ICD-10-CM

## 2019-11-26 PROBLEM — S82.209A TIBIA FRACTURE: Status: ACTIVE | Noted: 2019-11-26

## 2019-11-26 LAB
ANION GAP SERPL CALCULATED.3IONS-SCNC: 5 MMOL/L (ref 3–14)
BASOPHILS # BLD AUTO: 0 10E9/L (ref 0–0.2)
BASOPHILS NFR BLD AUTO: 0.5 %
BUN SERPL-MCNC: 8 MG/DL (ref 7–30)
CALCIUM SERPL-MCNC: 8.5 MG/DL (ref 8.5–10.1)
CHLORIDE SERPL-SCNC: 108 MMOL/L (ref 94–109)
CO2 SERPL-SCNC: 27 MMOL/L (ref 20–32)
CREAT SERPL-MCNC: 0.34 MG/DL (ref 0.52–1.04)
DIFFERENTIAL METHOD BLD: ABNORMAL
EOSINOPHIL # BLD AUTO: 0.6 10E9/L (ref 0–0.7)
EOSINOPHIL NFR BLD AUTO: 7 %
ERYTHROCYTE [DISTWIDTH] IN BLOOD BY AUTOMATED COUNT: 15.3 % (ref 10–15)
GFR SERPL CREATININE-BSD FRML MDRD: >90 ML/MIN/{1.73_M2}
GLUCOSE SERPL-MCNC: 97 MG/DL (ref 70–99)
HCT VFR BLD AUTO: 33.7 % (ref 35–47)
HGB BLD-MCNC: 10.7 G/DL (ref 11.7–15.7)
IMM GRANULOCYTES # BLD: 0 10E9/L (ref 0–0.4)
IMM GRANULOCYTES NFR BLD: 0.1 %
LYMPHOCYTES # BLD AUTO: 1.8 10E9/L (ref 0.8–5.3)
LYMPHOCYTES NFR BLD AUTO: 21.6 %
MCH RBC QN AUTO: 27.9 PG (ref 26.5–33)
MCHC RBC AUTO-ENTMCNC: 31.8 G/DL (ref 31.5–36.5)
MCV RBC AUTO: 88 FL (ref 78–100)
MONOCYTES # BLD AUTO: 0.8 10E9/L (ref 0–1.3)
MONOCYTES NFR BLD AUTO: 9.4 %
NEUTROPHILS # BLD AUTO: 5.1 10E9/L (ref 1.6–8.3)
NEUTROPHILS NFR BLD AUTO: 61.4 %
NRBC # BLD AUTO: 0 10*3/UL
NRBC BLD AUTO-RTO: 0 /100
PLATELET # BLD AUTO: 266 10E9/L (ref 150–450)
POTASSIUM SERPL-SCNC: 3.9 MMOL/L (ref 3.4–5.3)
RBC # BLD AUTO: 3.84 10E12/L (ref 3.8–5.2)
SODIUM SERPL-SCNC: 140 MMOL/L (ref 133–144)
WBC # BLD AUTO: 8.3 10E9/L (ref 4–11)

## 2019-11-26 PROCEDURE — 40000986 XR CHEST 1 VW

## 2019-11-26 PROCEDURE — 25800030 ZZH RX IP 258 OP 636: Performed by: HOSPITALIST

## 2019-11-26 PROCEDURE — 99285 EMERGENCY DEPT VISIT HI MDM: CPT | Mod: 25

## 2019-11-26 PROCEDURE — 25000128 H RX IP 250 OP 636: Performed by: HOSPITALIST

## 2019-11-26 PROCEDURE — 12000000 ZZH R&B MED SURG/OB

## 2019-11-26 PROCEDURE — 25000132 ZZH RX MED GY IP 250 OP 250 PS 637: Performed by: HOSPITALIST

## 2019-11-26 PROCEDURE — 99222 1ST HOSP IP/OBS MODERATE 55: CPT | Mod: AI | Performed by: HOSPITALIST

## 2019-11-26 PROCEDURE — 36415 COLL VENOUS BLD VENIPUNCTURE: CPT | Performed by: HOSPITALIST

## 2019-11-26 PROCEDURE — 85025 COMPLETE CBC W/AUTO DIFF WBC: CPT | Performed by: HOSPITALIST

## 2019-11-26 PROCEDURE — 29515 APPLICATION SHORT LEG SPLINT: CPT | Mod: RT

## 2019-11-26 PROCEDURE — 80048 BASIC METABOLIC PNL TOTAL CA: CPT | Performed by: HOSPITALIST

## 2019-11-26 PROCEDURE — 25000132 ZZH RX MED GY IP 250 OP 250 PS 637: Performed by: PHYSICIAN ASSISTANT

## 2019-11-26 PROCEDURE — 40000986 XR TIBIA & FIBULA RT 2 VW: Mod: RT

## 2019-11-26 PROCEDURE — 73590 X-RAY EXAM OF LOWER LEG: CPT | Mod: RT

## 2019-11-26 PROCEDURE — 40000257 ZZH STATISTIC CONSULT NO CHARGE VASC ACCESS

## 2019-11-26 RX ORDER — DICYCLOMINE HCL 20 MG
20 TABLET ORAL
COMMUNITY
End: 2020-02-04

## 2019-11-26 RX ORDER — LIDOCAINE 40 MG/G
CREAM TOPICAL
Status: DISCONTINUED | OUTPATIENT
Start: 2019-11-26 | End: 2019-11-28 | Stop reason: HOSPADM

## 2019-11-26 RX ORDER — CEFAZOLIN SODIUM 1 G/3ML
1 INJECTION, POWDER, FOR SOLUTION INTRAMUSCULAR; INTRAVENOUS SEE ADMIN INSTRUCTIONS
Status: DISCONTINUED | OUTPATIENT
Start: 2019-11-27 | End: 2019-11-27

## 2019-11-26 RX ORDER — CELECOXIB 100 MG/1
100 CAPSULE ORAL 2 TIMES DAILY
Status: DISCONTINUED | OUTPATIENT
Start: 2019-11-26 | End: 2019-11-28 | Stop reason: HOSPADM

## 2019-11-26 RX ORDER — LACTOBACILLUS RHAMNOSUS GG 10B CELL
1 CAPSULE ORAL AT BEDTIME
Status: DISCONTINUED | OUTPATIENT
Start: 2019-11-26 | End: 2019-11-28 | Stop reason: HOSPADM

## 2019-11-26 RX ORDER — GABAPENTIN 600 MG/1
600 TABLET ORAL 3 TIMES DAILY
Status: DISCONTINUED | OUTPATIENT
Start: 2019-11-26 | End: 2019-11-28 | Stop reason: HOSPADM

## 2019-11-26 RX ORDER — NALOXONE HYDROCHLORIDE 0.4 MG/ML
.1-.4 INJECTION, SOLUTION INTRAMUSCULAR; INTRAVENOUS; SUBCUTANEOUS
Status: DISCONTINUED | OUTPATIENT
Start: 2019-11-26 | End: 2019-11-28 | Stop reason: HOSPADM

## 2019-11-26 RX ORDER — ACETAMINOPHEN 325 MG/1
325-650 TABLET ORAL EVERY 4 HOURS PRN
Status: DISCONTINUED | OUTPATIENT
Start: 2019-11-26 | End: 2019-11-27

## 2019-11-26 RX ORDER — SACCHAROMYCES BOULARDII 250 MG
250 CAPSULE ORAL 2 TIMES DAILY
Status: DISCONTINUED | OUTPATIENT
Start: 2019-11-26 | End: 2019-11-26

## 2019-11-26 RX ORDER — HYDROCODONE BITARTRATE AND ACETAMINOPHEN 5; 325 MG/1; MG/1
1-2 TABLET ORAL EVERY 4 HOURS PRN
Status: DISCONTINUED | OUTPATIENT
Start: 2019-11-26 | End: 2019-11-26

## 2019-11-26 RX ORDER — ASCORBIC ACID 500 MG
1000 TABLET ORAL EVERY EVENING
Status: DISCONTINUED | OUTPATIENT
Start: 2019-11-26 | End: 2019-11-28 | Stop reason: HOSPADM

## 2019-11-26 RX ORDER — DICYCLOMINE HCL 20 MG
20 TABLET ORAL
Status: DISCONTINUED | OUTPATIENT
Start: 2019-11-26 | End: 2019-11-28 | Stop reason: HOSPADM

## 2019-11-26 RX ORDER — ONDANSETRON 2 MG/ML
4 INJECTION INTRAMUSCULAR; INTRAVENOUS EVERY 6 HOURS PRN
Status: DISCONTINUED | OUTPATIENT
Start: 2019-11-26 | End: 2019-11-27

## 2019-11-26 RX ORDER — ACETAMINOPHEN 650 MG/1
650 SUPPOSITORY RECTAL EVERY 4 HOURS PRN
Status: DISCONTINUED | OUTPATIENT
Start: 2019-11-26 | End: 2019-11-28 | Stop reason: HOSPADM

## 2019-11-26 RX ORDER — HYDROMORPHONE HYDROCHLORIDE 1 MG/ML
0.2 INJECTION, SOLUTION INTRAMUSCULAR; INTRAVENOUS; SUBCUTANEOUS
Status: DISCONTINUED | OUTPATIENT
Start: 2019-11-26 | End: 2019-11-28 | Stop reason: HOSPADM

## 2019-11-26 RX ORDER — PHENAZOPYRIDINE HYDROCHLORIDE 200 MG/1
200 TABLET, FILM COATED ORAL EVERY EVENING
COMMUNITY
End: 2021-07-15

## 2019-11-26 RX ORDER — BACLOFEN 10 MG/1
10 TABLET ORAL 3 TIMES DAILY
Status: DISCONTINUED | OUTPATIENT
Start: 2019-11-26 | End: 2019-11-28 | Stop reason: HOSPADM

## 2019-11-26 RX ORDER — DEXTROSE MONOHYDRATE, SODIUM CHLORIDE, AND POTASSIUM CHLORIDE 50; 1.49; 4.5 G/1000ML; G/1000ML; G/1000ML
INJECTION, SOLUTION INTRAVENOUS CONTINUOUS
Status: DISCONTINUED | OUTPATIENT
Start: 2019-11-26 | End: 2019-11-28

## 2019-11-26 RX ORDER — ONDANSETRON 4 MG/1
4 TABLET, ORALLY DISINTEGRATING ORAL EVERY 6 HOURS PRN
Status: DISCONTINUED | OUTPATIENT
Start: 2019-11-26 | End: 2019-11-28 | Stop reason: HOSPADM

## 2019-11-26 RX ORDER — POLYETHYLENE GLYCOL 3350 17 G/17G
17 POWDER, FOR SOLUTION ORAL DAILY PRN
Status: DISCONTINUED | OUTPATIENT
Start: 2019-11-26 | End: 2019-11-26

## 2019-11-26 RX ORDER — HEPARIN SODIUM 5000 [USP'U]/.5ML
5000 INJECTION, SOLUTION INTRAVENOUS; SUBCUTANEOUS EVERY 12 HOURS
Status: DISCONTINUED | OUTPATIENT
Start: 2019-11-26 | End: 2019-11-27

## 2019-11-26 RX ORDER — AMOXICILLIN 250 MG
2 CAPSULE ORAL 2 TIMES DAILY
Status: DISCONTINUED | OUTPATIENT
Start: 2019-11-26 | End: 2019-11-28 | Stop reason: HOSPADM

## 2019-11-26 RX ORDER — CEFAZOLIN SODIUM 2 G/100ML
2 INJECTION, SOLUTION INTRAVENOUS
Status: DISCONTINUED | OUTPATIENT
Start: 2019-11-27 | End: 2019-11-27 | Stop reason: ALTCHOICE

## 2019-11-26 RX ORDER — AMOXICILLIN 250 MG
1 CAPSULE ORAL 2 TIMES DAILY
Status: DISCONTINUED | OUTPATIENT
Start: 2019-11-26 | End: 2019-11-27

## 2019-11-26 RX ORDER — TOLTERODINE TARTRATE 2 MG/1
2 TABLET, EXTENDED RELEASE ORAL 2 TIMES DAILY
Status: DISCONTINUED | OUTPATIENT
Start: 2019-11-26 | End: 2019-11-28 | Stop reason: HOSPADM

## 2019-11-26 RX ADMIN — SODIUM CHLORIDE 4 MILLION UNITS: 9 INJECTION, SOLUTION INTRAVENOUS at 17:46

## 2019-11-26 RX ADMIN — CELECOXIB 100 MG: 100 CAPSULE ORAL at 22:21

## 2019-11-26 RX ADMIN — GABAPENTIN 600 MG: 600 TABLET, FILM COATED ORAL at 20:07

## 2019-11-26 RX ADMIN — ACETAMINOPHEN 650 MG: 325 TABLET, FILM COATED ORAL at 20:06

## 2019-11-26 RX ADMIN — TOLTERODINE TARTRATE 2 MG: 2 TABLET, FILM COATED ORAL at 22:21

## 2019-11-26 RX ADMIN — DICYCLOMINE HYDROCHLORIDE 20 MG: 20 TABLET ORAL at 17:12

## 2019-11-26 RX ADMIN — HEPARIN SODIUM 5000 UNITS: 5000 INJECTION, SOLUTION INTRAVENOUS; SUBCUTANEOUS at 18:07

## 2019-11-26 RX ADMIN — FLUOXETINE 20 MG: 20 CAPSULE ORAL at 20:06

## 2019-11-26 RX ADMIN — OXYCODONE HYDROCHLORIDE AND ACETAMINOPHEN 1000 MG: 500 TABLET ORAL at 20:06

## 2019-11-26 RX ADMIN — POTASSIUM CHLORIDE, DEXTROSE MONOHYDRATE AND SODIUM CHLORIDE: 150; 5; 450 INJECTION, SOLUTION INTRAVENOUS at 17:46

## 2019-11-26 RX ADMIN — BACLOFEN 10 MG: 10 TABLET ORAL at 20:08

## 2019-11-26 RX ADMIN — Medication 1 CAPSULE: at 17:12

## 2019-11-26 RX ADMIN — SODIUM CHLORIDE 4 MILLION UNITS: 9 INJECTION, SOLUTION INTRAVENOUS at 22:22

## 2019-11-26 ASSESSMENT — MIFFLIN-ST. JEOR: SCORE: 1436.93

## 2019-11-26 ASSESSMENT — ACTIVITIES OF DAILY LIVING (ADL): ADLS_ACUITY_SCORE: 18

## 2019-11-26 ASSESSMENT — ENCOUNTER SYMPTOMS: JOINT SWELLING: 1

## 2019-11-26 NOTE — PROGRESS NOTES
Chest film shows picc tip in the upper SVC .  Discussed with ER nurse and they will need to discuss results with primary MD once patient admitted to ortho unit.  ER nurse will obtain peripheral IV access as line is not in optimal position.

## 2019-11-26 NOTE — PROVIDER NOTIFICATION
IV team called to inform that PICC placement is verified and we can not use the PICC line.  She is asking if patient will need a new PICC line placed or if a midline is ok.      Paged hospitalist regarding the questions above, awaiting call back.

## 2019-11-26 NOTE — ED TRIAGE NOTES
Pt. Had flap surgery in Oct. Hurt leg Wednesday getting into hospital bed at home. Pt. [place] a snap right leg, but didn't feel pain. Today the home nurse noticed edema and put an ACE wrap on. So here to be evaluated, also PICC line not drawing blood.
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
## 2019-11-26 NOTE — PHARMACY-ADMISSION MEDICATION HISTORY
Pharmacy Medication History  Admission medication history interview status for the 11/26/2019  admission is complete. See EPIC admission navigator for prior to admission medications     Medication history sources: Spoke w/ patient.  Reviewed recent fill history through [employer].   Medication history source reliability: Moderate  Adherence assessment: Moderate    Medication reconciliation completed by provider prior to medication history? Yes    Time spent in this activity: 20 minutes      Prior to Admission medications    Medication Sig Last Dose Taking? Auth Provider   ACIDOPHILUS LACTOBACILLUS PO Take 1 tablet by mouth At Bedtime  11/25/2019 at pm Yes Reported, Patient   ascorbic acid (VITAMIN C) 1000 MG TABS Take 1,000 mg by mouth every evening  11/25/2019 at pm Yes Unknown, Entered By History   baclofen (LIORESAL) 10 MG tablet Take 1 tablet (10 mg) by mouth 3 times daily 11/26/2019 at x 2 doses Yes Ayala Limon MD PhD   celecoxib (CELEBREX) 100 MG capsule Take 1 capsule (100 mg) by mouth 2 times daily 11/26/2019 at am Yes Ayala Limon MD PhD   dicyclomine (BENTYL) 20 MG tablet Take 20 mg by mouth 3 times daily (with meals) 11/26/2019 at x 2 doses Yes Unknown, Entered By History   FLUoxetine (PROZAC) 20 MG capsule TAKE 1 CAPSULE DAILY 11/25/2019 at pm Yes Ayala Limon MD PhD   gabapentin (NEURONTIN) 600 MG tablet TAKE 1 TABLET BY MOUTH THREE TIMES A DAY 11/26/2019 at x 2 doses Yes Reported, Patient   medroxyPROGESTERone (PROVERA) 10 MG tablet TAKE ONE AND ONE-HALF TABLETS DAILY 11/26/2019 at am Yes Ayala Liomn MD PhD   penicillin G potassium 4 Million Units Inject 4 Million Units into the vein every 4 hours 11/26/2019 at 1000 Yes Ronny Han MD   phenazopyridine (PYRIDIUM) 200 MG tablet Take 200 mg by mouth every evening 11/25/2019 at pm Yes Unknown, Entered By History   psyllium (METAMUCIL/KONSYL) capsule Take 1 capsule by mouth 3 times daily (before meals) 11/26/2019 at x 2 doses Yes Unknown, Entered By  History   trospium (SANCTURA) 20 MG tablet Take 1 tablet (20 mg) by mouth 2 times daily 11/26/2019 at am Yes Aayla Limon MD PhD   ZINC-VITAMIN C PO Take 1 tablet by mouth every evening 11/25/2019 at pm Yes Unknown, Entered By History   order for DME [facility] Fax [phone]    Secondary Dressing tegaderm foam adhesive (ref #[id number]) Qty 30    Length of Need: 1 month  Frequency of dressing change: daily   Cintia Bob MD   order for DME [facility]   p: [phone] f: [phone]  EMAIL to [email or web address]    Request for group 2 air mattress & semi-electric hospital bed  Ht:177.8 cm  Wt:72.5 kg  Length of need: lifetime    Pt. is wheelchair bound & has been in a comprehensive ulcer treatment program for >2 months. We will follow monthly until wound closure    To obtain medical records:  p: [phone] f: [phone]   Za Carter PA-C   order for DME Equipment being ordered: Catheter Wheeler 2Way 30cc 18FR, Silicone Coated Latex   Ayala Limon MD PhD   order for DME Equipment being ordered: Anchoring Device Flexi-Track LG   Ayala Limon MD PhD   order for DME Equipment being ordered: Insertion Tray Wheeler w/BZK Swab Catheter 10CC, Sterile   Ayala Limon MD PhD   order for DME Equipment being ordered: Drain Bag, Kenguard 2000ml. Urinary Bag with Anti-Refulx   Ayala Limon MD PhD     Marilee Strickland, PharmD, BCPS

## 2019-11-26 NOTE — TELEPHONE ENCOUNTER
Update on home care : patient is going to the [facility] emergency room to have her right leg assessed. A few days ago she was transferring from wheelchair and heard a popping sound. It was ok that night, but now very swollen and bruising. Still making popping noise in shin area. Patient will transport herself with her mom.

## 2019-11-26 NOTE — ED PROVIDER NOTES
"  History     Chief Complaint:  Leg Injury      HPI   Priyanka Martinez is a 48 year old female with a history of paraplegia who presents to the emergency department with her mother for evaluation of a leg injury. She reports on Wednesday she was at the hospital for a flap surgery and when she was trying to get into a hospital bed at home she slipped and heard a snap in her right leg. She states she has no sensation or motor control in her right leg.    Allergies:  Hydrocodone    Medications:    Acidophilus lactobacillus  baclofen  celecoxib   dicyclomine  Fluoxetine   gabapentin   provera  penicillin G potassium   phenazopyridine   polyethylene glycol   psyllium   saccharomyces boulardii   trospium     Past Medical History:    Paraplegic immobility syndrome  Osteomyelitis of pelvic region  Sepsis  Generalized anxiety disorder  IBS  dysmenorrhea    Past Surgical History:    Spine fusion, poster, 7-12 segments T10 - L5  Cholecystectomy  irrigation and debridement decubitus with flap closure    Family History:    HTN - father    Social History:  Smoking status: former smoker  Alcohol use: no  Drug use: no  The patient presents to the emergency department with her mother.  PCP: Ayala Limon  Marital Status:  Single     Review of Systems   Musculoskeletal: Positive for gait problem and joint swelling.   All other systems reviewed and are negative.    Physical Exam     Patient Vitals for the past 24 hrs:   BP Temp Temp src Pulse Resp SpO2 Height Weight   11/26/19 1239 120/78 97.2  F (36.2  C) Temporal 107 18 97 % 1.778 m (5' 10\") 71.7 kg (158 lb)       Physical Exam  Constitutional: White female, supine  HENT: No signs of trauma.   Eyes: EOM are normal. Pupils are equal, round, and reactive to light.   Neck: Normal range of motion. No JVD present. No cervical adenopathy.  Cardiovascular: Regular rhythm.  Exam reveals no gallop and no friction rub.    No murmur heard.  Pulmonary/Chest: Bilateral breath sounds normal. No " wheezes, rhonchi or rales.  Abdominal: Soft. No tenderness. No rebound or guarding.   Musculoskeletal: Picc line left arm. Right leg unstable fracture, palpable distal tib fib. No sensation to touch, no motor. Edema of lower leg and foot.  Lymphadenopathy: No lymphadenopathy.   Neurological: awake, alert, appropriate. Normal strength. Coordination normal.  Paraplegic.   Skin: Skin is warm and dry. No rash noted. No erythema.     Emergency Department Course   Imaging:  Radiographic findings were communicated with the patient who voiced understanding of the findings.  XR Tibia and Fibula, right, 2 views:   IMPRESSION: Status post reduction of the distal tibial and fibular  diaphysis fractures. Alignment is improved with up to 9 mm residual  posterior displacement. No intra-articular extension. No dislocation.  Soft tissue swelling. as per radiology.   XR Chest 1 views:   Single view of the chest. Lungs are clear. Heart is normal  in size. No pneumothorax. Left PICC line has been placed with the tip  terminating in the superior SVC. Ideally this should be advanced  approximately 5 cm for ideal placement near the SVC right atrium  junction. as per radiology.    XR Tibia and Fibula, right, 2 views:   IMPRESSION:   1. Spiral oblique fracture of the tibial distal diaphysis with some  displacement and mild angulation.  2. Fracture of the fibular distal diaphysis with oblique and  longitudinal components, with some displacement and angulation. as per radiology.       Procedure:     Right leg Splint Placement     Short leg stirrup type splint was placed by me with assistance of nursing in order to stabilize the leg. Post stabilization splint xray showed no worsening of position.      Emergency Department Course:  Nursing notes and vitals reviewed. (1313) I performed an exam of the patient as documented above.     The patient was sent for a chest and right tibia and fibula xray while in the emergency department, findings  above.     I rechecked the patient and discussed the results of her workup thus far.     1417  I consulted with Dr. Farah of the hospitalist services. They are in agreement to accept the patient for admission.    Findings and plan explained to the Patient who consents to admission. Discussed the patient with Dr. Farah, who will admit the patient to a ortho bed for further monitoring, evaluation, and treatment.      Impression & Plan      Medical Decision Making:  Priyanka Martinez is a 48 year old female who presents with a right leg injury. She is paraplegic and about 6 days ago she was moving and felt something snap. She has no motor or sensation in her lower extremities. On exam there is instability of her lower right tib and fib. There was edema and swelling distally. X-rays show a spiral distal tib fib fracture. Also there had been problems with the PICC line and a chest xray was done to show where the PICC line was. Patient has been discussed with ortho and the hospitalist and will be admitted for further evaluation and probably surgery tomorrow.    Diagnosis:    ICD-10-CM    1. Tibia/fibula fracture, right, closed, initial encounter S82.201A Basic metabolic panel    S82.401A CBC with platelets differential   2. Paraplegia (H) G82.20        Disposition:  Admitted to ortho    Sal Gomez  11/26/2019    EMERGENCY DEPARTMENT  Scribe Disclosure:  I, Sal Gomez, am serving as a scribe at 1:13 PM on 11/26/2019 to document services personally performed by Ahsan Ingram MD based on my observations and the provider's statements to me.        Ahsan Ingram MD  11/26/19 6194

## 2019-11-26 NOTE — ED NOTES
"[facility]  ED Nurse Handoff Report    ED Chief complaint: Leg Injury      ED Diagnosis:   Final diagnoses:   Tibia/fibula fracture, right, closed, initial encounter   Paraplegia (H)       Code Status: Full Code    Allergies:   Allergies   Allergen Reactions     Hydrocodone Itching       Activity level - Baseline/Home:Pt is a paraplegic, has her own wheelchair  Activity Level - Current:   Stand with Assist x1    Patient's Preferred language: English   Needed?: No    Isolation: No  Infection: Not Applicable  Bariatric?: No    Vital Signs:   Vitals:    11/26/19 1239   BP: 120/78   Pulse: 107   Resp: 18   Temp: 97.2  F (36.2  C)   TempSrc: Temporal   SpO2: 97%   Weight: 71.7 kg (158 lb)   Height: 1.778 m (5' 10\")       Cardiac Rhythm: ,        Pain level:      Is this patient confused?: No   Does this patient have a guardian?  No         If yes, is there guardianship documents in the Epic \"Code/ACP\" activity?  N/A         Guardian Notified?  N/A  [place] - Suicide Severity Rating Scale Completed?  Yes  If yes, what color did the patient score?  White    Patient Report: Initial Complaint: Pt. had a flap surgery in Oct. Hurt leg Wednesday getting into hospital bed at home. Pt. heard a snap right lower leg, but didn't feel pain. Pt is a paraplegic. Today the home nurse noticed edema and put an ACE wrap on.    Focused Assessment: PT RLE splinted.  Pt has a PICC line that is not drawing blood.  IV team down to assess it. Chest xray was ordered for PICC placement because IV infusion RN states the PICC came out 12 cm.  IV team will reassess after results are back.  Catheter in place.   Tests Performed:No results found for any visits on 11/26/19.     Abnormal Results: Xray RLE and chest xray for PICC placement  Treatments provided: splint applied to the RLE    Family Comments: Parent at bedside     OBS brochure/video discussed/provided to patient/family: N/A              Name of person given " brochure if not patient:               Relationship to patient:     ED Medications:   Medications   alteplase (CATHFLO ACTIVASE) injection 2 mg (has no administration in time range)       Drips infusing?:  No    For the majority of the shift this patient was Green.   Interventions performed were .    Severe Sepsis OR Septic Shock Diagnosis Present: No    To be done/followed up on inpatient unit:  PICC not in good placement per IV team, see iv teams note, will need replacement. Please page PMD for new PICC orders when pt gets to the floor.      ED NURSE PHONE NUMBER: [id number]

## 2019-11-26 NOTE — PROGRESS NOTES
Aware of patient in ED with right distal third tibia and fibular fracture    Plan for IM nail tomorrow   NPO after midnight  Leave right LE splint intact  Elevate right LE  Pain medication as needed  Pre-op optimization per hospitalist    Formal consult to follow    Nubia Paul PAC

## 2019-11-27 ENCOUNTER — ANESTHESIA EVENT (OUTPATIENT)
Dept: SURGERY | Facility: CLINIC | Age: 49
DRG: 493 | End: 2019-11-27
Payer: COMMERCIAL

## 2019-11-27 ENCOUNTER — APPOINTMENT (OUTPATIENT)
Dept: GENERAL RADIOLOGY | Facility: CLINIC | Age: 49
DRG: 493 | End: 2019-11-27
Attending: HOSPITALIST
Payer: COMMERCIAL

## 2019-11-27 ENCOUNTER — ANESTHESIA (OUTPATIENT)
Dept: SURGERY | Facility: CLINIC | Age: 49
DRG: 493 | End: 2019-11-27
Payer: COMMERCIAL

## 2019-11-27 LAB
ANION GAP SERPL CALCULATED.3IONS-SCNC: 3 MMOL/L (ref 3–14)
BUN SERPL-MCNC: 7 MG/DL (ref 7–30)
CALCIUM SERPL-MCNC: 8.6 MG/DL (ref 8.5–10.1)
CHLORIDE SERPL-SCNC: 109 MMOL/L (ref 94–109)
CO2 SERPL-SCNC: 27 MMOL/L (ref 20–32)
CREAT SERPL-MCNC: 0.34 MG/DL (ref 0.52–1.04)
ERYTHROCYTE [DISTWIDTH] IN BLOOD BY AUTOMATED COUNT: 15.3 % (ref 10–15)
GFR SERPL CREATININE-BSD FRML MDRD: >90 ML/MIN/{1.73_M2}
GLUCOSE SERPL-MCNC: 98 MG/DL (ref 70–99)
HCG UR QL: NEGATIVE
HCT VFR BLD AUTO: 33.9 % (ref 35–47)
HGB BLD-MCNC: 10.6 G/DL (ref 11.7–15.7)
MCH RBC QN AUTO: 27.5 PG (ref 26.5–33)
MCHC RBC AUTO-ENTMCNC: 31.3 G/DL (ref 31.5–36.5)
MCV RBC AUTO: 88 FL (ref 78–100)
PLATELET # BLD AUTO: 256 10E9/L (ref 150–450)
POTASSIUM SERPL-SCNC: 3.9 MMOL/L (ref 3.4–5.3)
RBC # BLD AUTO: 3.85 10E12/L (ref 3.8–5.2)
SODIUM SERPL-SCNC: 139 MMOL/L (ref 133–144)
WBC # BLD AUTO: 5.1 10E9/L (ref 4–11)

## 2019-11-27 PROCEDURE — C1713 ANCHOR/SCREW BN/BN,TIS/BN: HCPCS | Performed by: ORTHOPAEDIC SURGERY

## 2019-11-27 PROCEDURE — 36000065 ZZH SURGERY LEVEL 4 W FLUORO 1ST 30 MIN: Performed by: ORTHOPAEDIC SURGERY

## 2019-11-27 PROCEDURE — 80048 BASIC METABOLIC PNL TOTAL CA: CPT | Performed by: HOSPITALIST

## 2019-11-27 PROCEDURE — 25800030 ZZH RX IP 258 OP 636: Performed by: ANESTHESIOLOGY

## 2019-11-27 PROCEDURE — 36000063 ZZH SURGERY LEVEL 4 EA 15 ADDTL MIN: Performed by: ORTHOPAEDIC SURGERY

## 2019-11-27 PROCEDURE — C1769 GUIDE WIRE: HCPCS | Performed by: ORTHOPAEDIC SURGERY

## 2019-11-27 PROCEDURE — 40000277 XR SURGERY CARM FLUORO LESS THAN 5 MIN W STILLS

## 2019-11-27 PROCEDURE — 27110028 ZZH OR GENERAL SUPPLY NON-STERILE: Performed by: ORTHOPAEDIC SURGERY

## 2019-11-27 PROCEDURE — 25000125 ZZHC RX 250: Performed by: NURSE ANESTHETIST, CERTIFIED REGISTERED

## 2019-11-27 PROCEDURE — 25800030 ZZH RX IP 258 OP 636: Performed by: NURSE ANESTHETIST, CERTIFIED REGISTERED

## 2019-11-27 PROCEDURE — 27210794 ZZH OR GENERAL SUPPLY STERILE: Performed by: ORTHOPAEDIC SURGERY

## 2019-11-27 PROCEDURE — 25800030 ZZH RX IP 258 OP 636: Performed by: PHYSICIAN ASSISTANT

## 2019-11-27 PROCEDURE — 25000128 H RX IP 250 OP 636: Performed by: NURSE ANESTHETIST, CERTIFIED REGISTERED

## 2019-11-27 PROCEDURE — 71000012 ZZH RECOVERY PHASE 1 LEVEL 1 FIRST HR: Performed by: ORTHOPAEDIC SURGERY

## 2019-11-27 PROCEDURE — 40000170 ZZH STATISTIC PRE-PROCEDURE ASSESSMENT II: Performed by: ORTHOPAEDIC SURGERY

## 2019-11-27 PROCEDURE — 25000128 H RX IP 250 OP 636: Performed by: PHYSICIAN ASSISTANT

## 2019-11-27 PROCEDURE — 25800030 ZZH RX IP 258 OP 636: Performed by: HOSPITALIST

## 2019-11-27 PROCEDURE — 25000132 ZZH RX MED GY IP 250 OP 250 PS 637: Performed by: PHYSICIAN ASSISTANT

## 2019-11-27 PROCEDURE — 25000128 H RX IP 250 OP 636: Performed by: HOSPITALIST

## 2019-11-27 PROCEDURE — 85027 COMPLETE CBC AUTOMATED: CPT | Performed by: HOSPITALIST

## 2019-11-27 PROCEDURE — 81025 URINE PREGNANCY TEST: CPT | Performed by: ANESTHESIOLOGY

## 2019-11-27 PROCEDURE — 0QSG36Z REPOSITION RIGHT TIBIA WITH INTRAMEDULLARY INTERNAL FIXATION DEVICE, PERCUTANEOUS APPROACH: ICD-10-PCS | Performed by: ORTHOPAEDIC SURGERY

## 2019-11-27 PROCEDURE — 25000566 ZZH SEVOFLURANE, EA 15 MIN: Performed by: ORTHOPAEDIC SURGERY

## 2019-11-27 PROCEDURE — 37000009 ZZH ANESTHESIA TECHNICAL FEE, EACH ADDTL 15 MIN: Performed by: ORTHOPAEDIC SURGERY

## 2019-11-27 PROCEDURE — 12000000 ZZH R&B MED SURG/OB

## 2019-11-27 PROCEDURE — 36415 COLL VENOUS BLD VENIPUNCTURE: CPT | Performed by: HOSPITALIST

## 2019-11-27 PROCEDURE — 25000128 H RX IP 250 OP 636: Performed by: ANESTHESIOLOGY

## 2019-11-27 PROCEDURE — 25000132 ZZH RX MED GY IP 250 OP 250 PS 637: Performed by: HOSPITALIST

## 2019-11-27 PROCEDURE — 99232 SBSQ HOSP IP/OBS MODERATE 35: CPT | Performed by: INTERNAL MEDICINE

## 2019-11-27 PROCEDURE — 37000008 ZZH ANESTHESIA TECHNICAL FEE, 1ST 30 MIN: Performed by: ORTHOPAEDIC SURGERY

## 2019-11-27 DEVICE — IMP SCR SYN 5.0 TI LOCK T25 STARDRIVE 46MM 04.005.536S: Type: IMPLANTABLE DEVICE | Site: TIBIA | Status: FUNCTIONAL

## 2019-11-27 DEVICE — IMP SCR SYN 5.0 TI LOCK T25 STARDRIVE 38MM 04.005.528S: Type: IMPLANTABLE DEVICE | Site: TIBIA | Status: FUNCTIONAL

## 2019-11-27 DEVICE — IMP SCR SYN 5.0 TI LOCK T25 STARDRIVE 36MM 04.005.526S: Type: IMPLANTABLE DEVICE | Site: TIBIA | Status: FUNCTIONAL

## 2019-11-27 DEVICE — IMPLANTABLE DEVICE: Type: IMPLANTABLE DEVICE | Site: TIBIA | Status: FUNCTIONAL

## 2019-11-27 DEVICE — IMP SCR SYN 5.0 TI LOCK T25 STARDRIVE 34MM 04.005.524S: Type: IMPLANTABLE DEVICE | Site: TIBIA | Status: FUNCTIONAL

## 2019-11-27 DEVICE — IMP SCR SYN 5.0 TI LOCK T25 STARDRIVE 48MM 04.005.538S: Type: IMPLANTABLE DEVICE | Site: TIBIA | Status: FUNCTIONAL

## 2019-11-27 RX ORDER — PROPOFOL 10 MG/ML
INJECTION, EMULSION INTRAVENOUS PRN
Status: DISCONTINUED | OUTPATIENT
Start: 2019-11-27 | End: 2019-11-27

## 2019-11-27 RX ORDER — NEOSTIGMINE METHYLSULFATE 1 MG/ML
VIAL (ML) INJECTION PRN
Status: DISCONTINUED | OUTPATIENT
Start: 2019-11-27 | End: 2019-11-27

## 2019-11-27 RX ORDER — ACETAMINOPHEN 325 MG/1
975 TABLET ORAL 3 TIMES DAILY
Status: DISCONTINUED | OUTPATIENT
Start: 2019-11-27 | End: 2019-11-28 | Stop reason: HOSPADM

## 2019-11-27 RX ORDER — SODIUM CHLORIDE, SODIUM LACTATE, POTASSIUM CHLORIDE, CALCIUM CHLORIDE 600; 310; 30; 20 MG/100ML; MG/100ML; MG/100ML; MG/100ML
INJECTION, SOLUTION INTRAVENOUS CONTINUOUS
Status: DISCONTINUED | OUTPATIENT
Start: 2019-11-27 | End: 2019-11-27 | Stop reason: HOSPADM

## 2019-11-27 RX ORDER — DICYCLOMINE HCL 20 MG
20 TABLET ORAL
Status: DISCONTINUED | OUTPATIENT
Start: 2019-11-27 | End: 2019-11-27

## 2019-11-27 RX ORDER — CEFAZOLIN SODIUM 1 G/3ML
1 INJECTION, POWDER, FOR SOLUTION INTRAMUSCULAR; INTRAVENOUS EVERY 8 HOURS
Status: COMPLETED | OUTPATIENT
Start: 2019-11-27 | End: 2019-11-28

## 2019-11-27 RX ORDER — LIDOCAINE HYDROCHLORIDE 20 MG/ML
INJECTION, SOLUTION INFILTRATION; PERINEURAL PRN
Status: DISCONTINUED | OUTPATIENT
Start: 2019-11-27 | End: 2019-11-27

## 2019-11-27 RX ORDER — HYDRALAZINE HYDROCHLORIDE 20 MG/ML
2.5-5 INJECTION INTRAMUSCULAR; INTRAVENOUS EVERY 10 MIN PRN
Status: DISCONTINUED | OUTPATIENT
Start: 2019-11-27 | End: 2019-11-27 | Stop reason: HOSPADM

## 2019-11-27 RX ORDER — ONDANSETRON 2 MG/ML
4 INJECTION INTRAMUSCULAR; INTRAVENOUS EVERY 30 MIN PRN
Status: DISCONTINUED | OUTPATIENT
Start: 2019-11-27 | End: 2019-11-27 | Stop reason: HOSPADM

## 2019-11-27 RX ORDER — METOCLOPRAMIDE HYDROCHLORIDE 5 MG/ML
10 INJECTION INTRAMUSCULAR; INTRAVENOUS EVERY 6 HOURS PRN
Status: DISCONTINUED | OUTPATIENT
Start: 2019-11-27 | End: 2019-11-28 | Stop reason: HOSPADM

## 2019-11-27 RX ORDER — PHENAZOPYRIDINE HYDROCHLORIDE 100 MG/1
200 TABLET, FILM COATED ORAL EVERY EVENING
Status: DISCONTINUED | OUTPATIENT
Start: 2019-11-27 | End: 2019-11-28 | Stop reason: HOSPADM

## 2019-11-27 RX ORDER — SODIUM CHLORIDE, SODIUM LACTATE, POTASSIUM CHLORIDE, CALCIUM CHLORIDE 600; 310; 30; 20 MG/100ML; MG/100ML; MG/100ML; MG/100ML
INJECTION, SOLUTION INTRAVENOUS CONTINUOUS
Status: DISCONTINUED | OUTPATIENT
Start: 2019-11-27 | End: 2019-11-28 | Stop reason: HOSPADM

## 2019-11-27 RX ORDER — LABETALOL HYDROCHLORIDE 5 MG/ML
10 INJECTION, SOLUTION INTRAVENOUS
Status: DISCONTINUED | OUTPATIENT
Start: 2019-11-27 | End: 2019-11-27 | Stop reason: HOSPADM

## 2019-11-27 RX ORDER — HEPARIN SODIUM 5000 [USP'U]/.5ML
5000 INJECTION, SOLUTION INTRAVENOUS; SUBCUTANEOUS EVERY 12 HOURS
Status: DISCONTINUED | OUTPATIENT
Start: 2019-11-28 | End: 2019-11-28 | Stop reason: HOSPADM

## 2019-11-27 RX ORDER — HYDROMORPHONE HYDROCHLORIDE 1 MG/ML
.3-.5 INJECTION, SOLUTION INTRAMUSCULAR; INTRAVENOUS; SUBCUTANEOUS EVERY 5 MIN PRN
Status: DISCONTINUED | OUTPATIENT
Start: 2019-11-27 | End: 2019-11-27 | Stop reason: HOSPADM

## 2019-11-27 RX ORDER — GLYCOPYRROLATE 0.2 MG/ML
INJECTION, SOLUTION INTRAMUSCULAR; INTRAVENOUS PRN
Status: DISCONTINUED | OUTPATIENT
Start: 2019-11-27 | End: 2019-11-27

## 2019-11-27 RX ORDER — ONDANSETRON 4 MG/1
4 TABLET, ORALLY DISINTEGRATING ORAL EVERY 30 MIN PRN
Status: DISCONTINUED | OUTPATIENT
Start: 2019-11-27 | End: 2019-11-27 | Stop reason: HOSPADM

## 2019-11-27 RX ORDER — PROCHLORPERAZINE MALEATE 5 MG
10 TABLET ORAL EVERY 6 HOURS PRN
Status: DISCONTINUED | OUTPATIENT
Start: 2019-11-27 | End: 2019-11-28 | Stop reason: HOSPADM

## 2019-11-27 RX ORDER — FENTANYL CITRATE 50 UG/ML
25-50 INJECTION, SOLUTION INTRAMUSCULAR; INTRAVENOUS
Status: DISCONTINUED | OUTPATIENT
Start: 2019-11-27 | End: 2019-11-27 | Stop reason: HOSPADM

## 2019-11-27 RX ORDER — ZINC GLUC, OXIDE/ASCORBIC ACID 15 MG-60MG
1 TABLET,DISINTEGRATING ORAL EVERY EVENING
Status: DISCONTINUED | OUTPATIENT
Start: 2019-11-27 | End: 2019-11-27 | Stop reason: RX

## 2019-11-27 RX ORDER — METOCLOPRAMIDE 5 MG/1
10 TABLET ORAL EVERY 6 HOURS PRN
Status: DISCONTINUED | OUTPATIENT
Start: 2019-11-27 | End: 2019-11-28 | Stop reason: HOSPADM

## 2019-11-27 RX ORDER — LIDOCAINE 40 MG/G
CREAM TOPICAL
Status: DISCONTINUED | OUTPATIENT
Start: 2019-11-27 | End: 2019-11-27

## 2019-11-27 RX ORDER — ONDANSETRON 4 MG/1
4 TABLET, ORALLY DISINTEGRATING ORAL EVERY 6 HOURS PRN
Status: DISCONTINUED | OUTPATIENT
Start: 2019-11-27 | End: 2019-11-27

## 2019-11-27 RX ORDER — AMOXICILLIN 250 MG
2 CAPSULE ORAL 2 TIMES DAILY
Status: DISCONTINUED | OUTPATIENT
Start: 2019-11-27 | End: 2019-11-27

## 2019-11-27 RX ORDER — NALOXONE HYDROCHLORIDE 0.4 MG/ML
.1-.4 INJECTION, SOLUTION INTRAMUSCULAR; INTRAVENOUS; SUBCUTANEOUS
Status: DISCONTINUED | OUTPATIENT
Start: 2019-11-27 | End: 2019-11-28

## 2019-11-27 RX ORDER — AMOXICILLIN 250 MG
1 CAPSULE ORAL 2 TIMES DAILY
Status: DISCONTINUED | OUTPATIENT
Start: 2019-11-27 | End: 2019-11-28 | Stop reason: HOSPADM

## 2019-11-27 RX ORDER — NALOXONE HYDROCHLORIDE 0.4 MG/ML
.1-.4 INJECTION, SOLUTION INTRAMUSCULAR; INTRAVENOUS; SUBCUTANEOUS
Status: DISCONTINUED | OUTPATIENT
Start: 2019-11-27 | End: 2019-11-27

## 2019-11-27 RX ORDER — DEXAMETHASONE SODIUM PHOSPHATE 4 MG/ML
INJECTION, SOLUTION INTRA-ARTICULAR; INTRALESIONAL; INTRAMUSCULAR; INTRAVENOUS; SOFT TISSUE PRN
Status: DISCONTINUED | OUTPATIENT
Start: 2019-11-27 | End: 2019-11-27

## 2019-11-27 RX ORDER — OXYCODONE HYDROCHLORIDE 5 MG/1
5-10 TABLET ORAL
Status: DISCONTINUED | OUTPATIENT
Start: 2019-11-27 | End: 2019-11-28 | Stop reason: HOSPADM

## 2019-11-27 RX ORDER — FENTANYL CITRATE 50 UG/ML
INJECTION, SOLUTION INTRAMUSCULAR; INTRAVENOUS PRN
Status: DISCONTINUED | OUTPATIENT
Start: 2019-11-27 | End: 2019-11-27

## 2019-11-27 RX ORDER — ONDANSETRON 2 MG/ML
4 INJECTION INTRAMUSCULAR; INTRAVENOUS EVERY 6 HOURS PRN
Status: DISCONTINUED | OUTPATIENT
Start: 2019-11-27 | End: 2019-11-28 | Stop reason: HOSPADM

## 2019-11-27 RX ADMIN — GABAPENTIN 600 MG: 600 TABLET, FILM COATED ORAL at 07:47

## 2019-11-27 RX ADMIN — CELECOXIB 100 MG: 100 CAPSULE ORAL at 20:31

## 2019-11-27 RX ADMIN — PROPOFOL 150 MG: 10 INJECTION, EMULSION INTRAVENOUS at 11:54

## 2019-11-27 RX ADMIN — SODIUM CHLORIDE 4 MILLION UNITS: 9 INJECTION, SOLUTION INTRAVENOUS at 10:36

## 2019-11-27 RX ADMIN — PHENYLEPHRINE HYDROCHLORIDE 0.35 MCG/KG/MIN: 10 INJECTION INTRAVENOUS at 12:42

## 2019-11-27 RX ADMIN — LIDOCAINE HYDROCHLORIDE 80 MG: 20 INJECTION, SOLUTION INFILTRATION; PERINEURAL at 11:54

## 2019-11-27 RX ADMIN — BACLOFEN 10 MG: 10 TABLET ORAL at 15:46

## 2019-11-27 RX ADMIN — DICYCLOMINE HYDROCHLORIDE 20 MG: 20 TABLET ORAL at 15:47

## 2019-11-27 RX ADMIN — SODIUM CHLORIDE 4 MILLION UNITS: 9 INJECTION, SOLUTION INTRAVENOUS at 02:47

## 2019-11-27 RX ADMIN — TOLTERODINE TARTRATE 2 MG: 2 TABLET, FILM COATED ORAL at 07:55

## 2019-11-27 RX ADMIN — ACETAMINOPHEN 975 MG: 325 TABLET, FILM COATED ORAL at 22:42

## 2019-11-27 RX ADMIN — SODIUM CHLORIDE 4 MILLION UNITS: 9 INJECTION, SOLUTION INTRAVENOUS at 18:02

## 2019-11-27 RX ADMIN — CEFAZOLIN 1 G: 1 INJECTION, POWDER, FOR SOLUTION INTRAMUSCULAR; INTRAVENOUS at 17:12

## 2019-11-27 RX ADMIN — SODIUM CHLORIDE 4 MILLION UNITS: 9 INJECTION, SOLUTION INTRAVENOUS at 07:24

## 2019-11-27 RX ADMIN — FLUOXETINE 20 MG: 20 CAPSULE ORAL at 20:30

## 2019-11-27 RX ADMIN — ONDANSETRON 4 MG: 2 INJECTION INTRAMUSCULAR; INTRAVENOUS at 13:57

## 2019-11-27 RX ADMIN — BACLOFEN 10 MG: 10 TABLET ORAL at 22:41

## 2019-11-27 RX ADMIN — HEPARIN SODIUM 5000 UNITS: 5000 INJECTION, SOLUTION INTRAVENOUS; SUBCUTANEOUS at 07:11

## 2019-11-27 RX ADMIN — OXYCODONE HYDROCHLORIDE AND ACETAMINOPHEN 1000 MG: 500 TABLET ORAL at 20:31

## 2019-11-27 RX ADMIN — PHENYLEPHRINE HYDROCHLORIDE 100 MCG: 10 INJECTION INTRAVENOUS at 12:38

## 2019-11-27 RX ADMIN — MIDAZOLAM 2 MG: 1 INJECTION INTRAMUSCULAR; INTRAVENOUS at 11:54

## 2019-11-27 RX ADMIN — DEXMEDETOMIDINE HYDROCHLORIDE 20 MCG: 100 INJECTION, SOLUTION INTRAVENOUS at 11:54

## 2019-11-27 RX ADMIN — SODIUM CHLORIDE, POTASSIUM CHLORIDE, SODIUM LACTATE AND CALCIUM CHLORIDE: 600; 310; 30; 20 INJECTION, SOLUTION INTRAVENOUS at 17:12

## 2019-11-27 RX ADMIN — ROCURONIUM BROMIDE 50 MG: 10 INJECTION INTRAVENOUS at 11:55

## 2019-11-27 RX ADMIN — SODIUM CHLORIDE 4 MILLION UNITS: 9 INJECTION, SOLUTION INTRAVENOUS at 22:42

## 2019-11-27 RX ADMIN — Medication 1 CAPSULE: at 15:48

## 2019-11-27 RX ADMIN — PHENYLEPHRINE HYDROCHLORIDE 200 MCG: 10 INJECTION INTRAVENOUS at 12:12

## 2019-11-27 RX ADMIN — POTASSIUM CHLORIDE, DEXTROSE MONOHYDRATE AND SODIUM CHLORIDE: 150; 5; 450 INJECTION, SOLUTION INTRAVENOUS at 07:51

## 2019-11-27 RX ADMIN — BACLOFEN 10 MG: 10 TABLET ORAL at 07:47

## 2019-11-27 RX ADMIN — PHENYLEPHRINE HYDROCHLORIDE 100 MCG: 10 INJECTION INTRAVENOUS at 12:26

## 2019-11-27 RX ADMIN — NEOSTIGMINE METHYLSULFATE 3 MG: 1 INJECTION, SOLUTION INTRAVENOUS at 13:09

## 2019-11-27 RX ADMIN — TOLTERODINE TARTRATE 2 MG: 2 TABLET, FILM COATED ORAL at 20:31

## 2019-11-27 RX ADMIN — Medication 1 CAPSULE: at 22:42

## 2019-11-27 RX ADMIN — DEXMEDETOMIDINE HYDROCHLORIDE 0.3 MCG/KG/HR: 100 INJECTION, SOLUTION INTRAVENOUS at 11:57

## 2019-11-27 RX ADMIN — GABAPENTIN 600 MG: 600 TABLET, FILM COATED ORAL at 15:46

## 2019-11-27 RX ADMIN — CELECOXIB 100 MG: 100 CAPSULE ORAL at 07:47

## 2019-11-27 RX ADMIN — DEXAMETHASONE SODIUM PHOSPHATE 4 MG: 4 INJECTION, SOLUTION INTRA-ARTICULAR; INTRALESIONAL; INTRAMUSCULAR; INTRAVENOUS; SOFT TISSUE at 12:19

## 2019-11-27 RX ADMIN — GLYCOPYRROLATE 0.4 MG: 0.2 INJECTION, SOLUTION INTRAMUSCULAR; INTRAVENOUS at 13:09

## 2019-11-27 RX ADMIN — FENTANYL CITRATE 100 MCG: 50 INJECTION, SOLUTION INTRAMUSCULAR; INTRAVENOUS at 11:54

## 2019-11-27 RX ADMIN — GABAPENTIN 600 MG: 600 TABLET, FILM COATED ORAL at 22:41

## 2019-11-27 RX ADMIN — PHENYLEPHRINE HYDROCHLORIDE 100 MCG: 10 INJECTION INTRAVENOUS at 12:35

## 2019-11-27 RX ADMIN — SODIUM CHLORIDE, POTASSIUM CHLORIDE, SODIUM LACTATE AND CALCIUM CHLORIDE: 600; 310; 30; 20 INJECTION, SOLUTION INTRAVENOUS at 11:52

## 2019-11-27 RX ADMIN — ONDANSETRON 4 MG: 2 INJECTION INTRAMUSCULAR; INTRAVENOUS at 12:19

## 2019-11-27 ASSESSMENT — ACTIVITIES OF DAILY LIVING (ADL)
ADLS_ACUITY_SCORE: 16
ADLS_ACUITY_SCORE: 18
ADLS_ACUITY_SCORE: 16

## 2019-11-27 ASSESSMENT — LIFESTYLE VARIABLES: TOBACCO_USE: 1

## 2019-11-27 NOTE — OP NOTE
[facility]  Orthopedic Operative Note    Priyanka Martinez MRN# [id number]   YOB: 1970  Procedure Date:  11/27/2019  Age: 48 year old     PREOPERATIVE DIAGNOSIS:    Distal third tibia and fibula fracture right     POSTOPERATIVE DIAGNOSIS:    Distal third tibia and fibula fracture right     PROCEDURE PERFORMED:    Intramedullary nailing right tibia    SURGEON:  Godwin Zamarripa MD    FIRST ASSISTANT:  Lisa Murphy PA-C. Her assistance was critical during transfer, positioning, preparation, draping, surgical approach, fracture reduction, implant placement, closure and placement of dressings. Her assistance allowed me to operate efficiently, decreasing surgical time and risk.       ANESTHESIA:  General    EBL: 5 mL    IMPLANTS:   Implant Name Type Inv. Item Serial No.  Lot No. LRB No. Used   IMP NAIL SYN CAN TIBIAL [id number] 04.004.549S Metallic Hardware/[place] IMP NAIL SYN CAN TIBIAL [id number] 04.004.549S  [employer] [place] [id number] Right 1   IMP SCR SYN 5.0 TI LOCK T25 STARDRIVE 34MM 04.005.524S Metallic Hardware/[place] IMP SCR SYN 5.0 TI LOCK T25 STARDRIVE 34MM 04.005.524S  [employer] [id number] Right 1   IMP SCR SYN 5.0 TI LOCK T25 STARDRIVE 38MM 04.005.528S Metallic Hardware/[place] IMP SCR SYN 5.0 TI LOCK T25 STARDRIVE 38MM 04.005.528S  [employer] [id number] Right 1   IMP SCR SYN 5.0 TI LOCK T25 STARDRIVE 62MM 04.005.552S Metallic Hardware/[place] IMP SCR SYN 5.0 TI LOCK T25 STARDRIVE 62MM 04.005.552S  [employer] [id number] Right 1   IMP SCR SYN 5.0 TI LOCK T25 STARDRIVE 48MM 04.005.538S Metallic Hardware/[place] IMP SCR SYN 5.0 TI LOCK T25 STARDRIVE 48MM 04.005.538S  [employer] [id number] Right 1   IMP SCR SYN 5.0 TI LOCK T25 STARDRIVE 36MM 04.005.526S Metallic Hardware/[place] IMP SCR SYN 5.0 TI LOCK T25 STARDRIVE 36MM 04.005.526S  [facility] [id number] Right 1   IMP SCR SYN 5.0 TI LOCK T25 STARDRIVE 36MM 04.005.526S Metallic Hardware/[place] IMP SCR SYN 5.0  TI LOCK T25 STARDRIVE 36MM 04.005.526S  [employer] [id number] Right 1   IMP SCR SYN 5.0 TI LOCK T25 STARDRIVE 46MM 04.005.536S Metallic Hardware/[place] IMP SCR SYN 5.0 TI LOCK T25 STARDRIVE 46MM 04.005.536S  [employer] [id number] Right 1       COMPLICATIONS: None     DISPOSITION: PACU    CONDITION: Stable    INDICATIONS: Priyanka Martinez is a 48 year old female with history of paraplegia complicated by suprapubic catheter and pressure ulcer of the left ischium complicated by osteomyelitis of the pelvic region status post debridement of the left ischium decubitus with left posterior thigh flap on 10/16/2019.  The patient sustained a fall while getting to the hospital bed at home.  Patient heard a snap in the right leg but did not have much pain.  Her home nurse noticed edema and put an Ace wrap on the leg and the patient was sent to the emergency department. Patient is status post  PICC on penicillin for treatment of her infected ischial ulcer.  She presents for definitive internal fixation.  Patient with previous injury to her left lower extremity and they attempted nonoperative management which resulted in ulceration of the soft tissues in a Aircast boot.  Given the patient's lack of protective sensation and the instability of this fracture which would necessitate above-the-knee immobilization for extended period of time (likely 12 weeks) and the high risk of delayed union or nonunion I recommended surgical stabilization to allow for continuous soft tissue monitoring and avoid the need for rigid external immobilization such as splint, cast or boot.  The patient and her family the bedside are in agreement with this recommendation given her history.  The patient does attempt to stand utilizing apparatus.  Risks, benefits and alternatives to proposed procedure discussed in detail. Risks of bleeding, infection, nerve damage, nonunion, malunion, hardware failure and possible need for removal of hardware discussed.  Possibility of post traumatic and post surgical pain and stiffness discussed. Patient elects to proceed. Signed informed consent obtained and placed on chart.    PROCEDURE:  Patient was identified in preoperative holding area and operative site marked with indelible marker.  Brought into the operating room and placed supine on operating table.  After induction of general anesthesia, all bony prominences were well-padded and a bump was placed beneath the ipsilateral buttock and the operative extremity was placed on a bone foam ramp. A tourniquet was applied to the thigh. The extremity was prepped and draped in normal sterile fashion.  Antibiotic administration was confirmed and a time-out completed.  Attention first turned toward surgical approach.  We elected to perform a semi-extended approach in a lateral parapatellar fashion given the patient's patella [place] on x-ray.  Incision was made from the inferolateral aspect of the patella along the lateral aspect of the patellar tendon proceeding sharply through skin subtenons tissue to the retinaculum.  The joint capsule was not entered.  We bluntly dissected through the fat pad.  Guidewire placed at the appropriate location and advanced in the proximal tibia.  Opening reamer utilized.  Guidewire placed to the fracture site.  Fracture site reduced utilizing manual traction and manipulation to restore appropriate length, alignment and rotation.  Guidewire advanced to the 5 seal scar.  We then began reaming.  Upon passing the 8.5 mm and cutting reamer unfortunately the guidewire without any resistance whatsoever passed through the physeal scar and through the plafond into the talus.  Therefore the initial reamer did violate the tibiotalar joint and talar dome.  This occurred while we are observing the fracture site fluoroscopically to ensure appropriate alignment was maintained while the initial reamer was passed across the fracture site. Given the patient is insensate and  a ambulates minimally, I do not fee thel this will cause any issues.  We subsequently under fluoroscopic guidance reamed to a final reamer size of 12.5 mm getting good chatter in the isthmus.  A 345 mm nail was inserted 11 mm diameter to a location or approximate 5 mm proximal to the plafond.  We subsequently placed 2 medial lateral proximal interlocks and one anterolateral to posterior medial interlock utilizing targeting jig.  We then placed 4 distal interlocks given the patient's extremely poor bone quality.  To replace medial lateral, one was placed anterior to posterior and one was placed anteromedial to posterior lateral all utilizing perfect circles techniques and fluoroscopic guidance.  Final fluoroscopic views were obtained mistreating appropriate hardware placement and appropriate fracture alignment.  Wounds were copiously irrigated sterile saline and closed in layers with 2-0 vicryl for subcutaneous tissue and staples for skin. Dry sterile dressings applied with Xeroform 4 x 4's and sterile bulky cotton roll.  Well molded well-padded short leg splint applied with the ankle in neutral.  Patient awakened from general anesthesia and taken to recovery in stable condition.  There were no complications and patient tolerated procedure well.    PLAN:  Nonweightbearing right lower extremity for 6 weeks  Maintain short leg splint for 2 weeks  Pain control  DVTP with  enteric coated for 4 weeks  Antibiotic prophylaxis with prior to admission penicillin  Follow-up in 2 weeks for    Wound checks   Suture removal   New x-rays AP/Lateral right tibia and fibula    Godwin Zamarripa MD  Orthopedic Trauma and Arthroplasty  [facility]  [phone]

## 2019-11-27 NOTE — PLAN OF CARE
Arrived from ED at 1600.  A&OX4.  Paraplegic.  Denies pain.  RLE splinted and elevated on pillows.  Pillows placed between legs/bony prominences.  Patient has no sensation from the waist down.  Chronic supra pubic catheter patent.  Scarring to left posterior thigh and left buttocks.  Patient right side lying, asked to reposition after 2 hours and patient refused stated she can reposition herself, education on pressure relief to prevent pressure sores done.  PICC removed from right arm this evening.  New PICC ordered, IV team said they will address in the AM, plan is to clarify again with hospitalist in the morning PICC vs. Midline.  Ortho consult tomorrow morning, patient has many questions/concerns.  Plan is NPO at midnight, patient is aware.  Good appetite for dinner.

## 2019-11-27 NOTE — PLAN OF CARE
A/OX4,cms intact except with baseline paraplegic.IVF infusing,taking schedule medications.Suprapubic catheter in place with adequate urine output.Turned and repositioned Q2 hrs.Old incision on left hip CDI.On reg diet,no nausea.Will continue to monitor.

## 2019-11-27 NOTE — PROVIDER NOTIFICATION
On call hospitalist paged for the 2nd time regarding PTA Celebrex awaiting return call.     Order received.

## 2019-11-27 NOTE — PROGRESS NOTES
Spoke with bedside RN about PICC placement for this pt. Reportedly pt needs 2 more weeks of antibiotic treatment. A midline catheter could possibly  work for this patient. Since the pt has a patent PIV at this time will defer PICC vs Midline until 11/27 when RN can clarify with MD  as to wether the patient should have a midline placed instead of a PICC line.
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
## 2019-11-27 NOTE — PROGRESS NOTES
A/OX4,vss.Pain well control with schedule medication.Report given to Pre ope RN.NPO from midnight,ivf infusing.

## 2019-11-27 NOTE — ANESTHESIA CARE TRANSFER NOTE
Patient: Priyanka Martinez    Procedure(s):  OPEN REDUCTION INTERNAL FIXATION, RIGHT   TIBIA FRACTURE    Diagnosis: Closed fracture of distal end of right tibia, unspecified fracture morphology, initial encounter [S82.301A]  Diagnosis Additional Information: No value filed.    Anesthesia Type:   General, ETT     Note:  Airway :Nasal Cannula  Patient transferred to:PACU  Comments: Patient awake, extubated, and transferred to PACU. VS stable and report given to RN. Handoff Report: Identifed the Patient, Identified the Reponsible Provider, Reviewed the pertinent medical history, Discussed the surgical course, Reviewed Intra-OP anesthesia mangement and issues during anesthesia, Set expectations for post-procedure period and Allowed opportunity for questions and acknowledgement of understanding      Vitals: (Last set prior to Anesthesia Care Transfer)    CRNA VITALS  11/27/2019 1306 - 11/27/2019 1345      11/27/2019             SpO2:  96 %    Resp Rate (set):  10                Electronically Signed By: AMY Lewis CRNA  November 27, 2019  1:45 PM

## 2019-11-27 NOTE — ANESTHESIA POSTPROCEDURE EVALUATION
Patient: Priyanka Martinez    Procedure(s):  OPEN REDUCTION INTERNAL FIXATION, RIGHT   TIBIA FRACTURE    Diagnosis:Closed fracture of distal end of right tibia, unspecified fracture morphology, initial encounter [S82.301A]  Diagnosis Additional Information: No value filed.    Anesthesia Type:  General, ETT    Note:  Anesthesia Post Evaluation    Patient location during evaluation: PACU  Patient participation: Able to fully participate in evaluation  Level of consciousness: awake and alert  Pain management: adequate  Airway patency: patent  Cardiovascular status: acceptable and hemodynamically stable  Respiratory status: acceptable and unassisted  Hydration status: acceptable  PONV: none             Last vitals:  Vitals:    11/27/19 1440 11/27/19 1530 11/27/19 1600   BP: 97/66 111/70 (P) 117/70   Pulse: 77     Resp: 13 14    Temp: 36.5  C (97.7  F) 36.7  C (98.1  F)    SpO2: 98% 97% (P) 96%         Electronically Signed By: Jet Robertson DO  November 27, 2019  4:47 PM
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
## 2019-11-28 ENCOUNTER — HOME INFUSION (PRE-WILLOW HOME INFUSION) (OUTPATIENT)
Dept: PHARMACY | Facility: CLINIC | Age: 49
End: 2019-11-28

## 2019-11-28 ENCOUNTER — APPOINTMENT (OUTPATIENT)
Dept: PHYSICAL THERAPY | Facility: CLINIC | Age: 49
DRG: 493 | End: 2019-11-28
Attending: PHYSICIAN ASSISTANT
Payer: COMMERCIAL

## 2019-11-28 VITALS
SYSTOLIC BLOOD PRESSURE: 111 MMHG | DIASTOLIC BLOOD PRESSURE: 80 MMHG | RESPIRATION RATE: 16 BRPM | HEIGHT: 70 IN | HEART RATE: 77 BPM | TEMPERATURE: 99.2 F | BODY MASS INDEX: 22.62 KG/M2 | WEIGHT: 158 LBS | OXYGEN SATURATION: 97 %

## 2019-11-28 LAB
ANION GAP SERPL CALCULATED.3IONS-SCNC: 5 MMOL/L (ref 3–14)
BASOPHILS # BLD AUTO: 0 10E9/L (ref 0–0.2)
BASOPHILS NFR BLD AUTO: 0.4 %
BUN SERPL-MCNC: 6 MG/DL (ref 7–30)
CALCIUM SERPL-MCNC: 7.9 MG/DL (ref 8.5–10.1)
CHLORIDE SERPL-SCNC: 110 MMOL/L (ref 94–109)
CO2 SERPL-SCNC: 26 MMOL/L (ref 20–32)
CREAT SERPL-MCNC: 0.4 MG/DL (ref 0.52–1.04)
DIFFERENTIAL METHOD BLD: ABNORMAL
EOSINOPHIL # BLD AUTO: 0.1 10E9/L (ref 0–0.7)
EOSINOPHIL NFR BLD AUTO: 1.1 %
ERYTHROCYTE [DISTWIDTH] IN BLOOD BY AUTOMATED COUNT: 15.3 % (ref 10–15)
GFR SERPL CREATININE-BSD FRML MDRD: >90 ML/MIN/{1.73_M2}
GLUCOSE SERPL-MCNC: 95 MG/DL (ref 70–99)
HCT VFR BLD AUTO: 33.7 % (ref 35–47)
HGB BLD-MCNC: 10.5 G/DL (ref 11.7–15.7)
IMM GRANULOCYTES # BLD: 0 10E9/L (ref 0–0.4)
IMM GRANULOCYTES NFR BLD: 0.1 %
LYMPHOCYTES # BLD AUTO: 2 10E9/L (ref 0.8–5.3)
LYMPHOCYTES NFR BLD AUTO: 24.3 %
MCH RBC QN AUTO: 27.6 PG (ref 26.5–33)
MCHC RBC AUTO-ENTMCNC: 31.2 G/DL (ref 31.5–36.5)
MCV RBC AUTO: 89 FL (ref 78–100)
MONOCYTES # BLD AUTO: 1 10E9/L (ref 0–1.3)
MONOCYTES NFR BLD AUTO: 12.5 %
NEUTROPHILS # BLD AUTO: 5 10E9/L (ref 1.6–8.3)
NEUTROPHILS NFR BLD AUTO: 61.6 %
NRBC # BLD AUTO: 0 10*3/UL
NRBC BLD AUTO-RTO: 0 /100
PLATELET # BLD AUTO: 258 10E9/L (ref 150–450)
POTASSIUM SERPL-SCNC: 3.9 MMOL/L (ref 3.4–5.3)
RBC # BLD AUTO: 3.8 10E12/L (ref 3.8–5.2)
SODIUM SERPL-SCNC: 141 MMOL/L (ref 133–144)
WBC # BLD AUTO: 8 10E9/L (ref 4–11)

## 2019-11-28 PROCEDURE — 85025 COMPLETE CBC W/AUTO DIFF WBC: CPT | Performed by: INTERNAL MEDICINE

## 2019-11-28 PROCEDURE — 25000132 ZZH RX MED GY IP 250 OP 250 PS 637: Performed by: PHYSICIAN ASSISTANT

## 2019-11-28 PROCEDURE — 27210219 ZZH KIT SHRLOCK 5FR DBL LUM PWR P

## 2019-11-28 PROCEDURE — 25000128 H RX IP 250 OP 636: Performed by: PHYSICIAN ASSISTANT

## 2019-11-28 PROCEDURE — 25000125 ZZHC RX 250: Performed by: INTERNAL MEDICINE

## 2019-11-28 PROCEDURE — 36415 COLL VENOUS BLD VENIPUNCTURE: CPT | Performed by: INTERNAL MEDICINE

## 2019-11-28 PROCEDURE — 80048 BASIC METABOLIC PNL TOTAL CA: CPT | Performed by: INTERNAL MEDICINE

## 2019-11-28 PROCEDURE — 25800030 ZZH RX IP 258 OP 636: Performed by: PHYSICIAN ASSISTANT

## 2019-11-28 PROCEDURE — 97161 PT EVAL LOW COMPLEX 20 MIN: CPT | Mod: GP | Performed by: PHYSICAL THERAPIST

## 2019-11-28 PROCEDURE — 25000132 ZZH RX MED GY IP 250 OP 250 PS 637: Performed by: HOSPITALIST

## 2019-11-28 PROCEDURE — 99238 HOSP IP/OBS DSCHRG MGMT 30/<: CPT | Performed by: INTERNAL MEDICINE

## 2019-11-28 PROCEDURE — 36569 INSJ PICC 5 YR+ W/O IMAGING: CPT

## 2019-11-28 RX ORDER — ACETAMINOPHEN 325 MG/1
325-650 TABLET ORAL EVERY 4 HOURS PRN
Qty: 60 TABLET | Refills: 3 | Status: SHIPPED | OUTPATIENT
Start: 2019-11-28 | End: 2019-12-26

## 2019-11-28 RX ORDER — ASPIRIN 325 MG
325 TABLET, DELAYED RELEASE (ENTERIC COATED) ORAL DAILY
Qty: 30 TABLET | Refills: 0 | Status: SHIPPED | OUTPATIENT
Start: 2019-11-28 | End: 2020-06-11

## 2019-11-28 RX ORDER — OXYCODONE HYDROCHLORIDE 5 MG/1
5-10 TABLET ORAL
Qty: 30 TABLET | Refills: 0 | Status: SHIPPED | OUTPATIENT
Start: 2019-11-28 | End: 2019-11-28

## 2019-11-28 RX ORDER — AMOXICILLIN 250 MG
1 CAPSULE ORAL 2 TIMES DAILY PRN
Qty: 60 TABLET | Refills: 0 | Status: SHIPPED | OUTPATIENT
Start: 2019-11-28 | End: 2021-07-15

## 2019-11-28 RX ADMIN — SODIUM CHLORIDE 4 MILLION UNITS: 9 INJECTION, SOLUTION INTRAVENOUS at 02:53

## 2019-11-28 RX ADMIN — DICYCLOMINE HYDROCHLORIDE 20 MG: 20 TABLET ORAL at 07:52

## 2019-11-28 RX ADMIN — ACETAMINOPHEN 975 MG: 325 TABLET, FILM COATED ORAL at 07:52

## 2019-11-28 RX ADMIN — HEPARIN SODIUM 5000 UNITS: 5000 INJECTION, SOLUTION INTRAVENOUS; SUBCUTANEOUS at 07:54

## 2019-11-28 RX ADMIN — Medication 1 CAPSULE: at 06:55

## 2019-11-28 RX ADMIN — CELECOXIB 100 MG: 100 CAPSULE ORAL at 07:52

## 2019-11-28 RX ADMIN — LIDOCAINE HYDROCHLORIDE ANHYDROUS 5 ML: 10 INJECTION, SOLUTION INFILTRATION at 10:46

## 2019-11-28 RX ADMIN — TOLTERODINE TARTRATE 2 MG: 2 TABLET, FILM COATED ORAL at 07:52

## 2019-11-28 RX ADMIN — BACLOFEN 10 MG: 10 TABLET ORAL at 07:52

## 2019-11-28 RX ADMIN — SODIUM CHLORIDE 4 MILLION UNITS: 9 INJECTION, SOLUTION INTRAVENOUS at 05:47

## 2019-11-28 RX ADMIN — GABAPENTIN 600 MG: 600 TABLET, FILM COATED ORAL at 07:52

## 2019-11-28 RX ADMIN — SODIUM CHLORIDE 4 MILLION UNITS: 9 INJECTION, SOLUTION INTRAVENOUS at 10:55

## 2019-11-28 RX ADMIN — CEFAZOLIN 1 G: 1 INJECTION, POWDER, FOR SOLUTION INTRAMUSCULAR; INTRAVENOUS at 00:54

## 2019-11-28 ASSESSMENT — ACTIVITIES OF DAILY LIVING (ADL)
ADLS_ACUITY_SCORE: 16
ADLS_ACUITY_SCORE: 17
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
## 2019-11-28 NOTE — PLAN OF CARE
Discharge Planner OT   Patient plan for discharge: Defer to PT  Current status: OT orders rec'd, chart reviewed. Per PT, pt has no IP OT needs.   Barriers to return to prior living situation: Defer to PT  Recommendations for discharge: Defer to PT  Rationale for recommendations:  Pt is hemiplegic at baseline and able to perform I/ADL's.  Pt's mom will be staying w/her for awhile to assist w/bathing, dressing and I/ADL's as needed. Orders complete       Entered by: Diana Dunne 11/28/2019 11:52 AM

## 2019-11-28 NOTE — PROVIDER NOTIFICATION
Notified Liz(PA) that patient wants to continue Celebrex and do not want oxycodone for discharge. Liz ordered to discontinue oxycodone and ok'd to take celebrex.

## 2019-11-28 NOTE — PLAN OF CARE
Discharge Planner PT   Patient plan for discharge: Home. Her mother is currently staying with her due to her recent surgery.   Current status: Patient seen for initial eval. Patient was able to perform sup to sit independently and able to transfer bed to chair with a sliding board and assist of her mom to support her right LE. Patient has no further PT needs. She has the equipment she needs at home already.   Barriers to return to prior living situation: None with assist of her mother.  Recommendations for discharge: Home with assist of her mother for holding her right LE during transfers.   Rationale for recommendations: Patient currently needs support of right LE during transfers to avoid putting weight through it but she is able to manage her upper body without assist when using the sliding board.        Entered by: Francesca Bella 11/28/2019 12:03 PM

## 2019-11-28 NOTE — PROGRESS NOTES
SW:  D:  Received discharge orders for patient.  Patient will discharge to home with a resumption of [facility] for nursing and [facility] for IV ABX.  Email referral sent to [facility] and process explained.  Call placed and message left for the on-call nurse from [facility].  Received a call back from [facility].  Per Sylvie, the home infusion nurse.  She will follow up directly with patient.  No further needs anticipated.    WALT Rose, [facility]  [phone]  [facility]

## 2019-11-28 NOTE — PLAN OF CARE
Pt A&O. VSS ex low grade temp. CMS intact ex baseline paraplegic. Dressing to right leg CDI. Incision on left hip WDL. Turn and rep Q2 hr. Suprapubic catheter patent. Denies pain. Will continue to monitor.

## 2019-11-28 NOTE — PROGRESS NOTES
Vitals stable. Denies pain. Suprapubic cath patent. Dressing c/d/i. Reviewed AVS with patient. No concerns at this time. Discharge medications and instruction sheet given. A&OX4. Left floor via wheelchair, accompanied by mother.

## 2019-11-28 NOTE — PROGRESS NOTES
Ortho  S/p Intramedullary nailing right tibia, POD#1    Denies pain  Concerns about splint - h/o previous non healing ulcers due to splint  Denies fever, chills, nausea  Would like to discharge today  NAD, A&O, mother present  Splint intact    Plan:  Nonweightbearing right lower extremity x 6 weeks  Maintain short leg splint for 2 weeks  DVT prophylaxis -  enteric coated for 4 weeks  Continue previous antibiotic prophylaxis with prior to admission penicillin - possible picc line  Follow-up in 2 weeks for wound checks, suture removal, x-rays AP/Lateral right tibia and fibula  Possible discharge today.  Ortho orders complete    Liz Hanson PA-C  8:07 AM

## 2019-11-28 NOTE — DISCHARGE INSTRUCTIONS
Nonweightbearing right lower extremity for 6 weeks  Maintain short leg splint for 2 weeks  DVT prophylaxis with Aspirin 325 enteric coated for 4 weeks  Continue previous antibiotic prophylaxis with prior to admission penicillin  Follow-up with Dr Zamarripa in 2 weeks for wound checks, suture removal, and new x-rays AP/Lateral right tibia and fibula    Please call [phone] to schedule appointment        Patient will discharge to home with a resumption of [facility] for nursing.  [facility] will contact patient directly to arrange for the first visit.  [facility]'s phone number is [phone].    Patient will discharge to home with a resumption of [facility] for home IV antibiotics.  [facility] will contact patient directly to continue the current cares.  [facility]'s phone number is [phone].
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
## 2019-11-29 ENCOUNTER — TELEPHONE (OUTPATIENT)
Dept: FAMILY MEDICINE | Facility: CLINIC | Age: 49
End: 2019-11-29

## 2019-11-29 ENCOUNTER — HOME INFUSION (PRE-WILLOW HOME INFUSION) (OUTPATIENT)
Dept: PHARMACY | Facility: CLINIC | Age: 49
End: 2019-11-29

## 2019-11-29 ENCOUNTER — APPOINTMENT (OUTPATIENT)
Dept: LAB | Facility: CLINIC | Age: 49
End: 2019-11-29
Attending: INTERNAL MEDICINE
Payer: COMMERCIAL

## 2019-11-29 NOTE — TELEPHONE ENCOUNTER
Patient discharged from [facility] IP  ( Inpatient or ER).    Discharge location: [facility]  Discharge date: 11/28/19  Diagnosis: tibia/fibula fracture, right, closed, initial encounter, closed fracture of distal end of right tibia with routine healing.    Patient has been in the ER/IP 1/2 times.  Care Coord:  NA  Please follow up as appropriate. If no follow up required, please close encounter.

## 2019-11-29 NOTE — TELEPHONE ENCOUNTER
Called and spoke to patient. She states that she is feeling well. We discussed scheduling follow up apts. She is going to call in to schedule with the ortho provider that performed her sx as she needs to be seen with in two weeks. Will schedule with Dr Limon if she is in need of anything non-ortho related.     Patient has no questions or concerns at this time and will call in to the clinic if any do arise.     Eneida Mcgarry RN

## 2019-11-29 NOTE — PROGRESS NOTES
This is a recent snapshot of the patient's [facility] medical record.  For current drug dose and complete information and questions, call [phone] or In Basket pool, [facility] home infusion ([id number])  CSN Number:  [id number]

## 2019-12-01 ENCOUNTER — APPOINTMENT (OUTPATIENT)
Dept: LAB | Facility: CLINIC | Age: 49
End: 2019-12-01
Attending: INTERNAL MEDICINE
Payer: COMMERCIAL

## 2019-12-02 ENCOUNTER — MEDICAL CORRESPONDENCE (OUTPATIENT)
Dept: HEALTH INFORMATION MANAGEMENT | Facility: CLINIC | Age: 49
End: 2019-12-02

## 2019-12-02 ENCOUNTER — HOME INFUSION (PRE-WILLOW HOME INFUSION) (OUTPATIENT)
Dept: PHARMACY | Facility: CLINIC | Age: 49
End: 2019-12-02

## 2019-12-02 ENCOUNTER — DOCUMENTATION ONLY (OUTPATIENT)
Dept: CARE COORDINATION | Facility: CLINIC | Age: 49
End: 2019-12-02

## 2019-12-02 LAB
ALBUMIN SERPL-MCNC: 2.4 G/DL (ref 3.4–5)
ALP SERPL-CCNC: 101 U/L (ref 40–150)
ALT SERPL W P-5'-P-CCNC: 16 U/L (ref 0–50)
ANION GAP SERPL CALCULATED.3IONS-SCNC: 5 MMOL/L (ref 3–14)
AST SERPL W P-5'-P-CCNC: 11 U/L (ref 0–45)
BASOPHILS # BLD AUTO: 0.1 10E9/L (ref 0–0.2)
BASOPHILS NFR BLD AUTO: 1.1 %
BILIRUB SERPL-MCNC: 0.2 MG/DL (ref 0.2–1.3)
BUN SERPL-MCNC: 12 MG/DL (ref 7–30)
CALCIUM SERPL-MCNC: 8.6 MG/DL (ref 8.5–10.1)
CHLORIDE SERPL-SCNC: 109 MMOL/L (ref 94–109)
CO2 SERPL-SCNC: 26 MMOL/L (ref 20–32)
CREAT SERPL-MCNC: 0.4 MG/DL (ref 0.52–1.04)
CRP SERPL-MCNC: 15.2 MG/L (ref 0–8)
DIFFERENTIAL METHOD BLD: ABNORMAL
EOSINOPHIL # BLD AUTO: 0.6 10E9/L (ref 0–0.7)
EOSINOPHIL NFR BLD AUTO: 8.3 %
ERYTHROCYTE [DISTWIDTH] IN BLOOD BY AUTOMATED COUNT: 15.5 % (ref 10–15)
GFR SERPL CREATININE-BSD FRML MDRD: >90 ML/MIN/{1.73_M2}
GLUCOSE SERPL-MCNC: 85 MG/DL (ref 70–99)
HCT VFR BLD AUTO: 33.3 % (ref 35–47)
HGB BLD-MCNC: 10.1 G/DL (ref 11.7–15.7)
IMM GRANULOCYTES # BLD: 0 10E9/L (ref 0–0.4)
IMM GRANULOCYTES NFR BLD: 0.1 %
LYMPHOCYTES # BLD AUTO: 2.1 10E9/L (ref 0.8–5.3)
LYMPHOCYTES NFR BLD AUTO: 27.1 %
MCH RBC QN AUTO: 27.2 PG (ref 26.5–33)
MCHC RBC AUTO-ENTMCNC: 30.3 G/DL (ref 31.5–36.5)
MCV RBC AUTO: 90 FL (ref 78–100)
MONOCYTES # BLD AUTO: 0.6 10E9/L (ref 0–1.3)
MONOCYTES NFR BLD AUTO: 7.2 %
NEUTROPHILS # BLD AUTO: 4.3 10E9/L (ref 1.6–8.3)
NEUTROPHILS NFR BLD AUTO: 56.2 %
NRBC # BLD AUTO: 0 10*3/UL
NRBC BLD AUTO-RTO: 0 /100
PLATELET # BLD AUTO: 331 10E9/L (ref 150–450)
POTASSIUM SERPL-SCNC: 4.1 MMOL/L (ref 3.4–5.3)
PROT SERPL-MCNC: 6.5 G/DL (ref 6.8–8.8)
RBC # BLD AUTO: 3.72 10E12/L (ref 3.8–5.2)
SODIUM SERPL-SCNC: 140 MMOL/L (ref 133–144)
WBC # BLD AUTO: 7.6 10E9/L (ref 4–11)

## 2019-12-02 PROCEDURE — 80053 COMPREHEN METABOLIC PANEL: CPT | Performed by: INTERNAL MEDICINE

## 2019-12-02 PROCEDURE — 85025 COMPLETE CBC W/AUTO DIFF WBC: CPT | Performed by: INTERNAL MEDICINE

## 2019-12-02 PROCEDURE — 86140 C-REACTIVE PROTEIN: CPT | Performed by: INTERNAL MEDICINE

## 2019-12-02 NOTE — PROGRESS NOTES
This is a recent snapshot of the patient's [facility] medical record.  For current drug dose and complete information and questions, call [phone] or In Basket pool, [facility] home infusion ([id number])  CSN Number:  [id number]

## 2019-12-02 NOTE — PROGRESS NOTES
12/4/19 She is doing better at her incision site. The flap procedure healed up nicely and she has been tolerating her abx treatment well. Last week she did have an accident transferring from bed to /c and now has a break in her right leg. It was due to weak bones, not really specifically anything she did wrong.   Our current IV antibiotic orders run 10/25 - 12/6, stating to reassess on 12/6. So we are wondering if she will need to continue past that date. Are you saying you would like her on another 6-8 weeks? She would be so happy to be done with her IV it it's possible.     Thank you,  Milagros BIRCH  [phone]              Patient has antibiotic treatment ordered through 12/6. Labs were drawn today and sent to [facility] lab for processing. Patient does not have an appointment to see you until January. Please advise on the next steps for patient after this week of antibiotic therapy.    Thank you,  Milagros Ramos RN  [phone]

## 2019-12-03 NOTE — PROGRESS NOTES
This is a recent snapshot of the patient's [facility] medical record.  For current drug dose and complete information and questions, call [phone] or In Basket pool, [facility] home infusion ([id number])  CSN Number:  [id number]

## 2019-12-04 ENCOUNTER — HOME INFUSION (PRE-WILLOW HOME INFUSION) (OUTPATIENT)
Dept: PHARMACY | Facility: CLINIC | Age: 49
End: 2019-12-04

## 2019-12-05 ENCOUNTER — TELEPHONE (OUTPATIENT)
Dept: INFECTIOUS DISEASES | Facility: CLINIC | Age: 49
End: 2019-12-05

## 2019-12-05 ENCOUNTER — HOME INFUSION (PRE-WILLOW HOME INFUSION) (OUTPATIENT)
Dept: PHARMACY | Facility: CLINIC | Age: 49
End: 2019-12-05

## 2019-12-05 NOTE — TELEPHONE ENCOUNTER
[facility] Call Center    Phone Message    May a detailed message be left on voicemail: yes    Reason for Call: Pt is on IV antibiotics and was supposed to be done this Friday, but unable to see Dr. Michaud until January and was told will need to continue antibiotics via IV until then. Pt doesn't understand and is requesting a call back to discuss.     Action Taken: Message routed to:  [facility] ([facility]): Infectious Disease

## 2019-12-05 NOTE — PROGRESS NOTES
This is a recent snapshot of the patient's [facility] medical record.  For current drug dose and complete information and questions, call [phone] or In Basket pool, [facility] home infusion ([id number])  CSN Number:  [id number]

## 2019-12-06 ENCOUNTER — HOME INFUSION (PRE-WILLOW HOME INFUSION) (OUTPATIENT)
Dept: PHARMACY | Facility: CLINIC | Age: 49
End: 2019-12-06

## 2019-12-06 ENCOUNTER — MYC MEDICAL ADVICE (OUTPATIENT)
Dept: INFECTIOUS DISEASES | Facility: CLINIC | Age: 49
End: 2019-12-06

## 2019-12-06 ENCOUNTER — TELEPHONE (OUTPATIENT)
Dept: INFECTIOUS DISEASES | Facility: CLINIC | Age: 49
End: 2019-12-06

## 2019-12-06 NOTE — TELEPHONE ENCOUNTER
[facility] Call Center    Phone Message    May a detailed message be left on voicemail: yes    Reason for Call: Other: Pt called back and is frustrated that pt still has not received a call bacl. Pt would like to know why pt should continuing taking antibiotics until the day of appt. Pt stated it is unacceptable that pt has to extend her use of antibiotics. Please call back pt. Thanks.     Action Taken: Message routed to:  [facility] ([facility] ID

## 2019-12-06 NOTE — TELEPHONE ENCOUNTER
[facility] Call Center    Phone Message    May a detailed message be left on voicemail: yes    Reason for Call: Other: Milagros, nurse from  Home [facility] called and stated that pt does not have a wound. Pt incision site is fully healed, and pt will submit an incision photo for a review. Milagros also would like Dr. Michaud to take a look at the lab results so pt can get off the antibiotics. Please call back Milagros for any further concerns. Thanks.     Action Taken: Message routed to:  [facility] ID

## 2019-12-09 NOTE — TELEPHONE ENCOUNTER
Per Dr Michaud via staff note, OK to call [facility] to let them know IV Abx can be stopped and PICC can be pulled. 12/6/20.  Nichole Alexandre RN

## 2019-12-09 NOTE — TELEPHONE ENCOUNTER
See QUETA Victor, documentation from today, 12/09/2019, for additional details on this message.    Rachael Guardado RN

## 2019-12-11 NOTE — PROGRESS NOTES
This is a recent snapshot of the patient's [facility] medical record.  For current drug dose and complete information and questions, call [phone] or In Basket pool, fv home infusion ([id number])  CSN Number:  [id number]

## 2019-12-12 ENCOUNTER — HOSPITAL ENCOUNTER (OUTPATIENT)
Dept: WOUND CARE | Facility: CLINIC | Age: 49
Discharge: HOME | End: 2019-12-12
Attending: SURGERY | Admitting: SURGERY
Payer: COMMERCIAL

## 2019-12-12 VITALS
SYSTOLIC BLOOD PRESSURE: 112 MMHG | RESPIRATION RATE: 18 BRPM | HEART RATE: 99 BPM | DIASTOLIC BLOOD PRESSURE: 73 MMHG | TEMPERATURE: 96.4 F

## 2019-12-12 DIAGNOSIS — L89.324 PRESSURE ULCER OF ISCHIUM, LEFT, STAGE IV (H): ICD-10-CM

## 2019-12-12 PROCEDURE — G0463 HOSPITAL OUTPT CLINIC VISIT: HCPCS

## 2019-12-12 RX ORDER — CELECOXIB 100 MG/1
CAPSULE ORAL
COMMUNITY
Start: 2019-09-17 | End: 2019-12-26

## 2019-12-12 RX ORDER — VITAMIN K2 40 MCG
TABLET ORAL
COMMUNITY

## 2019-12-12 NOTE — PROGRESS NOTES
Patient arrived for wound care visit. Certified Wound Care Nurse time spent evaluating patient record, completed a full evaluation and documented wound & becki-wound skin; provided recommendation based on treatment plan. Reviewed discharge instructions, patient education, and discussed plan of care with appropriate medical team staff members and patient and family members.

## 2019-12-12 NOTE — DISCHARGE INSTRUCTIONS
[facility] WOUND [facility]ALING [place]  [address] [facility] [place] [address]  Appointment Phone [phone]   Priyanka Martinez      1970  Your wound is Healed!! Dressings are no longer required.     Follow up with Provider: 1 month     Soraya Bob M.D.. December 12, 2019    Reducing a Patient s Risk for Pressure Injuries  There is no single preventive for pressure injuries. Give priority to pressure relief. Reduce other risks to help maintain a healthy flow of nutrients to the patient s skin.  Relieve pressure  Relieving pressure is the single most important factor in preventing and treating pressure injuries.  You can relieve pressure and restore the skin s blood supply by repositioning the patient and using special devices. Post a schedule to remind you to reposition the patient -- from side to back or from stomach to side. Make minor position changes even more frequently. Specially designed pillows, beds, or mattresses can also help reduce pressure.  In a bed    Use pillows under calves to elevate the legs from above the knees to the ankles.    Alter the angles of arms and legs.  In a wheelchair    Be sure the wheelchair is the proper size for the patient to give optimal support. For example, if the seat it too narrow, it will put extra pressure on the hips.    Cushion the back and buttocks with pillows or wheelchair cushions, and pad the footrest.    Use a special wheelchair cushion that is designed to distribute weight evenly and relieve pressure points that is evaluated yearly.    Have special cushions tested and adjusted at the proper times as recommended by the . This will allow the pressure relief to remain effective.   Manage moisture  Keep skin clean and lubricated, but free of excess moisture. Put the patient on a regular toilet schedule. Use incontinent devices, if appropriate. Consult with a doctor about using diarrhea medicines:    Use talc-free powders or  barrier creams.    Pat skin dry after bathing.    Lubricate skin with lotion.  Reduce shear and friction  Prevent skin breakdown by reducing friction and shear. Patients are less likely to slide down in bed if they re supported by pillows and the head of the bed isn t raised too high. During bed or wheelchair transfers, lift--don t drag--the patient. If you can t do this alone, get help. And be sure to use assistive devices, whenever possible.  In a bed    Use draw-sheets or transfer boards to move patients.    Clean and smooth the bed surface.    Lift the head of the bed no more than 30 .    Raise the foot of the bed slightly.  In a wheelchair    Support the patient s back with a pillow.    Use a foot extension.

## 2019-12-26 ENCOUNTER — OFFICE VISIT (OUTPATIENT)
Dept: PEDIATRICS | Facility: CLINIC | Age: 49
End: 2019-12-26
Payer: COMMERCIAL

## 2019-12-26 VITALS
TEMPERATURE: 97.5 F | OXYGEN SATURATION: 98 % | HEART RATE: 88 BPM | SYSTOLIC BLOOD PRESSURE: 121 MMHG | DIASTOLIC BLOOD PRESSURE: 83 MMHG

## 2019-12-26 DIAGNOSIS — M80.80XA OTHER OSTEOPOROSIS WITH CURRENT PATHOLOGICAL FRACTURE, INITIAL ENCOUNTER: Primary | ICD-10-CM

## 2019-12-26 DIAGNOSIS — K21.9 GASTROESOPHAGEAL REFLUX DISEASE WITHOUT ESOPHAGITIS: ICD-10-CM

## 2019-12-26 PROCEDURE — 99214 OFFICE O/P EST MOD 30 MIN: CPT | Performed by: INTERNAL MEDICINE

## 2019-12-26 RX ORDER — ALENDRONATE SODIUM 70 MG/1
70 TABLET ORAL
Qty: 12 TABLET | Refills: 3 | Status: SHIPPED | OUTPATIENT
Start: 2019-12-26 | End: 2020-12-15

## 2019-12-26 NOTE — PROGRESS NOTES
Subjective     Priyanka Martinez is a 49 year old female who presents to clinic today for the following health issues:    HPI   Priyanka has Paraplegia (H); Neuropathic pain of flank; Neurogenic bladder; Generalized anxiety disorder; Infected wound; Osteomyelitis of pelvic region (H); Polymicrobial bacterial infection; and Infection due to Staphylococcus aureus on their pertinent problem list.     History of Pressure ulcer with pelvis osteomyelitis s/p debridement left ischial decubitus with left posterior thigh flap 10/16/2019. In November, during a transfer, she broke right distal tibia/fibulla but  She was not aware of until a home care RN visited her and noted the deformity and swelling. She had surgery with nailing the tibia. Her surgeon operated on the right leg and found the bone to be profoundly osteoporotic in appearance and recommended she gets treated for osteoporosis.     Her prior labs with CBC/CMP were unremarkable. Has not had vitamin D checked before. She takes a calcium supplement. She thinks there is vitamin D in it but not sure.     Also is sore where pick line was. Left upper arm feels sore. No swelling or bruise in the left upper arm or the forearm.     She has history of GERD, maintained on Omeprazole     ROS:  Constitutional, HEENT, cardiovascular, pulmonary, gi and gu systems are negative, except as otherwise noted.       ACIDOPHILUS LACTOBACILLUS PO, Take 1 tablet by mouth At Bedtime   ascorbic acid (VITAMIN C) 1000 MG TABS, Take 1,000 mg by mouth every evening   aspirin (ASA) 325 MG EC tablet, Take 1 tablet (325 mg) by mouth daily  baclofen (LIORESAL) 10 MG tablet, Take 1 tablet (10 mg) by mouth 3 times daily  celecoxib (CELEBREX) 100 MG capsule, TAKE 1 CAPSULE TWICE A DAY  dicyclomine (BENTYL) 20 MG tablet, Take 20 mg by mouth 3 times daily (with meals)  FLUoxetine (PROZAC) 20 MG capsule, TAKE 1 CAPSULE DAILY  gabapentin (NEURONTIN) 600 MG tablet, TAKE 1 TABLET BY MOUTH THREE TIMES A  DAY  medroxyPROGESTERone (PROVERA) 10 MG tablet, TAKE ONE AND ONE-HALF TABLETS DAILY  Menaquinone-7 (VITAMIN K2) 40 MCG TABS,   order for DME, [place] Fax [phone]    Secondary Dressing tegaderm foam adhesive (ref #[id number]) Qty 30    Length of Need: 1 month  Frequency of dressing change: daily  order for DME, [facility]   p: [phone] f: [phone]  EMAIL to [email or web address]  [email or web address]    Request for group 2 air mattress & semi-electric hospital bed  Ht:177.8 cm  Wt:72.5 kg  Length of need: lifetime    Pt. is wheelchair bound & has been in a comprehensive ulcer treatment program for >2 months. We will follow monthly until wound closure    To obtain medical records:  p: [phone] f: [phone]  order for DME, Equipment being ordered: Catheter Wheeler 2Way 30cc 18FR, Silicone Coated Latex  order for DME, Equipment being ordered: Anchoring Device Flexi-Track LG  order for DME, Equipment being ordered: Insertion Tray Wheeler w/BZK Swab Catheter 10CC, Sterile  order for DME, Equipment being ordered: Drain Bag, Kenguard 2000ml. Urinary Bag with Anti-Refulx  phenazopyridine (PYRIDIUM) 200 MG tablet, Take 200 mg by mouth every evening  psyllium (METAMUCIL/KONSYL) capsule, Take 1 capsule by mouth 3 times daily (before meals)  trospium (SANCTURA) 20 MG tablet, Take 1 tablet (20 mg) by mouth 2 times daily  ZINC-VITAMIN C PO, Take 1 tablet by mouth every evening  senna-docusate (SENOKOT-S/PERICOLACE) 8.6-50 MG tablet, Take 1 tablet by mouth 2 times daily as needed for constipation  [DISCONTINUED] oxybutynin (DITROPAN) 5 MG tablet, Take 1 tablet by mouth 3 times daily.    No current facility-administered medications on file prior to visit.          Patient Active Problem List   Diagnosis     Paraplegia (H)     Suprapubic catheter (H)     Gastrointestinal problem     CARDIOVASCULAR SCREENING; LDL GOAL LESS THAN 160     Dysmenorrhea     Neuropathic pain of flank     Neurogenic  bladder     Endometriosis     IBS (irritable bowel syndrome)     Generalized anxiety disorder     Pressure ulcer of ischium, left, stage IV (H)     Sepsis (H)     Infected wound     Osteomyelitis of pelvic region (H)     Polymicrobial bacterial infection     Infection due to Staphylococcus aureus     S/P flap graft     Tibia fracture     Past Surgical History:   Procedure Laterality Date     C SPINE FUSION,POSTER,7-12 SGMTS  2004    From T10 to L5     CHOLECYSTECTOMY  's     COLONOSCOPY       COLONOSCOPY Left 2014    Procedure: COMBINED COLONOSCOPY, SINGLE OR MULTIPLE BIOPSY/POLYPECTOMY BY BIOPSY;  Surgeon: Junior Galeano MD;  Location: [facility]     ESOPHAGOSCOPY, GASTROSCOPY, DUODENOSCOPY (EGD), COMBINED N/A 2014    Procedure: COMBINED ESOPHAGOSCOPY, GASTROSCOPY, DUODENOSCOPY (EGD), BIOPSY SINGLE OR MULTIPLE;  Surgeon: Junior Galeano MD;  Location: [facility]     IRRIGATION AND DEBRIDEMENT DECUBITUS WITH FLAP CLOSURE, COMBINED Left 10/16/2019    Procedure: Debridement Left Ischial Decubitus, Flap,SPY-PHI;  Surgeon: Cintia Bob MD;  Location: [facility]     OPEN REDUCTION INTERNAL FIXATION TIBIA Right 2019    Procedure: OPEN REDUCTION INTERNAL FIXATION, RIGHT   TIBIA FRACTURE;  Surgeon: Godwin Zamarripa MD;  Location:  OR       Social History     Tobacco Use     Smoking status: Former Smoker     Last attempt to quit: 3/18/2012     Years since quittin.8     Smokeless tobacco: Never Used     Tobacco comment: 7 cig a day off and on socially   Substance Use Topics     Alcohol use: No     Family History   Problem Relation Age of Onset     Hypertension Father      Heart Failure Maternal Grandmother              Problem list, Medication list, Allergies, and Medical/Social/Surgical histories reviewed in [facility] and updated as appropriate.    OBJECTIVE:                                                    /83   Pulse 88   Temp 97.5  F (36.4  C) (Temporal)   SpO2 98%     GENERAL:  healthy, alert and no distress  Left upper arm: there is mild tenderness at the prior PICC insertion site, no erythema or warm, no bruising. No lump palpated.     Diagnostic test results:  No results found for any visits on 12/26/19.      ASSESSMENT/PLAN:                                                      49 year old female with the following diagnoses and treatment plan:      ICD-10-CM    1. Other osteoporosis with current pathological fracture, initial encounter M80.80XA alendronate (FOSAMAX) 70 MG tablet     CANCELED: Vitamin D Deficiency   2. Gastroesophageal reflux disease without esophagitis K21.9 omeprazole (PRILOSEC) 20 MG DR capsule       -- osteoporosis: discussed first line treatment with bisphosphonate, calicum/vitamin D 1200 mg/1000 international unit(s). Discussed potential side effects. Will start alendronate. Will get vitamin D level checked with the next lab draw from her surgeon, which is due in a week or so.   -- GERD: omeprazole refilled.   -- left upper arm pain at prior PICC insertion site: likely nerve irritation/injury during PICC insertion. No sign for vein thrombosis. Expect this to heal with time.   -- return for wellness visit in August.     Will call or return to clinic if worsening or symptoms not improving as discussed.  See Patient Instructions.      Ayala Limon MD-PhD  [facility] [place]    (Note: Chart documentation was done in part with Dragon Voice Recognition software. Although reviewed after completion, some word and grammatical errors may remain.)

## 2019-12-26 NOTE — PATIENT INSTRUCTIONS
Make appointment(s) for:   -- get vitamin D done with the next lab for your surgeon.   -- wellness visit in August.       Mychart me the calcium supplement you have: components and dosage.     Calcium 1200 mg and vitamin D 1000 international unit(s)      Medication(s) prescribed today:    Orders Placed This Encounter   Medications     alendronate (FOSAMAX) 70 MG tablet     Sig: Take 1 tablet (70 mg) by mouth every 7 days     Dispense:  12 tablet     Refill:  3     DISCONTD: omeprazole (PRILOSEC) 20 MG DR capsule     Sig: Take 1 capsule (20 mg) by mouth daily     Gets this OTC     omeprazole (PRILOSEC) 20 MG DR capsule     Sig: Take 1 capsule (20 mg) by mouth daily     Dispense:  90 capsule     Refill:  3

## 2019-12-27 NOTE — PROGRESS NOTES
Visit Date:   12/12/2019      [facility] VISIT NOTE      This is a 49-year-old paraplegic female who is here today with her mom for further followup on her left posterior thigh to ischial decubitus flap.  She was done over a month ago and did the majority of her recovery at home with her mom's nursing care.  There have been a bit of a delay due to some bed and mattress issues, but ultimately she went home and recovered well and was seen for removal of stitches, staples and drains at the appropriate postoperative time.  She then went back home to start her sitting protocol and was mapped about a week just prior to Thanksgiving.  That mapped fine and she has continued to be up in her wheelchair, etc., but unfortunately, she suffered an injury to her right lower leg when it became trapped between the wheelchair and her bed and had a nnea placed for a tib-fib fracture.  She is in a cast until Tuesday and other than being quite heavy for transfer purposes, she seems to be adjusting remarkably well.      As far as her bottom is concerned, her incision looks healthy and intact.  There is some concern that perhaps there was some edematous or kind of squishy field to the flap near the gluteal area, so a bedside ultrasound was just done and showed just a slight amount of fluid between the tissue planes of the flap.  She has had some labs done to follow her CRP and, while it is a little elevated at 15.2 this past Monday compared to 11/12 when it was 10, I am attributing that in part to her recent injury and surgery for her leg.        At this point, we will have her continue with conditioning her flap being sat on.  She is basically up all day, but doing it in kind of 4-hour segments with a lot of weight shifting.  If there are any other issues or concerns such as swelling or the incision starts to look different, they can come in sooner.  Otherwise, we will just see her maybe 1 more time just to check  her CRP, etc., and make sure that those are trending downward.  If there is some concern that there might be some underlying osteo still, we might then have to get imaging of her pelvis again.  Overall, I think she is doing quite well.  Her vitals today were within normal limits other than a heart rate of 99.   Labs:   Recent Labs   Lab Test 19  1100  18  0802   ALBUMIN 2.4*   < >  --    HGB 10.1*   < > 8.3*   INR  --   --  1.31*   WBC 7.6   < > 9.9   CRP 15.2*   < >  --     < > = values in this interval not displayed.     Nutrition requirements were discussed with patient today.  Vitals:  /73 (BP Location: Right arm)   Pulse 99   Temp 96.4  F (35.8  C) (Temporal)   Resp 18   Wound:     Photo:         VIRGIL PHELPS MD             D: 2019   T: 2019   MT: PK      Name:     CEDRIC MENDOZA   MRN:      [id number]        Account:      [id number]   :      1970           Visit Date:   2019      Document: [id number]

## 2020-01-07 ENCOUNTER — APPOINTMENT (OUTPATIENT)
Dept: LAB | Facility: CLINIC | Age: 50
End: 2020-01-07
Attending: INTERNAL MEDICINE
Payer: COMMERCIAL

## 2020-01-16 ENCOUNTER — HOSPITAL ENCOUNTER (OUTPATIENT)
Dept: WOUND CARE | Facility: CLINIC | Age: 50
Discharge: HOME | End: 2020-01-16
Attending: SURGERY | Admitting: SURGERY
Payer: COMMERCIAL

## 2020-01-16 VITALS
DIASTOLIC BLOOD PRESSURE: 90 MMHG | TEMPERATURE: 96.9 F | RESPIRATION RATE: 18 BRPM | HEART RATE: 101 BPM | SYSTOLIC BLOOD PRESSURE: 132 MMHG

## 2020-01-16 DIAGNOSIS — L89.324 PRESSURE ULCER OF ISCHIUM, LEFT, STAGE IV (H): ICD-10-CM

## 2020-01-16 DIAGNOSIS — Z98.890 S/P FLAP GRAFT: Primary | ICD-10-CM

## 2020-01-16 LAB
CRP SERPL-MCNC: <2.9 MG/L (ref 0–8)
DEPRECATED CALCIDIOL+CALCIFEROL SERPL-MC: 28 UG/L (ref 20–75)
ERYTHROCYTE [SEDIMENTATION RATE] IN BLOOD BY WESTERGREN METHOD: 9 MM/H (ref 0–20)

## 2020-01-16 PROCEDURE — G0463 HOSPITAL OUTPT CLINIC VISIT: HCPCS

## 2020-01-16 PROCEDURE — 86140 C-REACTIVE PROTEIN: CPT | Performed by: SURGERY

## 2020-01-16 PROCEDURE — 82306 VITAMIN D 25 HYDROXY: CPT | Performed by: SURGERY

## 2020-01-16 PROCEDURE — 85652 RBC SED RATE AUTOMATED: CPT | Performed by: SURGERY

## 2020-01-16 RX ORDER — EMOLLIENT COMBINATION NO.32
1 EMULSION, EXTENDED RELEASE TOPICAL DAILY
Qty: 225 G | Refills: 11 | Status: SHIPPED | OUTPATIENT
Start: 2020-01-16 | End: 2020-02-15

## 2020-01-16 NOTE — DISCHARGE INSTRUCTIONS
[facility] WOUND HEALING [place]  [address] [facility] [place] [address]  Appointment Phone [phone]   Priyanka Martinez      1970  Your wound is Healed!! Dressings are no longer required.     Please call [phone] to schedule your appointment if you have not heard from them in two business days.    Arrive 15 minutes early.    [facility] [phone]       Soraya Bob M.D.. January 16, 2020    Reducing a Patient s Risk for Pressure Injuries  There is no single preventive for pressure injuries. Give priority to pressure relief. Reduce other risks to help maintain a healthy flow of nutrients to the patient s skin.  Relieve pressure  Relieving pressure is the single most important factor in preventing and treating pressure injuries.  You can relieve pressure and restore the skin s blood supply by repositioning the patient and using special devices. Post a schedule to remind you to reposition the patient -- from side to back or from stomach to side. Make minor position changes even more frequently. Specially designed pillows, beds, or mattresses can also help reduce pressure.  In a bed    Use pillows under calves to elevate the legs from above the knees to the ankles.    Alter the angles of arms and legs.  In a wheelchair    Be sure the wheelchair is the proper size for the patient to give optimal support. For example, if the seat it too narrow, it will put extra pressure on the hips.    Cushion the back and buttocks with pillows or wheelchair cushions, and pad the footrest.    Use a special wheelchair cushion that is designed to distribute weight evenly and relieve pressure points that is evaluated yearly.    Have special cushions tested and adjusted at the proper times as recommended by the . This will allow the pressure relief to remain effective.   Manage moisture  Keep skin clean and lubricated, but free of excess moisture. Put the patient on a regular  toilet schedule. Use incontinent devices, if appropriate. Consult with a doctor about using diarrhea medicines:    Use talc-free powders or barrier creams.    Pat skin dry after bathing.    Lubricate skin with lotion.  Reduce shear and friction  Prevent skin breakdown by reducing friction and shear. Patients are less likely to slide down in bed if they re supported by pillows and the head of the bed isn t raised too high. During bed or wheelchair transfers, lift--don t drag--the patient. If you can t do this alone, get help. And be sure to use assistive devices, whenever possible.  In a bed    Use draw-sheets or transfer boards to move.    Clean and smooth the bed surface.    Lift the head of the bed no more than 30 .    Raise the foot of the bed slightly.  In a wheelchair    Support the patient s back with a pillow.    Use a foot extension.

## 2020-01-28 ENCOUNTER — ANCILLARY PROCEDURE (OUTPATIENT)
Dept: MRI IMAGING | Facility: CLINIC | Age: 50
End: 2020-01-28
Attending: SURGERY
Payer: COMMERCIAL

## 2020-01-28 DIAGNOSIS — S31.809A: Primary | ICD-10-CM

## 2020-01-28 PROCEDURE — A9585 GADOBUTROL INJECTION: HCPCS | Mod: JW | Performed by: SURGERY

## 2020-01-28 PROCEDURE — 72197 MRI PELVIS W/O & W/DYE: CPT | Performed by: RADIOLOGY

## 2020-01-28 RX ORDER — GADOBUTROL 604.72 MG/ML
7.5 INJECTION INTRAVENOUS ONCE
Status: COMPLETED | OUTPATIENT
Start: 2020-01-28 | End: 2020-01-28

## 2020-01-28 RX ADMIN — GADOBUTROL 7 ML: 604.72 INJECTION INTRAVENOUS at 09:32

## 2020-02-03 DIAGNOSIS — K58.0 IRRITABLE BOWEL SYNDROME WITH DIARRHEA: Primary | ICD-10-CM

## 2020-02-04 RX ORDER — DICYCLOMINE HCL 20 MG
TABLET ORAL
Qty: 180 TABLET | Refills: 4 | Status: SHIPPED | OUTPATIENT
Start: 2020-02-04 | End: 2021-04-19

## 2020-02-04 NOTE — TELEPHONE ENCOUNTER
"Requested Prescriptions   Pending Prescriptions Disp Refills     dicyclomine (BENTYL) 20 MG tablet [Pharmacy Med Name: DICYCLOMINE HCL TABS 20MG] 180 tablet 4     Sig: TAKE 1 TABLET TWICE A DAY       Oral Anticholinergic Agents Passed - 2/3/2020  9:03 AM        Passed - Patient is of age 12 or older        Passed - Recent (12 mo) or future (30 days) visit with authorizing provider's specialty     Patient has had an office visit with the authorizing provider or a provider within the authorizing providers department within the previous 12 mos or has a future within next 30 days. See \"Patient Info\" tab in inbasket, or \"Choose Columns\" in Meds & Orders section of the refill encounter.              Passed - Medication is active on med list        Passed - Patient is not pregnant        Passed - No positive pregnancy test on file within past 12 months        Routing refill request to provider for review/approval because:  Medication is reported/historical    Rani Sy RN, BSN, PHN  [facility]          "

## 2020-02-07 ENCOUNTER — TELEPHONE (OUTPATIENT)
Dept: WOUND CARE | Facility: CLINIC | Age: 50
End: 2020-02-07

## 2020-02-07 NOTE — TELEPHONE ENCOUNTER
Reviewed results with Dr. Bob-  Patient given information not to be concerned about MRI results due to post surgical changes. Patient reports understanding

## 2020-02-20 ENCOUNTER — MYC MEDICAL ADVICE (OUTPATIENT)
Dept: PEDIATRICS | Facility: CLINIC | Age: 50
End: 2020-02-20

## 2020-03-03 ENCOUNTER — HOME INFUSION (PRE-WILLOW HOME INFUSION) (OUTPATIENT)
Dept: PHARMACY | Facility: CLINIC | Age: 50
End: 2020-03-03

## 2020-03-09 ENCOUNTER — APPOINTMENT (OUTPATIENT)
Dept: LAB | Facility: CLINIC | Age: 50
End: 2020-03-09
Attending: INTERNAL MEDICINE
Payer: COMMERCIAL

## 2020-03-11 NOTE — PROGRESS NOTES
This is a recent snapshot of the patient's [facility] medical record.  For current drug dose and complete information and questions, call [phone] or In Basket pool, [facility] home infusion ([id number])  CSN Number:  [id number]

## 2020-03-16 DIAGNOSIS — R10.9 FLANK PAIN: ICD-10-CM

## 2020-03-16 NOTE — TELEPHONE ENCOUNTER
celecoxib (CELEBREX) 100 MG capsule   Last Written Prescription Date:  12/17/19  Last Fill Quantity: 180,  # refills: 1   Last office visit: 12/26/2019 with prescribing provider:  Dr. Limon   Future Office Visit:  No future office visit    [facility] [place]

## 2020-03-17 RX ORDER — CELECOXIB 100 MG/1
CAPSULE ORAL
Qty: 180 CAPSULE | Refills: 1 | Status: SHIPPED | OUTPATIENT
Start: 2020-03-17 | End: 2020-06-25

## 2020-03-17 NOTE — TELEPHONE ENCOUNTER
"LOV- 12/26 says wellness in August.   Za Lyons, RN      Requested Prescriptions   Pending Prescriptions Disp Refills     celecoxib (CELEBREX) 100 MG capsule 180 capsule 1     Sig: TAKE 1 CAPSULE TWICE A DAY       NSAID Medications Failed - 3/16/2020  3:08 PM        Failed - Blood pressure under 140/90 in past 12 months     BP Readings from Last 3 Encounters:   01/16/20 (!) 132/90   12/26/19 121/83   12/12/19 112/73                 Failed - Normal CBC on file in past 12 months     Recent Labs   Lab Test 12/02/19  1100   WBC 7.6   RBC 3.72*   HGB 10.1*   HCT 33.3*                    Failed - Normal serum creatinine on file in past 12 months     Recent Labs   Lab Test 12/02/19  1100   CR 0.40*       Ok to refill medication if creatinine is low          Passed - Normal ALT on file in past 12 months     Recent Labs   Lab Test 12/02/19  1100   ALT 16             Passed - Normal AST on file in past 12 months     Recent Labs   Lab Test 12/02/19  1100   AST 11             Passed - Recent (12 mo) or future (30 days) visit within the authorizing provider's specialty     Patient has had an office visit with the authorizing provider or a provider within the authorizing providers department within the previous 12 mos or has a future within next 30 days. See \"Patient Info\" tab in inbasket, or \"Choose Columns\" in Meds & Orders section of the refill encounter.              Passed - Patient is age 6-64 years        Passed - Medication is active on med list        Passed - No active pregnancy on record        Passed - No positive pregnancy test in past 12 months             "

## 2020-03-24 ENCOUNTER — TELEPHONE (OUTPATIENT)
Dept: PEDIATRICS | Facility: CLINIC | Age: 50
End: 2020-03-24

## 2020-03-24 NOTE — TELEPHONE ENCOUNTER
Prior Authorization Retail Medication Request    Medication/Dose:  celecoxib (CELEBREX) 100 MG capsule  ICD code (if different than what is on RX): Flank pain (R10.9)   Previously Tried and Failed:  Rationale:      Insurance Name:  [employer]   Insurance ID: [id number]      Pharmacy Information (if different than what is on RX)  Name:  [facility] - [address]   Phone: [phone]

## 2020-03-25 ENCOUNTER — TELEPHONE (OUTPATIENT)
Dept: PEDIATRICS | Facility: CLINIC | Age: 50
End: 2020-03-25

## 2020-03-25 NOTE — TELEPHONE ENCOUNTER
[facility] Call Center    Phone Message    May a detailed message be left on voicemail: yes     Reason for Call: [employer] called stating that the insurance company will not cover the Celecoxib and will need a new one sent. Please advise. Thank you.    Action Taken: Message routed to:  Primary Care p [phone]    Travel Screening: Not Applicable

## 2020-03-26 NOTE — TELEPHONE ENCOUNTER
Central Prior Authorization Team   Phone: [phone]    PA Initiation    Medication: celecoxib (CELEBREX) 100 MG capsule   Insurance Company: [employer] - Phone [phone] Fax [phone]  Pharmacy Filling the Rx: [facility] - [address]  Filling Pharmacy Phone: [phone]  Filling Pharmacy Fax: [phone]  Start Date: 3/26/2020

## 2020-03-26 NOTE — TELEPHONE ENCOUNTER
Please call patient Celebrex PA was denied.  Please check with patient what alternatives she has tried in the past.

## 2020-03-26 NOTE — TELEPHONE ENCOUNTER
Called pt she stated she doesn't think it was denied. She will check with her pharmacy and let us know maribel.Clover DANIELS CMA

## 2020-03-27 NOTE — TELEPHONE ENCOUNTER
Prior Authorization Approval        Authorization Effective Date: 2/25/2020  Authorization Expiration Date: 3/26/2021  Medication: celecoxib (CELEBREX) 100 MG capsule   Approved Dose/Quantity:   Reference #: [id number]   Insurance Company: [employer] - Phone [phone] Fax [phone]  Expected CoPay:       CoPay Card Available:      Foundation Assistance Needed:    Which Pharmacy is filling the prescription (Not needed for infusion/clinic administered): [facility] - [address]  Pharmacy Notified: Yes - via fax  Patient Notified:

## 2020-05-04 ENCOUNTER — E-VISIT (OUTPATIENT)
Dept: PEDIATRICS | Facility: CLINIC | Age: 50
End: 2020-05-04
Payer: COMMERCIAL

## 2020-05-04 DIAGNOSIS — N30.00 ACUTE CYSTITIS WITHOUT HEMATURIA: ICD-10-CM

## 2020-05-04 DIAGNOSIS — N39.0 ACUTE UTI (URINARY TRACT INFECTION): Primary | ICD-10-CM

## 2020-05-04 PROCEDURE — 99421 OL DIG E/M SVC 5-10 MIN: CPT | Performed by: INTERNAL MEDICINE

## 2020-05-04 RX ORDER — CIPROFLOXACIN 500 MG/1
500 TABLET, FILM COATED ORAL 2 TIMES DAILY
Qty: 14 TABLET | Refills: 0 | Status: SHIPPED | OUTPATIENT
Start: 2020-05-04 | End: 2020-06-11

## 2020-05-04 NOTE — PATIENT INSTRUCTIONS
Urinary Tract Infections in Women    Urinary tract infections (UTIs) are most often caused by bacteria. These bacteria enter the urinary tract. The bacteria may come from outside the body. Or they may travel from the skin outside the rectum or vagina into the urethra. Female anatomy makes it easy for bacteria from the bowel to enter a woman s urinary tract, which is the most common source of UTI. This means women develop UTIs more often than men. Pain in or around the urinary tract is a common UTI symptom. But the only way to know for sure if you have a UTI for the healthcare provider to test your urine. The two tests that may be done are the urinalysis and urine culture.  Types of UTIs    Cystitis. A bladder infection (cystitis) is the most common UTI in women. You may have urgent or frequent urination. You may also have pain, burning when you urinate, and bloody urine.    Urethritis. This is an inflamed urethra, which is the tube that carries urine from the bladder to outside the body. You may have lower stomach or back pain. You may also have urgent or frequent urination.    Pyelonephritis. This is a kidney infection. If not treated, it can be serious and damage your kidneys. In severe cases, you may need to stay in the hospital. You may have a fever and lower back pain.  Medicines to treat a UTI  Most UTIs are treated with antibiotics. These kill the bacteria. The length of time you need to take them depends on the type of infection. It may be as short as 3 days. If you have repeated UTIs, you may need a low-dose antibiotic for several months. Take antibiotics exactly as directed. Don t stop taking them until all of the medicine is gone. If you stop taking the antibiotic too soon, the infection may not go away. You may also develop a resistance to the antibiotic. This can make it much harder to treat.  Lifestyle changes to treat and prevent UTIs  The lifestyle changes below will help get rid of your UTI.  They may also help prevent future UTIs.    Drink plenty of fluids. This includes water, juice, or other caffeine-free drinks. Fluids help flush bacteria out of your body.    Empty your bladder. Always empty your bladder when you feel the urge to urinate. And always urinate before going to sleep. Urine that stays in your bladder can lead to infection. Try to urinate before and after sex as well.    Practice good personal hygiene. Wipe yourself from front to back after using the toilet. This helps keep bacteria from getting into the urethra.    Use condoms during sex. These help prevent UTIs caused by sexually transmitted bacteria. Also don't use spermicides during sex. These can increase the risk for UTIs. Choose other forms of birth control instead. For women who tend to get UTIs after sex, a low-dose of a preventive antibiotic may be used. Be sure to discuss this option with your healthcare provider.    Follow up with your healthcare provider as directed. He or she may test to make sure the infection has cleared. If needed, more treatment may be started.  Date Last Reviewed: 1/1/2017 2000-2019 [employer]. [address] [place] [id number]. All rights reserved. This information is not intended as a substitute for professional medical care. Always follow your healthcare professional's instructions.

## 2020-06-10 ENCOUNTER — TELEPHONE (OUTPATIENT)
Dept: PEDIATRICS | Facility: CLINIC | Age: 50
End: 2020-06-10

## 2020-06-10 NOTE — TELEPHONE ENCOUNTER
[facility]    Phone Message    May a detailed message be left on voicemail: yes     Reason for Call: Patient called stating that she just was seen by her Chiropractor and she was told that she might have a ulcer in her small intestine. Patient wants to know if Dr. Limon would see for that. Please advise. Thank you.  Action Taken: Message routed to:  Primary Care p [phone]    Travel Screening: Not Applicable

## 2020-06-11 ENCOUNTER — OFFICE VISIT (OUTPATIENT)
Dept: PEDIATRICS | Facility: CLINIC | Age: 50
End: 2020-06-11
Payer: COMMERCIAL

## 2020-06-11 VITALS
SYSTOLIC BLOOD PRESSURE: 121 MMHG | HEART RATE: 109 BPM | TEMPERATURE: 97.9 F | OXYGEN SATURATION: 97 % | DIASTOLIC BLOOD PRESSURE: 80 MMHG

## 2020-06-11 DIAGNOSIS — K92.1 BLACK STOOL: Primary | ICD-10-CM

## 2020-06-11 LAB
ERYTHROCYTE [DISTWIDTH] IN BLOOD BY AUTOMATED COUNT: 13.2 % (ref 10–15)
HCT VFR BLD AUTO: 41 % (ref 35–47)
HEMOCCULT STL QL: NEGATIVE
HGB BLD-MCNC: 13.5 G/DL (ref 11.7–15.7)
MCH RBC QN AUTO: 29.2 PG (ref 26.5–33)
MCHC RBC AUTO-ENTMCNC: 32.9 G/DL (ref 31.5–36.5)
MCV RBC AUTO: 89 FL (ref 78–100)
PLATELET # BLD AUTO: 264 10E9/L (ref 150–450)
RBC # BLD AUTO: 4.63 10E12/L (ref 3.8–5.2)
WBC # BLD AUTO: 7.7 10E9/L (ref 4–11)

## 2020-06-11 PROCEDURE — 99213 OFFICE O/P EST LOW 20 MIN: CPT | Performed by: INTERNAL MEDICINE

## 2020-06-11 PROCEDURE — 36415 COLL VENOUS BLD VENIPUNCTURE: CPT | Performed by: INTERNAL MEDICINE

## 2020-06-11 PROCEDURE — 85027 COMPLETE CBC AUTOMATED: CPT | Performed by: INTERNAL MEDICINE

## 2020-06-11 PROCEDURE — 82272 OCCULT BLD FECES 1-3 TESTS: CPT | Performed by: INTERNAL MEDICINE

## 2020-06-11 NOTE — RESULT ENCOUNTER NOTE
Dear Priyanka,   Your recent lab results showed the following:  -- hemoglobin is normal.  -- stool testing is negative.   -- since there was minimal stool that was tested, I will have lab mail you testing kit to test the stool at home and send the sample back to be sure there is no blood.     Please call or Mychart to our office if you have further questions.     Ayala Limon MD-PhD

## 2020-06-11 NOTE — PROGRESS NOTES
Subjective     Priyanka Martinez is a 49 year old female who presents to clinic today for the following health issues:    HPI   Patient came to be evaluated at the advice of her chiropractor.  Patient has had black tarry stool for about 10 days.  She has bowel movements every 3 days or so.  Each time the stool is very black.  She has not noticed any bright red blood in the stool.  Her last bowel movement was 2 days ago.  She would not have another one until tomorrow based on her current pattern.    She denies any fevers or chills.  She has had right upper quadrant burning discomfort.  However this burning discomfort typically is a referred pain when she is not feeling well.  When she is ill with anything, she would develop this burning discomfort in the right upper quadrant.  This is intermittent.  Currently she does not have the pain.  She had one episode of heartburn 3 days ago, but this is not the norm.  She has not been taking any NSAIDs.  She was on aspirin while she was hospitalized last year, but she stopped taking it shortly after discharge.    She has had some numbness and tingling in her hands, which is why she saw her chiropractor for treatment.  That aspect has improved.    Denied any respiratory symptoms.  No nausea or vomiting.  She is quadrant plegic, no sensation from the waist down.  She can usually tell when she has a bladder infection, but she does not feel that there is one going on right now.    ROS:  Constitutional, HEENT, cardiovascular, pulmonary, gi and gu systems are negative, except as otherwise noted.       ACIDOPHILUS LACTOBACILLUS PO, Take 1 tablet by mouth At Bedtime   alendronate (FOSAMAX) 70 MG tablet, Take 1 tablet (70 mg) by mouth every 7 days  ascorbic acid (VITAMIN C) 1000 MG TABS, Take 1,000 mg by mouth every evening   BACLOFEN 10 MG PO tablet, TAKE 1 TABLET THREE TIMES A DAY  celecoxib (CELEBREX) 100 MG capsule, TAKE 1 CAPSULE TWICE A DAY  dicyclomine (BENTYL) 20 MG tablet, TAKE  1 TABLET TWICE A DAY  FLUoxetine (PROZAC) 20 MG capsule, TAKE 1 CAPSULE DAILY  gabapentin (NEURONTIN) 600 MG tablet, TAKE 1 TABLET BY MOUTH THREE TIMES A DAY  medroxyPROGESTERone (PROVERA) 10 MG tablet, TAKE ONE AND ONE-HALF TABLETS DAILY  omeprazole (PRILOSEC) 20 MG DR capsule, Take 1 capsule (20 mg) by mouth daily  psyllium (METAMUCIL/KONSYL) capsule, Take 1 capsule by mouth 3 times daily (before meals)  trospium (SANCTURA) 20 MG tablet, Take 1 tablet (20 mg) by mouth 2 times daily  ZINC-VITAMIN C PO, Take 1 tablet by mouth every evening  [] Dermatological Products, Misc. (EPICERAM) EMUL, Externally apply 1 Application topically daily  Menaquinone-7 (VITAMIN K2) 40 MCG TABS,   order for DME, [facility] Fax [phone]    Secondary Dressing tegaderm foam adhesive (ref #[id number]) Qty 30    Length of Need: 1 month  Frequency of dressing change: daily  order for DME, [facility]   p: [phone] f: [phone]  EMAIL to [email or web address]    Request for group 2 air mattress & semi-electric hospital bed  Ht:177.8 cm  Wt:72.5 kg  Length of need: lifetime    Pt. is wheelchair bound & has been in a comprehensive ulcer treatment program for >2 months. We will follow monthly until wound closure    To obtain medical records:  p: [phone] f: [phone]  order for DME, Equipment being ordered: Catheter Wheeler 2Way 30cc 18FR, Silicone Coated Latex  order for DME, Equipment being ordered: Anchoring Device Flexi-Track LG  order for DME, Equipment being ordered: Insertion Tray Wheeler w/BZK Swab Catheter 10CC, Sterile  order for DME, Equipment being ordered: Drain Bag, Kenguard 2000ml. Urinary Bag with Anti-Refulx  phenazopyridine (PYRIDIUM) 200 MG tablet, Take 200 mg by mouth every evening  senna-docusate (SENOKOT-S/PERICOLACE) 8.6-50 MG tablet, Take 1 tablet by mouth 2 times daily as needed for constipation (Patient not taking: Reported on 2020)  [DISCONTINUED]  oxybutynin (DITROPAN) 5 MG tablet, Take 1 tablet by mouth 3 times daily.    No current facility-administered medications on file prior to visit.          Patient Active Problem List   Diagnosis     Paraplegia (H)     Suprapubic catheter (H)     Gastrointestinal problem     CARDIOVASCULAR SCREENING; LDL GOAL LESS THAN 160     Dysmenorrhea     Neuropathic pain of flank     Neurogenic bladder     Endometriosis     IBS (irritable bowel syndrome)     Generalized anxiety disorder     Pressure ulcer of ischium, left, stage IV (H)     Sepsis (H)     Infected wound     Osteomyelitis of pelvic region (H)     Polymicrobial bacterial infection     Infection due to Staphylococcus aureus     S/P flap graft     Tibia fracture     Past Surgical History:   Procedure Laterality Date     C SPINE FUSION,POSTER,7-12 SGMTS      From T10 to L5     CHOLECYSTECTOMY       COLONOSCOPY       COLONOSCOPY Left 2014    Procedure: COMBINED COLONOSCOPY, SINGLE OR MULTIPLE BIOPSY/POLYPECTOMY BY BIOPSY;  Surgeon: Junior Galeano MD;  Location: [facility]     ESOPHAGOSCOPY, GASTROSCOPY, DUODENOSCOPY (EGD), COMBINED N/A 2014    Procedure: COMBINED ESOPHAGOSCOPY, GASTROSCOPY, DUODENOSCOPY (EGD), BIOPSY SINGLE OR MULTIPLE;  Surgeon: Junior Galeano MD;  Location: [facility]     IRRIGATION AND DEBRIDEMENT DECUBITUS WITH FLAP CLOSURE, COMBINED Left 10/16/2019    Procedure: Debridement Left Ischial Decubitus, Flap,SPY-PHI;  Surgeon: Cintia Bob MD;  Location: [facility]     OPEN REDUCTION INTERNAL FIXATION TIBIA Right 2019    Procedure: OPEN REDUCTION INTERNAL FIXATION, RIGHT   TIBIA FRACTURE;  Surgeon: Godwin Zamarripa MD;  Location:  OR       Social History     Tobacco Use     Smoking status: Former Smoker     Last attempt to quit: 3/18/2012     Years since quittin.2     Smokeless tobacco: Never Used     Tobacco comment: 7 cig a day off and on socially   Substance Use Topics     Alcohol use: No     Family  History   Problem Relation Age of Onset     Hypertension Father      Heart Failure Maternal Grandmother              Problem list, Medication list, Allergies, and Medical/Social/Surgical histories reviewed in [facility] and updated as appropriate.    OBJECTIVE:                                                    /80   Pulse 109   Temp 97.9  F (36.6  C) (Temporal)   SpO2 97%     GENERAL: healthy, alert and no distress  Rectal exam: Minimal stool in the colon, only small smears of stool sample obtained on Hemoccult card.      Diagnostic test results:  Results for orders placed or performed in visit on 06/11/20   CBC with platelets     Status: None   Result Value Ref Range    WBC 7.7 4.0 - 11.0 10e9/L    RBC Count 4.63 3.8 - 5.2 10e12/L    Hemoglobin 13.5 11.7 - 15.7 g/dL    Hematocrit 41.0 35.0 - 47.0 %    MCV 89 78 - 100 fl    MCH 29.2 26.5 - 33.0 pg    MCHC 32.9 31.5 - 36.5 g/dL    RDW 13.2 10.0 - 15.0 %    Platelet Count 264 150 - 450 10e9/L   Occult blood stool     Status: None   Result Value Ref Range    Occult Blood Negative NEG^Negative         ASSESSMENT/PLAN:                                                      49 year old female with the following diagnoses and treatment plan:      ICD-10-CM    1. Black stool  K92.1 CBC with platelets     Occult blood stool     Occult blood stool     Black stool: Hemoccult negative, hemoglobin stable.  However minimal stool sample was obtained today.  I will send her Hemoccult cards to obtain stool sample when she has bowel movements.  We will do 2 more Hemoccult tests to make a total of 3 test.    Will call or return to clinic if worsening or symptoms not improving as discussed.  See Patient Instructions.      Ayala Limon MD-PhD  [facility] [place]    (Note: Chart documentation was done in part with Dragon Voice Recognition software. Although reviewed after completion, some word and grammatical errors may remain.)

## 2020-06-11 NOTE — TELEPHONE ENCOUNTER
Patient contacted, states that she has been having terrible pain.  She sees a chiropractor and they were talking about what could be the cause or source of her discomfort.  In their conversation it came up that the last few BM's have been black, like tar.  Chiropractor thought she may have an ulcer.  Patient c/o abdominal pain in RLQ.  Patient denies feeling faint or dizziness.  Has been having trouble concentrating lately.  Denies excessive fatigue or pale skin.  She is a paraplegic and sometimes it is difficult to pinpoint where the pain is stemming from.     Appointment scheduled this morning.    Leigh Goldsmith RN

## 2020-06-12 DIAGNOSIS — K92.1 BLACK STOOL: ICD-10-CM

## 2020-06-12 LAB — HEMOCCULT STL QL: NEGATIVE

## 2020-06-12 PROCEDURE — 82272 OCCULT BLD FECES 1-3 TESTS: CPT | Performed by: INTERNAL MEDICINE

## 2020-06-17 NOTE — RESULT ENCOUNTER NOTE
Dear Priyanka,   Your recent lab results showed the following:  -- the second test was negative as well. The black color stool may not represent blood.   -- let me know if you have more symptoms.     Please call or Mychart to our office if you have further questions.     Ayala Limon MD-PhD

## 2020-06-22 DIAGNOSIS — R10.9 FLANK PAIN: ICD-10-CM

## 2020-06-25 RX ORDER — CELECOXIB 100 MG/1
CAPSULE ORAL
Qty: 180 CAPSULE | Refills: 0 | Status: SHIPPED | OUTPATIENT
Start: 2020-06-25 | End: 2020-10-12

## 2020-06-25 NOTE — TELEPHONE ENCOUNTER
celecoxib (CELEBREX) 100 MG capsule    TAKE 1 CAPSULE TWICE A DAY     Last Written Prescription Date:  3/17/20  Last Fill Quantity: 180 capsule,   # refills: 1  Last Office Visit : 6/11/20  Future Office visit:  none    Routing FYI because:    Lab Test 12/02/19  1100   CR 0.40*      Ok to refill medication if creatinine is low

## 2020-07-17 ENCOUNTER — APPOINTMENT (OUTPATIENT)
Age: 50
Setting detail: DERMATOLOGY
End: 2020-07-18

## 2020-07-17 DIAGNOSIS — D22 MELANOCYTIC NEVI: ICD-10-CM

## 2020-07-17 DIAGNOSIS — L57.8 OTHER SKIN CHANGES DUE TO CHRONIC EXPOSURE TO NONIONIZING RADIATION: ICD-10-CM

## 2020-07-17 DIAGNOSIS — L81.4 OTHER MELANIN HYPERPIGMENTATION: ICD-10-CM

## 2020-07-17 DIAGNOSIS — L91.8 OTHER HYPERTROPHIC DISORDERS OF THE SKIN: ICD-10-CM

## 2020-07-17 DIAGNOSIS — L57.0 ACTINIC KERATOSIS: ICD-10-CM

## 2020-07-17 PROBLEM — D22.61 MELANOCYTIC NEVI OF RIGHT UPPER LIMB, INCLUDING SHOULDER: Status: ACTIVE | Noted: 2020-07-17

## 2020-07-17 PROBLEM — D22.62 MELANOCYTIC NEVI OF LEFT UPPER LIMB, INCLUDING SHOULDER: Status: ACTIVE | Noted: 2020-07-17

## 2020-07-17 PROBLEM — D22.5 MELANOCYTIC NEVI OF TRUNK: Status: ACTIVE | Noted: 2020-07-17

## 2020-07-17 PROCEDURE — OTHER COUNSELING: OTHER

## 2020-07-17 PROCEDURE — OTHER LIQUID NITROGEN: OTHER

## 2020-07-17 PROCEDURE — 11200 RMVL SKIN TAGS UP TO&INC 15: CPT

## 2020-07-17 PROCEDURE — OTHER SKIN TAG REMOVAL: OTHER

## 2020-07-17 PROCEDURE — 99203 OFFICE O/P NEW LOW 30 MIN: CPT | Mod: 25

## 2020-07-17 PROCEDURE — 17000 DESTRUCT PREMALG LESION: CPT | Mod: 59

## 2020-07-17 ASSESSMENT — LOCATION ZONE DERM
LOCATION ZONE: ARM
LOCATION ZONE: NECK
LOCATION ZONE: TRUNK
LOCATION ZONE: FACE

## 2020-07-17 ASSESSMENT — LOCATION SIMPLE DESCRIPTION DERM
LOCATION SIMPLE: CHEST
LOCATION SIMPLE: LEFT UPPER BACK
LOCATION SIMPLE: UPPER BACK
LOCATION SIMPLE: RIGHT ANTERIOR NECK
LOCATION SIMPLE: LEFT CHEEK
LOCATION SIMPLE: LEFT FOREARM
LOCATION SIMPLE: RIGHT FOREARM

## 2020-07-17 ASSESSMENT — LOCATION DETAILED DESCRIPTION DERM
LOCATION DETAILED: INFERIOR THORACIC SPINE
LOCATION DETAILED: RIGHT PROXIMAL DORSAL FOREARM
LOCATION DETAILED: RIGHT LATERAL SUPERIOR CHEST
LOCATION DETAILED: LEFT PROXIMAL DORSAL FOREARM
LOCATION DETAILED: LEFT INFERIOR CENTRAL MALAR CHEEK
LOCATION DETAILED: RIGHT SUPERIOR ANTERIOR NECK
LOCATION DETAILED: LEFT INFERIOR MEDIAL UPPER BACK

## 2020-07-17 NOTE — PROCEDURE: LIQUID NITROGEN
Duration Of Freeze Thaw-Cycle (Seconds): 10
Render Post-Care Instructions In Note?: no
Number Of Freeze-Thaw Cycles: 2 freeze-thaw cycles
Consent: The patient's consent was obtained including but not limited to risks of crusting, scabbing, blistering, scarring, darker or lighter pigmentary change, recurrence, incomplete removal and infection.
Detail Level: Simple
Post-Care Instructions: I reviewed with the patient in detail post-care instructions. Patient is to wear sunprotection, and avoid picking at any of the treated lesions. Pt may apply Vaseline to crusted or scabbing areas.

## 2020-07-17 NOTE — PROCEDURE: SKIN TAG REMOVAL
Hemostasis: Drysol
Anesthesia Volume In Cc: 3
Consent: Written consent obtained and the risks of skin tag removal was reviewed with the patient including but not limited to bleeding, pigmentary change, infection, pain, and remote possibility of scarring.
Include Z78.9 (Other Specified Conditions Influencing Health Status) As An Associated Diagnosis?: No
Add Associated Diagnoses If Applicable When Selecting Medical Necessity: Yes
Detail Level: Simple
Medical Necessity Clause: This procedure was medically necessary because the lesions that were treated were:
Medical Necessity Information: It is in your best interest to select a reason for this procedure from the list below. All of these items fulfill various CMS LCD requirements except the new and changing color options.

## 2020-07-17 NOTE — PLAN OF CARE
VS: Low BP this shift 91/57 at 2210 and rechecked at 0230 for 90/49. Pt. Runs low as baseline.    O2: On room air, maintaining sats >90.   Output: Voiding adequate amounts, suprapubic catheter   Last BM: 10/15/19, bowel program   Activity: Bedrest, para. HOB <30, Reposition only right and back side q.2 hours day time and q. 4 hours night time.   Skin: Incision to LLE CDI   Pain: Denied pain this shift   CMS: Baseline para, LE neuropathy   Dressing: ACE dressing to LLE buttox CDI   Diet: Regular diet as tolerated   LDA: Left Peripheral WDL Infusing, Suprapubic catheter   Equipment: Air mattress, PCD,    Plan: Continue plan of care   Additional Info: Likes AM meds at 0530.      
  VS: Soft Bps baseline. Normal vitals otherwise this shift   O2: Room air   Output: Suprapubic catheter. Drain was leaking. Tape used to reinforce and secure catheter. Some sediment in tubing this shift. Pt. Hoping to get new catheter from home today. Voiding adequate amounts into drain.    Last BM: 10/15/19   Activity: Bedrest, para, assist x1   Skin: RLE incision with ABD and tape CDI   Pain: Denied pain this shift   CMS: Para, denies numbness/tingling in upper body   Dressing: RLE ACE CDI   Diet: Regular diet as tolerated by pt.    LDA: Left peripheral IV SL   Equipment: PCD, Pillows for positioning   Plan: New cathether coming today. Monitor for leakage and BM.    Additional Info: Pt. Has tenderness to far left and right side of lower abdomen. Pt. Is waiting to have a bowel movement.      
  VS: Stable.   O2: RA.   Output: Suprapubic catheter, 1100 out.   Last BM: 10/19/19.   Activity: Bedrest, keep HOB < 30 degrees, Ax1 to reposition.   Skin: Incisions, drains, scab left heel.   Pain: Right sided abdominal pain managed with dilaudid.   CMS: Para, sensation absent BLEs.   Dressing: CDI, mepilex applied to left heel.   Diet: Regular.   LDA: PIV left lower forearm SL. POLINA to bulb suction with 15 ml serosanguinous output.   Equipment: WAVE mattress, PCDs, IS, pillows, call light within reach.   Plan: Home with help from mom upon discharge, date TBD.   Additional Info:       
  VS: Stable.   O2: RA.   Output: Suprapubic catheter.   Last BM: 10/19/19.   Activity: Bedrest, keep HOB < 30 degrees, Ax1 to reposition.   Skin: Incisions, drains, left heel covered.   Pain: Denies pain, soreness in back from bedrest, frequent repositioning, declined interventions.   CMS: Para, sensation absent from chest down.   Dressing: CDI, mepilex to left heel.   Diet: Regular.   LDA: PIV left lower forearm SL. POLINA to bulb suction with minimal serous output.   Equipment: WAVE mattress, IS, PCDs, pillows, call light within reach.   Plan: Home with help from mom upon discharge, date TBD.   Additional Info:       
  VS: Temp: 98  F (36.7  C) Temp src: Oral BP: 93/50 Pulse: 84 Heart Rate: 83 Resp: 13    O2: SpO2: 97 % O2 Device: None (Room air)    Output: Urine output adequate via suprapubic catheter   Last BM: 10/15 - due tomorrow for suppository    Activity: Bedrest, repo q2H Ax1   Skin: Intact - incision   Pain: Scheduled Tylenol, comfortably manageable    CMS: Paraplegic   Dressing: Moderate drainage   Diet: Regular diet, well tolerated.    LDA: PIV infusing, POLINA, suprapubic    Equipment: PCDs, WAVE mattress   Plan: TBD   Additional Info:        
  VS: VSS   O2: Sats >90% on RA   Output: Urine output adequate via suprapubic catheter - it may be leaking d/t pt not having the right size catheter in, pt mom is going to bring the right size tomorrow morning   Last BM: 10/15 - due for suppository today with dig stim    Activity: Bedrest, reposition with Ax1 back to right   Skin: Intact - incision   Pain: Bladder spasms is the only pain pt is experiencing   CMS: Paraplegic    Dressing: CDI, new one replaced this morning   Diet: Regular diet, well tolerated. Denies N/V   LDA: PIV, SL. POLINA, suprapubic catheter   Equipment: PCDs, WAVE mattress    Plan: Continue to monitor, call light within reach, able to make needs known. Reposition q2H   Additional Info:        
  VS: VSS and afebrile.    O2: Room air. O2>90%.   Output: Suprapubic catheter.   Last BM: 10/19. Suppository and stimulation.    Activity: Bedrest. Repositioned Q2H. Pt strong with upper half.   Skin: Intact except incision.    Pain: Some side pain. Tylenol given.    CMS: Para.   Dressing: CDI. Changed during shift.    Diet: Regular.   LDA: PIV saline locked, POLINA, johnson.    Equipment: Personal belongings, wave mattress.    Plan: Plan to discharge home with help from her mom. Day is TBD.   Additional Info:       
  VS: Vitals stable this shift   O2: On room air, maintaining sats >90 this shift   Output: Voiding adequate amounts, suprapubic catheter in place   Last BM: 10/24/19   Activity: Bedrest, paraplegic   Skin: LLE incision DAVID   Pain: Denied pain this shift   CMS: Denied numbness/tingling this shift   Dressing: RLE incision [place] DAVID, CDI   Diet: Regular diet as tolerated by pt.   LDA: POLINA Wheeler (bulb suction), PICC   Equipment: PCD   Plan: Discharge to home today 10/25/19   Additional Info:      
"  VS:   BP 90/60 (BP Location: Right arm)   Pulse 99   Temp 98.3  F (36.8  C) (Oral)   Resp 16   Ht 1.778 m (5' 10\")   Wt 71 kg (156 lb 8.4 oz)   SpO2 96%   BMI 22.46 kg/m       Output:   Suprapubic cath (Changed today via RN and pt.) Tube patent. Small BM after suppository and dig stim.    Lungs CTA all fields.   Activity:   Paraplegic; able to turn in bed with assistance 1-2; repositioned q2 hrs with pillows from R side-lying to back.    Skin: Ex ulcer repair/staples and PICC in LUE   Pain:   Tylenol given once this shift. See MAR   Neuro/CMS:   Paraplegic from [id number]. Otherwise intact.    Dressing(s):   Left coccyx and leg incision CDI, open to air. PICC dressing CDI/transparent.   Diet:   Regular. Pt has vitamin mcconnell and has mainly been drinking those for po fluid intake.    LDA:   PICC for abx infusions; POLINA drain LLE, suprapubic catheter.    Equipment:   PICC, IV pole, SP cath, PCD boots    Plan:   To home tomorrow with home care.    Additional Info:   Patient wants to ensure bowel regimen is back to baseline before going home.       "
"  VS: /64 (BP Location: Right arm)   Pulse 111   Temp 99.1  F (37.3  C) (Oral)   Resp 16   Ht 1.778 m (5' 10\")   Wt 71 kg (156 lb 8.4 oz)   SpO2 95%   BMI 22.46 kg/m     O2: Pt on room air. No complaints of SoB   Output: Pt has suprapubic catheter draining to Wheeler bag. 450mL out overnight. UA sent down yesterday and Pt started on antibiotics   Last BM: LBM: 10/21/19   Activity: Bedrest   Skin: Skin is intact besides incision on (L) hip/buttock. Incision DAVID   Pain: Denied pain overnight   CMS: Alert and oriented. Able to make needs known. Pt has no sensation below waste due to being Para.    Dressing: PIV dressing in (L) forearm is CDI.    Diet: Regular - thin   LDA: POLINA drain - bulb suction: 5 mL  Wheeler catheter is patent   Equipment: IV pole and pump. Call light and phone within reach.   Plan: Continue with POC   Additional Info:       "
"  VS: /69   Pulse 91   Temp 98.6  F (37  C)   Resp 16   Ht 1.778 m (5' 10\")   Wt 71 kg (156 lb 8.4 oz)   SpO2 96%   BMI 22.46 kg/m     O2: Room air. Denies SoB.   Output: Suprapubic catheter in place, using tape to secure to leg.   Last BM: 10/21/2019   Activity: Paraplegic turning q 2 hrs independently. Pillows in between bony prominences.   Skin: Intact. Incision on left buttock and left thigh open to air.   Pain: Well managed with oral Tylenol.   CMS: AO x 4. Paraplegic at T10.    Dressing: Incisions CDI and open to air.   Diet: Regular.   LDA: PIV in right hand infusing.   Equipment: IV pump and pole. POLINA drain.   Plan: Waiting for plastics consult. Plan to discharge home TBD.   Additional Info:        "
"VS: /67 (BP Location: Right arm)   Pulse 111   Temp 98.8  F (37.1  C) (Oral)   Resp 16   Ht 1.778 m (5' 10\")   Wt 71 kg (156 lb 8.4 oz)   SpO2 97%   BMI 22.46 kg/m    A&O x4.   O2: Room air. Denies SOB.   Output: Suprapubic catheter patent with adequate output.   Last BM: Medium BM this shift.   Activity: Bedrest.   Skin: Intact except for incision.   Pain: C/o pain in R abdomen, managed with PRN tylenol.   CMS: Para, no sensation from the waste down.   Dressing: Incision DAVID.   Diet: Regular. Did not eat much for dinner.   LDA: IV in L FA SL. POLINA with minimal output.   Plan: Home with help from mom.   Additional Info: Pt started on antibiotics and UA collected this shift.       "
Discharge Planner OT   Patient plan for discharge: home  Current status: Patient seen for UE HEP to maintain strength while on bedrest. Patient completed 8 B UE exercises using medium resistant theraband x10 reps. Appreciative of HEP.   Barriers to return to prior living situation: none anticipated (pt is getting a hospital bed delivered to home)  Recommendations for discharge: home with assist from mother   Rationale for recommendations: will see 3x/week for OT to continue with HEP and modifying as needed.        Entered by: CHACORTA HERMAN HELD 10/17/2019 8:53 AM       
Discharge Planner OT   Patient plan for discharge: home  Current status: Reviewed UE HEP. Patient completed 9 BUE exercises using medium resistive theraband X 15 reps. Patient reports little fatigue with medium resistive band, OT provides heavy resistive band for future use.   Barriers to return to prior living situation: none anticipated (pt is getting a hospital bed delivered to home)  Recommendations for discharge: home with assist from mother  Rationale for recommendations: will see 3x/week for OT to continue with HEP and modifying as needed.        Entered by: Juan David Wray 10/19/2019 11:50 AM         
Discharge Planner OT   Patient plan for discharge: home with assist from mother  Current status: checked in with patient regarding UE HEP, patient reported no concerns and verbalized understanding of HEP. Has both medium and heavy resistant theraband. Discussed using caution with recent PICC placement. Patient very pleasant and appreciative.   Barriers to return to prior living situation: none anticipated  Recommendations for discharge: home with assist from mother as needed   Rationale for recommendations: patient has met OT goals, will discharge from skilled OT. Patient hopeful to discharge home today or tomorrow.        Entered by: CHACORTA AREVALO 10/24/2019 9:52 AM       Occupational Therapy Discharge Summary    Reason for therapy discharge:    All goals and outcomes met, no further needs identified.    Progress towards therapy goal(s). See goals on Care Plan in [facility] electronic health record for goal details.  Goals met    Therapy recommendation(s):    Continue home exercise program.        
Discharge Planner OT   Patient plan for discharge: home with mom  Current status: pt demonstrated BUE HEP with max theraband with Min VC. Nursing in to initiate IV line.  Barriers to return to prior living situation: no OT barriers  Recommendations for discharge: home with mom  Rationale for recommendations: will see 3x/week for OT to continue with HEP and modifying as needed        Entered by: JEREMIAS COATES 10/22/2019 2:56 PM      
Has been getting pain medications IV but wants to ask for medications and not be woken up tried to start po dilaudid but felt comfortable from the IV medication and wanted to sleep and did not want it at that time Called for IV medication again at 0600  will try to transistion to po during day time hours. Has been sleeping between cares BP down to 88/ rechecked  and was up to 100/59 states BP went down when she was hospitalized in past  
Patient A & O x4. Pt is a paraplegic at baseline. Pt is on bedrest, on a wave mattress, repo Q2H.  Lung sounds clear throughout. Patient denies chest pain, SOB, lightheadedness, dizziness, tingling, numbness, nausea, and vomiting. Bowel sounds active, last BM 10/24, passing flatus, and tolerating regular diet. Drinking well and suprapubic is patent. PICC infusing in L. Denies pain. Incisions DAVID CDI. Has mepilex on L heel. POLINA patent with minimal output. Plan is to discharge to home with home infusions tomorrow via stretcher ride due to precautions from surgery. Patient demonstrates ability to use call light appropriately. Will continue to monitor patient.     
Patient A & O x4. Pt is a paraplegic at baseline. Pt on bedrest with no sitting and no HOB >30 degrees. Repo Q2H. Lung sounds clear throughout. Patient denies chest pain, SOB, lightheadedness, dizziness, nausea, and vomiting. Bowel sounds active, last BM 10/23 on day shift-pt stated it was small, passing flatus, and tolerating regular diet. Drinking well and has patent suprapubic with adequate output. PICC placed today in L infusing. Incisions CDI and DAVID, pt has mepilex on L heel-CDI. Pt denies pain. Plan is to discharge to home with home infusions once abx plan is finalized. Pt talked with SW today at beginning of evening shift. Patient demonstrates ability to use call light appropriately. Will continue to monitor patient  
Patient A&O x4, lungs sound clear, Bowel sound active- suppository given and bowel program done but no result,  Denied CP, lightheadedness, dizziness, and SOB,patient Para and sensation absent in lower extremities,  iv saline locked, drinking well and suprapubic catheter in placed but bypassing,pads changed three times, POLINA intact, unable to change catheter this shift due to SPD not having the right size of catheter- on coming RN will change later, dressing changed by plastic surgery MD,  incentive spirometer encouraged and done several times, repositioned and turned in bed from back to left side only, heels elevated off bed, demonstrates the ability to use call light appropriately, will continue to monitor patient.     
Pt A/O X 4. Afebrile. VSS. Lungs-Clear bilaterally with both anterior and posterior. Bowels-Hyperactive in all four quadrants. Pt given suppository and digital stimulation by female RN and had no results. Suprapubic catheter is patent with 900 ml's output this shift. Denies nausea and vomiting. Patient is paraplegic with baseline CMS. Denies pain. Is on a Regular diet and appetite was good this shift. Left IT incisional dressing is C/D/I. POLINA-Drain is patent with 10ml's serosanguinous drainage. Left heal has a scab that is C/D/I and covered with a Mepilex dressing. Pt is on bedrest on a WAVE Mattress, repositioned every 2 hours, HOB less than 30 degrees, and bilateral heels are elevated off the bed. PIV patent in the left arm and saline locked. PCD's on BLE's. Pt is able to make needs known, and call light is within reach. Continue to monitor.   
Pt A/O X 4. Afebrile. VSS. Lungs-Clear bilaterally with both anterior and posterior. Bowels-Hyperactive in all four quadrants. Suprapubic catheter is patent with 800ml's output this shift. Denies nausea and vomiting. Pt is baseline paraplegic. Denies pain. Is on a Regular diet and appetite was Good this shift. Left Coccyx/thigh incision is open to air and C/D/I. POLINA-Drain is patent with 10ml's output this shift. Mepilex in place on the left heel. Pt is on bedrest on a Wave mattress, repositioned every 2 hours, and bilateral heels are elevated off the bed. PICC line is patent in the left arm and saline locked. PCD's on BLE's. Pt is able to make needs known, and call light is within reach. Pt. discharged at 1500PM to home via [facility], and left with personal belongings. Pt. received complete discharge paperwork and PO medications as filled by discharge pharmacy. Pt. was given times of last dose for all discharge medications in writing on discharge medication sheets. Discharge teaching included PO medication. Pt. to follow up with  as scheduled. Pt. had no further questions at the time of discharge and no unmet needs were identified.    
Pt A/O X 4. Oral temperature 99.3, PRN Tylenol given and oral temperature recheck 97.7. VSS. Lungs-Clear bilaterally with both anterior and posterior. Bowels-Hyperactive in all four quadrants, pt had a small BM after bowel regimen completed by female RN. Suprapubic catheter is patent with 400ml's output this shift. Denies nausea and vomiting. Pt is paraplegic. Denies pain. Is on a Regular diet and appetite was Good this shift. Left coccyx/leg incision is open-to-air and is C/D/I. Left heel has a quarter size scab that is C/D/I and covered with a Mepilex dressing. Pt is on Bedrest on a Wave mattress, repositioned every 2 hours, and bilateral heels are elevated off the bed. PIV patent in the right hand and saline locked. PCD's on BLE's. Pt is able to make needs known, and call light is within reach. Continue to monitor.   
Pt arrived recently on 8A room 828 via bed, pt A&O X 4, oriented to room and call light, IV infusing, suprapubic cathter draining, pt denies nausea, CP or SOB, report given to the night RN, pt resting comfortable, with call light within reach, will continue to monitor.   
VS: Temp: 98.1  F (36.7  C) Temp src: Oral BP: 107/69 Pulse: 95 Heart Rate: 99 Resp: 16 SpO2: 96 % O2 Device: None (Room air)      Output: Urine output adequate via suprapubic catheter   Last BM: 10/19   Activity: Bedrest, reposition with Ax1 back to right q2h   Skin: Intact - incision   Pain: Comfortably manageable, PRN Dilaudid if pain increases   CMS: Paraplegic    Dressing: CDI   Diet: Regular diet, well tolerated. Denies N/V   LDA: PIV, SL. POLINA, suprapubic catheter   Equipment: PCDs, WAVE mattress    Plan: Continue to monitor, call light within reach, able to make needs known. Reposition q2H   Additional Info:       
VS: Temp: 99.1  F (37.3  C) Temp src: Oral BP: 120/74 Pulse: 95 Heart Rate: 91 Resp: 16 SpO2: 96 % O2 Device: None (Room air)      Output: Urine output adequate via suprapubic catheter - changed this morning    Last BM: 10/15 - no results with suppository last evening, a second one done today with stimulation. Decreased bowel sounds. Dr Abbasi aware.    Activity: Bedrest, reposition with Ax1 back to right q2h   Skin: Intact - incision   Pain: Bladder spasms is the only pain pt is experiencing   CMS: Paraplegic    Dressing: CDI, new one replaced this morning   Diet: Regular diet, well tolerated. Denies N/V   LDA: PIV, SL. POLINA, suprapubic catheter   Equipment: PCDs, WAVE mattress    Plan: Continue to monitor, call light within reach, able to make needs known. Reposition q2H   Additional Info:       
VS: VSS and afebrile.    O2: Room air. O2>90%.   Output: Suprapubic catheter.   Last BM: 10/19.    Activity: Bedrest. Repositioned Q2H. Pt strong with upper half.   Skin: Intact except incision.    Pain: Tylenol given. PO dilaudid if needed.    CMS: Para.   Dressing: CDI. Changed during shift.    Diet: Regular.   LDA: PIV saline locked, POLINA, catheter.    Equipment: Personal belongings, wave mattress.    Plan: Plan to discharge home with help from her mom. Day is TBD.   Additional Info:        
VS: VSS. Capno in place, numbers WNL.   O2: SpO2:  % O2 Device: None (Room air)    Output: Urine output adequate via suprapubic catheter   Last BM: 10/15 - due tomorrow for suppository. Declines interventions at this point.   Activity: Bedrest, repo q2H Ax1-2   Skin: Intact - incision to left hip, UTV under dressing.   Pain: Patient reports sharp quick pain in the right hip. States this is where she feel all her pain. Managed with PO dilaudid.   CMS: Paraplegic. Reports baseline intermittent numbness in BUE, but reports no change in sensation.    Dressing: Moderate drainage. Plastics notified about drainage and dressing change due to be done tonight by surgical team. On-call stated he would follow up with team to ensure dressing is change. Will continue to monitor.    Diet: Regular diet, well tolerated.    LDA: PIV infusing, POLINA, suprapubic    Equipment: PCDs, WAVE mattress   Plan: TBD   Additional Info:  Around 1600 patient called to report severe burning in her lower chest area. Was having a hard time catching her breath. Reporting feeling heartburn that is reaching her chest. Dr. Howell notified and came to assess the patient. No change in O2 sats or HR during the incidence. Patient denied the pain radiating anywhere. Dr. Howell ordered GI cocktail and EKG. EKG read normal sinus, provider notified. The pain lasted 30-60 seconds and has not reoccured. Provider noted he thinks it may be related to acid reflux. Patient reports relief. Patient was able to use call light to make needs known and call light remained within reach for the duration of the shift Continue POC.      2000: Plastics here to change dressing. Dressing changed and is CDI.         
regular rate and rhythm/no rub/no murmur

## 2020-07-24 NOTE — NURSING NOTE
Priyanka Martinez's chief complaint for this visit includes:  Chief Complaint   Patient presents with     RECHECK     apligraf left heel     PCP: Ayala Limon    Referring Provider:  No referring provider defined for this encounter.    /82  Pulse 89  SpO2 98%  Data Unavailable     Do you need any medication refills at today's visit? no       Unna Boot Text: An Unna boot was placed to help immobilize the limb and facilitate more rapid healing.

## 2020-07-26 DIAGNOSIS — Z98.890 S/P FLAP GRAFT: ICD-10-CM

## 2020-07-30 RX ORDER — TROSPIUM CHLORIDE 20 MG/1
20 TABLET, FILM COATED ORAL 2 TIMES DAILY
Qty: 180 TABLET | Refills: 3 | Status: SHIPPED | OUTPATIENT
Start: 2020-07-30 | End: 2021-07-27

## 2020-08-20 ENCOUNTER — MYC MEDICAL ADVICE (OUTPATIENT)
Dept: PEDIATRICS | Facility: CLINIC | Age: 50
End: 2020-08-20

## 2020-08-20 DIAGNOSIS — T83.511A URINARY TRACT INFECTION ASSOCIATED WITH CATHETERIZATION OF URINARY TRACT, UNSPECIFIED INDWELLING URINARY CATHETER TYPE, INITIAL ENCOUNTER (H): Primary | ICD-10-CM

## 2020-08-20 DIAGNOSIS — N39.0 URINARY TRACT INFECTION ASSOCIATED WITH CATHETERIZATION OF URINARY TRACT, UNSPECIFIED INDWELLING URINARY CATHETER TYPE, INITIAL ENCOUNTER (H): Primary | ICD-10-CM

## 2020-08-20 RX ORDER — CIPROFLOXACIN 500 MG/1
500 TABLET, FILM COATED ORAL 2 TIMES DAILY
Qty: 20 TABLET | Refills: 0 | Status: SHIPPED | OUTPATIENT
Start: 2020-08-20 | End: 2020-08-30

## 2020-11-16 ENCOUNTER — HEALTH MAINTENANCE LETTER (OUTPATIENT)
Age: 50
End: 2020-11-16

## 2020-11-27 DIAGNOSIS — K21.9 GASTROESOPHAGEAL REFLUX DISEASE WITHOUT ESOPHAGITIS: ICD-10-CM

## 2020-11-30 NOTE — TELEPHONE ENCOUNTER
Last Clinic Visit: 6/11/2020  [facility]    90 day jennie refill sent to the pharmacy - including instructions for patient to call the clinic and schedule an appointment.

## 2020-12-12 DIAGNOSIS — M80.80XA OTHER OSTEOPOROSIS WITH CURRENT PATHOLOGICAL FRACTURE, INITIAL ENCOUNTER: ICD-10-CM

## 2020-12-15 RX ORDER — ALENDRONATE SODIUM 70 MG/1
TABLET ORAL
Qty: 12 TABLET | Refills: 3 | Status: SHIPPED | OUTPATIENT
Start: 2020-12-15 | End: 2021-12-02

## 2020-12-15 NOTE — TELEPHONE ENCOUNTER
ALENDRONATE SODIUM TABS 4'S 70MG  Last Written Prescription Date:  12/26/2019  Last Fill Quantity: 12,   # refills: 3  Last Office Visit : 6/11/20  Future Office visit:  None    R outing refill request to provider for review/approval because:  Dexa scan- not noted.       Ref. Range 1/16/2020 09:16   Vitamin D Deficiency screening Latest Ref Range: 20 - 75 ug/L 28

## 2020-12-30 ENCOUNTER — MYC MEDICAL ADVICE (OUTPATIENT)
Dept: PEDIATRICS | Facility: CLINIC | Age: 50
End: 2020-12-30

## 2021-01-07 ENCOUNTER — MEDICAL CORRESPONDENCE (OUTPATIENT)
Dept: HEALTH INFORMATION MANAGEMENT | Facility: CLINIC | Age: 51
End: 2021-01-07

## 2021-01-07 ENCOUNTER — E-VISIT (OUTPATIENT)
Dept: PEDIATRICS | Facility: CLINIC | Age: 51
End: 2021-01-07
Payer: COMMERCIAL

## 2021-01-07 ENCOUNTER — VIRTUAL VISIT (OUTPATIENT)
Dept: PEDIATRICS | Facility: CLINIC | Age: 51
End: 2021-01-07
Payer: COMMERCIAL

## 2021-01-07 DIAGNOSIS — Z99.3 WHEELCHAIR DEPENDENCE: Primary | ICD-10-CM

## 2021-01-07 DIAGNOSIS — Z99.3 WHEELCHAIR CONFINEMENT: ICD-10-CM

## 2021-01-07 DIAGNOSIS — G82.20 PARAPLEGIA (H): Primary | ICD-10-CM

## 2021-01-07 DIAGNOSIS — M81.6 LOCALIZED OSTEOPOROSIS WITHOUT CURRENT PATHOLOGICAL FRACTURE: ICD-10-CM

## 2021-01-07 PROCEDURE — 99207 PR NON-BILLABLE SERV PER CHARTING: CPT | Performed by: INTERNAL MEDICINE

## 2021-01-07 PROCEDURE — 99213 OFFICE O/P EST LOW 20 MIN: CPT | Mod: 95 | Performed by: INTERNAL MEDICINE

## 2021-01-07 NOTE — PROGRESS NOTES
Priyanka is a 50 year old who is being evaluated via a billable video visit.      How would you like to obtain your AVS? MyChart  If the video visit is dropped, the invitation should be resent by: Send to e-mail at:   Will anyone else be joining your video visit? No    Video Start Time: 5:55 PM  Assessment & Plan   Priyanka was seen today for clinic care coordination - face to face.    Diagnoses and all orders for this visit:    Paraplegia (H)    Wheelchair confinement    Localized osteoporosis without current pathological fracture  -     DX Hip/Pelvis/Spine; Future    Other orders  -     Cancel: DX Hip/Pelvis/Spine; Future       Face-to-face encounter completed.  Will sign orders for seat back and to cushion covers.  History of osteoporosis with pathologic fracture.  She has been on Fosamax for over a year.  She is due for a follow-up bone density.  She will schedule that when she has another appointment where she needs to come into the clinic.  At this juncture she is tries to stay home during the pandemic.    Annual visit via virtual visit.     Ayala Limon MD PhD  [facility]     Priyanka is a 50 year old who presents to clinic today for the following health issues     HPI       Face to Face for Seat cover for wheel chair and cushion for back of chair.   Pt is wheelchair dependent due to paraplegia.  The seat back as foam based has been worn out.  She needs a new see back for the wheelchair.  She also uses cushion covers to protect her seat cushion.  She usually gets to covers at a time.  Those have worn out as well.  She needs to get replacement.    History of pathologic fracture in her hips 2 years ago.  Had bone density done at orthopedist office showing osteoporosis.  She started Fosamax treatment in December 2019.  She takes calcium and vitamin D.            Objective           Vitals:  No vitals were obtained today due to virtual visit.    Physical Exam   GENERAL: Healthy, alert  and no distress  PSYCH: Mentation appears normal, affect normal/bright, judgement and insight intact, normal speech and appearance well-groomed.      Video-Visit Details    Type of service:  Video Visit    Video End Time:6:12 PM    Originating Location (pt. Location): Home    Distant Location (provider location):  [facility]     Platform used for Video Visit: [employer]

## 2021-01-15 ENCOUNTER — HEALTH MAINTENANCE LETTER (OUTPATIENT)
Age: 51
End: 2021-01-15

## 2021-02-02 DIAGNOSIS — F41.9 ANXIETY: ICD-10-CM

## 2021-02-02 DIAGNOSIS — N94.6 DYSMENORRHEA: ICD-10-CM

## 2021-02-03 NOTE — TELEPHONE ENCOUNTER
Routing refill request to provider for review/approval because:  Drug not on the [facility] refill protocol   Michele Lance BSN, RN

## 2021-02-04 RX ORDER — MEDROXYPROGESTERONE ACETATE 10 MG
TABLET ORAL
Qty: 135 TABLET | Refills: 3 | Status: SHIPPED | OUTPATIENT
Start: 2021-02-04 | End: 2022-02-09

## 2021-02-07 ENCOUNTER — HEALTH MAINTENANCE LETTER (OUTPATIENT)
Age: 51
End: 2021-02-07

## 2021-02-11 ENCOUNTER — MYC MEDICAL ADVICE (OUTPATIENT)
Dept: FAMILY MEDICINE | Facility: CLINIC | Age: 51
End: 2021-02-11

## 2021-02-16 DIAGNOSIS — R10.9 FLANK PAIN: ICD-10-CM

## 2021-02-16 RX ORDER — BACLOFEN 10 MG/1
TABLET ORAL
Qty: 270 TABLET | Refills: 3 | Status: SHIPPED | OUTPATIENT
Start: 2021-02-16 | End: 2022-02-09

## 2021-02-16 NOTE — TELEPHONE ENCOUNTER
Routing refill request to provider for review/approval because:  Drug not on the [facility] refill protocol     Jenelle MOYN, RN

## 2021-03-11 ENCOUNTER — IMMUNIZATION (OUTPATIENT)
Dept: PEDIATRICS | Facility: CLINIC | Age: 51
End: 2021-03-11
Payer: COMMERCIAL

## 2021-03-11 PROCEDURE — 0001A PR COVID VAC PFIZER DIL RECON 30 MCG/0.3 ML IM: CPT

## 2021-03-11 PROCEDURE — 91300 PR COVID VAC PFIZER DIL RECON 30 MCG/0.3 ML IM: CPT

## 2021-03-12 ENCOUNTER — TELEPHONE (OUTPATIENT)
Dept: FAMILY MEDICINE | Facility: CLINIC | Age: 51
End: 2021-03-12

## 2021-03-12 NOTE — TELEPHONE ENCOUNTER
Prior Authorization Retail Medication Request    Medication/Dose: celecoxib (CELEBREX) 100 MG capsule  ICD code (if different than what is on RX):  Flank pain [R10.9]  Previously Tried and Failed:    Rationale:      Insurance Name:  [employer]  Insurance ID:  [id number]    Pharmacy Information (if different than what is on RX)  Name:  [facility] - [address]  Phone:  [phone]

## 2021-03-15 NOTE — TELEPHONE ENCOUNTER
Central Prior Authorization Team   Phone: [phone]      [place] Initiation    Medication: celecoxib (CELEBREX) 100 MG capsule  Insurance Company: [employer] - Phone [phone] Fax [phone]  Pharmacy Filling the Rx: [facility] - [place], [place] - [address] [place] [address]  Filling Pharmacy Phone: [phone]  Filling Pharmacy Fax:    Start Date: 3/15/2021

## 2021-03-16 ENCOUNTER — APPOINTMENT (OUTPATIENT)
Dept: URBAN - METROPOLITAN AREA CLINIC 259 | Age: 51
Setting detail: DERMATOLOGY
End: 2021-03-17

## 2021-03-16 DIAGNOSIS — L82.1 OTHER SEBORRHEIC KERATOSIS: ICD-10-CM

## 2021-03-16 DIAGNOSIS — D22 MELANOCYTIC NEVI: ICD-10-CM

## 2021-03-16 DIAGNOSIS — D18.0 HEMANGIOMA: ICD-10-CM

## 2021-03-16 DIAGNOSIS — Z71.89 OTHER SPECIFIED COUNSELING: ICD-10-CM

## 2021-03-16 DIAGNOSIS — L57.8 OTHER SKIN CHANGES DUE TO CHRONIC EXPOSURE TO NONIONIZING RADIATION: ICD-10-CM

## 2021-03-16 DIAGNOSIS — L81.4 OTHER MELANIN HYPERPIGMENTATION: ICD-10-CM

## 2021-03-16 PROBLEM — D18.01 HEMANGIOMA OF SKIN AND SUBCUTANEOUS TISSUE: Status: ACTIVE | Noted: 2021-03-16

## 2021-03-16 PROBLEM — D22.5 MELANOCYTIC NEVI OF TRUNK: Status: ACTIVE | Noted: 2021-03-16

## 2021-03-16 PROCEDURE — OTHER COUNSELING: OTHER

## 2021-03-16 PROCEDURE — 99213 OFFICE O/P EST LOW 20 MIN: CPT

## 2021-03-16 ASSESSMENT — LOCATION SIMPLE DESCRIPTION DERM
LOCATION SIMPLE: UPPER BACK
LOCATION SIMPLE: LEFT UPPER BACK
LOCATION SIMPLE: RIGHT UPPER BACK

## 2021-03-16 ASSESSMENT — LOCATION DETAILED DESCRIPTION DERM
LOCATION DETAILED: INFERIOR THORACIC SPINE
LOCATION DETAILED: LEFT INFERIOR MEDIAL UPPER BACK
LOCATION DETAILED: LEFT MEDIAL UPPER BACK
LOCATION DETAILED: RIGHT SUPERIOR MEDIAL UPPER BACK

## 2021-03-16 ASSESSMENT — LOCATION ZONE DERM: LOCATION ZONE: TRUNK

## 2021-03-18 NOTE — TELEPHONE ENCOUNTER
Prior Authorization Approval          Authorization Effective Date: 2/13/2021  Authorization Expiration Date: 3/15/2022  Medication: celecoxib (CELEBREX) 100 MG capsule  Approved Dose/Quantity: 180  Reference #: [id number]   Insurance Company: [employer] - Phone [phone] Fax [phone]  Expected CoPay:       Which Pharmacy is filling the prescription (Not needed for infusion/clinic administered): [facility] - [address]  Pharmacy Notified: No - was an automatic renewal for future fills.  Patient Notified: No

## 2021-04-01 ENCOUNTER — IMMUNIZATION (OUTPATIENT)
Dept: PEDIATRICS | Facility: CLINIC | Age: 51
End: 2021-04-01
Attending: INTERNAL MEDICINE
Payer: COMMERCIAL

## 2021-04-01 PROCEDURE — 91300 PR COVID VAC PFIZER DIL RECON 30 MCG/0.3 ML IM: CPT

## 2021-04-01 PROCEDURE — 0002A PR COVID VAC PFIZER DIL RECON 30 MCG/0.3 ML IM: CPT

## 2021-04-16 NOTE — NURSING NOTE
"Priyanka Martinez's goals for this visit include: CC  She requests these members of her care team be copied on today's visit information: No    PCP: Ayala Limon    Referring Provider:  No referring provider defined for this encounter.    Chief Complaint   Patient presents with     RECHECK       Initial There were no vitals taken for this visit. Estimated body mass index is 22.24 kg/(m^2) as calculated from the following:    Height as of 12/17/16: 1.778 m (5' 10\").    Weight as of 12/17/16: 70.3 kg (155 lb).  Medication Reconciliation: complete  " Device Interrogation Reviewed.   Episodes of nSVT noted  Need lab asap  F/u with lab to office asap  Bmp, mg, lft, lp

## 2021-04-18 DIAGNOSIS — K58.0 IRRITABLE BOWEL SYNDROME WITH DIARRHEA: ICD-10-CM

## 2021-04-19 RX ORDER — DICYCLOMINE HCL 20 MG
TABLET ORAL
Qty: 180 TABLET | Refills: 0 | Status: SHIPPED | OUTPATIENT
Start: 2021-04-19 | End: 2021-07-21

## 2021-04-19 NOTE — TELEPHONE ENCOUNTER
Prescription approved per [facility] Refill Protocol.  APPT NEEDED FOR FURTHER REFILLS  Jaja refill given per RN protocol.   Due for office visit  Pharmacy note to inform pt of office visit needed for continued medication use  Keri Santiago RN

## 2021-05-18 ENCOUNTER — TELEPHONE (OUTPATIENT)
Dept: NURSING | Facility: CLINIC | Age: 51
End: 2021-05-18

## 2021-05-18 NOTE — TELEPHONE ENCOUNTER
Pharmacy calling, requesting verbal order to refill patient's catheter supplies for the year. Looking for verbal to dispense johnson insertion tray, johnson catheter, bedside bag and flexitrack. Pharmacy to fax over request as well. [place] [facility] [place] verbal [place] #[phone].    Lili William RN 05/18/21 12:21 PM   [facility] Triage Nurse Advisor

## 2021-05-18 NOTE — TELEPHONE ENCOUNTER
This writer attempted to contact Nancy with the DME pharmacy in [place] on 05/18/21      Reason for call verbal orders for DME as requested below and unable to leave message as phone kept ringing and did not go to       If patient calls back:   Relay message provider's verbal approval for orders below, (read verbatim), document that pt called and close encounter        Gabi Cm RN

## 2021-05-19 NOTE — TELEPHONE ENCOUNTER
[place] pharmacy DME form placed in Dr. Ayala Limon's bin for completion.      Leyda MATTHEWS)(VICTORIANO)

## 2021-05-19 NOTE — TELEPHONE ENCOUNTER
Called Nancy with [facility] and gave her verbal orders as requested  Nancy verbalized understanding    Routing to team- if fax from [place] pharmacy has not yet been received, please call Nancy back to have her refax the CMN form    Gabi Cm RN  [facility]

## 2021-05-31 NOTE — MR AVS SNAPSHOT
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  Ortho/Sports Medicine Clinic  [address] [place] [id number]    To schedule future appointments in Ortho Clinic, you may call [phone].    To schedule ordered imaging by your Provider: Call [facility] at [phone]    CFX BATTERY available online at:   [email or web address]    Please call if any further questions or concerns [phone] and ask for the Orthopedic Department. Clinic hours 8 am to 5 pm.    Return to clinic if symptoms worsen.          Follow-ups after your visit        Your next 10 appointments already scheduled     Mar 21, 2017  3:00 PM CDT   [facility] Visit with Y[facility]any Rojas D[facility]   [facility]    [address] [place] [id number] [phone]              Who to contact     If you have questions or need follow up information about today's clinic visit or your schedule please contact [facility] directly at [phone].  Normal or non-critical lab and imaging results will be communicated to you by MyChart, letter or phone within 4 business days after the clinic has received the results. If you do not hear from us within 7 days, please contact the clinic through [employer] Effect[employer] or phone. If you have a critical or abnormal lab result, we will notify you by phone as soon as possible.  Submit refill requests through [employer] BATTERY or call your pharmacy and they will forward the refill request to us. Please allow 3 business days for your refill to be completed.          Additional Information  About Your Visit        NextMediumhart Information     All Web Leads gives you secure access to your electronic health record. If you see a primary care provider, you can also send messages to your care team and make appointments. If you have questions, please call your primary care clinic.  If you do not have a primary care provider, please call [phone] and they will assist you.      All Web Leads is an electronic gateway that provides easy, online access to your medical records. With All Web Leads, you can request a clinic appointment, read your test results, renew a prescription or communicate with your care team.     To access your existing account, please contact your [facility] or call [phone] for assistance.        Care EveryWhere ID     This is your Care EveryWhere ID. This could be used by other organizations to access your [facility] medical records  [id number]        Your Vitals Were     Pulse Pulse Oximetry                96 98%           Blood Pressure from Last 3 Encounters:   03/07/17 128/86   02/21/17 129/81   02/13/17 116/74    Weight from Last 3 Encounters:   12/17/16 70.3 kg (155 lb)   12/15/16 68 kg (150 lb)   11/14/16 68 kg (150 lb)              We Performed the Following     DEBRIDEMENT WOUND UP TO 20 SQ CM        Primary Care Provider Office Phone # Fax #    Ayala Limon [phone]       [facility] [address]  [facility] [address]        Thank you!     Thank you for choosing [facility]  for your care. Our goal is always to provide you with excellent care. Hearing back from our patients is one way we can continue to improve our services. Please take a few minutes to complete the written survey that you may receive in the mail after your visit with us. Thank you!             Your Updated Medication List - Protect others around you: Learn how to safely use, store and throw away your medicines at www.disposemymeds.org.          This  list is accurate as of: 3/7/17 12:28 PM.  Always use your most recent med list.                   Brand Name Dispense Instructions for use    bisacodyl 10 MG Suppository    DULCOLAX    25 suppository    Place 1 suppository (10 mg) rectally daily as needed for constipation       Catheters Misc     1 each    1 catheter every 28 days.       CRANBERRY CONCENTRATE 500 MG Caps   Generic drug:  Cranberry     90 capsule    Take 8,400 mg by mouth       dicyclomine 20 MG tablet    BENTYL    180 tablet    Take 1 tablet (20 mg) by mouth 2 times daily       FLUoxetine 20 MG capsule    PROzac    90 capsule    TAKE 1 CAPSULE EVERY DAY       gabapentin 400 MG capsule    NEURONTIN    270 capsule    TAKE 1 CAPSULE THREE TIMES DAILY       GLUTATHIONE PO      Take 175 mg by mouth 2 times daily       loperamide 2 MG capsule    IMODIUM     Take 2 mg by mouth 4 times daily as needed for diarrhea       medroxyPROGESTERone 10 MG tablet    PROVERA    135 tablet    TAKE 1 AND 1/2 TABLETS EVERY DAY       MICROKLENZ WOUND CLEANSER Liqd     240 mL    Externally apply topically daily For daily wound cares.       * order for DME     2 Units    Equipment being ordered: Anchoring Device Flexi-Track LG       * order for DME     1 Units    Equipment being ordered: Insertion Tray Wheeler w/BZK Swab Catheter 10CC, Sterile       * order for DME     2 Units    Equipment being ordered: Drain Bag, Kenguard 2000ml. Urinary Bag with Anti-Refulx       * order for DME     1 Units    Equipment being ordered: Catheter Wheeler 2Way 30cc 18FR, Silicone Coated Latex       order for DME     1 Units    Equipment being ordered: walking boot left       oxybutynin 5 MG tablet    DITROPAN    180 tablet    Take 1 tablet (5 mg) by mouth 2 times daily       sulfamethoxazole-trimethoprim 800-160 MG per tablet    BACTRIM DS/SEPTRA DS    14 tablet    Take 1 tablet by mouth 2 times daily       * Notice:  This list has 4 medication(s) that are the same as other medications prescribed  No significant past surgical history for you. Read the directions carefully, and ask your doctor or other care provider to review them with you.

## 2021-07-15 ENCOUNTER — OFFICE VISIT (OUTPATIENT)
Dept: FAMILY MEDICINE | Facility: CLINIC | Age: 51
End: 2021-07-15
Payer: COMMERCIAL

## 2021-07-15 VITALS
SYSTOLIC BLOOD PRESSURE: 110 MMHG | HEART RATE: 101 BPM | DIASTOLIC BLOOD PRESSURE: 64 MMHG | HEIGHT: 70 IN | OXYGEN SATURATION: 97 % | BODY MASS INDEX: 22.9 KG/M2 | WEIGHT: 160 LBS | RESPIRATION RATE: 25 BRPM | TEMPERATURE: 98 F

## 2021-07-15 DIAGNOSIS — Z13.220 SCREENING FOR HYPERLIPIDEMIA: ICD-10-CM

## 2021-07-15 DIAGNOSIS — Z00.00 ROUTINE GENERAL MEDICAL EXAMINATION AT A HEALTH CARE FACILITY: Primary | ICD-10-CM

## 2021-07-15 DIAGNOSIS — Z13.1 DIABETES MELLITUS SCREENING: ICD-10-CM

## 2021-07-15 DIAGNOSIS — Z11.59 NEED FOR HEPATITIS C SCREENING TEST: ICD-10-CM

## 2021-07-15 DIAGNOSIS — Z87.39 HISTORY OF OSTEOMYELITIS: ICD-10-CM

## 2021-07-15 DIAGNOSIS — K58.0 IRRITABLE BOWEL SYNDROME WITH DIARRHEA: ICD-10-CM

## 2021-07-15 DIAGNOSIS — G82.20 PARAPLEGIA (H): ICD-10-CM

## 2021-07-15 DIAGNOSIS — Z13.220 LIPID SCREENING: ICD-10-CM

## 2021-07-15 DIAGNOSIS — Z12.31 ENCOUNTER FOR SCREENING MAMMOGRAM FOR BREAST CANCER: ICD-10-CM

## 2021-07-15 DIAGNOSIS — Z99.3 WHEELCHAIR CONFINEMENT: ICD-10-CM

## 2021-07-15 LAB
BASOPHILS # BLD AUTO: 0 10E3/UL (ref 0–0.2)
BASOPHILS NFR BLD AUTO: 0 %
EOSINOPHIL # BLD AUTO: 0.1 10E3/UL (ref 0–0.7)
EOSINOPHIL NFR BLD AUTO: 1 %
ERYTHROCYTE [DISTWIDTH] IN BLOOD BY AUTOMATED COUNT: 13.6 % (ref 10–15)
HCT VFR BLD AUTO: 41.5 % (ref 35–47)
HGB BLD-MCNC: 13.9 G/DL (ref 11.7–15.7)
LYMPHOCYTES # BLD AUTO: 2.4 10E3/UL (ref 0.8–5.3)
LYMPHOCYTES NFR BLD AUTO: 30 %
MCH RBC QN AUTO: 30 PG (ref 26.5–33)
MCHC RBC AUTO-ENTMCNC: 33.5 G/DL (ref 31.5–36.5)
MCV RBC AUTO: 89 FL (ref 78–100)
MONOCYTES # BLD AUTO: 0.6 10E3/UL (ref 0–1.3)
MONOCYTES NFR BLD AUTO: 8 %
NEUTROPHILS # BLD AUTO: 4.8 10E3/UL (ref 1.6–8.3)
NEUTROPHILS NFR BLD AUTO: 61 %
PLATELET # BLD AUTO: 260 10E3/UL (ref 150–450)
RBC # BLD AUTO: 4.64 10E6/UL (ref 3.8–5.2)
WBC # BLD AUTO: 7.8 10E3/UL (ref 4–11)

## 2021-07-15 PROCEDURE — 86803 HEPATITIS C AB TEST: CPT | Performed by: INTERNAL MEDICINE

## 2021-07-15 PROCEDURE — 86140 C-REACTIVE PROTEIN: CPT | Performed by: INTERNAL MEDICINE

## 2021-07-15 PROCEDURE — 99396 PREV VISIT EST AGE 40-64: CPT | Performed by: INTERNAL MEDICINE

## 2021-07-15 PROCEDURE — 99213 OFFICE O/P EST LOW 20 MIN: CPT | Mod: 25 | Performed by: INTERNAL MEDICINE

## 2021-07-15 PROCEDURE — 36415 COLL VENOUS BLD VENIPUNCTURE: CPT | Performed by: INTERNAL MEDICINE

## 2021-07-15 PROCEDURE — 85025 COMPLETE CBC W/AUTO DIFF WBC: CPT | Performed by: INTERNAL MEDICINE

## 2021-07-15 ASSESSMENT — ANXIETY QUESTIONNAIRES
5. BEING SO RESTLESS THAT IT IS HARD TO SIT STILL: NOT AT ALL
8. IF YOU CHECKED OFF ANY PROBLEMS, HOW DIFFICULT HAVE THESE MADE IT FOR YOU TO DO YOUR WORK, TAKE CARE OF THINGS AT HOME, OR GET ALONG WITH OTHER PEOPLE?: NOT DIFFICULT AT ALL
1. FEELING NERVOUS, ANXIOUS, OR ON EDGE: SEVERAL DAYS
GAD7 TOTAL SCORE: 1
GAD7 TOTAL SCORE: 1
3. WORRYING TOO MUCH ABOUT DIFFERENT THINGS: NOT AT ALL
7. FEELING AFRAID AS IF SOMETHING AWFUL MIGHT HAPPEN: NOT AT ALL
4. TROUBLE RELAXING: NOT AT ALL
2. NOT BEING ABLE TO STOP OR CONTROL WORRYING: NOT AT ALL
6. BECOMING EASILY ANNOYED OR IRRITABLE: NOT AT ALL
7. FEELING AFRAID AS IF SOMETHING AWFUL MIGHT HAPPEN: NOT AT ALL
GAD7 TOTAL SCORE: 1

## 2021-07-15 ASSESSMENT — ENCOUNTER SYMPTOMS
WEAKNESS: 0
PALPITATIONS: 0
ALLERGIC/IMMUNOLOGIC NEGATIVE: 1
CONSTIPATION: 0
BREAST MASS: 0
ENDOCRINE NEGATIVE: 1
DYSURIA: 0
FREQUENCY: 0
SHORTNESS OF BREATH: 0
DIARRHEA: 0
SORE THROAT: 0
ABDOMINAL PAIN: 1
ARTHRALGIAS: 0
FEVER: 0
HEMATOLOGIC/LYMPHATIC NEGATIVE: 1
HEARTBURN: 0
DIZZINESS: 0
JOINT SWELLING: 0
HEMATURIA: 0
MYALGIAS: 0
EYE PAIN: 0
CHILLS: 0
PARESTHESIAS: 0
HEADACHES: 0
COUGH: 0
HEMATOCHEZIA: 0
NERVOUS/ANXIOUS: 1
NAUSEA: 0

## 2021-07-15 ASSESSMENT — PATIENT HEALTH QUESTIONNAIRE - PHQ9
SUM OF ALL RESPONSES TO PHQ QUESTIONS 1-9: 7
10. IF YOU CHECKED OFF ANY PROBLEMS, HOW DIFFICULT HAVE THESE PROBLEMS MADE IT FOR YOU TO DO YOUR WORK, TAKE CARE OF THINGS AT HOME, OR GET ALONG WITH OTHER PEOPLE: SOMEWHAT DIFFICULT
SUM OF ALL RESPONSES TO PHQ QUESTIONS 1-9: 7

## 2021-07-15 ASSESSMENT — ACTIVITIES OF DAILY LIVING (ADL): CURRENT_FUNCTION: TRANSPORTATION REQUIRES ASSISTANCE

## 2021-07-15 ASSESSMENT — MIFFLIN-ST. JEOR: SCORE: 1436.01

## 2021-07-15 NOTE — PATIENT INSTRUCTIONS
Mammogram and  Bone density:  Call [phone] to schedule at [facility] and [facility]        Preventive Health Recommendations  Female Ages 40 to 49    Yearly exam:     See your health care provider every year in order to  1. Review health changes.   2. Discuss preventive care.    3. Review your medicines if your doctor prescribed any.      Get a Pap test every three years (unless you have an abnormal result and your provider advises testing more often).      If you get Pap tests with HPV test, you only need to test every 5 years, unless you have an abnormal result. You do not need a Pap test if your uterus was removed (hysterectomy) and you have not had cancer.      You should be tested each year for STDs (sexually transmitted diseases), if you're at risk.     Ask your doctor if you should have a mammogram.      Have a colonoscopy (test for colon cancer) if someone in your family has had colon cancer or polyps before age 50.       Have a cholesterol test every 5 years.       Have a diabetes test (fasting glucose) after age 45. If you are at risk for diabetes, you should have this test every 3 years.    Shots: Get a flu shot each year. Get a tetanus shot every 10 years.     Nutrition:     Eat at least 5 servings of fruits and vegetables each day.    Eat whole-grain bread, whole-wheat pasta and brown rice instead of white grains and rice.    Get adequate Calcium and Vitamin D.      Lifestyle    Exercise at least 150 minutes a week (an average of 30 minutes a day, 5 days a week). This will help you control your weight and prevent disease.    Limit alcohol to one drink per day.    No smoking.     Wear sunscreen to prevent skin cancer.    See your dentist every six months for an exam and cleaning.

## 2021-07-15 NOTE — PROGRESS NOTES
"   SUBJECTIVE:   CC: Priyanka Martinez is an 50 year old woman who presents for preventive health visit.       Patient has been advised of split billing requirements and indicates understanding: Yes  History of osteomyelitis of the left ischium, s/p debridement and flap closer. She wanted to recheck inflammatio markers. The skin looks good. However, she feels there is some discomfort. Normally she doesn't feel pain below the waist due to paraplegia. Currently her seat cushion is not the best. She is waiting for her new order to come through.     Healthy Habits:     In general, how would you rate your overall health?  Good    Getting at least 3 servings of Calcium per day:  NO    Bi-annual eye exam:  Yes    Dental care twice a year:  Yes    Sleep apnea or symptoms of sleep apnea:  None    Diet:  Other    Frequency of exercise:  4-5 days/week    Duration of exercise:  15-30 minutes    Do you usually eat at least 4 servings of fruit and vegetables a day, include whole grains    & fiber and avoid regularly eating high fat or \"junk\" foods?  No    Taking medications regularly:  Yes    Barriers to taking medications:  None    Medication side effects:  None    Ability to successfully perform activities of daily living:  Transportation requires assistance    Home Safety:  No safety concerns identified    Hearing Impairment:  No hearing concerns    In the past 6 months, have you been bothered by leaking of urine? Yes    In general, how would you rate your overall mental or emotional health?  Good      PHQ-2 Total Score: 0    Additional concerns today:  Yes        Today's PHQ-2 Score:   PHQ-2 ( 1999 Pfizer) 7/15/2021   Q1: Little interest or pleasure in doing things 0   Q2: Feeling down, depressed or hopeless 0   PHQ-2 Score 0   Q1: Little interest or pleasure in doing things Not at all   Q2: Feeling down, depressed or hopeless Not at all   PHQ-2 Score 0       Abuse: Current or Past (Physical, Sexual or Emotional) - No  Do you " feel safe in your environment? Yes    Have you ever done Advance Care Planning? (For example, a Health Directive, POLST, or a discussion with a medical provider or your loved ones about your wishes): Yes, advance care planning is on file.    Social History     Tobacco Use     Smoking status: Former Smoker     Quit date: 3/18/2012     Years since quittin.3     Smokeless tobacco: Never Used     Tobacco comment: 7 cig a day off and on socially   Substance Use Topics     Alcohol use: No     If you drink alcohol do you typically have >3 drinks per day or >7 drinks per week? Not applicable    Alcohol Use 7/15/2021   Prescreen: >3 drinks/day or >7 drinks/week? Not Applicable   Prescreen: >3 drinks/day or >7 drinks/week? -   No flowsheet data found.    Reviewed orders with patient.  Reviewed health maintenance and updated orders accordingly - Yes  Labs reviewed in EPIC    Breast Cancer Screening:  Any new diagnosis of family breast, ovarian, or bowel cancer? No    FHS-7: No flowsheet data found.    Mammogram Screening: Recommended annual mammography  Pertinent mammograms are reviewed under the imaging tab.    History of abnormal Pap smear: PAP discontinued per patient's choice due to paraplegia.     Reviewed and updated as needed this visit by Provider      Review of Systems   Constitutional: Negative for chills and fever.   HENT: Negative for congestion, ear pain, hearing loss and sore throat.    Eyes: Negative for pain and visual disturbance.   Respiratory: Negative for cough and shortness of breath.    Cardiovascular: Positive for peripheral edema. Negative for chest pain and palpitations.   Gastrointestinal: Positive for abdominal pain. Negative for constipation, diarrhea, heartburn, hematochezia and nausea.   Endocrine: Negative.    Breasts:  Negative for tenderness, breast mass and discharge.   Genitourinary: Negative for dysuria, frequency, genital sores, hematuria, pelvic pain, urgency, vaginal bleeding and  "vaginal discharge.   Musculoskeletal: Negative for arthralgias, joint swelling and myalgias.   Skin: Negative for rash.   Allergic/Immunologic: Negative.    Neurological: Negative for dizziness, weakness, headaches and paresthesias.   Hematological: Negative.    Psychiatric/Behavioral: Negative for mood changes. The patient is nervous/anxious.           OBJECTIVE:   /64 (BP Location: Right arm, Patient Position: Chair, Cuff Size: Adult Large)   Pulse 101   Temp 98  F (36.7  C) (Oral)   Resp 25   Ht 1.778 m (5' 10\")   Wt 72.6 kg (160 lb)   SpO2 97%   Breastfeeding No   BMI 22.96 kg/m    Physical Exam  GENERAL: healthy, alert and no distress, pt examined in sitting position, inability to transfer from chair to exam table.   RESP: lungs clear to auscultation - no rales, rhonchi or wheezes  CV: regular rate and rhythm, normal S1 S2, no S3 or S4, no murmur, click or rub, no peripheral edema and peripheral pulses strong  ABDOMEN: soft, nontender, no hepatosplenomegaly, no masses and bowel sounds normal  MS: no gross musculoskeletal defects noted, no edema    Diagnostic Test Results:  Results for orders placed or performed in visit on 07/15/21   CRP, inflammation     Status: Normal   Result Value Ref Range    CRP Inflammation <2.9 0.0 - 8.0 mg/L   Hepatitis C Screen Reflex to HCV RNA Quant and Genotype     Status: Normal   Result Value Ref Range    Hepatitis C Antibody Nonreactive Nonreactive    Narrative    Assay performance characteristics have not been established for newborns, infants, and children.   CBC with platelets and differential     Status: None   Result Value Ref Range    WBC Count 7.8 4.0 - 11.0 10e3/uL    RBC Count 4.64 3.80 - 5.20 10e6/uL    Hemoglobin 13.9 11.7 - 15.7 g/dL    Hematocrit 41.5 35.0 - 47.0 %    MCV 89 78 - 100 fL    MCH 30.0 26.5 - 33.0 pg    MCHC 33.5 31.5 - 36.5 g/dL    RDW 13.6 10.0 - 15.0 %    Platelet Count 260 150 - 450 10e3/uL    % Neutrophils 61 %    % Lymphocytes 30 % "    % Monocytes 8 %    % Eosinophils 1 %    % Basophils 0 %    Absolute Neutrophils 4.8 1.6 - 8.3 10e3/uL    Absolute Lymphocytes 2.4 0.8 - 5.3 10e3/uL    Absolute Monocytes 0.6 0.0 - 1.3 10e3/uL    Absolute Eosinophils 0.1 0.0 - 0.7 10e3/uL    Absolute Basophils 0.0 0.0 - 0.2 10e3/uL   CBC with platelets and differential     Status: None    Narrative    The following orders were created for panel order CBC with platelets and differential.  Procedure                               Abnormality         Status                     ---------                               -----------         ------                     CBC with platelets and d...[[id number]]                      Final result                 Please view results for these tests on the individual orders.       ASSESSMENT/PLAN:   Priyanka was seen today for physical.    Diagnoses and all orders for this visit:    Routine general medical examination at a health care facility    Need for hepatitis C screening test  -     Hepatitis C Screen Reflex to HCV RNA Quant and Genotype; Future  -     Hepatitis C Screen Reflex to HCV RNA Quant and Genotype    Screening for hyperlipidemia    Lipid screening  -     Cancel: Lipid panel reflex to direct LDL Fasting; Future  -     Cancel: Lipid panel reflex to direct LDL Fasting    Diabetes mellitus screening  -     Cancel: GLUCOSE; Future  -     Cancel: GLUCOSE    History of osteomyelitis  Comments:  inflammatory markers are negative. no active infectious concern.   Orders:  -     CBC with platelets and differential; Future  -     CRP, inflammation; Future  -     CRP, inflammation  -     CBC with platelets and differential    Encounter for screening mammogram for breast cancer  -     MA SCREENING DIGITAL BILAT - Future  (s+30); Future    Wheelchair confinement    Paraplegia (H)    Irritable bowel syndrome with diarrhea  Comments:  Abdominal pain as primary symptom. on Bentyl.    Other orders  -     REVIEW OF HEALTH MAINTENANCE  "PROTOCOL ORDERS        Patient has been advised of split billing requirements and indicates understanding: No  COUNSELING:  Reviewed preventive health counseling, as reflected in patient instructions    Estimated body mass index is 22.96 kg/m  as calculated from the following:    Height as of this encounter: 1.778 m (5' 10\").    Weight as of this encounter: 72.6 kg (160 lb).        She reports that she quit smoking about 9 years ago. She has never used smokeless tobacco.      Counseling Resources:  ATP IV Guidelines  Pooled Cohorts Equation Calculator  Breast Cancer Risk Calculator  BRCA-Related Cancer Risk Assessment: FHS-7 Tool  FRAX Risk Assessment  ICSI Preventive Guidelines  Dietary Guidelines for Americans, 2010  [employer]'s MyPlate  ASA Prophylaxis  Lung CA Screening    Ayala Limon MD PhD  [facility]  Answers for HPI/ROS submitted by the patient on 7/15/2021  If you checked off any problems, how difficult have these problems made it for you to do your work, take care of things at home, or get along with other people?: Somewhat difficult  PHQ9 TOTAL SCORE: 7  KAVITHA 7 TOTAL SCORE: 1      "

## 2021-07-16 LAB — CRP SERPL-MCNC: <2.9 MG/L (ref 0–8)

## 2021-07-16 ASSESSMENT — PATIENT HEALTH QUESTIONNAIRE - PHQ9: SUM OF ALL RESPONSES TO PHQ QUESTIONS 1-9: 7

## 2021-07-16 ASSESSMENT — ANXIETY QUESTIONNAIRES: GAD7 TOTAL SCORE: 1

## 2021-07-16 NOTE — RESULT ENCOUNTER NOTE
Dear Priyanka,   Your recent test result are within acceptable range or at baseline. Please continue with your current plan of care.       Please call or Mychart to our office if you have further questions.     Ayala Limon MD-PhD

## 2021-07-19 DIAGNOSIS — K58.0 IRRITABLE BOWEL SYNDROME WITH DIARRHEA: ICD-10-CM

## 2021-07-19 LAB — HCV AB SERPL QL IA: NONREACTIVE

## 2021-07-19 NOTE — TELEPHONE ENCOUNTER
Refill request received within 30 days of last office visit with pcp.  Prescription is routed to the provider to please address refill.   Keri Santiago RN

## 2021-07-21 RX ORDER — DICYCLOMINE HCL 20 MG
TABLET ORAL
Qty: 180 TABLET | Refills: 3 | Status: SHIPPED | OUTPATIENT
Start: 2021-07-21

## 2021-07-25 DIAGNOSIS — Z98.890 S/P FLAP GRAFT: ICD-10-CM

## 2021-07-27 DIAGNOSIS — N30.00 ACUTE CYSTITIS WITHOUT HEMATURIA: ICD-10-CM

## 2021-07-27 RX ORDER — TROSPIUM CHLORIDE 20 MG/1
TABLET, FILM COATED ORAL
Qty: 180 TABLET | Refills: 0 | Status: SHIPPED | OUTPATIENT
Start: 2021-07-27 | End: 2021-10-21

## 2021-07-27 NOTE — TELEPHONE ENCOUNTER
Routing refill request to provider for review/approval because:  Drug not on the [facility] refill protocol   Drug not active on patient's medication list.         Sade Stevenson RN  [facility]

## 2021-07-28 NOTE — TELEPHONE ENCOUNTER
Please call patient   Usually do not refill antibiotics need to get more information   May need to have appointment   Dr Limon is out of office

## 2021-09-18 ENCOUNTER — HEALTH MAINTENANCE LETTER (OUTPATIENT)
Age: 51
End: 2021-09-18

## 2021-09-18 ENCOUNTER — MYC MEDICAL ADVICE (OUTPATIENT)
Dept: FAMILY MEDICINE | Facility: CLINIC | Age: 51
End: 2021-09-18

## 2021-09-18 DIAGNOSIS — R68.2 DRY MOUTH: ICD-10-CM

## 2021-09-18 DIAGNOSIS — Z13.220 LIPID SCREENING: Primary | ICD-10-CM

## 2021-09-20 ENCOUNTER — LAB (OUTPATIENT)
Dept: LAB | Facility: CLINIC | Age: 51
End: 2021-09-20
Payer: COMMERCIAL

## 2021-09-20 DIAGNOSIS — Z98.890 S/P FLAP GRAFT: ICD-10-CM

## 2021-09-20 DIAGNOSIS — Z13.220 LIPID SCREENING: ICD-10-CM

## 2021-09-20 LAB
ALBUMIN SERPL-MCNC: 3.5 G/DL (ref 3.4–5)
ALP SERPL-CCNC: 71 U/L (ref 40–150)
ALT SERPL W P-5'-P-CCNC: 26 U/L (ref 0–50)
ANION GAP SERPL CALCULATED.3IONS-SCNC: 1 MMOL/L (ref 3–14)
AST SERPL W P-5'-P-CCNC: 13 U/L (ref 0–45)
BILIRUB SERPL-MCNC: 0.3 MG/DL (ref 0.2–1.3)
BUN SERPL-MCNC: 15 MG/DL (ref 7–30)
CALCIUM SERPL-MCNC: 8.3 MG/DL (ref 8.5–10.1)
CHLORIDE BLD-SCNC: 111 MMOL/L (ref 94–109)
CO2 SERPL-SCNC: 29 MMOL/L (ref 20–32)
CREAT SERPL-MCNC: 0.49 MG/DL (ref 0.52–1.04)
CRP SERPL-MCNC: <2.9 MG/L (ref 0–8)
ERYTHROCYTE [DISTWIDTH] IN BLOOD BY AUTOMATED COUNT: 13 % (ref 10–15)
GFR SERPL CREATININE-BSD FRML MDRD: >90 ML/MIN/1.73M2
GLUCOSE BLD-MCNC: 97 MG/DL (ref 70–99)
HCT VFR BLD AUTO: 39.2 % (ref 35–47)
HGB BLD-MCNC: 13 G/DL (ref 11.7–15.7)
MCH RBC QN AUTO: 30 PG (ref 26.5–33)
MCHC RBC AUTO-ENTMCNC: 33.2 G/DL (ref 31.5–36.5)
MCV RBC AUTO: 91 FL (ref 78–100)
PLATELET # BLD AUTO: 234 10E3/UL (ref 150–450)
POTASSIUM BLD-SCNC: 4 MMOL/L (ref 3.4–5.3)
PROT SERPL-MCNC: 6.9 G/DL (ref 6.8–8.8)
RBC # BLD AUTO: 4.33 10E6/UL (ref 3.8–5.2)
SODIUM SERPL-SCNC: 141 MMOL/L (ref 133–144)
WBC # BLD AUTO: 7.1 10E3/UL (ref 4–11)

## 2021-09-20 PROCEDURE — 86140 C-REACTIVE PROTEIN: CPT

## 2021-09-20 PROCEDURE — 85027 COMPLETE CBC AUTOMATED: CPT

## 2021-09-20 PROCEDURE — 80061 LIPID PANEL: CPT

## 2021-09-20 PROCEDURE — 80053 COMPREHEN METABOLIC PANEL: CPT

## 2021-09-20 PROCEDURE — 36415 COLL VENOUS BLD VENIPUNCTURE: CPT

## 2021-09-21 NOTE — TELEPHONE ENCOUNTER
"To MD to advise.  Patient per mychart message had wanted to be checked for diabetes; has a family history of and his is having issues with \"worst dry mouth\" for a few weeks.  No medication changes; has been on long term.  Dad's side family has diabetes type 2.    Blood glucose yesterday was 97 with labs.  Annamarie Langford RN    "

## 2021-09-22 LAB
CHOLEST SERPL-MCNC: 154 MG/DL
HDLC SERPL-MCNC: 44 MG/DL
LDLC SERPL CALC-MCNC: 97 MG/DL
NONHDLC SERPL-MCNC: 110 MG/DL
TRIGL SERPL-MCNC: 66 MG/DL

## 2021-09-22 NOTE — RESULT ENCOUNTER NOTE
Dear Priyanka,   Your recent test results showed the following:  -- lipid panel is stable from last year.     Please call or Mychart to our office if you have further questions.     Ayala Limon MD-PhD
lab

## 2021-09-24 DIAGNOSIS — R10.9 FLANK PAIN: ICD-10-CM

## 2021-09-27 RX ORDER — CELECOXIB 100 MG/1
CAPSULE ORAL
Qty: 180 CAPSULE | Refills: 3 | Status: SHIPPED | OUTPATIENT
Start: 2021-09-27

## 2021-09-27 NOTE — TELEPHONE ENCOUNTER
Routing refill request to provider for review/approval because:  Labs out of range:  Creatinine    Jenelle Lance BSN, RN

## 2021-10-13 ENCOUNTER — MYC MEDICAL ADVICE (OUTPATIENT)
Dept: FAMILY MEDICINE | Facility: CLINIC | Age: 51
End: 2021-10-13

## 2021-10-13 DIAGNOSIS — F41.1 GAD (GENERALIZED ANXIETY DISORDER): Primary | ICD-10-CM

## 2021-10-13 DIAGNOSIS — F51.05 INSOMNIA SECONDARY TO ANXIETY: ICD-10-CM

## 2021-10-13 DIAGNOSIS — F41.9 INSOMNIA SECONDARY TO ANXIETY: ICD-10-CM

## 2021-10-13 NOTE — TELEPHONE ENCOUNTER
Routing to Provider to review and adivse, Refer to BlackbookHRhart message from patient.     It does not look like the added on labs were collected, she may need to come back in for them.    Li Swan RN

## 2021-10-18 RX ORDER — SERTRALINE HYDROCHLORIDE 25 MG/1
25 TABLET, FILM COATED ORAL DAILY
Qty: 30 TABLET | Refills: 0 | Status: SHIPPED | OUTPATIENT
Start: 2021-10-18 | End: 2021-11-10

## 2021-10-18 RX ORDER — HYDROXYZINE PAMOATE 25 MG/1
25 CAPSULE ORAL
Qty: 30 CAPSULE | Refills: 0 | Status: SHIPPED | OUTPATIENT
Start: 2021-10-18 | End: 2021-11-10

## 2021-10-20 DIAGNOSIS — Z98.890 S/P FLAP GRAFT: ICD-10-CM

## 2021-10-21 RX ORDER — TROSPIUM CHLORIDE 20 MG/1
TABLET, FILM COATED ORAL
Qty: 180 TABLET | Refills: 0 | Status: SHIPPED | OUTPATIENT
Start: 2021-10-21 | End: 2022-02-08

## 2021-10-28 RX ORDER — CIPROFLOXACIN 500 MG/1
TABLET, FILM COATED ORAL
Qty: 14 TABLET | Refills: 0 | OUTPATIENT
Start: 2021-10-28

## 2021-10-28 NOTE — TELEPHONE ENCOUNTER
Pt does not need this right now, please remove pended med and close encounter. I do not have security to do so. Thanks!

## 2022-03-04 ENCOUNTER — TELEPHONE (OUTPATIENT)
Dept: FAMILY MEDICINE | Facility: CLINIC | Age: 52
End: 2022-03-04
Payer: COMMERCIAL

## 2022-03-04 NOTE — TELEPHONE ENCOUNTER
Incoming call from [facility] staff. She states pt needs a new order for her monthly catheter because pt's dog ate her catheter upon delivery.    She states pt is currently receiving Lubricath 20 Telugu 5cc coated latex indwelling johnson catheter. Please review and sign pending order if appropriate.    Please fax order to [phone].    Olive Roberts, CHINMAYN, RN

## 2022-03-05 ENCOUNTER — HEALTH MAINTENANCE LETTER (OUTPATIENT)
Age: 52
End: 2022-03-05

## 2022-03-10 ENCOUNTER — MEDICAL CORRESPONDENCE (OUTPATIENT)
Dept: HEALTH INFORMATION MANAGEMENT | Facility: CLINIC | Age: 52
End: 2022-03-10
Payer: COMMERCIAL

## 2022-05-20 ENCOUNTER — TELEPHONE (OUTPATIENT)
Dept: FAMILY MEDICINE | Facility: CLINIC | Age: 52
End: 2022-05-20
Payer: COMMERCIAL

## 2022-05-20 ENCOUNTER — MEDICAL CORRESPONDENCE (OUTPATIENT)
Dept: HEALTH INFORMATION MANAGEMENT | Facility: CLINIC | Age: 52
End: 2022-05-20
Payer: COMMERCIAL

## 2022-05-20 NOTE — TELEPHONE ENCOUNTER
Form completed. Please fax.  
Form received from [facility].  Placed on Dr. Limon's desk for signature.    
1cm laceration on chin no active bleeding. Right periorbital ecchymosis

## 2022-05-25 ENCOUNTER — TELEPHONE (OUTPATIENT)
Dept: FAMILY MEDICINE | Facility: CLINIC | Age: 52
End: 2022-05-25
Payer: COMMERCIAL

## 2022-05-25 NOTE — TELEPHONE ENCOUNTER
A user error has taken place: encounter opened in error, closed for administrative reasons.  Keri Santiago RN

## 2022-08-20 ENCOUNTER — HEALTH MAINTENANCE LETTER (OUTPATIENT)
Age: 52
End: 2022-08-20

## 2022-09-27 DIAGNOSIS — M80.80XA OTHER OSTEOPOROSIS WITH CURRENT PATHOLOGICAL FRACTURE, INITIAL ENCOUNTER: ICD-10-CM

## 2022-09-28 RX ORDER — ALENDRONATE SODIUM 70 MG/1
TABLET ORAL
Qty: 8 TABLET | Refills: 5 | OUTPATIENT
Start: 2022-09-28

## 2022-09-28 RX ORDER — ALENDRONATE SODIUM 70 MG/1
70 TABLET ORAL
Qty: 8 TABLET | Refills: 0 | Status: SHIPPED | OUTPATIENT
Start: 2022-09-28

## 2022-09-28 NOTE — TELEPHONE ENCOUNTER
Pt is overdue for annual visit. I approved 2 months. Please schedule for physical within 2 months.

## 2022-10-12 NOTE — TELEPHONE ENCOUNTER
Called patient and she states that she has been living in [facility] for almost two years.Will not need anymore refills from us as she has a new provider.

## 2022-11-19 ENCOUNTER — HEALTH MAINTENANCE LETTER (OUTPATIENT)
Age: 52
End: 2022-11-19

## 2022-11-22 NOTE — PATIENT INSTRUCTIONS
Thanks for coming today.  Ortho[facility] Medicine Clinic  [address] [place] [address]    To schedule future appointments in Ortho Clinic, you may call [phone].    To schedule ordered imaging by your Provider: Call [facility] at [phone]    [employer] available online at:   [email or web address]    Please call if any further questions or concerns [phone] and ask for the Orthopedic Department. Clinic hours 8 am to 5 pm.    Return to clinic if symptoms worsen.     Counseling: I discussed the timing of the procedure and ensured the patient understands that this test requires multiple visits. No topical steroids applied should be applied to the patch testing location and no oral prednisone for two week prior to the test. While the patches are in place they should be kept dry which will limit bathing, swimming an exercise. I also explained that it is common for testing to be negative and this doesn't mean there isn't a allergic reaction occurring. During the testing itching is common. Location Patches Should Be Applied: Back Patch Test To Be Applied: TRUE test Patch Test Reading Schedule: First Reading at 48 hours and Second Reading at 72 hours Detail Level: Simple

## 2023-01-17 NOTE — MR AVS SNAPSHOT
After Visit Summary   8/29/2017    Priyanka Martinez    MRN: [id number]           Patient Information     Date Of Birth          1970        Visit Information        Provider Department      8/29/2017 7:00 AM Yobany Rojas DPM [facility]        Today's Diagnoses     Ulcer of heel and midfoot, left, with fat layer exposed (H)    -  1      Care Instructions    Thanks for coming today.  Ortho[facility] Medicine Clinic  [address] [place] [id number]    To schedule future appointments in Ortho Clinic, you may call [phone].    To schedule ordered imaging by your Provider: Call [facility] at [phone]    Press[employer] available online at:   [email or web address]    Please call if any further questions or concerns [phone] and ask for the Orthopedic Department. Clinic hours 8 am to 5 pm.    Return to clinic if symptoms worsen.            Follow-ups after your visit        Your next 10 appointments already scheduled     Sep 12, 2017  7:30 AM CDT   Return Visit with Yobany Rojas DPM   [facility] ([facility]    [address] [place] [id number] [phone]              Who to contact     If you have questions or need follow up information about today's clinic visit or your schedule please contact [facility] directly at [phone].  Normal or non-critical lab and imaging results will be communicated to you by MyChart, letter or phone within 4 business days after the clinic has received the results. If you do not hear from us within 7 days, please contact the clinic through MyChart or phone. If you have a critical or abnormal lab result, we will notify you by phone as soon as possible.  Submit refill requests through [employer] or call your pharmacy and they will forward the refill request to us. Please allow 3 business days for your refill to be completed.          Additional Information  Operative report    Date: 1/17/2023    Patient: Barbara Holden     Pre-op Dx: Asymptomatic left carotid stenosis    Post-op Dx: Same    Procedure: Left carotid endarterectomy with bovine patch angioplasty    Surgeon:  Beena Longoria MD    Assistants: RAEANN Hsieh    Anesthesia Type: General    Findings:  Focal carotid stenosis mostly in the proximal ICA.  Procedure done without any intraop or postoperative changes in neuro monitoring or neuro exam.    Estimated Blood Loss: 100cc    Complications: None    Implants:    Implant Name Type Inv. Item Serial No.  Lot No. LRB No. Used Action   PATCH VASC 8X.8CM XENOSURE BOVINE PRICRD TISS STRL -  Graft PATCH VASC 8X.8CM XENOSURE BOVINE PRICRD TISS STRL 0000 [facility] [id number] Left 1 Implanted   CLIP INTERNAL SM CHEVRON 24 CRTDG LIGATE TRIANGULATE XSECT - S. Other Implant CLIP INTERNAL SM CHEVRON 24 CRTDG LIGATE TRIANGULATE XSECT . [employer] [id number] Left 1 Implanted   CLIP INTERNAL MED CHEVRON 24 CRTDG LIGATE TRIANGULATE XSECT - S. Other Implant CLIP INTERNAL MED CHEVRON 24 CRTDG LIGATE TRIANGULATE XSECT . [employer] [id number] Left 1 Implanted     Specimens Removed: None    Indication:   74F PMH of HFrEF (EF 50%, follows closely with Heart Failure Team), CAD s/p stent, LBBB, HTN, HLD, cervical fusion, epilepsy who presents with asymptomatic left carotid stenosis ( cm/sec, ratio 4.1). She is interested in surgical intervention and would be a good candidate for left carotid endarterectomy.     Procedure In Detail:   The patient was brought into the operating room. A time out was performed, confirming the patient and procedure. General anesthesia was started. The patient was positioned in beach chair position. All pressure points were padded. A grier was placed. Preoperative antibiotics were given. On pre-op exam, the patient had the following exam neurological exam: no focal deficits. The left neck, shoulder, and chest  About Your Visit        Zad[employer] Information     [employer] gives you secure access to your electronic health record. If you see a primary care provider, you can also send messages to your care team and make appointments. If you have questions, please call your primary care clinic.  If you do not have a primary care provider, please call [phone] and they will assist you.      Con[employer]ac is an electronic gateway that provides easy, online access to your medical records. With [employer], you can request a clinic appointment, read your test results, renew a prescription or communicate with your care team.     To access your existing account, please contact your [facility] or call [phone] for assistance.        Care EveryWhere ID     This is your Care EveryWhere ID. This could be used by other organizations to access your [facility] medical records  [id number]        Your Vitals Were     Pulse Pulse Oximetry                91 98%           Blood Pressure from Last 3 Encounters:   08/29/17 129/83   08/15/17 (!) 131/91   08/01/17 120/79    Weight from Last 3 Encounters:   12/17/16 70.3 kg (155 lb)   12/15/16 68 kg (150 lb)   11/14/16 68 kg (150 lb)              Today, you had the following     No orders found for display       Primary Care Provider Office Phone # Fax #    Ayala Limon [phone]       [address]  [facility] [address]        Equal Access to Services     [facility]: Hadii nikunj ku hadasho Soomaali, waaxda luqadaha, qaybta kaalmada adeegyada, ronnie mccoy . So [facility] [phone].    ATENCIÓN: Si habla español, tiene a luu disposición servicios gratuitos de asistencia lingüística. Llame al [phone].    We comply with applicable federal civil rights laws and [place] laws. We do not discriminate on the basis of race, color, national origin, age, disability sex, sexual orientation or gender identity.            Thank you!      were prepped and draped in the usual standard fashion.     A skin incision was made along the anterior border of the left sternocleidomastoid muscle. This was deepened through the subcutaneous and platysma using much cautery. The sternocleidomastoid muscle was identified and retracted laterally. The internal jugular vein was identified and dissected along the medial border. The internal jugular vein was then retracted laterally.  The facial vein was not seen and therefore not divided. The common carotid artery was circumferentially dissected and controlled with vessel loop. The external and internal carotid arteries were then identified and circumferentially dissected and then encircled with vessel loops. The vagus nerve was located posteriorly in the carotid sheath, and was protected for the remainder of the case.  The ansa cervicalis and hypoglossal nerve were not seen (likely located more distally). The patient was heparinized to maintain ACT >250 throughout the rest of the case. MAP were kept >100. A clamp was placed on the internal carotid artery, followed by the common carotid and external carotid arteries.  There were no changes on neural monitoring.  An 11 blade was used to make an arteriotomy on a soft area of the common carotid artery, which was then extended with Mckinley scissors through the plaque and into the internal carotid artery. An endarterectomy was then performed. After the plaque was removed, the distal endpoint was examined and was found to be clean without any flaps or debris.  Tacking sutures were not needed.  A bovine patch was brought onto the field. Patch angiogplasty was performed using running 6-0 prolene suture in standard fashion. Prior to completion of the suture line, the vessels were back bled, forward bled, and flushed. Upon completion the clamps were removed sequentially (external carotid, common carotid, and then internal carotid). There were no changes on neuro monitoring. Using  Thank you for choosing [facility]  for your care. Our goal is always to provide you with excellent care. Hearing back from our patients is one way we can continue to improve our services. Please take a few minutes to complete the written survey that you may receive in the mail after your visit with us. Thank you!             Your Updated Medication List - Protect others around you: Learn how to safely use, store and throw away your medicines at www.disposemymeds.org.          This list is accurate as of: 8/29/17 11:59 PM.  Always use your most recent med list.                   Brand Name Dispense Instructions for use Diagnosis    Catheters Misc     1 each    1 catheter every 28 days.    Atonic neurogenic bladder       dicyclomine 20 MG tablet    BENTYL    180 tablet    Take 1 tablet (20 mg) by mouth 2 times daily    Irritable bowel syndrome with diarrhea       FLUoxetine 20 MG capsule    PROzac    90 capsule    TAKE 1 CAPSULE EVERY DAY    Anxiety       gabapentin 400 MG capsule    NEURONTIN    270 capsule    TAKE 1 CAPSULE THREE TIMES DAILY    Neuropathic pain of flank, right, Neuropathic pain of flank, left       medroxyPROGESTERone 10 MG tablet    PROVERA    135 tablet    TAKE 1 AND 1/2 TABLETS EVERY DAY    Dysmenorrhea       MICROKLENZ WOUND CLEANSER Liqd     240 mL    Externally apply topically daily For daily wound cares.    Ulcer of heel and midfoot, left, with fat layer exposed (H), Ingrowing nail       * order for DME     1 Units    Equipment being ordered: walking boot left    Tibia/fibula fracture, left, closed, initial encounter       * order for DME     2 Units    Equipment being ordered: Anchoring Device Flexi-Track LG    Neurogenic bladder       * order for DME     1 Units    Equipment being ordered: Insertion Tray Wheeler w/BZK Swab Catheter 10CC, Sterile    Neurogenic bladder       * order for DME     2 Units    Equipment being ordered: Drain Bag, Kenguard 2000ml. Urinary Bag with  the doppler, there was appropriate signals in all three arteries. The wound was irrigated. Hemostasis was achieved. 35mg of protamine was given. The platysma was then reapproximated with 3-0 Vicryl. The skin was reapproximated using 4-0 Monocryl. Lidocaine was injected around her incision site. Surgical glue was applied.    The procedure came to a conclusion. All counts were correct. Sterile dressings were applied. The patient was gently awoken from anesthesia. Upon awakening the patient was moving all extremities and there were no neurological changes noted. The patient was transferred to the recovery room. There were no intraoperative or immediate postoperative complications.      Post-Op Plan:   -Admit to the general floor. Goal SBP <160  -Exam: No neurological deficits  -Anti-thrombotics: Continue ASA and plavix  -Antibiotics: 24hr post-procedural ABx  -Activity: HOB at least 30 when resting  -Wound cares: Keep incision clean and dry  -Serial neurovascular exam  -Family was updated      Beena Longoria MD  Vascular & Endovascular Surgery  [facility]  Phone: [phone]   Pager: [phone]           Anti-Refulx    Neurogenic bladder       * order for DME     1 Units    Equipment being ordered: Catheter Wheeler 2Way 30cc 18FR, Silicone Coated Latex    Neurogenic bladder       oxybutynin 5 MG tablet    DITROPAN    180 tablet    Take 1 tablet (5 mg) by mouth 2 times daily    Neurogenic bladder       * Notice:  This list has 5 medication(s) that are the same as other medications prescribed for you. Read the directions carefully, and ask your doctor or other care provider to review them with you.

## 2023-04-09 ENCOUNTER — HEALTH MAINTENANCE LETTER (OUTPATIENT)
Age: 53
End: 2023-04-09

## 2023-05-24 ENCOUNTER — WEB ENCOUNTER (OUTPATIENT)
Dept: URBAN - METROPOLITAN AREA CLINIC 60 | Facility: CLINIC | Age: 53
End: 2023-05-24

## 2023-05-24 ENCOUNTER — DASHBOARD ENCOUNTERS (OUTPATIENT)
Age: 53
End: 2023-05-24

## 2023-05-24 ENCOUNTER — OFFICE VISIT (OUTPATIENT)
Dept: URBAN - METROPOLITAN AREA CLINIC 60 | Facility: CLINIC | Age: 53
End: 2023-05-24

## 2023-05-24 ENCOUNTER — OFFICE VISIT (OUTPATIENT)
Dept: URBAN - METROPOLITAN AREA CLINIC 60 | Facility: CLINIC | Age: 53
End: 2023-05-24
Payer: COMMERCIAL

## 2023-05-24 VITALS
HEIGHT: 70 IN | OXYGEN SATURATION: 98 % | HEART RATE: 103 BPM | TEMPERATURE: 98.2 F | WEIGHT: 158 LBS | DIASTOLIC BLOOD PRESSURE: 88 MMHG | SYSTOLIC BLOOD PRESSURE: 138 MMHG | BODY MASS INDEX: 22.62 KG/M2

## 2023-05-24 DIAGNOSIS — R10.11 RUQ PAIN: ICD-10-CM

## 2023-05-24 DIAGNOSIS — K52.9 COLITIS: ICD-10-CM

## 2023-05-24 PROCEDURE — 99204 OFFICE O/P NEW MOD 45 MIN: CPT | Performed by: PHYSICIAN ASSISTANT

## 2023-05-24 RX ORDER — ALENDRONATE SODIUM 70 MG/1
TABLET ORAL
Qty: 12 TABLET | Status: ACTIVE | COMMUNITY

## 2023-05-24 RX ORDER — MEDROXYPROGESTERONE ACETATE 10 MG/1
TABLET ORAL
Qty: 90 TABLET | Status: ACTIVE | COMMUNITY

## 2023-05-24 RX ORDER — SULFAMETHOXAZOLE AND TRIMETHOPRIM 800; 160 MG/1; MG/1
TAKE ONE TABLET BY MOUTH TWICE A DAY TABLET ORAL
Qty: 14 UNSPECIFIED | Refills: 0 | Status: ACTIVE | COMMUNITY

## 2023-05-24 RX ORDER — GABAPENTIN 600 MG/1
TABLET ORAL
Qty: 270 TABLET | Status: ACTIVE | COMMUNITY

## 2023-05-24 RX ORDER — MELOXICAM 7.5 MG/1
TAKE ONE TABLET BY MOUTH ONE TIME DAILY WITH FOOD TABLET ORAL
Qty: 30 UNSPECIFIED | Refills: 0 | Status: ACTIVE | COMMUNITY

## 2023-05-24 RX ORDER — MIRABEGRON 25 MG/1
TABLET, FILM COATED, EXTENDED RELEASE ORAL
Qty: 90 TABLET | Status: ACTIVE | COMMUNITY

## 2023-05-24 RX ORDER — FLUOXETINE HYDROCHLORIDE 20 MG/1
CAPSULE ORAL
Qty: 90 CAPSULE | Status: ACTIVE | COMMUNITY

## 2023-05-24 RX ORDER — SODIUM CHLORIDE, PRESERVATIVE FREE 5 ML
INJECTION INTRAVENOUS
Qty: 300 | Status: ACTIVE | COMMUNITY

## 2023-05-24 RX ORDER — SYRINGE, DISPOSABLE, 1 ML
SYRINGE, EMPTY DISPOSABLE MISCELLANEOUS
Qty: 1 | Status: ACTIVE | COMMUNITY

## 2023-05-24 RX ORDER — DICYCLOMINE HYDROCHLORIDE 20 MG/1
TABLET ORAL
Qty: 180 TABLET | Status: ACTIVE | COMMUNITY

## 2023-05-24 RX ORDER — OMEPRAZOLE 40 MG/1
1 CAPSULE CAPSULE, DELAYED RELEASE ORAL TWICE A DAY
Qty: 180 CAPSULE | Refills: 1 | OUTPATIENT
Start: 2023-05-24

## 2023-05-24 RX ORDER — BACLOFEN 10 MG/1
TABLET ORAL
Qty: 270 TABLET | Status: ACTIVE | COMMUNITY

## 2023-05-24 RX ORDER — WATER 1 ML/ML
IRRIGANT IRRIGATION
Qty: 500 MILLILITER | Refills: 0 | Status: ACTIVE | COMMUNITY

## 2023-05-24 RX ORDER — TROSPIUM CHLORIDE 20 MG/1
TABLET, FILM COATED ORAL
Qty: 180 TABLET | Status: ACTIVE | COMMUNITY

## 2023-05-24 RX ORDER — OMEPRAZOLE 20 MG/1
CAPSULE, DELAYED RELEASE ORAL
Qty: 90 CAPSULE | Status: ACTIVE | COMMUNITY

## 2023-05-24 NOTE — HPI-TODAY'S VISIT:
52-year-old female with history of paraplegia presents to the office as a new patient after recent hospitalization at [facility] from April 29, 2023 through May 3, 2023.  She was admitted with complaints of right-sided abdominal pain with associated generalized malaise, nausea and foul-smelling urine.  Her urine culture grew Proteus.  She was followed by ID and was treated with IV antibiotics and amikacin bladder instillation.  She had an elective debridement of a left ischial pressure ulcer.  During her hospital stay, she had a CT scan of the abdomen and pelvis done on April 29, 2023 which demonstrated questionable mild colitis changes with fluid in the bowel.  She states she has had RUQ pain for about 6 weeks. States this will come and go and is not related to eating. Pain does not radiate. She has no associated nausea, vomiting, diarrhea. She denies melena or hematochezia. Her pain persisted after UTI was treated. She has tried dicyclomine which was not helpful. She is using omeprazole 20mg daily. She takes ibuprofen 200mg daily to manage her abdominal pain which sometimes helps.  Last EGD done several years. No history of PUD.   She is leaving this Friday for to spend the summer in [place].   Last colonoscopy was done about 3 years ago for CRC screening and was normal according to her.

## 2023-08-22 ENCOUNTER — TELEPHONE ENCOUNTER (OUTPATIENT)
Dept: URBAN - METROPOLITAN AREA CLINIC 60 | Facility: CLINIC | Age: 53
End: 2023-08-22

## 2023-08-22 RX ORDER — OMEPRAZOLE 40 MG/1
1 CAPSULE CAPSULE, DELAYED RELEASE ORAL TWICE A DAY
Qty: 180 CAPSULE | Refills: 1
Start: 2023-05-24

## 2023-09-10 ENCOUNTER — HEALTH MAINTENANCE LETTER (OUTPATIENT)
Age: 53
End: 2023-09-10

## 2025-01-16 NOTE — PROGRESS NOTES
This is a recent snapshot of the patient's [facility] medical record.  For current drug dose and complete information and questions, call [phone] or In Basket pool, [facility] ([id number])  CSN Number:  [id number]       I have reviewed and confirmed nurses' notes for patient's medications, allergies, medical history, and surgical history.

## (undated) DEVICE — DRAPE C-ARM 60X42" 1013

## (undated) DEVICE — Device

## (undated) DEVICE — CAST PLASTER SPLINT 5X30" 7395

## (undated) DEVICE — LINEN TOWEL PACK X5 5464

## (undated) DEVICE — DRAIN JACKSON PRATT CHANNEL 15FR ROUND HUBLESS SIL JP-2228

## (undated) DEVICE — TAPE DURAPORE 3" SILK 1538-3

## (undated) DEVICE — CAST PADDING 4" WEBRIL UNSTERILE

## (undated) DEVICE — PACK EXTREMITY SOP15EXFSD

## (undated) DEVICE — SU ETHILON 3-0 FS-1 18" 669H

## (undated) DEVICE — SU VICRYL 0 CT-1 27" UND J260H

## (undated) DEVICE — POSITIONER HEAD FRAME FOAM LF FP-HEADSF

## (undated) DEVICE — STPL SKIN 35W ROTATING HEAD PRW35

## (undated) DEVICE — SU VICRYL 2-0 CP-1 27" UND J266H

## (undated) DEVICE — LIGHT HANDLE X2

## (undated) DEVICE — GLOVE PROTEXIS POWDER FREE 8.0 ORTHOPEDIC 2D73ET80

## (undated) DEVICE — SU PDS II 3-0 FS-1 27" CLR  Z442H

## (undated) DEVICE — PAD CHUX UNDERPAD 30X36" P3036C

## (undated) DEVICE — SOL NACL 0.9% IRRIG 1000ML BOTTLE 2F7124

## (undated) DEVICE — STPL SKIN PROXIMATE 35 WIDE PMW35

## (undated) DEVICE — APPLICATOR COTTON TIP 6"X2 STERILE LF 6012

## (undated) DEVICE — SYR 10ML FINGER CONTROL W/O NDL 309695

## (undated) DEVICE — SUCTION MANIFOLD DORNOCH ULTRA CART UL-CL500

## (undated) DEVICE — NDL 19GA 1.5" FILTER 305200

## (undated) DEVICE — ESU GROUND PAD UNIVERSAL W/O CORD

## (undated) DEVICE — GOWN IMPERVIOUS SPECIALTY XL/XLONG 39049

## (undated) DEVICE — SUCTION IRR SYSTEM W/O TIP INTERPULSE HANDPIECE 0210-100-000

## (undated) DEVICE — SPONGE LAP 18X18" X8435

## (undated) DEVICE — DRSG XEROFORM 1X8"

## (undated) DEVICE — DRSG ADAPTIC 3X8" 6113

## (undated) DEVICE — GLOVE PROTEXIS MICRO 6.5  2D73PM65

## (undated) DEVICE — IMM LIMB ELEVATOR DC40-0203

## (undated) DEVICE — BNDG ELASTIC 4" DBL LENGTH UNSTERILE 6611-14

## (undated) DEVICE — SPECIMEN CONTAINER 5OZ STERILE 2600SA

## (undated) DEVICE — STRAP KNEE/BODY 31143004

## (undated) DEVICE — SU ETHILON 3-0 PS-1 18" 1663H

## (undated) DEVICE — DRSG STERI STRIP 1/2X4" R1547

## (undated) DEVICE — BLADE KNIFE SURG 10 371110

## (undated) DEVICE — GLOVE PROTEXIS BLUE W/NEU-THERA 8.0  2D73EB80

## (undated) DEVICE — ROD SYN REAMER BALL TIP 2.5X950MM  351.706S

## (undated) DEVICE — POSITIONER HEAD DONUT FOAM 9" LF FP-HEAD9

## (undated) DEVICE — DRILL BIT QUICK COUPLING 3 FLUTE 4.2MMX330/100MM CALIBRATE

## (undated) DEVICE — SU VICRYL 2-0 CT-1 27" UND J259H

## (undated) DEVICE — SU VICRYL 3-0 FS-1 27" J442H

## (undated) DEVICE — PREP SKIN SCRUB TRAY 4461A

## (undated) DEVICE — SOL WATER IRRIG 1000ML BOTTLE 2F7114

## (undated) DEVICE — SYR BULB IRRIG 50ML LATEX FREE 0035280

## (undated) DEVICE — BLADE KNIFE SURG 15 371115

## (undated) DEVICE — WIRE GUIDE 3.2X400MM  357.399

## (undated) DEVICE — DECANTER TRANSFER DEVICE 2008S

## (undated) DEVICE — DRAPE SHEET REV FOLD 3/4 9349

## (undated) DEVICE — BONE CLEANING TIP INTERPULSE  0210-010-000

## (undated) DEVICE — SU PDS II 2-0 CT-2 27"  Z333H

## (undated) DEVICE — LINEN GOWN X4 5410

## (undated) DEVICE — DRAIN JACKSON PRATT RESERVOIR 100ML SU130-1305

## (undated) DEVICE — DRSG STERI STRIP 1X5" R1548

## (undated) DEVICE — ESU PENCIL W/SMOKE EVAC NEPTUNE STRYKER 0703-046-000

## (undated) DEVICE — PREP TECHNI-CARE CHLOROXYLENOL 3% 4OZ BOTTLE C222-4ZWO

## (undated) DEVICE — NDL 18GA 1.5" 305196

## (undated) DEVICE — DRSG ABDOMINAL 07 1/2X8" 7197D

## (undated) DEVICE — PREP CHLORAPREP 26ML TINTED ORANGE  260815

## (undated) DEVICE — GOWN XLG DISP 9545

## (undated) DEVICE — SYR 10ML LL W/O NDL 302995

## (undated) DEVICE — DRILL BIT QUICK COUPLING 3 FLUTE 4.2MMX145MM NDL  POINT

## (undated) DEVICE — BNDG ELASTIC 6" DBL LENGTH UNSTERILE 6611-16

## (undated) DEVICE — LINEN ORTHO PACK 5446

## (undated) DEVICE — PEN MARKING SKIN W/PAPER RULER 31145785

## (undated) DEVICE — DRSG TELFA 3X8" 1238

## (undated) DEVICE — MANIFOLD NEPTUNE 4 PORT 700-20

## (undated) DEVICE — SU PROLENE 3-0 PS-1 18" 8663G

## (undated) RX ORDER — FENTANYL CITRATE 50 UG/ML
INJECTION, SOLUTION INTRAMUSCULAR; INTRAVENOUS
Status: DISPENSED
Start: 2019-11-27

## (undated) RX ORDER — ONDANSETRON 2 MG/ML
INJECTION INTRAMUSCULAR; INTRAVENOUS
Status: DISPENSED
Start: 2019-10-16

## (undated) RX ORDER — EPINEPHRINE 1 MG/ML
INJECTION, SOLUTION INTRAMUSCULAR; SUBCUTANEOUS
Status: DISPENSED
Start: 2019-11-27

## (undated) RX ORDER — FLUMAZENIL 0.1 MG/ML
INJECTION, SOLUTION INTRAVENOUS
Status: DISPENSED
Start: 2018-11-12

## (undated) RX ORDER — FENTANYL CITRATE 50 UG/ML
INJECTION, SOLUTION INTRAMUSCULAR; INTRAVENOUS
Status: DISPENSED
Start: 2018-11-12

## (undated) RX ORDER — FENTANYL CITRATE 50 UG/ML
INJECTION, SOLUTION INTRAMUSCULAR; INTRAVENOUS
Status: DISPENSED
Start: 2019-10-16

## (undated) RX ORDER — BUPIVACAINE HYDROCHLORIDE 5 MG/ML
INJECTION, SOLUTION EPIDURAL; INTRACAUDAL
Status: DISPENSED
Start: 2019-11-27

## (undated) RX ORDER — CEFAZOLIN SODIUM 2 G/100ML
INJECTION, SOLUTION INTRAVENOUS
Status: DISPENSED
Start: 2019-10-16

## (undated) RX ORDER — LIDOCAINE HYDROCHLORIDE AND EPINEPHRINE 10; 10 MG/ML; UG/ML
INJECTION, SOLUTION INFILTRATION; PERINEURAL
Status: DISPENSED
Start: 2019-10-16

## (undated) RX ORDER — NALOXONE HYDROCHLORIDE 0.4 MG/ML
INJECTION, SOLUTION INTRAMUSCULAR; INTRAVENOUS; SUBCUTANEOUS
Status: DISPENSED
Start: 2018-11-12

## (undated) RX ORDER — LIDOCAINE HYDROCHLORIDE 20 MG/ML
INJECTION, SOLUTION EPIDURAL; INFILTRATION; INTRACAUDAL; PERINEURAL
Status: DISPENSED
Start: 2019-10-16

## (undated) RX ORDER — LIDOCAINE HYDROCHLORIDE 20 MG/ML
INJECTION, SOLUTION EPIDURAL; INFILTRATION; INTRACAUDAL; PERINEURAL
Status: DISPENSED
Start: 2019-11-27

## (undated) RX ORDER — ONDANSETRON 2 MG/ML
INJECTION INTRAMUSCULAR; INTRAVENOUS
Status: DISPENSED
Start: 2019-11-27

## (undated) RX ORDER — BUPIVACAINE HYDROCHLORIDE 2.5 MG/ML
INJECTION, SOLUTION EPIDURAL; INFILTRATION; INTRACAUDAL
Status: DISPENSED
Start: 2019-10-16

## (undated) RX ORDER — DEXAMETHASONE SODIUM PHOSPHATE 4 MG/ML
INJECTION, SOLUTION INTRA-ARTICULAR; INTRALESIONAL; INTRAMUSCULAR; INTRAVENOUS; SOFT TISSUE
Status: DISPENSED
Start: 2019-11-27

## (undated) RX ORDER — PROPOFOL 10 MG/ML
INJECTION, EMULSION INTRAVENOUS
Status: DISPENSED
Start: 2019-11-27

## (undated) RX ORDER — DEXAMETHASONE SODIUM PHOSPHATE 4 MG/ML
INJECTION, SOLUTION INTRA-ARTICULAR; INTRALESIONAL; INTRAMUSCULAR; INTRAVENOUS; SOFT TISSUE
Status: DISPENSED
Start: 2019-10-16

## (undated) RX ORDER — PHENYLEPHRINE HCL IN 0.9% NACL 1 MG/10 ML
SYRINGE (ML) INTRAVENOUS
Status: DISPENSED
Start: 2019-10-16

## (undated) RX ORDER — PROPOFOL 10 MG/ML
INJECTION, EMULSION INTRAVENOUS
Status: DISPENSED
Start: 2019-10-16